# Patient Record
Sex: MALE | Race: BLACK OR AFRICAN AMERICAN | NOT HISPANIC OR LATINO | Employment: OTHER | URBAN - METROPOLITAN AREA
[De-identification: names, ages, dates, MRNs, and addresses within clinical notes are randomized per-mention and may not be internally consistent; named-entity substitution may affect disease eponyms.]

---

## 2022-07-13 ENCOUNTER — HOSPITAL ENCOUNTER (EMERGENCY)
Facility: HOSPITAL | Age: 87
Discharge: HOME | End: 2022-07-13
Attending: FAMILY MEDICINE
Payer: MEDICARE

## 2022-07-13 VITALS
HEART RATE: 80 BPM | TEMPERATURE: 99 F | DIASTOLIC BLOOD PRESSURE: 79 MMHG | OXYGEN SATURATION: 98 % | RESPIRATION RATE: 21 BRPM | SYSTOLIC BLOOD PRESSURE: 146 MMHG | WEIGHT: 181.13 LBS

## 2022-07-13 DIAGNOSIS — R06.02 SHORTNESS OF BREATH: ICD-10-CM

## 2022-07-13 DIAGNOSIS — J44.1 COPD EXACERBATION: Primary | ICD-10-CM

## 2022-07-13 LAB
ALBUMIN SERPL BCP-MCNC: 3.3 G/DL (ref 3.5–5.2)
ALLENS TEST: ABNORMAL
ALP SERPL-CCNC: 187 U/L (ref 55–135)
ALT SERPL W/O P-5'-P-CCNC: 16 U/L (ref 10–44)
ANION GAP SERPL CALC-SCNC: 12 MMOL/L (ref 8–16)
AST SERPL-CCNC: 13 U/L (ref 10–40)
BACTERIA #/AREA URNS HPF: ABNORMAL /HPF
BASOPHILS # BLD AUTO: 0.02 K/UL (ref 0–0.2)
BASOPHILS NFR BLD: 0.4 % (ref 0–1.9)
BILIRUB SERPL-MCNC: 1.6 MG/DL (ref 0.1–1)
BILIRUB UR QL STRIP: NEGATIVE
BUN SERPL-MCNC: 19 MG/DL (ref 8–23)
CALCIUM SERPL-MCNC: 8.8 MG/DL (ref 8.7–10.5)
CHLORIDE SERPL-SCNC: 117 MMOL/L (ref 95–110)
CLARITY UR: CLEAR
CO2 SERPL-SCNC: 16 MMOL/L (ref 23–29)
COLOR UR: YELLOW
CREAT SERPL-MCNC: 1.2 MG/DL (ref 0.5–1.4)
DELSYS: ABNORMAL
DIFFERENTIAL METHOD: ABNORMAL
EOSINOPHIL # BLD AUTO: 0.1 K/UL (ref 0–0.5)
EOSINOPHIL NFR BLD: 0.9 % (ref 0–8)
ERYTHROCYTE [DISTWIDTH] IN BLOOD BY AUTOMATED COUNT: 20.2 % (ref 11.5–14.5)
EST. GFR  (AFRICAN AMERICAN): >60 ML/MIN/1.73 M^2
EST. GFR  (NON AFRICAN AMERICAN): 53 ML/MIN/1.73 M^2
FIO2: 21
GLUCOSE SERPL-MCNC: 130 MG/DL (ref 70–110)
GLUCOSE UR QL STRIP: ABNORMAL
HCO3 UR-SCNC: 18.4 MMOL/L (ref 24–28)
HCT VFR BLD AUTO: 31.6 % (ref 40–54)
HGB BLD-MCNC: 9.6 G/DL (ref 14–18)
HGB UR QL STRIP: NEGATIVE
HYALINE CASTS #/AREA URNS LPF: 3 /LPF
IMM GRANULOCYTES # BLD AUTO: 0.01 K/UL (ref 0–0.04)
IMM GRANULOCYTES NFR BLD AUTO: 0.2 % (ref 0–0.5)
KETONES UR QL STRIP: NEGATIVE
LACTATE SERPL-SCNC: 2.2 MMOL/L (ref 0.5–2.2)
LEUKOCYTE ESTERASE UR QL STRIP: NEGATIVE
LYMPHOCYTES # BLD AUTO: 1.8 K/UL (ref 1–4.8)
LYMPHOCYTES NFR BLD: 33.5 % (ref 18–48)
MCH RBC QN AUTO: 25.9 PG (ref 27–31)
MCHC RBC AUTO-ENTMCNC: 30.4 G/DL (ref 32–36)
MCV RBC AUTO: 85 FL (ref 82–98)
MICROSCOPIC COMMENT: ABNORMAL
MODE: ABNORMAL
MONOCYTES # BLD AUTO: 0.7 K/UL (ref 0.3–1)
MONOCYTES NFR BLD: 12.8 % (ref 4–15)
NEUTROPHILS # BLD AUTO: 2.9 K/UL (ref 1.8–7.7)
NEUTROPHILS NFR BLD: 52.2 % (ref 38–73)
NITRITE UR QL STRIP: NEGATIVE
NRBC BLD-RTO: 0 /100 WBC
PCO2 BLDA: 24.3 MMHG (ref 35–45)
PH SMN: 7.49 [PH] (ref 7.35–7.45)
PH UR STRIP: 6 [PH] (ref 5–8)
PLATELET # BLD AUTO: 163 K/UL (ref 150–450)
PMV BLD AUTO: 10.8 FL (ref 9.2–12.9)
PO2 BLDA: 78 MMHG (ref 80–100)
POC BE: -5 MMOL/L
POC SATURATED O2: 97 % (ref 95–100)
POTASSIUM SERPL-SCNC: 3.7 MMOL/L (ref 3.5–5.1)
PROT SERPL-MCNC: 7 G/DL (ref 6–8.4)
PROT UR QL STRIP: ABNORMAL
RBC # BLD AUTO: 3.71 M/UL (ref 4.6–6.2)
RBC #/AREA URNS HPF: 2 /HPF (ref 0–4)
SAMPLE: ABNORMAL
SITE: ABNORMAL
SODIUM SERPL-SCNC: 145 MMOL/L (ref 136–145)
SP GR UR STRIP: 1.02 (ref 1–1.03)
URN SPEC COLLECT METH UR: ABNORMAL
UROBILINOGEN UR STRIP-ACNC: ABNORMAL EU/DL
WBC # BLD AUTO: 5.49 K/UL (ref 3.9–12.7)
WBC #/AREA URNS HPF: 4 /HPF (ref 0–5)
YEAST URNS QL MICRO: ABNORMAL

## 2022-07-13 PROCEDURE — 93005 ELECTROCARDIOGRAM TRACING: CPT

## 2022-07-13 PROCEDURE — 93010 ELECTROCARDIOGRAM REPORT: CPT | Mod: ,,, | Performed by: INTERNAL MEDICINE

## 2022-07-13 PROCEDURE — 83605 ASSAY OF LACTIC ACID: CPT | Performed by: FAMILY MEDICINE

## 2022-07-13 PROCEDURE — 81000 URINALYSIS NONAUTO W/SCOPE: CPT | Performed by: FAMILY MEDICINE

## 2022-07-13 PROCEDURE — 85025 COMPLETE CBC W/AUTO DIFF WBC: CPT | Performed by: FAMILY MEDICINE

## 2022-07-13 PROCEDURE — 94640 AIRWAY INHALATION TREATMENT: CPT | Mod: XB

## 2022-07-13 PROCEDURE — 99900035 HC TECH TIME PER 15 MIN (STAT)

## 2022-07-13 PROCEDURE — 80053 COMPREHEN METABOLIC PANEL: CPT | Performed by: FAMILY MEDICINE

## 2022-07-13 PROCEDURE — 87040 BLOOD CULTURE FOR BACTERIA: CPT | Performed by: FAMILY MEDICINE

## 2022-07-13 PROCEDURE — 36600 WITHDRAWAL OF ARTERIAL BLOOD: CPT

## 2022-07-13 PROCEDURE — 99291 CRITICAL CARE FIRST HOUR: CPT | Mod: 25

## 2022-07-13 PROCEDURE — 93010 EKG 12-LEAD: ICD-10-PCS | Mod: ,,, | Performed by: INTERNAL MEDICINE

## 2022-07-13 PROCEDURE — 25000242 PHARM REV CODE 250 ALT 637 W/ HCPCS: Performed by: FAMILY MEDICINE

## 2022-07-13 RX ORDER — ALBUTEROL SULFATE 90 UG/1
1-2 AEROSOL, METERED RESPIRATORY (INHALATION) EVERY 6 HOURS PRN
Qty: 6.7 G | Refills: 0 | Status: SHIPPED | OUTPATIENT
Start: 2022-07-13

## 2022-07-13 RX ORDER — IPRATROPIUM BROMIDE AND ALBUTEROL SULFATE 2.5; .5 MG/3ML; MG/3ML
3 SOLUTION RESPIRATORY (INHALATION)
Status: COMPLETED | OUTPATIENT
Start: 2022-07-13 | End: 2022-07-13

## 2022-07-13 RX ADMIN — IPRATROPIUM BROMIDE AND ALBUTEROL SULFATE 3 ML: .5; 2.5 SOLUTION RESPIRATORY (INHALATION) at 05:07

## 2022-07-13 NOTE — ED PROVIDER NOTES
SCRIBE #1 NOTE: I, Jeyreid Humphries, am scribing for, and in the presence of, Carolina Vee MD. I have scribed the entire note.       History     Chief Complaint   Patient presents with    Shortness of Breath     Pt. Presents to ED via EMS due to having shortness of breath for the past 2 days. Pt has a new DX of CHF.      Review of patient's allergies indicates:   Allergen Reactions    Penicillins          History of Present Illness     HPI    7/13/2022, 5:21 PM  History obtained from the patient and EMS      History of Present Illness: Joseph Toussaint is a 89 y.o. male patient with a PMHx of CHF and COPD who presents to the Emergency Department for evaluation of SOB x 1 week. Per EMS, pt was found on front porch in respirator distress. Pt was placed on 8L nonrebreather SpO2 100%. Pt states he smokes 1 pack per day and is not on home O2. Pt does not have inhaler or nebulizer at home. Symptoms are constant and moderate in severity. No mitigating or exacerbating factors reported. Associated sxs include cough and wheezing. Patient denies any CP, HA, n/v, congestion, fever, and all other sxs at this time. No further complaints or concerns at this time.       Arrival mode: EMS    PCP: Primary Doctor No        Past Medical History:  Past Medical History:   Diagnosis Date    CHF (congestive heart failure)     COPD (chronic obstructive pulmonary disease)     HTN (hypertension)        Past Surgical History:  No past surgical history on file.      Family History:  No family history on file.    Social History:  Social History     Tobacco Use    Smoking status: Current Every Day Smoker     Packs/day: 1.00     Types: Cigarettes    Smokeless tobacco: Never Used   Substance and Sexual Activity    Alcohol use: Yes     Comment: occasionally    Drug use: Not Currently    Sexual activity: Not Currently        Review of Systems     Review of Systems   Constitutional: Negative for fever.   HENT: Negative for congestion and  sore throat.    Respiratory: Positive for cough, shortness of breath and wheezing.    Cardiovascular: Negative for chest pain.   Gastrointestinal: Negative for nausea.   Genitourinary: Negative for dysuria.   Musculoskeletal: Negative for back pain.   Skin: Negative for rash.   Neurological: Negative for weakness.   Hematological: Does not bruise/bleed easily.   All other systems reviewed and are negative.     Physical Exam     Initial Vitals   BP Pulse Resp Temp SpO2   07/13/22 1715 07/13/22 1715 07/13/22 1715 07/13/22 1740 07/13/22 1715   (!) 142/74 88 (!) 22 98.1 °F (36.7 °C) 99 %      MAP       --                 Physical Exam  Nursing Notes and Vital Signs Reviewed.  Constitutional: Patient is in moderate distress. Well-developed and well-nourished.  Head: Atraumatic. Normocephalic.  Eyes: PERRL. EOM intact. Conjunctivae are not pale. No scleral icterus.  ENT: Mucous membranes are moist. Oropharynx is clear and symmetric.    Neck: Supple. Full ROM. No lymphadenopathy.  Cardiovascular: Regular rate. Regular rhythm. No murmurs, rubs, or gallops. Distal pulses are 2+ and symmetric.  Pulmonary/Chest: Pt is in respiratory distress. Tachypneic. Expiratory wheezing in bilateral lung bases  Abdominal: Soft and non-distended.  There is no tenderness.  No rebound, guarding, or rigidity. Good bowel sounds.  Genitourinary: No CVA tenderness  Musculoskeletal: Moves all extremities. No obvious deformities. 1+ edema BLE. No calf tenderness.  Skin: Warm and dry.  Neurological:  Alert, awake, and appropriate.  Normal speech.  No acute focal neurological deficits are appreciated.  Psychiatric: Normal affect. Good eye contact. Appropriate in content.     ED Course   Critical Care    Date/Time: 7/13/2022 11:48 PM  Performed by: Carolina Vee MD  Authorized by: Carolina Vee MD   Direct patient critical care time: 20 minutes  Additional history critical care time: 5 minutes  Ordering / reviewing critical care time: 5  minutes  Documentation critical care time: 5 minutes  Consulting other physicians critical care time: 5 minutes  Consult with family critical care time: 5 minutes  Total critical care time (exclusive of procedural time) : 45 minutes  Critical care time was exclusive of teaching time and separately billable procedures and treating other patients.  Critical care was necessary to treat or prevent imminent or life-threatening deterioration of the following conditions: respiratory failure.  Critical care was time spent personally by me on the following activities: blood draw for specimens, development of treatment plan with patient or surrogate, discussions with consultants, interpretation of cardiac output measurements, evaluation of patient's response to treatment, examination of patient, obtaining history from patient or surrogate, ordering and performing treatments and interventions, ordering and review of laboratory studies, ordering and review of radiographic studies, pulse oximetry, re-evaluation of patient's condition and review of old charts.        ED Vital Signs:  Vitals:    07/13/22 1715 07/13/22 1734 07/13/22 1735 07/13/22 1740   BP: (!) 142/74 122/82     Pulse: 88 97 87    Resp: (!) 22 20 (!) 22 (!) 24   Temp:    98.1 °F (36.7 °C)   TempSrc:    Oral   SpO2: 99% 98% 96% 97%   Weight: 82.1 kg (181 lb 1.6 oz)       07/13/22 1745 07/13/22 1747 07/13/22 1833 07/13/22 1900   BP:   (!) 154/98 (!) 158/98   Pulse:  89 94 90   Resp: (!) 27 (!) 26 20 (!) 23   Temp:       TempSrc:       SpO2: 97% 97% (!) 94% 96%   Weight:        07/13/22 1930 07/13/22 2000 07/13/22 2106   BP: (!) 153/92 (!) 144/89 (!) 146/79   Pulse: 89 86 80   Resp: (!) 22 (!) 23 (!) 21   Temp:   98.5 °F (36.9 °C)   TempSrc:   Oral   SpO2: 95% 97% 98%   Weight:          Abnormal Lab Results:  Labs Reviewed   CBC W/ AUTO DIFFERENTIAL - Abnormal; Notable for the following components:       Result Value    RBC 3.71 (*)     Hemoglobin 9.6 (*)      Hematocrit 31.6 (*)     MCH 25.9 (*)     MCHC 30.4 (*)     RDW 20.2 (*)     All other components within normal limits   COMPREHENSIVE METABOLIC PANEL - Abnormal; Notable for the following components:    Chloride 117 (*)     CO2 16 (*)     Glucose 130 (*)     Albumin 3.3 (*)     Total Bilirubin 1.6 (*)     Alkaline Phosphatase 187 (*)     eGFR if non  53 (*)     All other components within normal limits   URINALYSIS, REFLEX TO URINE CULTURE - Abnormal; Notable for the following components:    Protein, UA 1+ (*)     Glucose, UA 4+ (*)     Urobilinogen, UA 2.0-3.0 (*)     All other components within normal limits    Narrative:     Specimen Source->Urine   URINALYSIS MICROSCOPIC - Abnormal; Notable for the following components:    Hyaline Casts, UA 3 (*)     All other components within normal limits    Narrative:     Specimen Source->Urine   ISTAT PROCEDURE - Abnormal; Notable for the following components:    POC PH 7.488 (*)     POC PCO2 24.3 (*)     POC PO2 78 (*)     POC HCO3 18.4 (*)     All other components within normal limits   CULTURE, BLOOD    Narrative:     Aerobic and anaerobic   CULTURE, BLOOD    Narrative:     Aerobic and anaerobic   LACTIC ACID, PLASMA        All Lab Results:  Results for orders placed or performed during the hospital encounter of 07/13/22   Blood culture x two cultures. Draw prior to antibiotics.    Specimen: Peripheral, Antecubital, Left; Blood   Result Value Ref Range    Blood Culture, Routine No Growth to date     Blood Culture, Routine No Growth to date    Blood culture x two cultures. Draw prior to antibiotics.    Specimen: Peripheral, Antecubital, Left; Blood   Result Value Ref Range    Blood Culture, Routine No Growth to date     Blood Culture, Routine No Growth to date    CBC auto differential   Result Value Ref Range    WBC 5.49 3.90 - 12.70 K/uL    RBC 3.71 (L) 4.60 - 6.20 M/uL    Hemoglobin 9.6 (L) 14.0 - 18.0 g/dL    Hematocrit 31.6 (L) 40.0 - 54.0 %    MCV 85  82 - 98 fL    MCH 25.9 (L) 27.0 - 31.0 pg    MCHC 30.4 (L) 32.0 - 36.0 g/dL    RDW 20.2 (H) 11.5 - 14.5 %    Platelets 163 150 - 450 K/uL    MPV 10.8 9.2 - 12.9 fL    Immature Granulocytes 0.2 0.0 - 0.5 %    Gran # (ANC) 2.9 1.8 - 7.7 K/uL    Immature Grans (Abs) 0.01 0.00 - 0.04 K/uL    Lymph # 1.8 1.0 - 4.8 K/uL    Mono # 0.7 0.3 - 1.0 K/uL    Eos # 0.1 0.0 - 0.5 K/uL    Baso # 0.02 0.00 - 0.20 K/uL    nRBC 0 0 /100 WBC    Gran % 52.2 38.0 - 73.0 %    Lymph % 33.5 18.0 - 48.0 %    Mono % 12.8 4.0 - 15.0 %    Eosinophil % 0.9 0.0 - 8.0 %    Basophil % 0.4 0.0 - 1.9 %    Differential Method Automated    Comprehensive metabolic panel   Result Value Ref Range    Sodium 145 136 - 145 mmol/L    Potassium 3.7 3.5 - 5.1 mmol/L    Chloride 117 (H) 95 - 110 mmol/L    CO2 16 (L) 23 - 29 mmol/L    Glucose 130 (H) 70 - 110 mg/dL    BUN 19 8 - 23 mg/dL    Creatinine 1.2 0.5 - 1.4 mg/dL    Calcium 8.8 8.7 - 10.5 mg/dL    Total Protein 7.0 6.0 - 8.4 g/dL    Albumin 3.3 (L) 3.5 - 5.2 g/dL    Total Bilirubin 1.6 (H) 0.1 - 1.0 mg/dL    Alkaline Phosphatase 187 (H) 55 - 135 U/L    AST 13 10 - 40 U/L    ALT 16 10 - 44 U/L    Anion Gap 12 8 - 16 mmol/L    eGFR if African American >60 >60 mL/min/1.73 m^2    eGFR if non African American 53 (A) >60 mL/min/1.73 m^2   Lactic acid, plasma #1   Result Value Ref Range    Lactate (Lactic Acid) 2.2 0.5 - 2.2 mmol/L   Urinalysis, Reflex to Urine Culture Urine, Clean Catch    Specimen: Urine   Result Value Ref Range    Specimen UA Urine, Clean Catch     Color, UA Yellow Yellow, Straw, Zully    Appearance, UA Clear Clear    pH, UA 6.0 5.0 - 8.0    Specific Gravity, UA 1.020 1.005 - 1.030    Protein, UA 1+ (A) Negative    Glucose, UA 4+ (A) Negative    Ketones, UA Negative Negative    Bilirubin (UA) Negative Negative    Occult Blood UA Negative Negative    Nitrite, UA Negative Negative    Urobilinogen, UA 2.0-3.0 (A) <2.0 EU/dL    Leukocytes, UA Negative Negative   Urinalysis Microscopic   Result  Value Ref Range    RBC, UA 2 0 - 4 /hpf    WBC, UA 4 0 - 5 /hpf    Bacteria None None-Occ /hpf    Yeast, UA None None    Hyaline Casts, UA 3 (A) 0-1/lpf /lpf    Microscopic Comment SEE COMMENT    ISTAT PROCEDURE   Result Value Ref Range    POC PH 7.488 (H) 7.35 - 7.45    POC PCO2 24.3 (LL) 35 - 45 mmHg    POC PO2 78 (L) 80 - 100 mmHg    POC HCO3 18.4 (L) 24 - 28 mmol/L    POC BE -5 -2 to 2 mmol/L    POC SATURATED O2 97 95 - 100 %    Sample ARTERIAL     Site LR     Allens Test Pass     DelSys Room Air     Mode SPONT     FiO2 21          Imaging Results:  Imaging Results          X-Ray Chest AP Portable (Final result)  Result time 07/13/22 18:19:09    Final result by Leonid Robin MD (07/13/22 18:19:09)                 Impression:      No acute abnormality.      Electronically signed by: Leonid Robin  Date:    07/13/2022  Time:    18:19             Narrative:    EXAMINATION:  XR CHEST AP PORTABLE    CLINICAL HISTORY:  Sepsis;    TECHNIQUE:  Single frontal view of the chest was performed.    COMPARISON:  None    FINDINGS:  The lungs are clear, with normal appearance of pulmonary vasculature and no pleural effusion or pneumothorax.    The cardiac silhouette is borderline enlarged.  The hilar and mediastinal contours are unremarkable.    Bones are intact.                                 The EKG was ordered, reviewed, and independently interpreted by the ED provider.  Interpretation time: 17:12  Rate: 90 BPM  Rhythm: Sinus rhythm with sinus arrhythmia with 1st degree A-V block  Interpretation: Left axis deviaiton. Low voltage QRS. Cannot ruleout anterior infarct. No STEMI.           The Emergency Provider reviewed the vital signs and test results, which are outlined above.     ED Discussion       7:54 PM: Reassessed pt at this time. Discussed with pt all pertinent ED information and results. Discussed pt dx and plan of tx. Gave pt all f/u and return to the ED instructions. All questions and concerns were addressed at  this time. Pt expresses understanding of information and instructions, and is comfortable with plan to discharge. Pt is stable for discharge.    I discussed with patient and/or family/caretaker that evaluation in the ED does not suggest any emergent or life threatening medical conditions requiring immediate intervention beyond what was provided in the ED, and I believe patient is safe for discharge.  Regardless, an unremarkable evaluation in the ED does not preclude the development or presence of a serious of life threatening condition. As such, patient was instructed to return immediately for any worsening or change in current symptoms.       Medical Decision Making:   Clinical Tests:   Lab Tests: Ordered and Reviewed  Radiological Study: Reviewed and Ordered  Medical Tests: Ordered and Reviewed           ED Medication(s):  Medications   albuterol-ipratropium 2.5 mg-0.5 mg/3 mL nebulizer solution 3 mL (3 mLs Nebulization Given 7/13/22 1527)       Discharge Medication List as of 7/13/2022  7:52 PM      START taking these medications    Details   albuterol (PROVENTIL/VENTOLIN HFA) 90 mcg/actuation inhaler Inhale 1-2 puffs into the lungs every 6 (six) hours as needed for Wheezing. Rescue, Starting Wed 7/13/2022, Print                     Scribe Attestation:   Scribe #1: I performed the above scribed service and the documentation accurately describes the services I performed. I attest to the accuracy of the note.     Attending:   Physician Attestation Statement for Scribe #1: I, Carolina Vee MD, personally performed the services described in this documentation, as scribed by Jey Humphries, in my presence, and it is both accurate and complete.           Clinical Impression       ICD-10-CM ICD-9-CM   1. COPD exacerbation  J44.1 491.21   2. Shortness of breath  R06.02 786.05       Disposition:   Disposition: Discharged  Condition: Stable         Carolina Vee MD  07/15/22 6911

## 2022-07-19 ENCOUNTER — HOSPITAL ENCOUNTER (INPATIENT)
Facility: HOSPITAL | Age: 87
LOS: 7 days | Discharge: HOME | DRG: 291 | End: 2022-07-26
Attending: EMERGENCY MEDICINE | Admitting: INTERNAL MEDICINE
Payer: MEDICARE

## 2022-07-19 DIAGNOSIS — I10 ESSENTIAL HYPERTENSION: ICD-10-CM

## 2022-07-19 DIAGNOSIS — I50.23 ACUTE ON CHRONIC SYSTOLIC CONGESTIVE HEART FAILURE: ICD-10-CM

## 2022-07-19 DIAGNOSIS — I48.91 NEW ONSET ATRIAL FIBRILLATION: ICD-10-CM

## 2022-07-19 DIAGNOSIS — E78.2 MIXED HYPERLIPIDEMIA: ICD-10-CM

## 2022-07-19 DIAGNOSIS — I50.9 CHF (CONGESTIVE HEART FAILURE): ICD-10-CM

## 2022-07-19 DIAGNOSIS — I50.9 ACUTE CONGESTIVE HEART FAILURE, UNSPECIFIED HEART FAILURE TYPE: Primary | ICD-10-CM

## 2022-07-19 DIAGNOSIS — R06.02 SHORTNESS OF BREATH: ICD-10-CM

## 2022-07-19 DIAGNOSIS — R06.82 TACHYPNEA: ICD-10-CM

## 2022-07-19 LAB
ALBUMIN SERPL BCP-MCNC: 3.6 G/DL (ref 3.5–5.2)
ALP SERPL-CCNC: 178 U/L (ref 55–135)
ALT SERPL W/O P-5'-P-CCNC: 14 U/L (ref 10–44)
ANION GAP SERPL CALC-SCNC: 11 MMOL/L (ref 8–16)
AST SERPL-CCNC: 12 U/L (ref 10–40)
BACTERIA BLD CULT: NORMAL
BACTERIA BLD CULT: NORMAL
BASOPHILS # BLD AUTO: 0.01 K/UL (ref 0–0.2)
BASOPHILS NFR BLD: 0.2 % (ref 0–1.9)
BILIRUB SERPL-MCNC: 1.7 MG/DL (ref 0.1–1)
BNP SERPL-MCNC: >4900 PG/ML (ref 0–99)
BUN SERPL-MCNC: 26 MG/DL (ref 8–23)
CALCIUM SERPL-MCNC: 9.4 MG/DL (ref 8.7–10.5)
CHLORIDE SERPL-SCNC: 115 MMOL/L (ref 95–110)
CO2 SERPL-SCNC: 17 MMOL/L (ref 23–29)
CREAT SERPL-MCNC: 1.4 MG/DL (ref 0.5–1.4)
D DIMER PPP IA.FEU-MCNC: 1.2 MG/L FEU
DIFFERENTIAL METHOD: ABNORMAL
EOSINOPHIL # BLD AUTO: 0.1 K/UL (ref 0–0.5)
EOSINOPHIL NFR BLD: 1.3 % (ref 0–8)
ERYTHROCYTE [DISTWIDTH] IN BLOOD BY AUTOMATED COUNT: 19.9 % (ref 11.5–14.5)
EST. GFR  (AFRICAN AMERICAN): 51 ML/MIN/1.73 M^2
EST. GFR  (NON AFRICAN AMERICAN): 44 ML/MIN/1.73 M^2
GLUCOSE SERPL-MCNC: 113 MG/DL (ref 70–110)
HCT VFR BLD AUTO: 35.6 % (ref 40–54)
HGB BLD-MCNC: 10.8 G/DL (ref 14–18)
IMM GRANULOCYTES # BLD AUTO: 0.02 K/UL (ref 0–0.04)
IMM GRANULOCYTES NFR BLD AUTO: 0.4 % (ref 0–0.5)
LYMPHOCYTES # BLD AUTO: 1.1 K/UL (ref 1–4.8)
LYMPHOCYTES NFR BLD: 22.9 % (ref 18–48)
MCH RBC QN AUTO: 25.8 PG (ref 27–31)
MCHC RBC AUTO-ENTMCNC: 30.3 G/DL (ref 32–36)
MCV RBC AUTO: 85 FL (ref 82–98)
MONOCYTES # BLD AUTO: 0.4 K/UL (ref 0.3–1)
MONOCYTES NFR BLD: 9 % (ref 4–15)
NEUTROPHILS # BLD AUTO: 3.2 K/UL (ref 1.8–7.7)
NEUTROPHILS NFR BLD: 66.2 % (ref 38–73)
NRBC BLD-RTO: 0 /100 WBC
PLATELET # BLD AUTO: 180 K/UL (ref 150–450)
PMV BLD AUTO: 10.8 FL (ref 9.2–12.9)
POTASSIUM SERPL-SCNC: 4 MMOL/L (ref 3.5–5.1)
PROT SERPL-MCNC: 7.4 G/DL (ref 6–8.4)
RBC # BLD AUTO: 4.18 M/UL (ref 4.6–6.2)
SARS-COV-2 RDRP RESP QL NAA+PROBE: NEGATIVE
SODIUM SERPL-SCNC: 143 MMOL/L (ref 136–145)
TROPONIN I SERPL DL<=0.01 NG/ML-MCNC: 0.03 NG/ML (ref 0–0.03)
WBC # BLD AUTO: 4.8 K/UL (ref 3.9–12.7)

## 2022-07-19 PROCEDURE — 96374 THER/PROPH/DIAG INJ IV PUSH: CPT

## 2022-07-19 PROCEDURE — 80053 COMPREHEN METABOLIC PANEL: CPT | Performed by: PHYSICIAN ASSISTANT

## 2022-07-19 PROCEDURE — 25000003 PHARM REV CODE 250: Performed by: INTERNAL MEDICINE

## 2022-07-19 PROCEDURE — 63600175 PHARM REV CODE 636 W HCPCS: Performed by: INTERNAL MEDICINE

## 2022-07-19 PROCEDURE — 85379 FIBRIN DEGRADATION QUANT: CPT | Performed by: INTERNAL MEDICINE

## 2022-07-19 PROCEDURE — 36415 COLL VENOUS BLD VENIPUNCTURE: CPT | Performed by: INTERNAL MEDICINE

## 2022-07-19 PROCEDURE — 84484 ASSAY OF TROPONIN QUANT: CPT | Performed by: PHYSICIAN ASSISTANT

## 2022-07-19 PROCEDURE — 99285 EMERGENCY DEPT VISIT HI MDM: CPT | Mod: 25

## 2022-07-19 PROCEDURE — U0002 COVID-19 LAB TEST NON-CDC: HCPCS | Performed by: PHYSICIAN ASSISTANT

## 2022-07-19 PROCEDURE — 63600175 PHARM REV CODE 636 W HCPCS: Performed by: EMERGENCY MEDICINE

## 2022-07-19 PROCEDURE — 21400001 HC TELEMETRY ROOM

## 2022-07-19 PROCEDURE — 93005 ELECTROCARDIOGRAM TRACING: CPT

## 2022-07-19 PROCEDURE — 93010 EKG 12-LEAD: ICD-10-PCS | Mod: ,,, | Performed by: INTERNAL MEDICINE

## 2022-07-19 PROCEDURE — 85025 COMPLETE CBC W/AUTO DIFF WBC: CPT | Performed by: PHYSICIAN ASSISTANT

## 2022-07-19 PROCEDURE — 93010 ELECTROCARDIOGRAM REPORT: CPT | Mod: ,,, | Performed by: INTERNAL MEDICINE

## 2022-07-19 PROCEDURE — A4216 STERILE WATER/SALINE, 10 ML: HCPCS | Performed by: INTERNAL MEDICINE

## 2022-07-19 PROCEDURE — 83880 ASSAY OF NATRIURETIC PEPTIDE: CPT | Performed by: PHYSICIAN ASSISTANT

## 2022-07-19 RX ORDER — LANOLIN ALCOHOL/MO/W.PET/CERES
800 CREAM (GRAM) TOPICAL
Status: DISCONTINUED | OUTPATIENT
Start: 2022-07-19 | End: 2022-07-26 | Stop reason: HOSPADM

## 2022-07-19 RX ORDER — IBUPROFEN 200 MG
16 TABLET ORAL
Status: DISCONTINUED | OUTPATIENT
Start: 2022-07-19 | End: 2022-07-26 | Stop reason: HOSPADM

## 2022-07-19 RX ORDER — NALOXONE HCL 0.4 MG/ML
0.02 VIAL (ML) INJECTION
Status: DISCONTINUED | OUTPATIENT
Start: 2022-07-19 | End: 2022-07-26 | Stop reason: HOSPADM

## 2022-07-19 RX ORDER — IBUPROFEN 200 MG
24 TABLET ORAL
Status: DISCONTINUED | OUTPATIENT
Start: 2022-07-19 | End: 2022-07-26 | Stop reason: HOSPADM

## 2022-07-19 RX ORDER — SODIUM,POTASSIUM PHOSPHATES 280-250MG
2 POWDER IN PACKET (EA) ORAL
Status: DISCONTINUED | OUTPATIENT
Start: 2022-07-19 | End: 2022-07-26 | Stop reason: HOSPADM

## 2022-07-19 RX ORDER — HYDROCODONE BITARTRATE AND ACETAMINOPHEN 5; 325 MG/1; MG/1
1 TABLET ORAL EVERY 6 HOURS PRN
Status: DISCONTINUED | OUTPATIENT
Start: 2022-07-19 | End: 2022-07-26 | Stop reason: HOSPADM

## 2022-07-19 RX ORDER — FUROSEMIDE 10 MG/ML
60 INJECTION INTRAMUSCULAR; INTRAVENOUS
Status: COMPLETED | OUTPATIENT
Start: 2022-07-19 | End: 2022-07-19

## 2022-07-19 RX ORDER — SODIUM CHLORIDE 0.9 % (FLUSH) 0.9 %
10 SYRINGE (ML) INJECTION EVERY 8 HOURS
Status: DISCONTINUED | OUTPATIENT
Start: 2022-07-19 | End: 2022-07-26 | Stop reason: HOSPADM

## 2022-07-19 RX ORDER — TALC
6 POWDER (GRAM) TOPICAL NIGHTLY PRN
Status: DISCONTINUED | OUTPATIENT
Start: 2022-07-19 | End: 2022-07-26 | Stop reason: HOSPADM

## 2022-07-19 RX ORDER — FUROSEMIDE 10 MG/ML
40 INJECTION INTRAMUSCULAR; INTRAVENOUS DAILY
Status: DISCONTINUED | OUTPATIENT
Start: 2022-07-19 | End: 2022-07-21

## 2022-07-19 RX ORDER — ACETAMINOPHEN 325 MG/1
650 TABLET ORAL EVERY 4 HOURS PRN
Status: DISCONTINUED | OUTPATIENT
Start: 2022-07-19 | End: 2022-07-26 | Stop reason: HOSPADM

## 2022-07-19 RX ORDER — ENOXAPARIN SODIUM 100 MG/ML
40 INJECTION SUBCUTANEOUS EVERY 24 HOURS
Status: DISCONTINUED | OUTPATIENT
Start: 2022-07-19 | End: 2022-07-26 | Stop reason: HOSPADM

## 2022-07-19 RX ORDER — POLYETHYLENE GLYCOL 3350 17 G/17G
17 POWDER, FOR SOLUTION ORAL DAILY PRN
Status: DISCONTINUED | OUTPATIENT
Start: 2022-07-19 | End: 2022-07-26 | Stop reason: HOSPADM

## 2022-07-19 RX ORDER — GLUCAGON 1 MG
1 KIT INJECTION
Status: DISCONTINUED | OUTPATIENT
Start: 2022-07-19 | End: 2022-07-26 | Stop reason: HOSPADM

## 2022-07-19 RX ADMIN — ENOXAPARIN SODIUM 40 MG: 100 INJECTION SUBCUTANEOUS at 10:07

## 2022-07-19 RX ADMIN — FUROSEMIDE 60 MG: 10 INJECTION, SOLUTION INTRAMUSCULAR; INTRAVENOUS at 06:07

## 2022-07-19 RX ADMIN — Medication 10 ML: at 10:07

## 2022-07-19 RX ADMIN — FUROSEMIDE 40 MG: 10 INJECTION, SOLUTION INTRAMUSCULAR; INTRAVENOUS at 11:07

## 2022-07-19 NOTE — ED PROVIDER NOTES
Encounter Date: 7/19/2022       History     Chief Complaint   Patient presents with    Shortness of Breath     x1 week, audible wheezing in triage. Hx COPD, CHF.     HPI   89-year-old  male with history of COPD and CHF presenting to the emergency department complaining of worsening shortness of breath over the past week.  Patient notes cough and wheezing with some chills.  Denies any fever.  No runny nose sore throat.  He has all intermittent chest tightness but denies chest pain.  Has no pedal edema.  Patient denies irregular heart beat.  He does continue to smoke.  I patient denies orthopnea.    Review of patient's allergies indicates:   Allergen Reactions    Penicillins      Past Medical History:   Diagnosis Date    CHF (congestive heart failure)     COPD (chronic obstructive pulmonary disease)     HTN (hypertension)      History reviewed. No pertinent surgical history.  History reviewed. No pertinent family history.  Social History     Tobacco Use    Smoking status: Current Every Day Smoker     Packs/day: 1.00     Types: Cigarettes    Smokeless tobacco: Never Used   Substance Use Topics    Alcohol use: Yes     Comment: occasionally    Drug use: Not Currently     Review of Systems   Constitutional: Positive for chills. Negative for fever.   HENT: Negative for congestion, rhinorrhea and sore throat.    Respiratory: Positive for cough, chest tightness, shortness of breath and wheezing.    Cardiovascular: Negative for chest pain, palpitations and leg swelling.   Gastrointestinal: Negative for nausea and vomiting.   Musculoskeletal: Negative for arthralgias and myalgias.   All other systems reviewed and are negative.      Physical Exam     Initial Vitals   BP Pulse Resp Temp SpO2   07/19/22 1743 07/19/22 1733 07/19/22 1733 07/19/22 1733 07/19/22 1733   (!) 134/94 85 (!) 30 97.9 °F (36.6 °C) (!) 94 %      MAP       --                Physical Exam  Nursing Notes and Vital Signs  Reviewed.  Constitutional: Patient is in no acute distress. Well-developed and well-nourished.  Head: Atraumatic. Normocephalic.  Eyes:  EOM intact.  No scleral icterus.  ENT: Mucous membranes are moist.  Nares clear   Neck:  Full ROM. No JVD.  Cardiovascular:  Irregularly irregular rate and rhythm.Pulmonary/Chest:  Patient is tachypneic.  He is dyspneic with short sentences due to shortness of breath.  Questionable crackles in the bilateral base ease.  No wheezing.    Abdominal: Soft and non-distended.  There is no tenderness.  No rebound, guarding, or rigidity. Good bowel sounds.  Genitourinary: No CVA tenderness.  No suprapubic tenderness  Musculoskeletal: Moves all extremities. No obvious deformities.  5 x 5 strength in all extremities   Skin: Warm and dry.  Neurological:  Alert, awake, and appropriate.  Normal speech.  No acute focal neurological deficits are appreciated.  Two through 12 intact bilaterally.  Psychiatric: Normal affect. Good eye contact. Appropriate in content.    ED Course   Procedures  Labs Reviewed   CBC W/ AUTO DIFFERENTIAL - Abnormal; Notable for the following components:       Result Value    RBC 4.18 (*)     Hemoglobin 10.8 (*)     Hematocrit 35.6 (*)     MCH 25.8 (*)     MCHC 30.3 (*)     RDW 19.9 (*)     All other components within normal limits   COMPREHENSIVE METABOLIC PANEL - Abnormal; Notable for the following components:    Chloride 115 (*)     CO2 17 (*)     Glucose 113 (*)     BUN 26 (*)     Total Bilirubin 1.7 (*)     Alkaline Phosphatase 178 (*)     eGFR if  51 (*)     eGFR if non  44 (*)     All other components within normal limits   B-TYPE NATRIURETIC PEPTIDE - Abnormal; Notable for the following components:    BNP >4,900 (*)     All other components within normal limits   TROPONIN I   SARS-COV-2 RNA AMPLIFICATION, QUAL     EKG Readings: (Independently Interpreted)   Initial Reading: No STEMI. Rhythm: Atrial Fibrillation. Heart Rate: 71.  Ectopy: No Ectopy. ST Segments: Normal ST Segments. T Waves: Normal. Axis: Right Axis Deviation.     7:04 PM: Discussed case with Sloane Rosario NP ([facility]). Dr. Malhotra agrees with current care and management of pt and accepts admission.   Admitting Service: Hospital Medicine  Admitting Physician: Dr. Malhotra  Admit to: Inpatient Tele    7:14 PM: Re-evaluated pt. I have discussed test results, shared treatment plan, and the need for admission with patient and family at bedside. Pt and family express understanding at this time and agree with all information. All questions answered. Pt and family have no further questions or concerns at this time. Pt is ready for admit.    7:10 PM  Patient with new onset AFib worsening congestive heart failure.  He is tachypneic with mild respiratory distress.  Patient being admitted to Hospital Medicine for further evaluation workup and treatment.  Patient is aware.    Imaging Results          X-Ray Chest AP Portable (Final result)  Result time 07/19/22 18:23:43    Final result by Vinnie Elliott MD (07/19/22 18:23:43)                 Impression:      Cardiomegaly with perihilar edema.  Mild pleural effusions suspected.  Correlate clinically to CHF.  Underlying pneumonia not excluded      Electronically signed by: Earl Birmingham  Date:    07/19/2022  Time:    18:23             Narrative:    EXAMINATION:  XR CHEST AP PORTABLE    CLINICAL HISTORY:  CHF;    TECHNIQUE:  Single frontal view of the chest was performed.    COMPARISON:  None    FINDINGS:  Cardiomegaly with perihilar edema.  Mild pleural effusions.    Bones are intact.                                 Medications   furosemide injection 60 mg (60 mg Intravenous Given 7/19/22 1812)     Medical Decision Making:   Clinical Tests:   Lab Tests: Ordered and Reviewed  Radiological Study: Ordered and Reviewed  Medical Tests: Ordered and Reviewed                      Clinical Impression:   Final diagnoses:  [R06.02] Shortness  of breath  [I50.9] Acute congestive heart failure, unspecified heart failure type (Primary)  [I48.91] New onset atrial fibrillation  [R06.82] Tachypnea          ED Disposition Condition    Admit               Guero Rosa Jr., MD  07/19/22 1910

## 2022-07-19 NOTE — FIRST PROVIDER EVALUATION
Medical screening exam completed.  I have conducted a focused provider triage encounter, findings are as follows:    Brief history of present illness:  Sob for few days.  Hx of CHF and COPD    There were no vitals filed for this visit.    Pertinent physical exam:  sob        Preliminary workup initiated; this workup will be continued and followed by the physician or advanced practice provider that is assigned to the patient when roomed.

## 2022-07-20 PROBLEM — I25.10 CORONARY ARTERY DISEASE INVOLVING NATIVE CORONARY ARTERY OF NATIVE HEART WITHOUT ANGINA PECTORIS: Status: ACTIVE | Noted: 2018-10-26

## 2022-07-20 PROBLEM — J44.9 COPD SUGGESTED BY INITIAL EVALUATION: Status: ACTIVE | Noted: 2022-06-13

## 2022-07-20 PROBLEM — E11.42 TYPE 2 DIABETES MELLITUS WITH DIABETIC POLYNEUROPATHY, WITHOUT LONG-TERM CURRENT USE OF INSULIN: Status: ACTIVE | Noted: 2018-10-26

## 2022-07-20 PROBLEM — I10 ESSENTIAL HYPERTENSION: Status: ACTIVE | Noted: 2018-10-26

## 2022-07-20 PROBLEM — I44.1 AV BLOCK, 2ND DEGREE: Status: ACTIVE | Noted: 2019-01-18

## 2022-07-20 PROBLEM — E78.2 MIXED HYPERLIPIDEMIA: Status: ACTIVE | Noted: 2018-10-26

## 2022-07-20 PROBLEM — I50.21 ACUTE HFREF (HEART FAILURE WITH REDUCED EJECTION FRACTION): Status: ACTIVE | Noted: 2019-01-18

## 2022-07-20 PROBLEM — N40.1 BENIGN PROSTATIC HYPERPLASIA WITH NOCTURIA: Status: ACTIVE | Noted: 2018-10-26

## 2022-07-20 PROBLEM — R35.1 BENIGN PROSTATIC HYPERPLASIA WITH NOCTURIA: Status: ACTIVE | Noted: 2018-10-26

## 2022-07-20 PROBLEM — I50.23 ACUTE ON CHRONIC SYSTOLIC CONGESTIVE HEART FAILURE: Status: ACTIVE | Noted: 2022-07-20

## 2022-07-20 LAB
ALBUMIN SERPL BCP-MCNC: 3.6 G/DL (ref 3.5–5.2)
ALP SERPL-CCNC: 171 U/L (ref 55–135)
ALT SERPL W/O P-5'-P-CCNC: 13 U/L (ref 10–44)
ANION GAP SERPL CALC-SCNC: 9 MMOL/L (ref 8–16)
AORTIC ROOT ANNULUS: 3.46 CM
ASCENDING AORTA: 3.2 CM
AST SERPL-CCNC: 12 U/L (ref 10–40)
AV INDEX (PROSTH): 0.67
AV MEAN GRADIENT: 2 MMHG
AV PEAK GRADIENT: 4 MMHG
AV REGURGITATION PRESSURE HALF TIME: 523.84 MS
AV VALVE AREA: 1.9 CM2
AV VELOCITY RATIO: 0.75
BILIRUB SERPL-MCNC: 2 MG/DL (ref 0.1–1)
BSA FOR ECHO PROCEDURE: 2.25 M2
BUN SERPL-MCNC: 24 MG/DL (ref 8–23)
CALCIUM SERPL-MCNC: 9.4 MG/DL (ref 8.7–10.5)
CHLORIDE SERPL-SCNC: 112 MMOL/L (ref 95–110)
CO2 SERPL-SCNC: 24 MMOL/L (ref 23–29)
CREAT SERPL-MCNC: 1.3 MG/DL (ref 0.5–1.4)
CV ECHO LV RWT: 0.66 CM
DOP CALC AO PEAK VEL: 1.05 M/S
DOP CALC AO VTI: 20 CM
DOP CALC LVOT AREA: 2.8 CM2
DOP CALC LVOT DIAMETER: 1.9 CM
DOP CALC LVOT PEAK VEL: 0.79 M/S
DOP CALC LVOT STROKE VOLUME: 37.97 CM3
DOP CALC RVOT PEAK VEL: 0.65 M/S
DOP CALC RVOT VTI: 10.7 CM
DOP CALCLVOT PEAK VEL VTI: 13.4 CM
ECHO LV POSTERIOR WALL: 1.5 CM (ref 0.6–1.1)
EJECTION FRACTION: 20 %
EST. GFR  (AFRICAN AMERICAN): 56 ML/MIN/1.73 M^2
EST. GFR  (NON AFRICAN AMERICAN): 48 ML/MIN/1.73 M^2
FRACTIONAL SHORTENING: 11 % (ref 28–44)
GLUCOSE SERPL-MCNC: 103 MG/DL (ref 70–110)
INTERVENTRICULAR SEPTUM: 1.6 CM (ref 0.6–1.1)
IVC DIAMETER: 3 CM
IVRT: 68.51 MSEC
LA MAJOR: 5.47 CM
LA MINOR: 5.51 CM
LA WIDTH: 4.1 CM
LEFT ATRIUM SIZE: 4.13 CM
LEFT ATRIUM VOLUME INDEX: 35.8 ML/M2
LEFT ATRIUM VOLUME: 79.02 CM3
LEFT INTERNAL DIMENSION IN SYSTOLE: 4.04 CM (ref 2.1–4)
LEFT VENTRICLE DIASTOLIC VOLUME INDEX: 42.31 ML/M2
LEFT VENTRICLE DIASTOLIC VOLUME: 93.5 ML
LEFT VENTRICLE MASS INDEX: 132 G/M2
LEFT VENTRICLE SYSTOLIC VOLUME INDEX: 32.3 ML/M2
LEFT VENTRICLE SYSTOLIC VOLUME: 71.49 ML
LEFT VENTRICULAR INTERNAL DIMENSION IN DIASTOLE: 4.52 CM (ref 3.5–6)
LEFT VENTRICULAR MASS: 291.89 G
LVOT MG: 1.18 MMHG
LVOT MV: 0.51 CM/S
MAGNESIUM SERPL-MCNC: 2 MG/DL (ref 1.6–2.6)
PISA AR MAX VEL: 2.5 M/S
PISA MRMAX VEL: 5.86 M/S
PISA TR MAX VEL: 4.68 M/S
POCT GLUCOSE: 101 MG/DL (ref 70–110)
POCT GLUCOSE: 104 MG/DL (ref 70–110)
POCT GLUCOSE: 109 MG/DL (ref 70–110)
POTASSIUM SERPL-SCNC: 3.3 MMOL/L (ref 3.5–5.1)
PROT SERPL-MCNC: 6.9 G/DL (ref 6–8.4)
PV MEAN GRADIENT: 0.84 MMHG
RA MAJOR: 6.22 CM
RA PRESSURE: 15 MMHG
RA WIDTH: 3.84 CM
RIGHT VENTRICULAR END-DIASTOLIC DIMENSION: 4.56 CM
SINUS: 3.22 CM
SODIUM SERPL-SCNC: 145 MMOL/L (ref 136–145)
STJ: 3.15 CM
TDI LATERAL: 0.07 M/S
TDI SEPTAL: 0.08 M/S
TDI: 0.08 M/S
TR MAX PG: 88 MMHG
TRICUSPID ANNULAR PLANE SYSTOLIC EXCURSION: 1.81 CM
TV REST PULMONARY ARTERY PRESSURE: 103 MMHG

## 2022-07-20 PROCEDURE — A4216 STERILE WATER/SALINE, 10 ML: HCPCS | Performed by: INTERNAL MEDICINE

## 2022-07-20 PROCEDURE — 25500020 PHARM REV CODE 255: Performed by: FAMILY MEDICINE

## 2022-07-20 PROCEDURE — 94761 N-INVAS EAR/PLS OXIMETRY MLT: CPT

## 2022-07-20 PROCEDURE — 63700000 PHARM REV CODE 250 ALT 637 W/O HCPCS: Performed by: INTERNAL MEDICINE

## 2022-07-20 PROCEDURE — 83735 ASSAY OF MAGNESIUM: CPT | Performed by: INTERNAL MEDICINE

## 2022-07-20 PROCEDURE — 99223 1ST HOSP IP/OBS HIGH 75: CPT | Mod: ,,, | Performed by: INTERNAL MEDICINE

## 2022-07-20 PROCEDURE — S4991 NICOTINE PATCH NONLEGEND: HCPCS | Performed by: INTERNAL MEDICINE

## 2022-07-20 PROCEDURE — 63600175 PHARM REV CODE 636 W HCPCS: Performed by: INTERNAL MEDICINE

## 2022-07-20 PROCEDURE — 25000003 PHARM REV CODE 250: Performed by: INTERNAL MEDICINE

## 2022-07-20 PROCEDURE — 99223 PR INITIAL HOSPITAL CARE,LEVL III: ICD-10-PCS | Mod: ,,, | Performed by: INTERNAL MEDICINE

## 2022-07-20 PROCEDURE — 80053 COMPREHEN METABOLIC PANEL: CPT | Performed by: INTERNAL MEDICINE

## 2022-07-20 PROCEDURE — 83036 HEMOGLOBIN GLYCOSYLATED A1C: CPT | Performed by: INTERNAL MEDICINE

## 2022-07-20 PROCEDURE — 21400001 HC TELEMETRY ROOM

## 2022-07-20 PROCEDURE — 36415 COLL VENOUS BLD VENIPUNCTURE: CPT | Performed by: INTERNAL MEDICINE

## 2022-07-20 RX ORDER — IPRATROPIUM BROMIDE AND ALBUTEROL SULFATE 2.5; .5 MG/3ML; MG/3ML
3 SOLUTION RESPIRATORY (INHALATION) EVERY 6 HOURS PRN
Status: DISCONTINUED | OUTPATIENT
Start: 2022-07-20 | End: 2022-07-26 | Stop reason: HOSPADM

## 2022-07-20 RX ORDER — SACUBITRIL AND VALSARTAN 49; 51 MG/1; MG/1
1 TABLET, FILM COATED ORAL 2 TIMES DAILY
Status: ON HOLD | COMMUNITY
End: 2022-07-26 | Stop reason: SDUPTHER

## 2022-07-20 RX ORDER — ASPIRIN 81 MG/1
81 TABLET ORAL DAILY
Status: ON HOLD | COMMUNITY
End: 2022-07-26 | Stop reason: SDUPTHER

## 2022-07-20 RX ORDER — TAMSULOSIN HYDROCHLORIDE 0.4 MG/1
CAPSULE ORAL NIGHTLY
Status: ON HOLD | COMMUNITY
End: 2022-07-26 | Stop reason: SDUPTHER

## 2022-07-20 RX ORDER — PANTOPRAZOLE SODIUM 40 MG/1
40 TABLET, DELAYED RELEASE ORAL DAILY
Status: ON HOLD | COMMUNITY
End: 2022-07-26 | Stop reason: SDUPTHER

## 2022-07-20 RX ORDER — PRAVASTATIN SODIUM 80 MG/1
80 TABLET ORAL NIGHTLY
Status: ON HOLD | COMMUNITY
End: 2022-07-26 | Stop reason: SDUPTHER

## 2022-07-20 RX ORDER — TIOTROPIUM BROMIDE 18 UG/1
18 CAPSULE ORAL; RESPIRATORY (INHALATION) NIGHTLY
Status: ON HOLD | COMMUNITY
End: 2022-07-26 | Stop reason: SDUPTHER

## 2022-07-20 RX ORDER — INSULIN ASPART 100 [IU]/ML
0-5 INJECTION, SOLUTION INTRAVENOUS; SUBCUTANEOUS
Status: DISCONTINUED | OUTPATIENT
Start: 2022-07-20 | End: 2022-07-26 | Stop reason: HOSPADM

## 2022-07-20 RX ORDER — ASPIRIN 81 MG/1
81 TABLET ORAL DAILY
Status: DISCONTINUED | OUTPATIENT
Start: 2022-07-20 | End: 2022-07-26 | Stop reason: HOSPADM

## 2022-07-20 RX ORDER — HYDRALAZINE HYDROCHLORIDE 20 MG/ML
10 INJECTION INTRAMUSCULAR; INTRAVENOUS EVERY 8 HOURS PRN
Status: DISCONTINUED | OUTPATIENT
Start: 2022-07-20 | End: 2022-07-26 | Stop reason: HOSPADM

## 2022-07-20 RX ORDER — EMPAGLIFLOZIN 10 MG/1
10 TABLET, FILM COATED ORAL DAILY
Status: ON HOLD | COMMUNITY
End: 2022-07-26 | Stop reason: SDUPTHER

## 2022-07-20 RX ORDER — SPIRONOLACTONE 25 MG/1
25 TABLET ORAL DAILY
Status: ON HOLD | COMMUNITY
End: 2022-07-26 | Stop reason: SDUPTHER

## 2022-07-20 RX ORDER — SPIRONOLACTONE 25 MG/1
25 TABLET ORAL DAILY
Status: DISCONTINUED | OUTPATIENT
Start: 2022-07-20 | End: 2022-07-26 | Stop reason: HOSPADM

## 2022-07-20 RX ORDER — POLYETHYLENE GLYCOL 3350 17 G/17G
17 POWDER, FOR SOLUTION ORAL DAILY
Status: DISCONTINUED | OUTPATIENT
Start: 2022-07-20 | End: 2022-07-26 | Stop reason: HOSPADM

## 2022-07-20 RX ORDER — PRAVASTATIN SODIUM 20 MG/1
20 TABLET ORAL DAILY
Status: DISCONTINUED | OUTPATIENT
Start: 2022-07-20 | End: 2022-07-26 | Stop reason: HOSPADM

## 2022-07-20 RX ORDER — AZITHROMYCIN 250 MG/1
500 TABLET, FILM COATED ORAL DAILY
Status: COMPLETED | OUTPATIENT
Start: 2022-07-20 | End: 2022-07-23

## 2022-07-20 RX ORDER — SODIUM CHLORIDE 0.9 % (FLUSH) 0.9 %
10 SYRINGE (ML) INJECTION
Status: DISCONTINUED | OUTPATIENT
Start: 2022-07-20 | End: 2022-07-26 | Stop reason: HOSPADM

## 2022-07-20 RX ORDER — FLUTICASONE FUROATE AND VILANTEROL 100; 25 UG/1; UG/1
1 POWDER RESPIRATORY (INHALATION) DAILY
Status: ON HOLD | COMMUNITY
End: 2022-07-26 | Stop reason: SDUPTHER

## 2022-07-20 RX ORDER — IBUPROFEN 200 MG
1 TABLET ORAL DAILY
Status: DISCONTINUED | OUTPATIENT
Start: 2022-07-20 | End: 2022-07-26 | Stop reason: HOSPADM

## 2022-07-20 RX ORDER — ONDANSETRON 4 MG/1
4 TABLET, ORALLY DISINTEGRATING ORAL EVERY 8 HOURS PRN
Status: DISCONTINUED | OUTPATIENT
Start: 2022-07-20 | End: 2022-07-26 | Stop reason: HOSPADM

## 2022-07-20 RX ADMIN — SACUBITRIL AND VALSARTAN 1 TABLET: 49; 51 TABLET, FILM COATED ORAL at 08:07

## 2022-07-20 RX ADMIN — FUROSEMIDE 40 MG: 10 INJECTION, SOLUTION INTRAMUSCULAR; INTRAVENOUS at 08:07

## 2022-07-20 RX ADMIN — PRAVASTATIN SODIUM 20 MG: 20 TABLET ORAL at 08:07

## 2022-07-20 RX ADMIN — IOHEXOL 100 ML: 350 INJECTION, SOLUTION INTRAVENOUS at 09:07

## 2022-07-20 RX ADMIN — NICOTINE 1 PATCH: 21 PATCH, EXTENDED RELEASE TRANSDERMAL at 08:07

## 2022-07-20 RX ADMIN — ASPIRIN 81 MG: 81 TABLET, COATED ORAL at 08:07

## 2022-07-20 RX ADMIN — POLYETHYLENE GLYCOL 3350 17 G: 17 POWDER, FOR SOLUTION ORAL at 08:07

## 2022-07-20 RX ADMIN — Medication 10 ML: at 09:07

## 2022-07-20 RX ADMIN — Medication 10 ML: at 06:07

## 2022-07-20 RX ADMIN — Medication 10 ML: at 02:07

## 2022-07-20 RX ADMIN — SPIRONOLACTONE 25 MG: 25 TABLET, FILM COATED ORAL at 08:07

## 2022-07-20 RX ADMIN — ENOXAPARIN SODIUM 40 MG: 100 INJECTION SUBCUTANEOUS at 05:07

## 2022-07-20 RX ADMIN — AZITHROMYCIN MONOHYDRATE 500 MG: 250 TABLET ORAL at 08:07

## 2022-07-20 NOTE — ASSESSMENT & PLAN NOTE
Patient is identified as having Combined Systolic and Diastolic heart failure that is Acute on chronic. CHF is currently uncontrolled due to Rales/crackles on pulmonary exam. Latest ECHO performed and demonstrates- Results for orders placed during the hospital encounter of 07/19/22    Echo    Interpretation Summary  · Concentric hypertrophy and severely decreased systolic function.  · Mild left atrial enlargement.  · The estimated ejection fraction is 20%.  · Left ventricular diastolic dysfunction.  · There is left ventricular global hypokinesis.  · Moderate right ventricular enlargement with mildly to moderately reduced right ventricular systolic function.  · Moderate right atrial enlargement.  · There is mild aortic valve stenosis.  · Aortic valve area is 1.90 cm2; peak velocity is 1.05 m/s; mean gradient is 2 mmHg.  · There is severe pulmonary hypertension.  · Moderate tricuspid regurgitation.  · Elevated central venous pressure (15 mmHg).  · The estimated PA systolic pressure is 103 mmHg.  . Continue Furosemide, Aldactone and ARNI and monitor clinical status closely. Monitor on telemetry. Patient is on CHF pathway.  Monitor strict Is&Os and daily weights.  Place on fluid restriction of 1.5 L. Continue to stress to patient importance of self efficacy and  on diet for CHF. Last BNP reviewed- and noted below   Recent Labs   Lab 07/19/22  1800   BNP >4,900*   .  If no response to diuretics will plan inotropes due to recurrent admits.

## 2022-07-20 NOTE — CONSULTS
[facility] - Telemetry ([facility])  Cardiology  Consult Note    Patient Name: Joseph Toussaint  MRN: [id number]  Admission Date: 7/19/2022  Hospital Length of Stay: 1 days  Code Status: Full Code   Attending Provider: Ren Moura MD   Consulting Provider: Edie Culp MD  Primary Care Physician: Primary Doctor No  Principal Problem:Acute on chronic systolic congestive heart failure    Patient information was obtained from patient and ER records.     Inpatient consult to Cardiology  Consult performed by: Whit Culp MD  Consult ordered by: Yifan Malhotra MD        Subjective:     Chief Complaint: shortness of breath    HPI:      From hospital medicine  History was taken from the patient and electronic chart .No family at bedside at this time.  89-year-old  male with history of COPD and CHF ,chronic active smoker ,DM,BPH  presenting to the emergency department complaining of worsening shortness of breath over the past week.  There is associated history of cough ,denies history of leg swelling ,dizziness or syncope.  He was recently discharged from [facility]-07/12/2022 and from that documentation, he was sent to the SNF after recent diagnosis of CHF with EF -25-30% . He was continued on Aldactone/Entresto.   He was also seen in the ED -07/13/2022 with worsening dyspnoea and was discharged home.  He was admitted at [facility] -06/01 for acute CHF . ECho done at that time -06/2022 showed    1. Normal left ventricular cavity size. Severely depressed left ventricular systolic   function. LVEF 25 - 30%.   2. Mildly dilated right ventricle. Moderate right ventricular hypokinesis.   3. Moderate to severe mitral valve regurgitation.   4. Moderate to severe tricuspid valve regurgitation  Labs and imaging test reviewed.  Cardiomegaly with perihilar edema.  Mild pleural effusions suspected  BNP- more than 4900  CBC -hemoglobin 10.8  He will be admitted for CHF exacerbation /COPD.     His  primary care  physician is Dr. Leonid Noguera.     Cards consult for chf I have talked with patient eh stopped smoking 2 weeks ago he has progressive shortness of breath no leg swelling .denies syncope near syncope he claims compliance with medical therapy . Due to his recurrent admits he may benefit from inotropic therapy if he does not respond well to diuresis.       Past Medical History:   Diagnosis Date    CHF (congestive heart failure)     COPD (chronic obstructive pulmonary disease)     Diabetes mellitus     HTN (hypertension)        History reviewed. No pertinent surgical history.    Review of patient's allergies indicates:   Allergen Reactions    Penicillins        No current facility-administered medications on file prior to encounter.     Current Outpatient Medications on File Prior to Encounter   Medication Sig    albuterol (PROVENTIL/VENTOLIN HFA) 90 mcg/actuation inhaler Inhale 1-2 puffs into the lungs every 6 (six) hours as needed for Wheezing. Rescue    aspirin (ASPIR-81) 81 MG EC tablet Take 81 mg by mouth once daily.    empagliflozin (JARDIANCE) 10 mg tablet Take 10 mg by mouth once daily.    fluticasone furoate-vilanteroL (BREO ELLIPTA) 100-25 mcg/dose diskus inhaler Inhale 1 puff into the lungs once daily. Controller    pantoprazole (PROTONIX) 40 MG tablet Take 40 mg by mouth once daily.    pravastatin (PRAVACHOL) 80 MG tablet Take 80 mg by mouth nightly.    sacubitriL-valsartan (ENTRESTO) 49-51 mg per tablet Take 1 tablet by mouth 2 (two) times daily.    spironolactone (ALDACTONE) 25 MG tablet Take 25 mg by mouth once daily.    tamsulosin (FLOMAX) 0.4 mg Cap Take by mouth every evening.    tiotropium (SPIRIVA) 18 mcg inhalation capsule Inhale 18 mcg into the lungs nightly. Controller     Family History    None       Tobacco Use    Smoking status: Current Every Day Smoker     Packs/day: 1.00     Types: Cigarettes    Smokeless tobacco: Never Used   Substance and Sexual Activity    Alcohol  use: Yes     Comment: occasionally    Drug use: Not Currently    Sexual activity: Not Currently     Review of Systems   Constitutional: Negative for malaise/fatigue.   Eyes:  Negative for blurred vision.   Cardiovascular:  Positive for dyspnea on exertion. Negative for chest pain, claudication, cyanosis, irregular heartbeat, leg swelling, near-syncope, orthopnea, palpitations and paroxysmal nocturnal dyspnea.   Respiratory:  Positive for cough and shortness of breath. Negative for hemoptysis.    Hematologic/Lymphatic: Negative for bleeding problem. Does not bruise/bleed easily.   Skin:  Negative for dry skin and itching.   Musculoskeletal:  Negative for falls, muscle weakness and myalgias.   Gastrointestinal:  Negative for abdominal pain, diarrhea, heartburn, hematemesis, hematochezia and melena.   Genitourinary:  Negative for flank pain and hematuria.   Neurological:  Negative for dizziness, focal weakness, headaches, light-headedness, numbness, paresthesias, seizures and weakness.   Psychiatric/Behavioral:  Negative for altered mental status and memory loss. The patient is not nervous/anxious.    Allergic/Immunologic: Negative for hives.   Objective:     Vital Signs (Most Recent):  Temp: 98.7 °F (37.1 °C) (07/20/22 1104)  Pulse: 76 (07/20/22 1257)  Resp: 18 (07/20/22 1104)  BP: (!) 132/93 (07/20/22 1257)  SpO2: (!) 94 % (07/20/22 1104)   Vital Signs (24h Range):  Temp:  [97.6 °F (36.4 °C)-98.8 °F (37.1 °C)] 98.7 °F (37.1 °C)  Pulse:  [76-97] 76  Resp:  [18-30] 18  SpO2:  [92 %-100 %] 94 %  BP: (132-173)/() 132/93     Weight: 101.2 kg (223 lb)  Body mass index is 31.1 kg/m².    SpO2: (!) 94 %  O2 Device (Oxygen Therapy): nasal cannula      Intake/Output Summary (Last 24 hours) at 7/20/2022 1314  Last data filed at 7/20/2022 1100  Gross per 24 hour   Intake --   Output 2250 ml   Net -2250 ml       Lines/Drains/Airways       Peripheral Intravenous Line  Duration                  Peripheral IV - Single Lumen  07/19/22 1802 20 G Left Antecubital <1 day                    Physical Exam  Vitals and nursing note reviewed.   Constitutional:       General: He is not in acute distress.     Appearance: He is well-developed. He is not diaphoretic.   HENT:      Head: Normocephalic and atraumatic.   Eyes:      General:         Right eye: No discharge.         Left eye: No discharge.      Pupils: Pupils are equal, round, and reactive to light.   Neck:      Thyroid: No thyromegaly.      Vascular: No JVD.   Cardiovascular:      Rate and Rhythm: Normal rate and regular rhythm.      Pulses: Normal pulses and intact distal pulses.      Heart sounds: Normal heart sounds. No murmur heard.    No friction rub. No gallop.   Pulmonary:      Effort: Pulmonary effort is normal. No respiratory distress.      Breath sounds: Rales present. No wheezing.   Chest:      Chest wall: No tenderness.   Abdominal:      General: Bowel sounds are normal. There is no distension.      Palpations: Abdomen is soft.      Tenderness: There is no abdominal tenderness.   Musculoskeletal:         General: Normal range of motion.      Cervical back: Neck supple.      Right lower leg: No edema.      Left lower leg: No edema.   Skin:     General: Skin is warm and dry.      Findings: No erythema or rash.   Neurological:      Mental Status: He is alert and oriented to person, place, and time.      Cranial Nerves: No cranial nerve deficit.   Psychiatric:         Behavior: Behavior normal.         Judgment: Judgment normal.       Significant Labs:   Recent Lab Results  (Last 5 results in the past 24 hours)        07/20/22  1055   07/20/22  0806   07/20/22  0733   07/20/22  0607   07/19/22  2257        Albumin     3.6           Alkaline Phosphatase     171           ALT     13           Anion Gap     9           Ao root annulus   3.46             Ascending aorta   3.20             Ao peak aldo   1.05             Ao VTI   20.0             AR Max Aldo   2.50             AST      12           AV valve area   1.90             AV mean gradient   2             AV index (prosthetic)   0.67             AV peak gradient   4             AV regurgitation pressure 1/2 time   523.960352657194178             AV Velocity Ratio   0.75             BILIRUBIN TOTAL     2.0  Comment: For infants and newborns, interpretation of results should be based  on gestational age, weight and in agreement with clinical  observations.    Premature Infant recommended reference ranges:  Up to 24 hours.............<8.0 mg/dL  Up to 48 hours............<12.0 mg/dL  3-5 days..................<15.0 mg/dL  6-29 days.................<15.0 mg/dL             BSA   2.25             BUN     24           Calcium     9.4           Chloride     112           CO2     24           Creatinine     1.3           Left Ventricle Relative Wall Thickness   0.66             D-Dimer         1.20  Comment: The quantitative D-dimer assay should be used as an aid in   the diagnosis of deep vein thrombosis and pulmonary embolism  in patients with the appropriate presentation and clinical  history. The upper limit of the reference interval and the clinical   cut off   point are identical. Causes of a positive (>0.50 mg/L FEU) D-Dimer   test  include, but are not limited to: DVT, PE, DIC, thrombolytic   therapy, anticoagulant therapy, recent surgery, trauma, or   pregnancy, disseminated malignancy, aortic aneurysm, cirrhosis,  and severe infection. False negative results may occur in   patients with distal DVT.         eGFR if      56           eGFR if non      48  Comment: Calculation used to obtain the estimated glomerular filtration  rate (eGFR) is the CKD-EPI equation.              EF   20             FS   11             Glucose     103           IVC diameter   3.00             IVRT   [id number]             IVSd   1.60             LA WIDTH   4.10             Left Atrium Major Axis   5.47             Left  Atrium Minor Axis   5.51             LA size   4.13             LA volume   79.02             LA vol index   35.8             LVOT area   2.8             LV EDV BP   93.50             LV Diastolic Volume Index   42.31             LVIDd   4.52             LVIDs   4.04             LV mass   291.89             LV Mass Index   132             Left Ventricular Outflow Tract Mean Gradient   1.18             Left Ventricular Outflow Tract Mean Velocity   0.22845304546             LVOT diameter   1.90             LVOT peak aldo   0.79             LVOT stroke volume   37.97             LVOT peak VTI   13.40             LV ESV BP   71.49             LV Systolic Volume Index   32.3             Magnesium     2.0           Mean e'   0.08             Mr max aldo   5.86             POCT Glucose 101       109         Potassium     3.3           PROTEIN TOTAL     6.9           PV mean gradient   0.84             Posterior Wall   1.50             Right Atrial Pressure (from IVC)   15             RA Major Axis   6.22             RA Width   3.84             RVDD   4.56             RVOT peak aldo   0.65             RVOT peak VTI   10.7             Sinus   3.22             Sodium     145           STJ   3.15             TAPSE   1.81             TDI SEPTAL   0.08             TDI LATERAL   0.07             Triscuspid Valve Regurgitation Peak Gradient   88             TR Max Aldo   4.68             TV rest pulmonary artery pressure   103                                    Significant Imaging: EXAMINATION:  XR CHEST AP PORTABLE     CLINICAL HISTORY:  CHF;     TECHNIQUE:  Single frontal view of the chest was performed.     COMPARISON:  None     FINDINGS:  Cardiomegaly with perihilar edema.  Mild pleural effusions.     Bones are intact.     Impression:     Cardiomegaly with perihilar edema.  Mild pleural effusions suspected.  Correlate clinically to CHF.  Underlying pneumonia not excluded        Electronically signed by: Earl Birmingham  Date:                                             07/19/2022  Time:                                           18:23    EXAMINATION:  CTA CHEST NON CORONARY     CLINICAL HISTORY:  Chest pain and shortness of breath     TECHNIQUE:  After the intravenous administration of 100 cc of Omni 350 nonionic contrast using CT pulmonary angio technique, 1.25  Mm axial images were acquired using helical CT technique from the lung apices through costophrenic sulci.  Sagittal coronal and oblique MIPS were also submitted for interpretation.     COMPARISON:  Chest x-ray dated 07/19/2022     FINDINGS:  -Pulmonary arteries: Pulmonary arteries are well opacified.  No evidence of pulmonary embolism.  No evidence of pulmonary hypertension.  No right heart strain is identified with RV/LV ratio < 0.9.  Reflux of contrast into the IVC and azygous vein suggests right heart dysfunction.     -Lungs: No nodules or infiltrates.  Atelectasis seen at the lung bases.  Bilateral pulmonary interstitial edema suspected.     -Pleura: Small bilateral pleural effusions, right greater than left producing mild atelectasis at the lung bases.     -Mediastinum/Anabella:Shotty adenopathy     -Axilla: No adenopathy.     -Thyroid: Normal lower gland.     -Heart/Aorta: Heart size is enlarged.  Moderate  coronary artery disease.  No pericardial effusion. Aorta normal caliber.   Aorta demonstrates severe atherosclerotic disease.     -Bones/Chest Wall: Intact with mild multilevel spondylosis throughout the thoracic spine.  Mild gynecomastia.  Mild anasarca.     -Upper Abdomen: Trace ascites.  Post cholecystectomy.  Mild constipation.  Indeterminate hypodensity upper pole left kidney measuring 4 cm which could reflect a cyst.     Impression:     No evidence of pulmonary embolism.     Small bilateral pleural effusions, right greater than left producing mild atelectasis at the lung bases     See additional findings above     All CT scans at this facility use dose modulation, iterative  reconstruction and/or weight based dosing when appropriate to reduce radiation dose to as low as reasonably achievable.        Electronically signed by: Jamir Dunn MD  Date:                                            07/20/2022  Time:                                           10:05    Assessment and Plan:     * Acute on chronic systolic congestive heart failure  Patient is identified as having Combined Systolic and Diastolic heart failure that is Acute on chronic. CHF is currently uncontrolled due to Rales/crackles on pulmonary exam. Latest ECHO performed and demonstrates- Results for orders placed during the hospital encounter of 07/19/22    Echo    Interpretation Summary  · Concentric hypertrophy and severely decreased systolic function.  · Mild left atrial enlargement.  · The estimated ejection fraction is 20%.  · Left ventricular diastolic dysfunction.  · There is left ventricular global hypokinesis.  · Moderate right ventricular enlargement with mildly to moderately reduced right ventricular systolic function.  · Moderate right atrial enlargement.  · There is mild aortic valve stenosis.  · Aortic valve area is 1.90 cm2; peak velocity is 1.05 m/s; mean gradient is 2 mmHg.  · There is severe pulmonary hypertension.  · Moderate tricuspid regurgitation.  · Elevated central venous pressure (15 mmHg).  · The estimated PA systolic pressure is 103 mmHg.  . Continue Furosemide, Aldactone and ARNI and monitor clinical status closely. Monitor on telemetry. Patient is on CHF pathway.  Monitor strict Is&Os and daily weights.  Place on fluid restriction of 1.5 L. Continue to stress to patient importance of self efficacy and  on diet for CHF. Last BNP reviewed- and noted below   Recent Labs   Lab 07/19/22  1800   BNP >4,900*   .  If no response to diuretics will plan inotropes due to recurrent admits.    Mixed hyperlipidemia  contineu sattins    Type 2 diabetes mellitus with diabetic polyneuropathy, without  long-term current use of insulin  Per primary team    Essential hypertension  continue home meds    COPD suggested by initial evaluation  Per primary team .        VTE Risk Mitigation (From admission, onward)         Ordered     enoxaparin injection 40 mg  Daily         07/19/22 2131     IP VTE HIGH RISK PATIENT  Once         07/19/22 2131     Place sequential compression device  Until discontinued         07/19/22 2131                Thank you for your consult. I will follow-up with patient. Please contact us if you have any additional questions.    Edie Culp MD  Cardiology   [facility] - Telemetry ([facility])

## 2022-07-20 NOTE — ASSESSMENT & PLAN NOTE
Patient is identified as having Systolic (HFrEF) heart failure that is Acute on chronic. CHF is currently uncontrolled due to Dyspnea not returned to baseline after 1 doses of IV diuretic. Latest ECHO performed and demonstrates- 06/2022  CONCLUSIONS:   1. Normal left ventricular cavity size. Severely depressed left ventricular systolic   function. LVEF 25 - 30%.   2. Mildly dilated right ventricle. Moderate right ventricular hypokinesis.   3. Moderate to severe mitral valve regurgitation.   4. Moderate to severe tricuspid valve regurgitation    . Continue Nitrate/Vasodilator and monitor clinical status closely. Monitor on telemetry. Patient is on CHF pathway.  Monitor strict Is&Os and daily weights.  Place on fluid restriction of 1 L. Continue to stress to patient importance of self efficacy and  on diet for CHF. Last BNP reviewed- and noted below   Recent Labs   Lab 07/19/22  1800   BNP >4,900*   .  Will start entresto, lasix,aldactone  cardiology consult

## 2022-07-20 NOTE — CONSULTS
Food & Nutrition  Education    Diet Education:  Low Sodium and Fluid   Time Spent: RD remote  Learners: Patient      Nutrition Education provided with handouts:   1.  DASH Cardiac Diet Educational Handout  2.  COPD Diet Educational Handout  3.  Heart Healthy Diet Educational Handout  4.  Low Na Diet Educational Handout    Comments:    All questions and concerns answered. Dietitian's contact information provided.       Follow-Up: Nutritional Educational documents provided with discharge information.      Please Re-consult as needed        Thanks!  Julia Chris MS, RDN, LDN

## 2022-07-20 NOTE — ASSESSMENT & PLAN NOTE
Patient is on CHF pathway  -Cardiology consulted  -Continue Nitrate/Vasodilator and monitor clinical status closely.  -Continue entresto, lasix, and aldactone.      -Monitor strict I&Os and daily weights.    -Fluid restriction of 1.5 L.   -Continue to stress to patient importance of self efficacy and  on diet for CHF.      Last BNP reviewed.   Recent Labs   Lab 07/19/22  1800   BNP >4,900*

## 2022-07-20 NOTE — HPI
History was taken from the patient and electronic chart .No family at bedside at this time.  89-year-old  male with history of COPD and CHF ,chronic active smoker ,DM,BPH  presenting to the emergency department complaining of worsening shortness of breath over the past week.  There is associated history of cough ,denies history of leg swelling ,dizziness or syncope.  He was recently discharged from [facility]-07/12/2022 and from that documentation, he was sent to the SNF after recent diagnosis of CHF with EF -25-30% . He was continued on Aldactone/Entresto.   He was also seen in the ED -07/13/2022 with worsening dyspnoea and was discharged home.  He was admitted at [facility] -06/01 for acute CHF . ECho done at that time -06/2022 showed    1. Normal left ventricular cavity size. Severely depressed left ventricular systolic   function. LVEF 25 - 30%.   2. Mildly dilated right ventricle. Moderate right ventricular hypokinesis.   3. Moderate to severe mitral valve regurgitation.   4. Moderate to severe tricuspid valve regurgitation  Labs and imaging test reviewed.  Cardiomegaly with perihilar edema.  Mild pleural effusions suspected  BNP- more than 4900  CBC -hemoglobin 10.8  He will be admitted for CHF exacerbation /COPD.    His  primary care physician is Dr. Leonid Noguera.

## 2022-07-20 NOTE — PLAN OF CARE
Patient admitted to unit d/t complaints of SOB  Patient accompanied by son in ER but alone to room.   Pt remains fall free this shift.  Pt AAOx2-3. He knows he is in the hospital, but doesn't know which hospital he's at. He is verbal with clear speech.  Skin warm and dry.   Cardiac monitoring in progress d/t new onset afib  O2 per NC @2L  External catheter in place due to patient receiving lasix  Bed low, side rails up x 2, wheels locked, call light in reach.  Bed alarm maintained for safety.  Patient instructed to call for assistance.  Hourly rounding completed.  24 hour chart check completed  POC updated and reviewed with pt. Will continue POC.

## 2022-07-20 NOTE — SUBJECTIVE & OBJECTIVE
Past Medical History:   Diagnosis Date    CHF (congestive heart failure)     COPD (chronic obstructive pulmonary disease)     HTN (hypertension)        History reviewed. No pertinent surgical history.    Review of patient's allergies indicates:   Allergen Reactions    Penicillins        No current facility-administered medications on file prior to encounter.     Current Outpatient Medications on File Prior to Encounter   Medication Sig    albuterol (PROVENTIL/VENTOLIN HFA) 90 mcg/actuation inhaler Inhale 1-2 puffs into the lungs every 6 (six) hours as needed for Wheezing. Rescue     Family History    None       Tobacco Use    Smoking status: Current Every Day Smoker     Packs/day: 1.00     Types: Cigarettes    Smokeless tobacco: Never Used   Substance and Sexual Activity    Alcohol use: Yes     Comment: occasionally    Drug use: Not Currently    Sexual activity: Not Currently     Review of Systems   Constitutional:  Positive for activity change and appetite change. Negative for chills, diaphoresis, fatigue, fever and unexpected weight change.   HENT:  Negative for congestion, dental problem, drooling and ear discharge.    Respiratory:  Positive for cough and shortness of breath.    Cardiovascular:  Negative for palpitations and leg swelling.   Gastrointestinal:  Negative for abdominal distention and abdominal pain.   Endocrine: Negative for cold intolerance.   Musculoskeletal:  Negative for arthralgias and back pain.   Allergic/Immunologic: Negative for environmental allergies.   Psychiatric/Behavioral:  Negative for agitation and behavioral problems.    Objective:     Vital Signs (Most Recent):  Temp: 98.8 °F (37.1 °C) (07/20/22 0417)  Pulse: 83 (07/20/22 0417)  Resp: 18 (07/20/22 0417)  BP: (!) 163/89 (07/20/22 0417)  SpO2: 99 % (07/20/22 0417)   Vital Signs (24h Range):  Temp:  [97.6 °F (36.4 °C)-98.8 °F (37.1 °C)] 98.8 °F (37.1 °C)  Pulse:  [80-97] 83  Resp:  [18-30] 18  SpO2:  [92 %-100 %] 99 %  BP:  (134-165)/() 163/89     Weight: 101.5 kg (223 lb 12.3 oz)  Body mass index is 31.21 kg/m².    Physical Exam  Vitals and nursing note reviewed.   Constitutional:       Appearance: He is well-developed.   HENT:      Head: Normocephalic and atraumatic.      Nose: Nose normal.   Eyes:      Pupils: Pupils are equal, round, and reactive to light.   Cardiovascular:      Rate and Rhythm: Normal rate and regular rhythm.      Heart sounds: Normal heart sounds.   Pulmonary:      Effort: Pulmonary effort is normal. No respiratory distress.      Breath sounds: Rales present. No wheezing.      Comments: Barrel chest   Abdominal:      General: Abdomen is flat. There is no distension.      Tenderness: There is no abdominal tenderness.   Musculoskeletal:         General: Normal range of motion.      Cervical back: Normal range of motion and neck supple.   Skin:     General: Skin is dry.   Neurological:      Mental Status: He is alert. Mental status is at baseline.   Psychiatric:         Mood and Affect: Mood normal.         CRANIAL NERVES     CN III, IV, VI   Pupils are equal, round, and reactive to light.     Significant Labs: All pertinent labs within the past 24 hours have been reviewed.  BMP:   Recent Labs   Lab 07/19/22  1800   *      K 4.0   *   CO2 17*   BUN 26*   CREATININE 1.4   CALCIUM 9.4     CBC:   Recent Labs   Lab 07/19/22  1800   WBC 4.80   HGB 10.8*   HCT 35.6*        CMP:   Recent Labs   Lab 07/19/22  1800      K 4.0   *   CO2 17*   *   BUN 26*   CREATININE 1.4   CALCIUM 9.4   PROT 7.4   ALBUMIN 3.6   BILITOT 1.7*   ALKPHOS 178*   AST 12   ALT 14   ANIONGAP 11   EGFRNONAA 44*     Cardiac Markers:   Recent Labs   Lab 07/19/22  1800   BNP >4,900*       Significant Imaging: I have reviewed all pertinent imaging results/findings within the past 24 hours.

## 2022-07-20 NOTE — HPI
From hospital medicine  History was taken from the patient and electronic chart .No family at bedside at this time.  89-year-old  male with history of COPD and CHF ,chronic active smoker ,DM,BPH  presenting to the emergency department complaining of worsening shortness of breath over the past week.  There is associated history of cough ,denies history of leg swelling ,dizziness or syncope.  He was recently discharged from [facility]-07/12/2022 and from that documentation, he was sent to the SNF after recent diagnosis of CHF with EF -25-30% . He was continued on Aldactone/Entresto.   He was also seen in the ED -07/13/2022 with worsening dyspnoea and was discharged home.  He was admitted at [facility] -06/01 for acute CHF . ECho done at that time -06/2022 showed    1. Normal left ventricular cavity size. Severely depressed left ventricular systolic   function. LVEF 25 - 30%.   2. Mildly dilated right ventricle. Moderate right ventricular hypokinesis.   3. Moderate to severe mitral valve regurgitation.   4. Moderate to severe tricuspid valve regurgitation  Labs and imaging test reviewed.  Cardiomegaly with perihilar edema.  Mild pleural effusions suspected  BNP- more than 4900  CBC -hemoglobin 10.8  He will be admitted for CHF exacerbation /COPD.     His  primary care physician is Dr. Leonid Noguera.     Cards consult for chf I have talked with patient eh stopped smoking 2 weeks ago he has progressive shortness of breath no leg swelling .denies syncope near syncope he claims compliance with medical therapy . Due to his recurrent admits he may benefit from inotropic therapy if he does not respond well to diuresis.

## 2022-07-20 NOTE — PROGRESS NOTES
[facility] Medicine  Progress Note    Patient Name: Joseph Toussaint  MRN: [id number]  Patient Class: IP- Inpatient   Admission Date: 7/19/2022  Length of Stay: 1 days  Attending Physician: Ren Moura MD  Primary Care Provider: Primary Doctor No        Subjective:     Principal Problem:Acute on chronic systolic congestive heart failure        HPI:  History was taken from the patient and electronic chart .No family at bedside at this time.  89-year-old  male with history of COPD and CHF ,chronic active smoker ,DM,BPH  presenting to the emergency department complaining of worsening shortness of breath over the past week.  There is associated history of cough ,denies history of leg swelling ,dizziness or syncope.  He was recently discharged from [facility]-07/12/2022 and from that documentation, he was sent to the SNF after recent diagnosis of CHF with EF -25-30% . He was continued on Aldactone/Entresto.   He was also seen in the ED -07/13/2022 with worsening dyspnoea and was discharged home.  He was admitted at [facility] -06/01 for acute CHF . ECho done at that time -06/2022 showed    1. Normal left ventricular cavity size. Severely depressed left ventricular systolic   function. LVEF 25 - 30%.   2. Mildly dilated right ventricle. Moderate right ventricular hypokinesis.   3. Moderate to severe mitral valve regurgitation.   4. Moderate to severe tricuspid valve regurgitation  Labs and imaging test reviewed.  Cardiomegaly with perihilar edema.  Mild pleural effusions suspected  BNP- more than 4900  CBC -hemoglobin 10.8  He will be admitted for CHF exacerbation /COPD.    His  primary care physician is Dr. Leonid Noguera.       Overview/Hospital Course:  Patient admitted to Telemetry for CHF exacerbation/COPD. Cardio consulted. BNP > 4,900. Patient treated with nitrate/vasodilator, Entresto, Lasix, Aldactone, Zithromax, and duonebs. CXR shows cardiomegaly with  perihilar edema and mild pleural effusions. CTA reveals no evidence of pulmonary embolism, but small bilateral pleural effusions, right greater than left producing mild atelectasis at the lung bases. Patient verbalized improvement in dyspnea. He also endorses deviation from low sodium diet associated with recent move to son's house.       Interval History: Patient was sitting upright in bed, eating breakfast when seen. He notes improvement in dyspnea. Will continue current plan of care.      Review of Systems   Constitutional:  Negative for chills and fever.   HENT:  Negative for congestion, ear pain, rhinorrhea, sneezing and sore throat.    Eyes:  Negative for pain and visual disturbance.   Respiratory:  Positive for cough and shortness of breath (improving).    Cardiovascular:  Negative for chest pain, palpitations and leg swelling.   Gastrointestinal:  Negative for abdominal pain, constipation, diarrhea, nausea and vomiting.   Endocrine: Negative for cold intolerance, heat intolerance, polydipsia, polyphagia and polyuria.   Genitourinary:  Negative for dysuria.   Musculoskeletal:  Negative for arthralgias.   Neurological:  Negative for dizziness, tremors, seizures, syncope, weakness, light-headedness, numbness and headaches.   Hematological:  Negative for adenopathy. Does not bruise/bleed easily.   Psychiatric/Behavioral:  Negative for agitation and confusion. The patient is not nervous/anxious.    Objective:     Vital Signs (Most Recent):  Temp: 98.7 °F (37.1 °C) (07/20/22 1104)  Pulse: 79 (07/20/22 1104)  Resp: 18 (07/20/22 1104)  BP: (!) 173/100 (07/20/22 1104)  SpO2: (!) 94 % (07/20/22 1104)   Vital Signs (24h Range):  Temp:  [97.6 °F (36.4 °C)-98.8 °F (37.1 °C)] 98.7 °F (37.1 °C)  Pulse:  [79-97] 79  Resp:  [18-30] 18  SpO2:  [92 %-100 %] 94 %  BP: (134-173)/() 173/100     Weight: 101.2 kg (223 lb)  Body mass index is 31.1 kg/m².    Intake/Output Summary (Last 24 hours) at 7/20/2022 1232  Last data  filed at 7/20/2022 0100  Gross per 24 hour   Intake --   Output 1050 ml   Net -1050 ml      Physical Exam  Constitutional:       General: He is not in acute distress.     Appearance: Normal appearance. He is not ill-appearing, toxic-appearing or diaphoretic.   HENT:      Head: Normocephalic and atraumatic.      Nose: Nose normal.      Mouth/Throat:      Mouth: Mucous membranes are moist.      Pharynx: Oropharynx is clear.   Eyes:      Conjunctiva/sclera: Conjunctivae normal.   Cardiovascular:      Pulses: Normal pulses.      Heart sounds: Normal heart sounds.   Pulmonary:      Effort: Pulmonary effort is normal.      Breath sounds: Rales present. No wheezing.   Abdominal:      General: Bowel sounds are normal.   Musculoskeletal:         General: Normal range of motion.   Skin:     General: Skin is warm and dry.   Neurological:      General: No focal deficit present.      Mental Status: He is alert and oriented to person, place, and time.   Psychiatric:         Mood and Affect: Mood normal.         Behavior: Behavior normal.       Significant Labs: All pertinent labs within the past 24 hours have been reviewed.    Significant Imaging: I have reviewed all pertinent imaging results/findings within the past 24 hours.      Assessment/Plan:      * Acute on chronic systolic congestive heart failure  Patient is on CHF pathway  -Cardiology consulted  -Continue Nitrate/Vasodilator and monitor clinical status closely.  -Continue entresto, lasix, and aldactone.      -Monitor strict I&Os and daily weights.    -Fluid restriction of 1.5 L.   -Continue to stress to patient importance of self efficacy and  on diet for CHF.      Last BNP reviewed.   Recent Labs   Lab 07/19/22  1800   BNP >4,900*     Patient is identified as having Combined Systolic and Diastolic heart failure that is Acute on chronic. CHF is currently uncontrolled due to Rales/crackles on pulmonary exam and Pulmonary edema/pleural effusion on CXR. Latest ECHO  performed and demonstrates- Results for orders placed during the hospital encounter of 07/19/22    Echo    Interpretation Summary  · Concentric hypertrophy and severely decreased systolic function.  · Mild left atrial enlargement.  · The estimated ejection fraction is 20%.  · Left ventricular diastolic dysfunction.  · There is left ventricular global hypokinesis.  · Moderate right ventricular enlargement with mildly to moderately reduced right ventricular systolic function.  · Moderate right atrial enlargement.  · There is mild aortic valve stenosis.  · Aortic valve area is 1.90 cm2; peak velocity is 1.05 m/s; mean gradient is 2 mmHg.  · There is severe pulmonary hypertension.  · Moderate tricuspid regurgitation.  · Elevated central venous pressure (15 mmHg).  · The estimated PA systolic pressure is 103 mmHg.  . Continue Beta Blocker, ACE/ARB and Furosemide and monitor clinical status closely. Monitor on telemetry. Patient is on CHF pathway.  Monitor strict Is&Os and daily weights.  Place on fluid restriction of 1.5 L. Continue to stress to patient importance of self efficacy and  on diet for CHF. Last BNP reviewed- and noted below   Recent Labs   Lab 07/19/22  1800   BNP >4,900*   .          Mixed hyperlipidemia  -Monitor closely.   -Outpatient f/u      Type 2 diabetes mellitus with diabetic polyneuropathy, without long-term current use of insulin  -SSI   -Diabetic diet      Essential hypertension  -Continue Entresto  -Monitor BP closely  -Low sodium diet     COPD suggested by initial evaluation  -Duonebs as tolerated  -Encourage nicotine cessation (70 pack year history)   -Continue zithromax     Benign prostatic hyperplasia with nocturia  -Continue flomax. Monitor clinical response        VTE Risk Mitigation (From admission, onward)         Ordered     enoxaparin injection 40 mg  Daily         07/19/22 2131     IP VTE HIGH RISK PATIENT  Once         07/19/22 2131     Place sequential compression device   Until discontinued         07/19/22 2131                Discharge Planning   TATIANA:      Code Status: Full Code   Is the patient medically ready for discharge?:     Reason for patient still in hospital (select all that apply): Patient trending condition, Treatment and Consult recommendations                     Yary Whaley NP  Department of Hospital Medicine   [facility] ([facility])

## 2022-07-20 NOTE — ASSESSMENT & PLAN NOTE
-Duonebs as tolerated  -Encourage nicotine cessation (70 pack year history)   -Continue zithromax

## 2022-07-20 NOTE — HOSPITAL COURSE
Patient admitted to Telemetry for CHF exacerbation/COPD. Cardio consulted. BNP > 4,900. Patient treated with nitrate/vasodilator, Entresto, Lasix, Aldactone, Zithromax, and duonebs. CXR shows cardiomegaly with perihilar edema and mild pleural effusions. CTA reveals no evidence of pulmonary embolism, but small bilateral pleural effusions, right greater than left producing mild atelectasis at the lung bases. Patient verbalized improvement in dyspnea. He also endorses deviation from low sodium diet associated with recent move to son's house. On 7/21/22, patient reports continues improvement of dyspnea; patient is responding appropriately to diuresis. Decrease in potassium noted, currently 3.3. Continue plan of care with potassium added.  24 July:  No acute problems or complaints.  Breathing comfortably on room air.  No peripheral edema.  Therapy evaluations pending.  Transition to oral diuretics.    Patient was stable on room air. He was discharged home. No bb started due to history of 2nd degree AV block.

## 2022-07-20 NOTE — SUBJECTIVE & OBJECTIVE
Past Medical History:   Diagnosis Date    CHF (congestive heart failure)     COPD (chronic obstructive pulmonary disease)     Diabetes mellitus     HTN (hypertension)        History reviewed. No pertinent surgical history.    Review of patient's allergies indicates:   Allergen Reactions    Penicillins        No current facility-administered medications on file prior to encounter.     Current Outpatient Medications on File Prior to Encounter   Medication Sig    albuterol (PROVENTIL/VENTOLIN HFA) 90 mcg/actuation inhaler Inhale 1-2 puffs into the lungs every 6 (six) hours as needed for Wheezing. Rescue    aspirin (ASPIR-81) 81 MG EC tablet Take 81 mg by mouth once daily.    empagliflozin (JARDIANCE) 10 mg tablet Take 10 mg by mouth once daily.    fluticasone furoate-vilanteroL (BREO ELLIPTA) 100-25 mcg/dose diskus inhaler Inhale 1 puff into the lungs once daily. Controller    pantoprazole (PROTONIX) 40 MG tablet Take 40 mg by mouth once daily.    pravastatin (PRAVACHOL) 80 MG tablet Take 80 mg by mouth nightly.    sacubitriL-valsartan (ENTRESTO) 49-51 mg per tablet Take 1 tablet by mouth 2 (two) times daily.    spironolactone (ALDACTONE) 25 MG tablet Take 25 mg by mouth once daily.    tamsulosin (FLOMAX) 0.4 mg Cap Take by mouth every evening.    tiotropium (SPIRIVA) 18 mcg inhalation capsule Inhale 18 mcg into the lungs nightly. Controller     Family History    None       Tobacco Use    Smoking status: Current Every Day Smoker     Packs/day: 1.00     Types: Cigarettes    Smokeless tobacco: Never Used   Substance and Sexual Activity    Alcohol use: Yes     Comment: occasionally    Drug use: Not Currently    Sexual activity: Not Currently     Review of Systems   Constitutional: Negative for malaise/fatigue.   Eyes:  Negative for blurred vision.   Cardiovascular:  Positive for dyspnea on exertion. Negative for chest pain, claudication, cyanosis, irregular heartbeat, leg swelling, near-syncope, orthopnea, palpitations  and paroxysmal nocturnal dyspnea.   Respiratory:  Positive for cough and shortness of breath. Negative for hemoptysis.    Hematologic/Lymphatic: Negative for bleeding problem. Does not bruise/bleed easily.   Skin:  Negative for dry skin and itching.   Musculoskeletal:  Negative for falls, muscle weakness and myalgias.   Gastrointestinal:  Negative for abdominal pain, diarrhea, heartburn, hematemesis, hematochezia and melena.   Genitourinary:  Negative for flank pain and hematuria.   Neurological:  Negative for dizziness, focal weakness, headaches, light-headedness, numbness, paresthesias, seizures and weakness.   Psychiatric/Behavioral:  Negative for altered mental status and memory loss. The patient is not nervous/anxious.    Allergic/Immunologic: Negative for hives.   Objective:     Vital Signs (Most Recent):  Temp: 98.7 °F (37.1 °C) (07/20/22 1104)  Pulse: 76 (07/20/22 1257)  Resp: 18 (07/20/22 1104)  BP: (!) 132/93 (07/20/22 1257)  SpO2: (!) 94 % (07/20/22 1104)   Vital Signs (24h Range):  Temp:  [97.6 °F (36.4 °C)-98.8 °F (37.1 °C)] 98.7 °F (37.1 °C)  Pulse:  [76-97] 76  Resp:  [18-30] 18  SpO2:  [92 %-100 %] 94 %  BP: (132-173)/() 132/93     Weight: 101.2 kg (223 lb)  Body mass index is 31.1 kg/m².    SpO2: (!) 94 %  O2 Device (Oxygen Therapy): nasal cannula      Intake/Output Summary (Last 24 hours) at 7/20/2022 1314  Last data filed at 7/20/2022 1100  Gross per 24 hour   Intake --   Output 2250 ml   Net -2250 ml       Lines/Drains/Airways       Peripheral Intravenous Line  Duration                  Peripheral IV - Single Lumen 07/19/22 1802 20 G Left Antecubital <1 day                    Physical Exam  Vitals and nursing note reviewed.   Constitutional:       General: He is not in acute distress.     Appearance: He is well-developed. He is not diaphoretic.   HENT:      Head: Normocephalic and atraumatic.   Eyes:      General:         Right eye: No discharge.         Left eye: No discharge.      Pupils:  Pupils are equal, round, and reactive to light.   Neck:      Thyroid: No thyromegaly.      Vascular: No JVD.   Cardiovascular:      Rate and Rhythm: Normal rate and regular rhythm.      Pulses: Normal pulses and intact distal pulses.      Heart sounds: Normal heart sounds. No murmur heard.    No friction rub. No gallop.   Pulmonary:      Effort: Pulmonary effort is normal. No respiratory distress.      Breath sounds: Rales present. No wheezing.   Chest:      Chest wall: No tenderness.   Abdominal:      General: Bowel sounds are normal. There is no distension.      Palpations: Abdomen is soft.      Tenderness: There is no abdominal tenderness.   Musculoskeletal:         General: Normal range of motion.      Cervical back: Neck supple.      Right lower leg: No edema.      Left lower leg: No edema.   Skin:     General: Skin is warm and dry.      Findings: No erythema or rash.   Neurological:      Mental Status: He is alert and oriented to person, place, and time.      Cranial Nerves: No cranial nerve deficit.   Psychiatric:         Behavior: Behavior normal.         Judgment: Judgment normal.       Significant Labs:   Recent Lab Results  (Last 5 results in the past 24 hours)        07/20/22  1055   07/20/22  0806   07/20/22  0733   07/20/22  0607   07/19/22  2257        Albumin     3.6           Alkaline Phosphatase     171           ALT     13           Anion Gap     9           Ao root annulus   3.46             Ascending aorta   3.20             Ao peak aldo   1.05             Ao VTI   20.0             AR Max Aldo   2.50             AST     12           AV valve area   1.90             AV mean gradient   2             AV index (prosthetic)   0.67             AV peak gradient   4             AV regurgitation pressure 1/2 time   [id number]             AV Velocity Ratio   0.75             BILIRUBIN TOTAL     2.0  Comment: For infants and newborns, interpretation of results should be based  on gestational age,  weight and in agreement with clinical  observations.    Premature Infant recommended reference ranges:  Up to 24 hours.............<8.0 mg/dL  Up to 48 hours............<12.0 mg/dL  3-5 days..................<15.0 mg/dL  6-29 days.................<15.0 mg/dL             BSA   2.25             BUN     24           Calcium     9.4           Chloride     112           CO2     24           Creatinine     1.3           Left Ventricle Relative Wall Thickness   0.66             D-Dimer         1.20  Comment: The quantitative D-dimer assay should be used as an aid in   the diagnosis of deep vein thrombosis and pulmonary embolism  in patients with the appropriate presentation and clinical  history. The upper limit of the reference interval and the clinical   cut off   point are identical. Causes of a positive (>0.50 mg/L FEU) D-Dimer   test  include, but are not limited to: DVT, PE, DIC, thrombolytic   therapy, anticoagulant therapy, recent surgery, trauma, or   pregnancy, disseminated malignancy, aortic aneurysm, cirrhosis,  and severe infection. False negative results may occur in   patients with distal DVT.         eGFR if      56           eGFR if non      48  Comment: Calculation used to obtain the estimated glomerular filtration  rate (eGFR) is the CKD-EPI equation.              EF   20             FS   11             Glucose     103           IVC diameter   3.00             IVRT   [id number]             IVSd   1.60             LA WIDTH   4.10             Left Atrium Major Axis   5.47             Left Atrium Minor Axis   5.51             LA size   4.13             LA volume   79.02             LA vol index   35.8             LVOT area   2.8             LV EDV BP   93.50             LV Diastolic Volume Index   42.31             LVIDd   4.52             LVIDs   4.04             LV mass   291.89             LV Mass Index   132             Left Ventricular Outflow Tract Mean Gradient    1.18             Left Ventricular Outflow Tract Mean Velocity   [id number]             LVOT diameter   1.90             LVOT peak aldo   0.79             LVOT stroke volume   37.97             LVOT peak VTI   13.40             LV ESV BP   71.49             LV Systolic Volume Index   32.3             Magnesium     2.0           Mean e'   0.08             Mr max aldo   5.86             POCT Glucose 101       109         Potassium     3.3           PROTEIN TOTAL     6.9           PV mean gradient   0.84             Posterior Wall   1.50             Right Atrial Pressure (from IVC)   15             RA Major Axis   6.22             RA Width   3.84             RVDD   4.56             RVOT peak aldo   0.65             RVOT peak VTI   10.7             Sinus   3.22             Sodium     145           STJ   3.15             TAPSE   1.81             TDI SEPTAL   0.08             TDI LATERAL   0.07             Triscuspid Valve Regurgitation Peak Gradient   88             TR Max Aldo   4.68             TV rest pulmonary artery pressure   103                                    Significant Imaging: EXAMINATION:  XR CHEST AP PORTABLE     CLINICAL HISTORY:  CHF;     TECHNIQUE:  Single frontal view of the chest was performed.     COMPARISON:  None     FINDINGS:  Cardiomegaly with perihilar edema.  Mild pleural effusions.     Bones are intact.     Impression:     Cardiomegaly with perihilar edema.  Mild pleural effusions suspected.  Correlate clinically to CHF.  Underlying pneumonia not excluded        Electronically signed by: Earl Birmingham  Date:                                            07/19/2022  Time:                                           18:23    EXAMINATION:  CTA CHEST NON CORONARY     CLINICAL HISTORY:  Chest pain and shortness of breath     TECHNIQUE:  After the intravenous administration of 100 cc of Omni 350 nonionic contrast using CT pulmonary angio technique, 1.25  Mm axial images were acquired using helical CT  technique from the lung apices through costophrenic sulci.  Sagittal coronal and oblique MIPS were also submitted for interpretation.     COMPARISON:  Chest x-ray dated 07/19/2022     FINDINGS:  -Pulmonary arteries: Pulmonary arteries are well opacified.  No evidence of pulmonary embolism.  No evidence of pulmonary hypertension.  No right heart strain is identified with RV/LV ratio < 0.9.  Reflux of contrast into the IVC and azygous vein suggests right heart dysfunction.     -Lungs: No nodules or infiltrates.  Atelectasis seen at the lung bases.  Bilateral pulmonary interstitial edema suspected.     -Pleura: Small bilateral pleural effusions, right greater than left producing mild atelectasis at the lung bases.     -Mediastinum/Anabella:Shotty adenopathy     -Axilla: No adenopathy.     -Thyroid: Normal lower gland.     -Heart/Aorta: Heart size is enlarged.  Moderate  coronary artery disease.  No pericardial effusion. Aorta normal caliber.   Aorta demonstrates severe atherosclerotic disease.     -Bones/Chest Wall: Intact with mild multilevel spondylosis throughout the thoracic spine.  Mild gynecomastia.  Mild anasarca.     -Upper Abdomen: Trace ascites.  Post cholecystectomy.  Mild constipation.  Indeterminate hypodensity upper pole left kidney measuring 4 cm which could reflect a cyst.     Impression:     No evidence of pulmonary embolism.     Small bilateral pleural effusions, right greater than left producing mild atelectasis at the lung bases     See additional findings above     All CT scans at this facility use dose modulation, iterative reconstruction and/or weight based dosing when appropriate to reduce radiation dose to as low as reasonably achievable.        Electronically signed by: Jamir Dunn MD  Date:                                            07/20/2022  Time:                                           10:05

## 2022-07-20 NOTE — PLAN OF CARE
[facility] - Telemetry (Hospital)  Initial Discharge Assessment       Primary Care Provider: Primary Doctor No    Admission Diagnosis: Shortness of breath [R06.02]  Tachypnea [R06.82]  New onset atrial fibrillation [I48.91]  Acute congestive heart failure, unspecified heart failure type [I50.9]    Admission Date: 7/19/2022  Expected Discharge Date:     Discharge Barriers Identified: None    Payor: [employer] / Plan: [employer] [place] / Product Type: Medicare Advantage /     Extended Emergency Contact Information  Primary Emergency Contact: Toussaint,Kevin  Mobile Phone: [phone]  Relation: Son  Preferred language: English   needed? No    Discharge Plan A: Home, Home Health, Home with family       No Pharmacies Listed    Initial Assessment (most recent)     Adult Discharge Assessment - 07/20/22 1351        Discharge Assessment    Assessment Type Discharge Planning Assessment     Confirmed/corrected address, phone number and insurance No     Reason Other (see comment)   SW to f/u with pt's son to correct demographics    Confirmed Demographics Correct on Facesheet     Source of Information patient     Communicated TATIANA with patient/caregiver Date not available/Unable to determine     Reason For Admission CHF     Lives With child(dyan), adult     Facility Arrived From: Home     Do you expect to return to your current living situation? Yes     Do you have help at home or someone to help you manage your care at home? Yes     Who are your caregiver(s) and their phone number(s)? Pt's sonKraig     Prior to hospitilization cognitive status: Alert/Oriented     Current cognitive status: Alert/Oriented     Walking or Climbing Stairs Difficulty ambulation difficulty, requires equipment     Mobility Management walker, cane     Dressing/Bathing Difficulty none     Equipment Currently Used at Home cane, straight     Readmission within 30 days? Yes     Patient currently being followed by outpatient case  "management? No     Do you currently have service(s) that help you manage your care at home? No     Do you take prescription medications? Yes     Do you have prescription coverage? Yes     Do you have any problems affording any of your prescribed medications? No     Is the patient taking medications as prescribed? yes     Who is going to help you get home at discharge? Pt's son, Kraig     How do you get to doctors appointments? family or friend will provide     Are you on dialysis? No     Do you take coumadin? No     Discharge Plan A Home;Home Health;Home with family     DME Needed Upon Discharge  none     Discharge Plan discussed with: Patient     Discharge Barriers Identified None               Swer met with patient at the bedside to complete discharge assessment. Patient reports living at home with his son, Kraig. Patient reports that Kragi is his help at home. Patient currently receiving home health services. Pt could not recall name but pt's son said it's "[facility]," swer to f/u to confirm with agency.     CM needs TBD. Swer updated pt's whiteboard to reflect discharge disposition and CM contact information.             "

## 2022-07-20 NOTE — SUBJECTIVE & OBJECTIVE
Interval History: Patient was sitting upright in bed, eating breakfast when seen. He notes improvement in dyspnea. Will continue current plan of care.      Review of Systems   Constitutional:  Negative for chills and fever.   HENT:  Negative for congestion, ear pain, rhinorrhea, sneezing and sore throat.    Eyes:  Negative for pain and visual disturbance.   Respiratory:  Positive for cough and shortness of breath (improving).    Cardiovascular:  Negative for chest pain, palpitations and leg swelling.   Gastrointestinal:  Negative for abdominal pain, constipation, diarrhea, nausea and vomiting.   Endocrine: Negative for cold intolerance, heat intolerance, polydipsia, polyphagia and polyuria.   Genitourinary:  Negative for dysuria.   Musculoskeletal:  Negative for arthralgias.   Neurological:  Negative for dizziness, tremors, seizures, syncope, weakness, light-headedness, numbness and headaches.   Hematological:  Negative for adenopathy. Does not bruise/bleed easily.   Psychiatric/Behavioral:  Negative for agitation and confusion. The patient is not nervous/anxious.    Objective:     Vital Signs (Most Recent):  Temp: 98.7 °F (37.1 °C) (07/20/22 1104)  Pulse: 79 (07/20/22 1104)  Resp: 18 (07/20/22 1104)  BP: (!) 173/100 (07/20/22 1104)  SpO2: (!) 94 % (07/20/22 1104)   Vital Signs (24h Range):  Temp:  [97.6 °F (36.4 °C)-98.8 °F (37.1 °C)] 98.7 °F (37.1 °C)  Pulse:  [79-97] 79  Resp:  [18-30] 18  SpO2:  [92 %-100 %] 94 %  BP: (134-173)/() 173/100     Weight: 101.2 kg (223 lb)  Body mass index is 31.1 kg/m².    Intake/Output Summary (Last 24 hours) at 7/20/2022 1232  Last data filed at 7/20/2022 0100  Gross per 24 hour   Intake --   Output 1050 ml   Net -1050 ml      Physical Exam  Constitutional:       General: He is not in acute distress.     Appearance: Normal appearance. He is not ill-appearing, toxic-appearing or diaphoretic.   HENT:      Head: Normocephalic and atraumatic.      Nose: Nose normal.       Mouth/Throat:      Mouth: Mucous membranes are moist.      Pharynx: Oropharynx is clear.   Eyes:      Conjunctiva/sclera: Conjunctivae normal.   Cardiovascular:      Pulses: Normal pulses.      Heart sounds: Normal heart sounds.   Pulmonary:      Effort: Pulmonary effort is normal.      Breath sounds: Rales present. No wheezing.   Abdominal:      General: Bowel sounds are normal.   Musculoskeletal:         General: Normal range of motion.   Skin:     General: Skin is warm and dry.   Neurological:      General: No focal deficit present.      Mental Status: He is alert and oriented to person, place, and time.   Psychiatric:         Mood and Affect: Mood normal.         Behavior: Behavior normal.       Significant Labs: All pertinent labs within the past 24 hours have been reviewed.    Significant Imaging: I have reviewed all pertinent imaging results/findings within the past 24 hours.

## 2022-07-21 LAB
ANION GAP SERPL CALC-SCNC: 13 MMOL/L (ref 8–16)
BUN SERPL-MCNC: 22 MG/DL (ref 8–23)
CALCIUM SERPL-MCNC: 8.9 MG/DL (ref 8.7–10.5)
CHLORIDE SERPL-SCNC: 107 MMOL/L (ref 95–110)
CO2 SERPL-SCNC: 24 MMOL/L (ref 23–29)
CREAT SERPL-MCNC: 1.1 MG/DL (ref 0.5–1.4)
EST. GFR  (AFRICAN AMERICAN): >60 ML/MIN/1.73 M^2
EST. GFR  (NON AFRICAN AMERICAN): 59 ML/MIN/1.73 M^2
ESTIMATED AVG GLUCOSE: 143 MG/DL (ref 68–131)
GLUCOSE SERPL-MCNC: 107 MG/DL (ref 70–110)
HBA1C MFR BLD: 6.6 % (ref 4–5.6)
POCT GLUCOSE: 118 MG/DL (ref 70–110)
POCT GLUCOSE: 127 MG/DL (ref 70–110)
POCT GLUCOSE: 128 MG/DL (ref 70–110)
POCT GLUCOSE: 145 MG/DL (ref 70–110)
POCT GLUCOSE: 149 MG/DL (ref 70–110)
POTASSIUM SERPL-SCNC: 3.3 MMOL/L (ref 3.5–5.1)
SODIUM SERPL-SCNC: 144 MMOL/L (ref 136–145)

## 2022-07-21 PROCEDURE — 21400001 HC TELEMETRY ROOM

## 2022-07-21 PROCEDURE — 99232 PR SUBSEQUENT HOSPITAL CARE,LEVL II: ICD-10-PCS | Mod: ,,, | Performed by: PHYSICIAN ASSISTANT

## 2022-07-21 PROCEDURE — 80048 BASIC METABOLIC PNL TOTAL CA: CPT | Performed by: INTERNAL MEDICINE

## 2022-07-21 PROCEDURE — 63700000 PHARM REV CODE 250 ALT 637 W/O HCPCS: Performed by: INTERNAL MEDICINE

## 2022-07-21 PROCEDURE — A4216 STERILE WATER/SALINE, 10 ML: HCPCS | Performed by: INTERNAL MEDICINE

## 2022-07-21 PROCEDURE — 36415 COLL VENOUS BLD VENIPUNCTURE: CPT | Performed by: INTERNAL MEDICINE

## 2022-07-21 PROCEDURE — S4991 NICOTINE PATCH NONLEGEND: HCPCS | Performed by: INTERNAL MEDICINE

## 2022-07-21 PROCEDURE — 25000003 PHARM REV CODE 250: Performed by: INTERNAL MEDICINE

## 2022-07-21 PROCEDURE — 25000003 PHARM REV CODE 250: Performed by: NURSE PRACTITIONER

## 2022-07-21 PROCEDURE — 63600175 PHARM REV CODE 636 W HCPCS: Performed by: PHYSICIAN ASSISTANT

## 2022-07-21 PROCEDURE — 63600175 PHARM REV CODE 636 W HCPCS: Performed by: INTERNAL MEDICINE

## 2022-07-21 PROCEDURE — 99232 SBSQ HOSP IP/OBS MODERATE 35: CPT | Mod: ,,, | Performed by: PHYSICIAN ASSISTANT

## 2022-07-21 RX ORDER — FUROSEMIDE 10 MG/ML
40 INJECTION INTRAMUSCULAR; INTRAVENOUS 2 TIMES DAILY
Status: DISCONTINUED | OUTPATIENT
Start: 2022-07-21 | End: 2022-07-24

## 2022-07-21 RX ORDER — POTASSIUM CHLORIDE 20 MEQ/1
20 TABLET, EXTENDED RELEASE ORAL ONCE
Status: COMPLETED | OUTPATIENT
Start: 2022-07-21 | End: 2022-07-21

## 2022-07-21 RX ADMIN — PRAVASTATIN SODIUM 20 MG: 20 TABLET ORAL at 08:07

## 2022-07-21 RX ADMIN — FUROSEMIDE 40 MG: 10 INJECTION, SOLUTION INTRAMUSCULAR; INTRAVENOUS at 08:07

## 2022-07-21 RX ADMIN — AZITHROMYCIN MONOHYDRATE 500 MG: 250 TABLET ORAL at 08:07

## 2022-07-21 RX ADMIN — NICOTINE 1 PATCH: 21 PATCH, EXTENDED RELEASE TRANSDERMAL at 08:07

## 2022-07-21 RX ADMIN — Medication 10 ML: at 09:07

## 2022-07-21 RX ADMIN — SACUBITRIL AND VALSARTAN 1 TABLET: 49; 51 TABLET, FILM COATED ORAL at 08:07

## 2022-07-21 RX ADMIN — POTASSIUM CHLORIDE 20 MEQ: 1500 TABLET, EXTENDED RELEASE ORAL at 09:07

## 2022-07-21 RX ADMIN — Medication 6 MG: at 10:07

## 2022-07-21 RX ADMIN — Medication 10 ML: at 06:07

## 2022-07-21 RX ADMIN — POLYETHYLENE GLYCOL 3350 17 G: 17 POWDER, FOR SOLUTION ORAL at 08:07

## 2022-07-21 RX ADMIN — Medication 10 ML: at 02:07

## 2022-07-21 RX ADMIN — ASPIRIN 81 MG: 81 TABLET, COATED ORAL at 08:07

## 2022-07-21 RX ADMIN — SPIRONOLACTONE 25 MG: 25 TABLET, FILM COATED ORAL at 08:07

## 2022-07-21 RX ADMIN — ENOXAPARIN SODIUM 40 MG: 100 INJECTION SUBCUTANEOUS at 04:07

## 2022-07-21 NOTE — NURSING
Pt is on 2 liters O2.  He is requesting to leave AMA.  Pt confused.  He thinks he was admitted several days ago.  Will continue to monitor

## 2022-07-21 NOTE — PLAN OF CARE
Nutrition Plan of Care:    Interventions/Recommendations (treatment strategy):  1. RD to provide disease specific nutrition related educations.  2. Patient continue on a cardiac, diabetic, low sodium diet.   3. Patient continue to consume adequate oral intake.    Julia Chris MS, RDN, LDN

## 2022-07-21 NOTE — SUBJECTIVE & OBJECTIVE
Interval History: No acute overnight events. Patient was sitting upright in bed when seen. He notes continued improvement in dyspnea with no new complaints. Potassium declining due to diuresis, oral potassium added to regimen. Cardiology consulted. Due to recurrent readmits, will consider inotropic therapy. Continue current plan of care.     Review of Systems   Constitutional:  Positive for activity change. Negative for chills and fever.   HENT:  Negative for congestion, ear pain, rhinorrhea, sneezing and sore throat.    Eyes:  Negative for pain and visual disturbance.   Respiratory:  Positive for cough (intermittent) and shortness of breath (improving). Negative for chest tightness and wheezing.    Cardiovascular:  Negative for chest pain, palpitations and leg swelling.   Gastrointestinal:  Negative for abdominal pain, constipation, diarrhea, nausea and vomiting.   Endocrine: Negative for cold intolerance, heat intolerance, polydipsia, polyphagia and polyuria.   Genitourinary:  Negative for dysuria.   Musculoskeletal:  Negative for arthralgias.   Neurological:  Negative for dizziness, seizures, syncope, weakness, numbness and headaches.   Hematological:  Negative for adenopathy. Does not bruise/bleed easily.   Psychiatric/Behavioral:  Negative for agitation, confusion and sleep disturbance. The patient is not nervous/anxious.    Objective:     Vital Signs (Most Recent):  Temp: 97.7 °F (36.5 °C) (07/21/22 0718)  Pulse: 82 (07/21/22 0718)  Resp: 18 (07/21/22 0718)  BP: (!) 147/91 (07/21/22 0718)  SpO2: 97 % (07/21/22 0718)   Vital Signs (24h Range):  Temp:  [97.7 °F (36.5 °C)-98.7 °F (37.1 °C)] 97.7 °F (36.5 °C)  Pulse:  [69-99] 82  Resp:  [18-19] 18  SpO2:  [94 %-99 %] 97 %  BP: (116-173)/() 147/91     Weight: 97.2 kg (214 lb 4.6 oz)  Body mass index is 29.89 kg/m².    Intake/Output Summary (Last 24 hours) at 7/21/2022 1025  Last data filed at 7/21/2022 0400  Gross per 24 hour   Intake 600 ml   Output 2400 ml    Net -1800 ml      Physical Exam  Constitutional:       Appearance: Normal appearance.   HENT:      Head: Normocephalic and atraumatic.      Nose: Nose normal.      Mouth/Throat:      Mouth: Mucous membranes are moist.      Pharynx: Oropharynx is clear.   Eyes:      Conjunctiva/sclera: Conjunctivae normal.   Neck:      Vascular: No carotid bruit or JVD.   Cardiovascular:      Rate and Rhythm: Normal rate and regular rhythm.      Pulses: Normal pulses.           Radial pulses are 2+ on the right side and 2+ on the left side.        Dorsalis pedis pulses are 2+ on the right side and 2+ on the left side.   Pulmonary:      Effort: Pulmonary effort is normal.      Breath sounds: Rales present.   Abdominal:      General: Bowel sounds are normal. There is no abdominal bruit.      Palpations: There is no hepatomegaly.   Musculoskeletal:         General: Normal range of motion.      Right lower leg: No edema.      Left lower leg: No edema.   Lymphadenopathy:      Cervical: No cervical adenopathy.   Skin:     General: Skin is warm and dry.      Coloration: Skin is not jaundiced.      Findings: No erythema or rash.   Neurological:      General: No focal deficit present.      Mental Status: He is alert.   Psychiatric:         Mood and Affect: Mood normal.       Significant Labs: All pertinent labs within the past 24 hours have been reviewed.    Significant Imaging: I have reviewed all pertinent imaging results/findings within the past 24 hours.

## 2022-07-21 NOTE — SUBJECTIVE & OBJECTIVE
Review of Systems   Constitutional: Negative.   HENT: Negative.     Eyes: Negative.    Cardiovascular:  Positive for dyspnea on exertion.   Respiratory:  Positive for shortness of breath.    Endocrine: Negative.    Hematologic/Lymphatic: Negative.    Skin: Negative.    Musculoskeletal: Negative.    Gastrointestinal: Negative.    Genitourinary: Negative.    Neurological: Negative.    Psychiatric/Behavioral: Negative.     Allergic/Immunologic: Negative.    Objective:     Vital Signs (Most Recent):  Temp: 97.5 °F (36.4 °C) (07/21/22 1137)  Pulse: 74 (07/21/22 1137)  Resp: 16 (07/21/22 1137)  BP: 125/80 (07/21/22 1137)  SpO2: 96 % (07/21/22 1137) Vital Signs (24h Range):  Temp:  [97.5 °F (36.4 °C)-98 °F (36.7 °C)] 97.5 °F (36.4 °C)  Pulse:  [69-99] 74  Resp:  [16-19] 16  SpO2:  [95 %-99 %] 96 %  BP: (116-156)/(67-92) 125/80     Weight: 97.2 kg (214 lb 4.6 oz)  Body mass index is 29.89 kg/m².     SpO2: 96 %  O2 Device (Oxygen Therapy): room air      Intake/Output Summary (Last 24 hours) at 7/21/2022 1354  Last data filed at 7/21/2022 1000  Gross per 24 hour   Intake 480 ml   Output 1200 ml   Net -720 ml       Lines/Drains/Airways       Peripheral Intravenous Line  Duration                  Peripheral IV - Single Lumen 07/19/22 1802 20 G Left Antecubital 1 day                    Physical Exam  Vitals and nursing note reviewed.   Constitutional:       General: He is not in acute distress.     Appearance: Normal appearance. He is well-developed. He is not diaphoretic.   HENT:      Head: Normocephalic and atraumatic.   Eyes:      General:         Right eye: No discharge.         Left eye: No discharge.      Pupils: Pupils are equal, round, and reactive to light.   Neck:      Thyroid: No thyromegaly.      Vascular: No JVD.      Trachea: No tracheal deviation.      Comments: +JVD    Cardiovascular:      Rate and Rhythm: Normal rate and regular rhythm.      Heart sounds: Normal heart sounds, S1 normal and S2 normal. No  murmur heard.  Pulmonary:      Effort: Pulmonary effort is normal. No respiratory distress.      Breath sounds: Rales present. No wheezing.   Abdominal:      General: There is distension.      Tenderness: There is no abdominal tenderness. There is no rebound.   Musculoskeletal:      Cervical back: Neck supple.      Right lower leg: No edema.      Left lower leg: No edema.   Skin:     General: Skin is warm and dry.      Findings: No erythema.   Neurological:      General: No focal deficit present.      Mental Status: He is alert and oriented to person, place, and time.   Psychiatric:         Mood and Affect: Mood normal.         Behavior: Behavior normal.         Thought Content: Thought content normal.       Significant Labs: CMP   Recent Labs   Lab 07/19/22  1800 07/20/22  0733 07/21/22  0455    145 144   K 4.0 3.3* 3.3*   * 112* 107   CO2 17* 24 24   * 103 107   BUN 26* 24* 22   CREATININE 1.4 1.3 1.1   CALCIUM 9.4 9.4 8.9   PROT 7.4 6.9  --    ALBUMIN 3.6 3.6  --    BILITOT 1.7* 2.0*  --    ALKPHOS 178* 171*  --    AST 12 12  --    ALT 14 13  --    ANIONGAP 11 9 13   ESTGFRAFRICA 51* 56* >60   EGFRNONAA 44* 48* 59*   , CBC   Recent Labs   Lab 07/19/22  1800   WBC 4.80   HGB 10.8*   HCT 35.6*      , Troponin   Recent Labs   Lab 07/19/22  1800   TROPONINI 0.026   , and All pertinent lab results from the last 24 hours have been reviewed.    Significant Imaging: Echocardiogram: Transthoracic echo (TTE) complete (Cupid Only):   Results for orders placed or performed during the hospital encounter of 07/19/22   Echo   Result Value Ref Range    BSA 2.25 m2    LA WIDTH 4.10 cm    TDI SEPTAL 0.08 m/s    IVC diameter 3.00 cm    Left Ventricular Outflow Tract Mean Velocity 0.52828145407 cm/s    Left Ventricular Outflow Tract Mean Gradient 1.18 mmHg    TDI LATERAL 0.07 m/s    LVIDd 4.52 3.5 - 6.0 cm    IVS 1.60 (A) 0.6 - 1.1 cm    Posterior Wall 1.50 (A) 0.6 - 1.1 cm    Ao root annulus 3.46 cm     LVIDs 4.04 (A) 2.1 - 4.0 cm    FS 11 28 - 44 %    LA volume 79.02 cm3    Sinus 3.22 cm    STJ 3.15 cm    Ascending aorta 3.20 cm    LV mass 291.89 g    LA size 4.13 cm    RVDD 4.56 cm    TAPSE 1.81 cm    Left Ventricle Relative Wall Thickness 0.66 cm    AV regurgitation pressure 1/2 time 523.065653240604504 ms    AV mean gradient 2 mmHg    AV valve area 1.90 cm2    AV Velocity Ratio 0.75     AV index (prosthetic) 0.67     Mean e' 0.08 m/s    IVRT 68.238024205458103 msec    LVOT diameter 1.90 cm    LVOT area 2.8 cm2    LVOT peak aldo 0.79 m/s    LVOT peak VTI 13.40 cm    Ao peak aldo 1.05 m/s    Ao VTI 20.0 cm    RVOT peak aldo 0.65 m/s    RVOT peak VTI 10.7 cm    Mr max aldo 5.86 m/s    LVOT stroke volume 37.97 cm3    AV peak gradient 4 mmHg    PV mean gradient 0.84 mmHg    AR Max Aldo 2.50 m/s    TR Max Aldo 4.68 m/s    LV Systolic Volume 71.49 mL    LV Systolic Volume Index 32.3 mL/m2    LV Diastolic Volume 93.50 mL    LV Diastolic Volume Index 42.31 mL/m2    LA Volume Index 35.8 mL/m2    LV Mass Index 132 g/m2    RA Major Axis 6.22 cm    Left Atrium Minor Axis 5.51 cm    Left Atrium Major Axis 5.47 cm    Triscuspid Valve Regurgitation Peak Gradient 88 mmHg    RA Width 3.84 cm    Right Atrial Pressure (from IVC) 15 mmHg    EF 20 %    TV rest pulmonary artery pressure 103 mmHg    Narrative    · Concentric hypertrophy and severely decreased systolic function.  · Mild left atrial enlargement.  · The estimated ejection fraction is 20%.  · Left ventricular diastolic dysfunction.  · There is left ventricular global hypokinesis.  · Moderate right ventricular enlargement with mildly to moderately reduced   right ventricular systolic function.  · Moderate right atrial enlargement.  · There is mild aortic valve stenosis.  · Aortic valve area is 1.90 cm2; peak velocity is 1.05 m/s; mean gradient   is 2 mmHg.  · There is severe pulmonary hypertension.  · Moderate tricuspid regurgitation.  · Elevated central venous pressure (15  mmHg).  · The estimated PA systolic pressure is 103 mmHg.       and EKG: Reviewed

## 2022-07-21 NOTE — ASSESSMENT & PLAN NOTE
Patient is identified as having Combined Systolic and Diastolic heart failure that is Acute on chronic. CHF is currently uncontrolled due to Rales/crackles on pulmonary exam. Latest ECHO performed and demonstrates- Results for orders placed during the hospital encounter of 07/19/22    Echo    Interpretation Summary  · Concentric hypertrophy and severely decreased systolic function.  · Mild left atrial enlargement.  · The estimated ejection fraction is 20%.  · Left ventricular diastolic dysfunction.  · There is left ventricular global hypokinesis.  · Moderate right ventricular enlargement with mildly to moderately reduced right ventricular systolic function.  · Moderate right atrial enlargement.  · There is mild aortic valve stenosis.  · Aortic valve area is 1.90 cm2; peak velocity is 1.05 m/s; mean gradient is 2 mmHg.  · There is severe pulmonary hypertension.  · Moderate tricuspid regurgitation.  · Elevated central venous pressure (15 mmHg).  · The estimated PA systolic pressure is 103 mmHg.  . Continue Furosemide, Aldactone and ARNI and monitor clinical status closely. Monitor on telemetry. Patient is on CHF pathway.  Monitor strict Is&Os and daily weights.  Place on fluid restriction of 1.5 L. Continue to stress to patient importance of self efficacy and  on diet for CHF. Last BNP reviewed- and noted below   Recent Labs   Lab 07/19/22  1800   BNP >4,900*   .  If no response to diuretics will plan inotropes due to recurrent admits.    7/21/22  -Lasix increased to 40 mg BID, assess response  -Continue Aldactone, Entresto  -Strict I's/O's  -Reassess in AM

## 2022-07-21 NOTE — PLAN OF CARE
Pt remains fall free this shift.  Pt AAOx2, verbal, clear speech.  Skin warm and dry. No new skin issues.  Telemonitoring in progress, (SR 63-80)  Room air  Male purewick   Assist x2 with transfers.  PO abx   IUV lasix   CBG AC/ HS with PRN SS insulin.  Bed low, side rails up x 2, wheels locked, call light in reach.  Bed alarm maintained for safety.  Patient instructed to call for assistance.  Hourly rounding completed.  24 hour chart check completed  POC updated and reviewed with pt. Will continue POC.

## 2022-07-21 NOTE — PROGRESS NOTES
[facility] - Telemetry ([facility])  Cardiology  Progress Note    Patient Name: Joseph Toussaint  MRN: [id number]  Admission Date: 7/19/2022  Hospital Length of Stay: 2 days  Code Status: Full Code   Attending Physician: Ren Moura MD   Primary Care Physician: Primary Doctor No  Expected Discharge Date:   Principal Problem:Acute on chronic systolic congestive heart failure    Subjective:   HPI:  From hospital medicine  History was taken from the patient and electronic chart .No family at bedside at this time.  89-year-old  male with history of COPD and CHF ,chronic active smoker ,DM,BPH  presenting to the emergency department complaining of worsening shortness of breath over the past week.  There is associated history of cough ,denies history of leg swelling ,dizziness or syncope.  He was recently discharged from [facility]-07/12/2022 and from that documentation, he was sent to the SNF after recent diagnosis of CHF with EF -25-30% . He was continued on Aldactone/Entresto.   He was also seen in the ED -07/13/2022 with worsening dyspnoea and was discharged home.  He was admitted at [facility] -06/01 for acute CHF . ECho done at that time -06/2022 showed    1. Normal left ventricular cavity size. Severely depressed left ventricular systolic   function. LVEF 25 - 30%.   2. Mildly dilated right ventricle. Moderate right ventricular hypokinesis.   3. Moderate to severe mitral valve regurgitation.   4. Moderate to severe tricuspid valve regurgitation  Labs and imaging test reviewed.  Cardiomegaly with perihilar edema.  Mild pleural effusions suspected  BNP- more than 4900  CBC -hemoglobin 10.8  He will be admitted for CHF exacerbation /COPD.     His  primary care physician is Dr. Leonid Noguera.      Cards consult for chf I have talked with patient eh stopped smoking 2 weeks ago he has progressive shortness of breath no leg swelling .denies syncope near syncope he claims compliance with medical  therapy . Due to his recurrent admits he may benefit from inotropic therapy if he does not respond well to diuresis.           Hospital Course:   7/21/22-Patient seen and examined today, resting in bed. Feels ok. Remains SOB with abdominal distension/JVD. Lasix increased, assess response. Labs reviewed, creatinine stable, K low, needs repletion.           Review of Systems   Constitutional: Negative.   HENT: Negative.     Eyes: Negative.    Cardiovascular:  Positive for dyspnea on exertion.   Respiratory:  Positive for shortness of breath.    Endocrine: Negative.    Hematologic/Lymphatic: Negative.    Skin: Negative.    Musculoskeletal: Negative.    Gastrointestinal: Negative.    Genitourinary: Negative.    Neurological: Negative.    Psychiatric/Behavioral: Negative.     Allergic/Immunologic: Negative.    Objective:     Vital Signs (Most Recent):  Temp: 97.5 °F (36.4 °C) (07/21/22 1137)  Pulse: 74 (07/21/22 1137)  Resp: 16 (07/21/22 1137)  BP: 125/80 (07/21/22 1137)  SpO2: 96 % (07/21/22 1137) Vital Signs (24h Range):  Temp:  [97.5 °F (36.4 °C)-98 °F (36.7 °C)] 97.5 °F (36.4 °C)  Pulse:  [69-99] 74  Resp:  [16-19] 16  SpO2:  [95 %-99 %] 96 %  BP: (116-156)/(67-92) 125/80     Weight: 97.2 kg (214 lb 4.6 oz)  Body mass index is 29.89 kg/m².     SpO2: 96 %  O2 Device (Oxygen Therapy): room air      Intake/Output Summary (Last 24 hours) at 7/21/2022 1354  Last data filed at 7/21/2022 1000  Gross per 24 hour   Intake 480 ml   Output 1200 ml   Net -720 ml       Lines/Drains/Airways       Peripheral Intravenous Line  Duration                  Peripheral IV - Single Lumen 07/19/22 1802 20 G Left Antecubital 1 day                    Physical Exam  Vitals and nursing note reviewed.   Constitutional:       General: He is not in acute distress.     Appearance: Normal appearance. He is well-developed. He is not diaphoretic.   HENT:      Head: Normocephalic and atraumatic.   Eyes:      General:         Right eye: No discharge.          Left eye: No discharge.      Pupils: Pupils are equal, round, and reactive to light.   Neck:      Thyroid: No thyromegaly.      Vascular: No JVD.      Trachea: No tracheal deviation.      Comments: +JVD    Cardiovascular:      Rate and Rhythm: Normal rate and regular rhythm.      Heart sounds: Normal heart sounds, S1 normal and S2 normal. No murmur heard.  Pulmonary:      Effort: Pulmonary effort is normal. No respiratory distress.      Breath sounds: Rales present. No wheezing.   Abdominal:      General: There is distension.      Tenderness: There is no abdominal tenderness. There is no rebound.   Musculoskeletal:      Cervical back: Neck supple.      Right lower leg: No edema.      Left lower leg: No edema.   Skin:     General: Skin is warm and dry.      Findings: No erythema.   Neurological:      General: No focal deficit present.      Mental Status: He is alert and oriented to person, place, and time.   Psychiatric:         Mood and Affect: Mood normal.         Behavior: Behavior normal.         Thought Content: Thought content normal.       Significant Labs: CMP   Recent Labs   Lab 07/19/22  1800 07/20/22  0733 07/21/22  0455    145 144   K 4.0 3.3* 3.3*   * 112* 107   CO2 17* 24 24   * 103 107   BUN 26* 24* 22   CREATININE 1.4 1.3 1.1   CALCIUM 9.4 9.4 8.9   PROT 7.4 6.9  --    ALBUMIN 3.6 3.6  --    BILITOT 1.7* 2.0*  --    ALKPHOS 178* 171*  --    AST 12 12  --    ALT 14 13  --    ANIONGAP 11 9 13   ESTGFRAFRICA 51* 56* >60   EGFRNONAA 44* 48* 59*   , CBC   Recent Labs   Lab 07/19/22  1800   WBC 4.80   HGB 10.8*   HCT 35.6*      , Troponin   Recent Labs   Lab 07/19/22  1800   TROPONINI 0.026   , and All pertinent lab results from the last 24 hours have been reviewed.    Significant Imaging: Echocardiogram: Transthoracic echo (TTE) complete (Cupid Only):   Results for orders placed or performed during the hospital encounter of 07/19/22   Echo   Result Value Ref Range    BSA 2.25  m2    LA WIDTH 4.10 cm    TDI SEPTAL 0.08 m/s    IVC diameter 3.00 cm    Left Ventricular Outflow Tract Mean Velocity 0.03338367853 cm/s    Left Ventricular Outflow Tract Mean Gradient 1.18 mmHg    TDI LATERAL 0.07 m/s    LVIDd 4.52 3.5 - 6.0 cm    IVS 1.60 (A) 0.6 - 1.1 cm    Posterior Wall 1.50 (A) 0.6 - 1.1 cm    Ao root annulus 3.46 cm    LVIDs 4.04 (A) 2.1 - 4.0 cm    FS 11 28 - 44 %    LA volume 79.02 cm3    Sinus 3.22 cm    STJ 3.15 cm    Ascending aorta 3.20 cm    LV mass 291.89 g    LA size 4.13 cm    RVDD 4.56 cm    TAPSE 1.81 cm    Left Ventricle Relative Wall Thickness 0.66 cm    AV regurgitation pressure 1/2 time 523.492165996742916 ms    AV mean gradient 2 mmHg    AV valve area 1.90 cm2    AV Velocity Ratio 0.75     AV index (prosthetic) 0.67     Mean e' 0.08 m/s    IVRT 68.379197210325933 msec    LVOT diameter 1.90 cm    LVOT area 2.8 cm2    LVOT peak aldo 0.79 m/s    LVOT peak VTI 13.40 cm    Ao peak aldo 1.05 m/s    Ao VTI 20.0 cm    RVOT peak aldo 0.65 m/s    RVOT peak VTI 10.7 cm    Mr max aldo 5.86 m/s    LVOT stroke volume 37.97 cm3    AV peak gradient 4 mmHg    PV mean gradient 0.84 mmHg    AR Max Aldo 2.50 m/s    TR Max Aldo 4.68 m/s    LV Systolic Volume 71.49 mL    LV Systolic Volume Index 32.3 mL/m2    LV Diastolic Volume 93.50 mL    LV Diastolic Volume Index 42.31 mL/m2    LA Volume Index 35.8 mL/m2    LV Mass Index 132 g/m2    RA Major Axis 6.22 cm    Left Atrium Minor Axis 5.51 cm    Left Atrium Major Axis 5.47 cm    Triscuspid Valve Regurgitation Peak Gradient 88 mmHg    RA Width 3.84 cm    Right Atrial Pressure (from IVC) 15 mmHg    EF 20 %    TV rest pulmonary artery pressure 103 mmHg    Narrative    · Concentric hypertrophy and severely decreased systolic function.  · Mild left atrial enlargement.  · The estimated ejection fraction is 20%.  · Left ventricular diastolic dysfunction.  · There is left ventricular global hypokinesis.  · Moderate right ventricular enlargement with mildly to  moderately reduced   right ventricular systolic function.  · Moderate right atrial enlargement.  · There is mild aortic valve stenosis.  · Aortic valve area is 1.90 cm2; peak velocity is 1.05 m/s; mean gradient   is 2 mmHg.  · There is severe pulmonary hypertension.  · Moderate tricuspid regurgitation.  · Elevated central venous pressure (15 mmHg).  · The estimated PA systolic pressure is 103 mmHg.       and EKG: Reviewed    Assessment and Plan:   Patient who presents with decompensated combined CHF. Still overloaded, Lasix increased. Reassess in AM.    * Acute on chronic systolic congestive heart failure  Patient is identified as having Combined Systolic and Diastolic heart failure that is Acute on chronic. CHF is currently uncontrolled due to Rales/crackles on pulmonary exam. Latest ECHO performed and demonstrates- Results for orders placed during the hospital encounter of 07/19/22    Echo    Interpretation Summary  · Concentric hypertrophy and severely decreased systolic function.  · Mild left atrial enlargement.  · The estimated ejection fraction is 20%.  · Left ventricular diastolic dysfunction.  · There is left ventricular global hypokinesis.  · Moderate right ventricular enlargement with mildly to moderately reduced right ventricular systolic function.  · Moderate right atrial enlargement.  · There is mild aortic valve stenosis.  · Aortic valve area is 1.90 cm2; peak velocity is 1.05 m/s; mean gradient is 2 mmHg.  · There is severe pulmonary hypertension.  · Moderate tricuspid regurgitation.  · Elevated central venous pressure (15 mmHg).  · The estimated PA systolic pressure is 103 mmHg.  . Continue Furosemide, Aldactone and ARNI and monitor clinical status closely. Monitor on telemetry. Patient is on CHF pathway.  Monitor strict Is&Os and daily weights.  Place on fluid restriction of 1.5 L. Continue to stress to patient importance of self efficacy and  on diet for CHF. Last BNP reviewed- and noted  below   Recent Labs   Lab 07/19/22  1800   BNP >4,900*   .  If no response to diuretics will plan inotropes due to recurrent admits.    7/21/22  -Lasix increased to 40 mg BID, assess response  -Continue Aldactone, Entresto  -Strict I's/O's  -Reassess in AM    Mixed hyperlipidemia  -Continue statin    Type 2 diabetes mellitus with diabetic polyneuropathy, without long-term current use of insulin  -Per primary team    Essential hypertension  -Continue home meds    COPD suggested by initial evaluation  -Per primary team .        VTE Risk Mitigation (From admission, onward)         Ordered     enoxaparin injection 40 mg  Daily         07/19/22 2131     IP VTE HIGH RISK PATIENT  Once         07/19/22 2131     Place sequential compression device  Until discontinued         07/19/22 2131                Emilee Oliveros PA-C  Cardiology  [facility] - Telemetry ([facility])

## 2022-07-21 NOTE — HOSPITAL COURSE
7/21/22-Patient seen and examined today, resting in bed. Feels ok. Remains SOB with abdominal distension/JVD. Lasix increased, assess response. Labs reviewed, creatinine stable, K low, needs repletion.     7/22/22-Patient seen and examined today, lying in bed. Feels well. States SOB has improved. I's/O's not documented overnight. Labs reviewed. Creatinine stable, K low, needs repletion.

## 2022-07-21 NOTE — ASSESSMENT & PLAN NOTE
Patient is on CHF pathway  -Cardiology consulted. Inotropic therapy will be considered if patient is non-responsive to diuresis to minimize hospital admissions.   -Continue Nitrate/Vasodilator and monitor clinical status closely.  -Continue entresto, lasix, and aldactone.      -Monitor strict I&Os and daily weights.    -Fluid restriction of 1.5 L.   -Continue to stress to patient importance of self efficacy and  on diet for CHF.      Last BNP reviewed.   Recent Labs   Lab 07/19/22  1800   BNP >4,900*

## 2022-07-21 NOTE — PROGRESS NOTES
[facility] - Telemetry ([facility])  Adult Nutrition  Progress Note    SUMMARY       Recommendations    Recommendation/Intervention:  1. To continue patient on a cardiac, low sodium, diabetic diet.    2. Patient with consume adaquate po intake.    Goals:   1. Patient to continue on a cardiac, low sodium, diabetic diet    2. Collaboration with Medical Providers    Nutrition Goal Status: goal met    Communication of RD Recs: other (comment) (Plan of Care; Sticky notes)    Assessment and Plan        Nutrition Problem  Lack of Nutrition Knowledge    Related to (etiology):   Disease states    Signs and Symptoms (as evidenced by):   Non compliance with disease specific diet restrictions    Interventions/Recommendations (treatment strategy):  1. RD to provide disease specific nutrition related educations.  2. Patient continue on a cardiac, diabetic, low sodium diet.   3. Patient continue to consume adequate oral intake.    Nutrition Diagnosis Status:   Continues       Malnutrition Assessment                                       Reason for Assessment    Reason For Assessment: consult, other (see comments) (Diet Education)    Diagnosis: cardiac disease, pulmonary disease    Relevant Medical History: CHF, Diabetes, COPD    Interdisciplinary Rounds: did not attend    General Information Comments:   07/20/2022: Patient with Diabetic diet and 1500ml fluid restricition.  Patient is confused and stating he wants to leave AMA.  Consulted for diet education.  Education documents provided with discharge papers.  RD will follow up on any diet questions. PO intake noted at 100% with no tolerance issues.  Last bowel movement noted on 7/19/2022.  Labs reviewed.  NKFA.  Will continue to monitor.  Patient at low nutritional risk.  (RD remote)    Nutrition Discharge Planning: Patient to continue on a cardiac, diabetic, fluid restricition (1.5L) diet.    Nutrition Risk Screen    Nutrition Risk Screen: no indicators present    Nutrition/Diet  "History    Spiritual, Cultural Beliefs, Yazidi Practices, Values that Affect Care: no  Food Allergies: NKFA  Factors Affecting Nutritional Intake: None identified at this time    Anthropometrics    Temp: 97.8 °F (36.6 °C)  Height Method: Stated  Height: 5' 11" (180.3 cm)  Height (inches): 71 in  Weight Method: Bed Scale  Weight: 101.2 kg (223 lb 1.7 oz)  Weight (lb): 223.11 lb  Ideal Body Weight (IBW), Male: 172 lb  % Ideal Body Weight, Male (lb): 129.72 %  BMI (Calculated): 31.1  BMI Grade: 30 - 34.9- obesity - grade I       Lab/Procedures/Meds    Pertinent Labs Reviewed: reviewed  Pertinent Medications Reviewed: reviewed    BMP  Lab Results   Component Value Date     07/20/2022    K 3.3 (L) 07/20/2022     (H) 07/20/2022    CO2 24 07/20/2022    BUN 24 (H) 07/20/2022    CREATININE 1.3 07/20/2022    CALCIUM 9.4 07/20/2022    ANIONGAP 9 07/20/2022    ESTGFRAFRICA 56 (A) 07/20/2022    EGFRNONAA 48 (A) 07/20/2022     Lab Results   Component Value Date     07/20/2022    K 3.3 (L) 07/20/2022     (H) 07/20/2022    CO2 24 07/20/2022     Lab Results   Component Value Date    ALBUMIN 3.6 07/20/2022     Lab Results   Component Value Date    CALCIUM 9.4 07/20/2022     Scheduled Meds:   aspirin  81 mg Oral Daily    azithromycin  500 mg Oral Daily    enoxaparin  40 mg Subcutaneous Daily    furosemide (LASIX) injection  40 mg Intravenous Daily    nicotine  1 patch Transdermal Daily    polyethylene glycol  17 g Oral Daily    pravastatin  20 mg Oral Daily    sacubitriL-valsartan  1 tablet Oral BID    sodium chloride 0.9%  10 mL Intravenous Q8H    spironolactone  25 mg Oral Daily     Continuous Infusions:  PRN Meds:.acetaminophen, albuterol-ipratropium, dextrose 10%, dextrose 10%, dextrose 10%, dextrose 10%, glucagon (human recombinant), glucose, glucose, hydrALAZINE, HYDROcodone-acetaminophen, insulin aspart U-100, magnesium oxide, magnesium oxide, melatonin, naloxone, ondansetron, polyethylene " glycol, potassium bicarbonate, potassium bicarbonate, potassium bicarbonate, potassium, sodium phosphates, potassium, sodium phosphates, potassium, sodium phosphates, sodium chloride 0.9%    Physical Findings/Assessment         Estimated/Assessed Needs    Weight Used For Calorie Calculations: 101.2 kg (223 lb 1.7 oz)  Energy Calorie Requirements (kcal): 20kcals/kg  Energy Need Method: Kcal/kg  Protein Requirements: 0.8-1.2g/kg  Weight Used For Protein Calculations: 101.2 kg (223 lb 1.7 oz)  Fluid Requirements (mL): 1500mls fluid restriction  Estimated Fluid Requirement Method: other (see comments) (fluid restriction)  RDA Method (mL): 20  CHO Requirement: 253      Nutrition Prescription Ordered    Current Diet Order: Diabetic, Fluid restriction 1500mls    Evaluation of Received Nutrient/Fluid Intake    % Kcal Needs: 100%  % Protein Needs: 100%  I/O: 1500ml fluid restriction  Energy Calories Required: meeting needs  Protein Required: meeting needs  Fluid Required: meeting needs  Total Fluid Intake (mL/kg): 15ml/kg  Tolerance: tolerating  % Intake of Estimated Energy Needs: 75 - 100 %  % Meal Intake: 75 - 100 %    Nutrition Risk    Level of Risk/Frequency of Follow-up: low     Monitor and Evaluation    Food and Nutrient Intake: energy intake, food and beverage intake  Food and Nutrient Adminstration: diet order  Knowledge/Beliefs/Attitudes: food and nutrition knowledge/skill, beliefs and attitudes  Physical Activity and Function: nutrition-related ADLs and IADLs, factors affecting access to physical activity  Anthropometric Measurements: height/length, weight, weight change, body mass index  Biochemical Data, Medical Tests and Procedures: electrolyte and renal panel, lipid profile, gastrointestinal profile, glucose/endocrine profile, inflammatory profile     Nutrition Follow-Up    RD Follow-up?: Yes     Julia Chris, MS, RDN, LDN

## 2022-07-21 NOTE — PROGRESS NOTES
[facility] Medicine  Progress Note    Patient Name: Joseph Toussaint  MRN: [id number]  Patient Class: IP- Inpatient   Admission Date: 7/19/2022  Length of Stay: 2 days  Attending Physician: Ren Moura MD  Primary Care Provider: Primary Doctor No        Subjective:     Principal Problem:Acute on chronic systolic congestive heart failure        HPI:  History was taken from the patient and electronic chart .No family at bedside at this time.  89-year-old  male with history of COPD and CHF ,chronic active smoker ,DM,BPH  presenting to the emergency department complaining of worsening shortness of breath over the past week.  There is associated history of cough ,denies history of leg swelling ,dizziness or syncope.  He was recently discharged from [facility]-07/12/2022 and from that documentation, he was sent to the SNF after recent diagnosis of CHF with EF -25-30% . He was continued on Aldactone/Entresto.   He was also seen in the ED -07/13/2022 with worsening dyspnoea and was discharged home.  He was admitted at [facility] -06/01 for acute CHF . ECho done at that time -06/2022 showed    1. Normal left ventricular cavity size. Severely depressed left ventricular systolic   function. LVEF 25 - 30%.   2. Mildly dilated right ventricle. Moderate right ventricular hypokinesis.   3. Moderate to severe mitral valve regurgitation.   4. Moderate to severe tricuspid valve regurgitation  Labs and imaging test reviewed.  Cardiomegaly with perihilar edema.  Mild pleural effusions suspected  BNP- more than 4900  CBC -hemoglobin 10.8  He will be admitted for CHF exacerbation /COPD.    His  primary care physician is Dr. Leonid Noguera.       Overview/Hospital Course:  Patient admitted to Telemetry for CHF exacerbation/COPD. Cardio consulted. BNP > 4,900. Patient treated with nitrate/vasodilator, Entresto, Lasix, Aldactone, Zithromax, and duonebs. CXR shows cardiomegaly with  perihilar edema and mild pleural effusions. CTA reveals no evidence of pulmonary embolism, but small bilateral pleural effusions, right greater than left producing mild atelectasis at the lung bases. Patient verbalized improvement in dyspnea. He also endorses deviation from low sodium diet associated with recent move to son's house. On 7/21/22, patient reports continues improvement of dyspnea; patient is responding appropriately to diuresis. Hypokalemia noted with replacement given in setting of diuresis. Diuresis in progress with dose adjusted due to continued wheezing, JVD, and abdominal distention upon assessment.       Interval History: No acute overnight events. Patient was sitting upright in bed when seen. Pt verbalized improvement with no new complaints. Potassium declining due to diuresis, oral potassium added to regimen. Cardiology consulted. Continue current plan of care.     Review of Systems   Constitutional:  Positive for activity change. Negative for chills and fever.   HENT:  Negative for congestion, ear pain, rhinorrhea, sneezing and sore throat.    Eyes:  Negative for pain and visual disturbance.   Respiratory:  Positive for cough (intermittent) and shortness of breath (improving). Negative for chest tightness and wheezing.    Cardiovascular:  Negative for chest pain, palpitations and leg swelling.   Gastrointestinal:  Negative for abdominal pain, constipation, diarrhea, nausea and vomiting.   Endocrine: Negative for cold intolerance, heat intolerance, polydipsia, polyphagia and polyuria.   Genitourinary:  Negative for dysuria.   Musculoskeletal:  Negative for arthralgias.   Neurological:  Negative for dizziness, seizures, syncope, weakness, numbness and headaches.   Hematological:  Negative for adenopathy. Does not bruise/bleed easily.   Psychiatric/Behavioral:  Negative for agitation, confusion and sleep disturbance. The patient is not nervous/anxious.    Objective:     Vital Signs (Most  Recent):  Temp: 97.7 °F (36.5 °C) (07/21/22 0718)  Pulse: 82 (07/21/22 0718)  Resp: 18 (07/21/22 0718)  BP: (!) 147/91 (07/21/22 0718)  SpO2: 97 % (07/21/22 0718)   Vital Signs (24h Range):  Temp:  [97.7 °F (36.5 °C)-98.7 °F (37.1 °C)] 97.7 °F (36.5 °C)  Pulse:  [69-99] 82  Resp:  [18-19] 18  SpO2:  [94 %-99 %] 97 %  BP: (116-173)/() 147/91     Weight: 97.2 kg (214 lb 4.6 oz)  Body mass index is 29.89 kg/m².    Intake/Output Summary (Last 24 hours) at 7/21/2022 1025  Last data filed at 7/21/2022 0400  Gross per 24 hour   Intake 600 ml   Output 2400 ml   Net -1800 ml      Physical Exam  Constitutional:       Appearance: Normal appearance.   HENT:      Head: Normocephalic and atraumatic.      Nose: Nose normal.      Mouth/Throat:      Mouth: Mucous membranes are moist.      Pharynx: Oropharynx is clear.   Eyes:      Conjunctiva/sclera: Conjunctivae normal.   Neck:      Vascular: No carotid bruit. + JVD   Cardiovascular:      Rate and Rhythm: Normal rate and regular rhythm.      Pulses: Normal pulses.           Radial pulses are 2+ on the right side and 2+ on the left side.        Dorsalis pedis pulses are 2+ on the right side and 2+ on the left side.   Pulmonary:      Effort: Pulmonary effort is normal.      Breath sounds: Rales present. + wheezing  Abdominal:      General: Bowel sounds are normal. There is no abdominal bruit.      Palpations: There is no hepatomegaly. +abdominal distention    Musculoskeletal:         General: Normal range of motion.      Right lower leg: No edema.      Left lower leg: No edema.   Lymphadenopathy:      Cervical: No cervical adenopathy.   Skin:     General: Skin is warm and dry.      Coloration: Skin is not jaundiced.      Findings: No erythema or rash.   Neurological:      General: No focal deficit present.      Mental Status: He is alert.   Psychiatric:         Mood and Affect: Mood normal.       Significant Labs: All pertinent labs within the past 24 hours have been  reviewed.    Significant Imaging: I have reviewed all pertinent imaging results/findings within the past 24 hours.      Assessment/Plan:      * Acute on chronic systolic congestive heart failure  Patient is on CHF pathway  -Cardiology consulted. Inotropic therapy will be considered if patient is non-responsive to diuresis to minimize hospital admissions.   -Continue Nitrate/Vasodilator and monitor clinical status closely.  -Continue entresto, lasix, and aldactone.      -Monitor strict I&Os and daily weights.    -Fluid restriction of 1.5 L.   -Continue to stress to patient importance of self efficacy and  on diet for CHF.      Last BNP reviewed.   Recent Labs   Lab 07/19/22  1800   BNP >4,900*       Patient is identified as having Combined Systolic and Diastolic heart failure that is Acute on chronic. CHF is currently uncontrolled due to Rales/crackles on pulmonary exam and Pulmonary edema/pleural effusion on CXR. Latest ECHO performed and demonstrates- Results for orders placed during the hospital encounter of 07/19/22    Echo    Interpretation Summary  · Concentric hypertrophy and severely decreased systolic function.  · Mild left atrial enlargement.  · The estimated ejection fraction is 20%.  · Left ventricular diastolic dysfunction.  · There is left ventricular global hypokinesis.  · Moderate right ventricular enlargement with mildly to moderately reduced right ventricular systolic function.  · Moderate right atrial enlargement.  · There is mild aortic valve stenosis.  · Aortic valve area is 1.90 cm2; peak velocity is 1.05 m/s; mean gradient is 2 mmHg.  · There is severe pulmonary hypertension.  · Moderate tricuspid regurgitation.  · Elevated central venous pressure (15 mmHg).  · The estimated PA systolic pressure is 103 mmHg.  . Continue Aldactone, Entresto, and Furosemide and monitor clinical status closely. Monitor on telemetry. Patient is on CHF pathway.  Monitor strict Is&Os and daily weights.  Place  on fluid restriction of 1.5 L. Continue to stress to patient importance of self efficacy and  on diet for CHF. Last BNP reviewed- and noted below   Recent Labs   Lab 07/19/22  1800   BNP >4,900*   IV Lasix dose increased- will consider inotropic therapy if decreased response to diuresis       Mixed hyperlipidemia  -Monitor closely.   -Statin   -Outpatient f/u      Type 2 diabetes mellitus with diabetic polyneuropathy, without long-term current use of insulin  -SSI and Accuchecks   -Diabetic diet      Essential hypertension  -Continue Entresto  -Monitor BP closely  -Low sodium diet     COPD suggested by initial evaluation  -Duonebs as tolerated  -Encourage nicotine cessation (70 pack year history)   -Continue zithromax     Benign prostatic hyperplasia with nocturia  -Continue flomax. Monitor clinical response        VTE Risk Mitigation (From admission, onward)         Ordered     enoxaparin injection 40 mg  Daily         07/19/22 2131     IP VTE HIGH RISK PATIENT  Once         07/19/22 2131     Place sequential compression device  Until discontinued         07/19/22 2131                Discharge Planning   TATIANA:      Code Status: Full Code   Is the patient medically ready for discharge?:     Reason for patient still in hospital (select all that apply): Patient trending condition, Laboratory test, Treatment, Consult recommendations and PT / OT recommendations  Discharge Plan A: Home, Home Health, Home with family                  Yary Whaley NP  Department of Hospital Medicine   [facility] - Telemetry ([facility])

## 2022-07-22 LAB
ANION GAP SERPL CALC-SCNC: 11 MMOL/L (ref 8–16)
BUN SERPL-MCNC: 28 MG/DL (ref 8–23)
CALCIUM SERPL-MCNC: 8.8 MG/DL (ref 8.7–10.5)
CHLORIDE SERPL-SCNC: 108 MMOL/L (ref 95–110)
CO2 SERPL-SCNC: 21 MMOL/L (ref 23–29)
CREAT SERPL-MCNC: 1.1 MG/DL (ref 0.5–1.4)
EST. GFR  (AFRICAN AMERICAN): >60 ML/MIN/1.73 M^2
EST. GFR  (NON AFRICAN AMERICAN): 59 ML/MIN/1.73 M^2
GLUCOSE SERPL-MCNC: 112 MG/DL (ref 70–110)
POCT GLUCOSE: 108 MG/DL (ref 70–110)
POCT GLUCOSE: 122 MG/DL (ref 70–110)
POCT GLUCOSE: 125 MG/DL (ref 70–110)
POCT GLUCOSE: 127 MG/DL (ref 70–110)
POTASSIUM SERPL-SCNC: 3.4 MMOL/L (ref 3.5–5.1)
SODIUM SERPL-SCNC: 140 MMOL/L (ref 136–145)

## 2022-07-22 PROCEDURE — S4991 NICOTINE PATCH NONLEGEND: HCPCS | Performed by: INTERNAL MEDICINE

## 2022-07-22 PROCEDURE — 99232 PR SUBSEQUENT HOSPITAL CARE,LEVL II: ICD-10-PCS | Mod: ,,, | Performed by: PHYSICIAN ASSISTANT

## 2022-07-22 PROCEDURE — 63600175 PHARM REV CODE 636 W HCPCS: Performed by: INTERNAL MEDICINE

## 2022-07-22 PROCEDURE — 25000003 PHARM REV CODE 250: Performed by: INTERNAL MEDICINE

## 2022-07-22 PROCEDURE — 63700000 PHARM REV CODE 250 ALT 637 W/O HCPCS: Performed by: INTERNAL MEDICINE

## 2022-07-22 PROCEDURE — 99232 SBSQ HOSP IP/OBS MODERATE 35: CPT | Mod: ,,, | Performed by: PHYSICIAN ASSISTANT

## 2022-07-22 PROCEDURE — 36415 COLL VENOUS BLD VENIPUNCTURE: CPT | Performed by: INTERNAL MEDICINE

## 2022-07-22 PROCEDURE — A4216 STERILE WATER/SALINE, 10 ML: HCPCS | Performed by: INTERNAL MEDICINE

## 2022-07-22 PROCEDURE — 25000003 PHARM REV CODE 250: Performed by: PHYSICIAN ASSISTANT

## 2022-07-22 PROCEDURE — 94761 N-INVAS EAR/PLS OXIMETRY MLT: CPT

## 2022-07-22 PROCEDURE — 63600175 PHARM REV CODE 636 W HCPCS: Performed by: PHYSICIAN ASSISTANT

## 2022-07-22 PROCEDURE — 21400001 HC TELEMETRY ROOM

## 2022-07-22 PROCEDURE — 80048 BASIC METABOLIC PNL TOTAL CA: CPT | Performed by: INTERNAL MEDICINE

## 2022-07-22 RX ORDER — POTASSIUM CHLORIDE 750 MG/1
30 TABLET, EXTENDED RELEASE ORAL ONCE
Status: COMPLETED | OUTPATIENT
Start: 2022-07-22 | End: 2022-07-22

## 2022-07-22 RX ADMIN — AZITHROMYCIN MONOHYDRATE 500 MG: 250 TABLET ORAL at 08:07

## 2022-07-22 RX ADMIN — FUROSEMIDE 40 MG: 10 INJECTION, SOLUTION INTRAMUSCULAR; INTRAVENOUS at 08:07

## 2022-07-22 RX ADMIN — Medication 10 ML: at 05:07

## 2022-07-22 RX ADMIN — ASPIRIN 81 MG: 81 TABLET, COATED ORAL at 08:07

## 2022-07-22 RX ADMIN — ENOXAPARIN SODIUM 40 MG: 100 INJECTION SUBCUTANEOUS at 04:07

## 2022-07-22 RX ADMIN — Medication 10 ML: at 01:07

## 2022-07-22 RX ADMIN — SACUBITRIL AND VALSARTAN 1 TABLET: 49; 51 TABLET, FILM COATED ORAL at 09:07

## 2022-07-22 RX ADMIN — SPIRONOLACTONE 25 MG: 25 TABLET, FILM COATED ORAL at 08:07

## 2022-07-22 RX ADMIN — PRAVASTATIN SODIUM 20 MG: 20 TABLET ORAL at 08:07

## 2022-07-22 RX ADMIN — NICOTINE 1 PATCH: 21 PATCH, EXTENDED RELEASE TRANSDERMAL at 08:07

## 2022-07-22 RX ADMIN — SACUBITRIL AND VALSARTAN 1 TABLET: 49; 51 TABLET, FILM COATED ORAL at 08:07

## 2022-07-22 RX ADMIN — POTASSIUM CHLORIDE 30 MEQ: 750 TABLET, EXTENDED RELEASE ORAL at 04:07

## 2022-07-22 RX ADMIN — POLYETHYLENE GLYCOL 3350 17 G: 17 POWDER, FOR SOLUTION ORAL at 08:07

## 2022-07-22 RX ADMIN — Medication 10 ML: at 10:07

## 2022-07-22 NOTE — NURSING
POC reviewed with pt. Pt verbalizes understanding of POC. No questions at this time.  AAOx1. NADN.  NSR on cardiac monitor.  Pt remains free of falls.  No complaints at this time.  Safety measures in place. Will continue to monitor.  Informed pt to call for assistance before getting up. Pt verbalizes understanding.  Hourly Rounding complete

## 2022-07-22 NOTE — PROGRESS NOTES
[facility] - Telemetry ([facility])  Cardiology  Progress Note    Patient Name: Joseph Toussaint  MRN: [id number]  Admission Date: 7/19/2022  Hospital Length of Stay: 3 days  Code Status: Full Code   Attending Physician: Jose Manuel Garcia MD   Primary Care Physician: Primary Doctor No  Expected Discharge Date:   Principal Problem:Acute on chronic systolic congestive heart failure    Subjective:   HPI:   History was taken from the patient and electronic chart .No family at bedside at this time.  89-year-old  male with history of COPD and CHF ,chronic active smoker ,DM,BPH  presenting to the emergency department complaining of worsening shortness of breath over the past week.  There is associated history of cough ,denies history of leg swelling ,dizziness or syncope.  He was recently discharged from [facility]-07/12/2022 and from that documentation, he was sent to the SNF after recent diagnosis of CHF with EF -25-30% . He was continued on Aldactone/Entresto.   He was also seen in the ED -07/13/2022 with worsening dyspnoea and was discharged home.  He was admitted at [facility] -06/01 for acute CHF . ECho done at that time -06/2022 showed    1. Normal left ventricular cavity size. Severely depressed left ventricular systolic   function. LVEF 25 - 30%.   2. Mildly dilated right ventricle. Moderate right ventricular hypokinesis.   3. Moderate to severe mitral valve regurgitation.   4. Moderate to severe tricuspid valve regurgitation  Labs and imaging test reviewed.  Cardiomegaly with perihilar edema.  Mild pleural effusions suspected  BNP- more than 4900  CBC -hemoglobin 10.8  He will be admitted for CHF exacerbation /COPD.     His  primary care physician is Dr. Leonid Noguera.      Cards consult for chf I have talked with patient eh stopped smoking 2 weeks ago he has progressive shortness of breath no leg swelling .denies syncope near syncope he claims compliance with medical therapy . Due to his  recurrent admits he may benefit from inotropic therapy if he does not respond well to diuresis.      Hospital Course:   7/21/22-Patient seen and examined today, resting in bed. Feels ok. Remains SOB with abdominal distension/JVD. Lasix increased, assess response. Labs reviewed, creatinine stable, K low, needs repletion.     7/22/22-Patient seen and examined today, lying in bed. Feels well. States SOB has improved. I's/O's not documented overnight. Labs reviewed. Creatinine stable, K low, needs repletion.        Review of Systems   Constitutional: Positive for malaise/fatigue.   HENT: Negative.     Eyes: Negative.    Cardiovascular: Negative.    Respiratory: Negative.     Endocrine: Negative.    Hematologic/Lymphatic: Negative.    Skin: Negative.    Musculoskeletal: Negative.    Gastrointestinal: Negative.    Genitourinary: Negative.    Neurological: Negative.    Psychiatric/Behavioral: Negative.     Allergic/Immunologic: Negative.    Objective:     Vital Signs (Most Recent):  Temp: 97.8 °F (36.6 °C) (07/22/22 1153)  Pulse: 77 (07/22/22 1300)  Resp: 18 (07/22/22 1153)  BP: (!) 163/91 (07/22/22 1153)  SpO2: 97 % (07/22/22 1153)   Vital Signs (24h Range):  Temp:  [97.5 °F (36.4 °C)-97.8 °F (36.6 °C)] 97.8 °F (36.6 °C)  Pulse:  [53-85] 77  Resp:  [18] 18  SpO2:  [95 %-99 %] 97 %  BP: (116-163)/(73-91) 163/91     Weight: 97.1 kg (214 lb 1.1 oz)  Body mass index is 29.86 kg/m².     SpO2: 97 %  O2 Device (Oxygen Therapy): room air    No intake or output data in the 24 hours ending 07/22/22 1442    Lines/Drains/Airways       Peripheral Intravenous Line  Duration                  Peripheral IV - Single Lumen 07/19/22 1802 20 G Left Antecubital 2 days                    Physical Exam  Vitals and nursing note reviewed.   Constitutional:       General: He is not in acute distress.     Appearance: Normal appearance. He is well-developed. He is not diaphoretic.   HENT:      Head: Normocephalic and atraumatic.   Eyes:       General:         Right eye: No discharge.         Left eye: No discharge.      Pupils: Pupils are equal, round, and reactive to light.   Neck:      Thyroid: No thyromegaly.      Vascular: No JVD.      Trachea: No tracheal deviation.      Comments: +JVD  Cardiovascular:      Rate and Rhythm: Normal rate and regular rhythm.      Heart sounds: Normal heart sounds, S1 normal and S2 normal. No murmur heard.  Pulmonary:      Effort: Pulmonary effort is normal. No respiratory distress.      Breath sounds: Normal breath sounds. No wheezing or rales.   Abdominal:      General: There is no distension.      Tenderness: There is no rebound.   Musculoskeletal:      Cervical back: Neck supple.      Right lower leg: No edema.      Left lower leg: No edema.   Skin:     General: Skin is warm and dry.      Findings: No erythema.   Neurological:      General: No focal deficit present.      Mental Status: He is alert and oriented to person, place, and time.   Psychiatric:         Mood and Affect: Mood normal.         Behavior: Behavior normal.         Thought Content: Thought content normal.       Significant Labs: CMP   Recent Labs   Lab 07/21/22  0455 07/22/22  0417    140   K 3.3* 3.4*    108   CO2 24 21*    112*   BUN 22 28*   CREATININE 1.1 1.1   CALCIUM 8.9 8.8   ANIONGAP 13 11   ESTGFRAFRICA >60 >60   EGFRNONAA 59* 59*   , CBC No results for input(s): WBC, HGB, HCT, PLT in the last 48 hours., Troponin No results for input(s): TROPONINI in the last 48 hours., and All pertinent lab results from the last 24 hours have been reviewed.    Significant Imaging: Echocardiogram: Transthoracic echo (TTE) complete (Cupid Only):   Results for orders placed or performed during the hospital encounter of 07/19/22   Echo   Result Value Ref Range    BSA 2.25 m2    LA WIDTH 4.10 cm    TDI SEPTAL 0.08 m/s    IVC diameter 3.00 cm    Left Ventricular Outflow Tract Mean Velocity 0.92790642301 cm/s    Left Ventricular Outflow Tract  Mean Gradient 1.18 mmHg    TDI LATERAL 0.07 m/s    LVIDd 4.52 3.5 - 6.0 cm    IVS 1.60 (A) 0.6 - 1.1 cm    Posterior Wall 1.50 (A) 0.6 - 1.1 cm    Ao root annulus 3.46 cm    LVIDs 4.04 (A) 2.1 - 4.0 cm    FS 11 28 - 44 %    LA volume 79.02 cm3    Sinus 3.22 cm    STJ 3.15 cm    Ascending aorta 3.20 cm    LV mass 291.89 g    LA size 4.13 cm    RVDD 4.56 cm    TAPSE 1.81 cm    Left Ventricle Relative Wall Thickness 0.66 cm    AV regurgitation pressure 1/2 time 523.580359751737843 ms    AV mean gradient 2 mmHg    AV valve area 1.90 cm2    AV Velocity Ratio 0.75     AV index (prosthetic) 0.67     Mean e' 0.08 m/s    IVRT [id number] msec    LVOT diameter 1.90 cm    LVOT area 2.8 cm2    LVOT peak aldo 0.79 m/s    LVOT peak VTI 13.40 cm    Ao peak aldo 1.05 m/s    Ao VTI 20.0 cm    RVOT peak aldo 0.65 m/s    RVOT peak VTI 10.7 cm    Mr max aldo 5.86 m/s    LVOT stroke volume 37.97 cm3    AV peak gradient 4 mmHg    PV mean gradient 0.84 mmHg    AR Max Aldo 2.50 m/s    TR Max Aldo 4.68 m/s    LV Systolic Volume 71.49 mL    LV Systolic Volume Index 32.3 mL/m2    LV Diastolic Volume 93.50 mL    LV Diastolic Volume Index 42.31 mL/m2    LA Volume Index 35.8 mL/m2    LV Mass Index 132 g/m2    RA Major Axis 6.22 cm    Left Atrium Minor Axis 5.51 cm    Left Atrium Major Axis 5.47 cm    Triscuspid Valve Regurgitation Peak Gradient 88 mmHg    RA Width 3.84 cm    Right Atrial Pressure (from IVC) 15 mmHg    EF 20 %    TV rest pulmonary artery pressure 103 mmHg    Narrative    · Concentric hypertrophy and severely decreased systolic function.  · Mild left atrial enlargement.  · The estimated ejection fraction is 20%.  · Left ventricular diastolic dysfunction.  · There is left ventricular global hypokinesis.  · Moderate right ventricular enlargement with mildly to moderately reduced   right ventricular systolic function.  · Moderate right atrial enlargement.  · There is mild aortic valve stenosis.  · Aortic valve area is 1.90 cm2; peak  velocity is 1.05 m/s; mean gradient   is 2 mmHg.  · There is severe pulmonary hypertension.  · Moderate tricuspid regurgitation.  · Elevated central venous pressure (15 mmHg).  · The estimated PA systolic pressure is 103 mmHg.      , EKG: Reviewed, and X-Ray: CXR: X-Ray Chest 1 View (CXR): No results found for this visit on 07/19/22. and X-Ray Chest PA and Lateral (CXR): No results found for this visit on 07/19/22.    Assessment and Plan:   Patient who presents with decompensated combined CHF. Much improved post diuresis. Can transition to po Lasix soon. Continue OMT as tolerated. Consider OP ischemic evaluation. CHF clinic f/u.     * Acute on chronic systolic congestive heart failure  Patient is identified as having Combined Systolic and Diastolic heart failure that is Acute on chronic. CHF is currently uncontrolled due to Rales/crackles on pulmonary exam. Latest ECHO performed and demonstrates- Results for orders placed during the hospital encounter of 07/19/22    Echo    Interpretation Summary  · Concentric hypertrophy and severely decreased systolic function.  · Mild left atrial enlargement.  · The estimated ejection fraction is 20%.  · Left ventricular diastolic dysfunction.  · There is left ventricular global hypokinesis.  · Moderate right ventricular enlargement with mildly to moderately reduced right ventricular systolic function.  · Moderate right atrial enlargement.  · There is mild aortic valve stenosis.  · Aortic valve area is 1.90 cm2; peak velocity is 1.05 m/s; mean gradient is 2 mmHg.  · There is severe pulmonary hypertension.  · Moderate tricuspid regurgitation.  · Elevated central venous pressure (15 mmHg).  · The estimated PA systolic pressure is 103 mmHg.  . Continue Furosemide, Aldactone and ARNI and monitor clinical status closely. Monitor on telemetry. Patient is on CHF pathway.  Monitor strict Is&Os and daily weights.  Place on fluid restriction of 1.5 L. Continue to stress to patient  importance of self efficacy and  on diet for CHF. Last BNP reviewed- and noted below   Recent Labs   Lab 07/19/22  1800   BNP >4,900*   .  If no response to diuretics will plan inotropes due to recurrent admits.    7/21/22  -Lasix increased to 40 mg BID, assess response  -Continue Aldactone, Entresto  -Strict I's/O's  -Reassess in AM    7/22/22  -Clinically seems improved  -Continue OMT as tolerated, can transition to Lasix po soon and arrange close f/u in CHF clinic  -Continue Aldactone, Entresto    Mixed hyperlipidemia  -Continue statin    Type 2 diabetes mellitus with diabetic polyneuropathy, without long-term current use of insulin  -Per primary team    Essential hypertension  -Continue home meds    COPD suggested by initial evaluation  -Per primary team .        VTE Risk Mitigation (From admission, onward)         Ordered     enoxaparin injection 40 mg  Daily         07/19/22 2131     IP VTE HIGH RISK PATIENT  Once         07/19/22 2131     Place sequential compression device  Until discontinued         07/19/22 2131                Emilee Oliveros PA-C  Cardiology  [facility] - Telemetry ([facility])

## 2022-07-22 NOTE — SUBJECTIVE & OBJECTIVE
Review of Systems   Constitutional: Positive for malaise/fatigue.   HENT: Negative.     Eyes: Negative.    Cardiovascular: Negative.    Respiratory: Negative.     Endocrine: Negative.    Hematologic/Lymphatic: Negative.    Skin: Negative.    Musculoskeletal: Negative.    Gastrointestinal: Negative.    Genitourinary: Negative.    Neurological: Negative.    Psychiatric/Behavioral: Negative.     Allergic/Immunologic: Negative.    Objective:     Vital Signs (Most Recent):  Temp: 97.8 °F (36.6 °C) (07/22/22 1153)  Pulse: 77 (07/22/22 1300)  Resp: 18 (07/22/22 1153)  BP: (!) 163/91 (07/22/22 1153)  SpO2: 97 % (07/22/22 1153)   Vital Signs (24h Range):  Temp:  [97.5 °F (36.4 °C)-97.8 °F (36.6 °C)] 97.8 °F (36.6 °C)  Pulse:  [53-85] 77  Resp:  [18] 18  SpO2:  [95 %-99 %] 97 %  BP: (116-163)/(73-91) 163/91     Weight: 97.1 kg (214 lb 1.1 oz)  Body mass index is 29.86 kg/m².     SpO2: 97 %  O2 Device (Oxygen Therapy): room air    No intake or output data in the 24 hours ending 07/22/22 1442    Lines/Drains/Airways       Peripheral Intravenous Line  Duration                  Peripheral IV - Single Lumen 07/19/22 1802 20 G Left Antecubital 2 days                    Physical Exam  Vitals and nursing note reviewed.   Constitutional:       General: He is not in acute distress.     Appearance: Normal appearance. He is well-developed. He is not diaphoretic.   HENT:      Head: Normocephalic and atraumatic.   Eyes:      General:         Right eye: No discharge.         Left eye: No discharge.      Pupils: Pupils are equal, round, and reactive to light.   Neck:      Thyroid: No thyromegaly.      Vascular: No JVD.      Trachea: No tracheal deviation.      Comments: +JVD  Cardiovascular:      Rate and Rhythm: Normal rate and regular rhythm.      Heart sounds: Normal heart sounds, S1 normal and S2 normal. No murmur heard.  Pulmonary:      Effort: Pulmonary effort is normal. No respiratory distress.      Breath sounds: Normal breath  sounds. No wheezing or rales.   Abdominal:      General: There is no distension.      Tenderness: There is no rebound.   Musculoskeletal:      Cervical back: Neck supple.      Right lower leg: No edema.      Left lower leg: No edema.   Skin:     General: Skin is warm and dry.      Findings: No erythema.   Neurological:      General: No focal deficit present.      Mental Status: He is alert and oriented to person, place, and time.   Psychiatric:         Mood and Affect: Mood normal.         Behavior: Behavior normal.         Thought Content: Thought content normal.       Significant Labs: CMP   Recent Labs   Lab 07/21/22  0455 07/22/22  0417    140   K 3.3* 3.4*    108   CO2 24 21*    112*   BUN 22 28*   CREATININE 1.1 1.1   CALCIUM 8.9 8.8   ANIONGAP 13 11   ESTGFRAFRICA >60 >60   EGFRNONAA 59* 59*   , CBC No results for input(s): WBC, HGB, HCT, PLT in the last 48 hours., Troponin No results for input(s): TROPONINI in the last 48 hours., and All pertinent lab results from the last 24 hours have been reviewed.    Significant Imaging: Echocardiogram: Transthoracic echo (TTE) complete (Cupid Only):   Results for orders placed or performed during the hospital encounter of 07/19/22   Echo   Result Value Ref Range    BSA 2.25 m2    LA WIDTH 4.10 cm    TDI SEPTAL 0.08 m/s    IVC diameter 3.00 cm    Left Ventricular Outflow Tract Mean Velocity 0.40806893494 cm/s    Left Ventricular Outflow Tract Mean Gradient 1.18 mmHg    TDI LATERAL 0.07 m/s    LVIDd 4.52 3.5 - 6.0 cm    IVS 1.60 (A) 0.6 - 1.1 cm    Posterior Wall 1.50 (A) 0.6 - 1.1 cm    Ao root annulus 3.46 cm    LVIDs 4.04 (A) 2.1 - 4.0 cm    FS 11 28 - 44 %    LA volume 79.02 cm3    Sinus 3.22 cm    STJ 3.15 cm    Ascending aorta 3.20 cm    LV mass 291.89 g    LA size 4.13 cm    RVDD 4.56 cm    TAPSE 1.81 cm    Left Ventricle Relative Wall Thickness 0.66 cm    AV regurgitation pressure 1/2 time 523.605709914466516 ms    AV mean gradient 2 mmHg    AV  valve area 1.90 cm2    AV Velocity Ratio 0.75     AV index (prosthetic) 0.67     Mean e' 0.08 m/s    IVRT [id number] msec    LVOT diameter 1.90 cm    LVOT area 2.8 cm2    LVOT peak aldo 0.79 m/s    LVOT peak VTI 13.40 cm    Ao peak aldo 1.05 m/s    Ao VTI 20.0 cm    RVOT peak aldo 0.65 m/s    RVOT peak VTI 10.7 cm    Mr max aldo 5.86 m/s    LVOT stroke volume 37.97 cm3    AV peak gradient 4 mmHg    PV mean gradient 0.84 mmHg    AR Max Aldo 2.50 m/s    TR Max Aldo 4.68 m/s    LV Systolic Volume 71.49 mL    LV Systolic Volume Index 32.3 mL/m2    LV Diastolic Volume 93.50 mL    LV Diastolic Volume Index 42.31 mL/m2    LA Volume Index 35.8 mL/m2    LV Mass Index 132 g/m2    RA Major Axis 6.22 cm    Left Atrium Minor Axis 5.51 cm    Left Atrium Major Axis 5.47 cm    Triscuspid Valve Regurgitation Peak Gradient 88 mmHg    RA Width 3.84 cm    Right Atrial Pressure (from IVC) 15 mmHg    EF 20 %    TV rest pulmonary artery pressure 103 mmHg    Narrative    · Concentric hypertrophy and severely decreased systolic function.  · Mild left atrial enlargement.  · The estimated ejection fraction is 20%.  · Left ventricular diastolic dysfunction.  · There is left ventricular global hypokinesis.  · Moderate right ventricular enlargement with mildly to moderately reduced   right ventricular systolic function.  · Moderate right atrial enlargement.  · There is mild aortic valve stenosis.  · Aortic valve area is 1.90 cm2; peak velocity is 1.05 m/s; mean gradient   is 2 mmHg.  · There is severe pulmonary hypertension.  · Moderate tricuspid regurgitation.  · Elevated central venous pressure (15 mmHg).  · The estimated PA systolic pressure is 103 mmHg.      , EKG: Reviewed, and X-Ray: CXR: X-Ray Chest 1 View (CXR): No results found for this visit on 07/19/22. and X-Ray Chest PA and Lateral (CXR): No results found for this visit on 07/19/22.

## 2022-07-22 NOTE — ASSESSMENT & PLAN NOTE
Patient is identified as having Combined Systolic and Diastolic heart failure that is Acute on chronic. CHF is currently uncontrolled due to Rales/crackles on pulmonary exam. Latest ECHO performed and demonstrates- Results for orders placed during the hospital encounter of 07/19/22    Echo    Interpretation Summary  · Concentric hypertrophy and severely decreased systolic function.  · Mild left atrial enlargement.  · The estimated ejection fraction is 20%.  · Left ventricular diastolic dysfunction.  · There is left ventricular global hypokinesis.  · Moderate right ventricular enlargement with mildly to moderately reduced right ventricular systolic function.  · Moderate right atrial enlargement.  · There is mild aortic valve stenosis.  · Aortic valve area is 1.90 cm2; peak velocity is 1.05 m/s; mean gradient is 2 mmHg.  · There is severe pulmonary hypertension.  · Moderate tricuspid regurgitation.  · Elevated central venous pressure (15 mmHg).  · The estimated PA systolic pressure is 103 mmHg.  . Continue Furosemide, Aldactone and ARNI and monitor clinical status closely. Monitor on telemetry. Patient is on CHF pathway.  Monitor strict Is&Os and daily weights.  Place on fluid restriction of 1.5 L. Continue to stress to patient importance of self efficacy and  on diet for CHF. Last BNP reviewed- and noted below   Recent Labs   Lab 07/19/22  1800   BNP >4,900*   .  If no response to diuretics will plan inotropes due to recurrent admits.    7/21/22  -Lasix increased to 40 mg BID, assess response  -Continue Aldactone, Entresto  -Strict I's/O's  -Reassess in AM    7/22/22  -Clinically seems improved  -Continue OMT as tolerated, can transition to Lasix po soon and arrange close f/u in CHF clinic  -Continue Aldactone, Entresto

## 2022-07-23 LAB
ANION GAP SERPL CALC-SCNC: 12 MMOL/L (ref 8–16)
BUN SERPL-MCNC: 29 MG/DL (ref 8–23)
CALCIUM SERPL-MCNC: 9.1 MG/DL (ref 8.7–10.5)
CHLORIDE SERPL-SCNC: 106 MMOL/L (ref 95–110)
CO2 SERPL-SCNC: 23 MMOL/L (ref 23–29)
CREAT SERPL-MCNC: 1 MG/DL (ref 0.5–1.4)
EST. GFR  (AFRICAN AMERICAN): >60 ML/MIN/1.73 M^2
EST. GFR  (NON AFRICAN AMERICAN): >60 ML/MIN/1.73 M^2
GLUCOSE SERPL-MCNC: 105 MG/DL (ref 70–110)
POCT GLUCOSE: 104 MG/DL (ref 70–110)
POCT GLUCOSE: 123 MG/DL (ref 70–110)
POCT GLUCOSE: 133 MG/DL (ref 70–110)
POCT GLUCOSE: 174 MG/DL (ref 70–110)
POTASSIUM SERPL-SCNC: 3.6 MMOL/L (ref 3.5–5.1)
SODIUM SERPL-SCNC: 141 MMOL/L (ref 136–145)

## 2022-07-23 PROCEDURE — 63700000 PHARM REV CODE 250 ALT 637 W/O HCPCS: Performed by: INTERNAL MEDICINE

## 2022-07-23 PROCEDURE — 63600175 PHARM REV CODE 636 W HCPCS: Performed by: PHYSICIAN ASSISTANT

## 2022-07-23 PROCEDURE — 97530 THERAPEUTIC ACTIVITIES: CPT

## 2022-07-23 PROCEDURE — A4216 STERILE WATER/SALINE, 10 ML: HCPCS | Performed by: INTERNAL MEDICINE

## 2022-07-23 PROCEDURE — 97163 PT EVAL HIGH COMPLEX 45 MIN: CPT

## 2022-07-23 PROCEDURE — 36415 COLL VENOUS BLD VENIPUNCTURE: CPT | Performed by: INTERNAL MEDICINE

## 2022-07-23 PROCEDURE — 25000003 PHARM REV CODE 250: Performed by: INTERNAL MEDICINE

## 2022-07-23 PROCEDURE — 21400001 HC TELEMETRY ROOM

## 2022-07-23 PROCEDURE — 94761 N-INVAS EAR/PLS OXIMETRY MLT: CPT

## 2022-07-23 PROCEDURE — S4991 NICOTINE PATCH NONLEGEND: HCPCS | Performed by: INTERNAL MEDICINE

## 2022-07-23 PROCEDURE — 63600175 PHARM REV CODE 636 W HCPCS: Performed by: INTERNAL MEDICINE

## 2022-07-23 PROCEDURE — 80048 BASIC METABOLIC PNL TOTAL CA: CPT | Performed by: INTERNAL MEDICINE

## 2022-07-23 RX ORDER — QUETIAPINE FUMARATE 25 MG/1
25 TABLET, FILM COATED ORAL ONCE
Status: DISCONTINUED | OUTPATIENT
Start: 2022-07-24 | End: 2022-07-24

## 2022-07-23 RX ADMIN — ASPIRIN 81 MG: 81 TABLET, COATED ORAL at 09:07

## 2022-07-23 RX ADMIN — NICOTINE 1 PATCH: 21 PATCH, EXTENDED RELEASE TRANSDERMAL at 09:07

## 2022-07-23 RX ADMIN — ENOXAPARIN SODIUM 40 MG: 100 INJECTION SUBCUTANEOUS at 04:07

## 2022-07-23 RX ADMIN — SACUBITRIL AND VALSARTAN 1 TABLET: 49; 51 TABLET, FILM COATED ORAL at 09:07

## 2022-07-23 RX ADMIN — Medication 10 ML: at 05:07

## 2022-07-23 RX ADMIN — PRAVASTATIN SODIUM 20 MG: 20 TABLET ORAL at 09:07

## 2022-07-23 RX ADMIN — FUROSEMIDE 40 MG: 10 INJECTION, SOLUTION INTRAMUSCULAR; INTRAVENOUS at 09:07

## 2022-07-23 RX ADMIN — Medication 10 ML: at 09:07

## 2022-07-23 RX ADMIN — SPIRONOLACTONE 25 MG: 25 TABLET, FILM COATED ORAL at 09:07

## 2022-07-23 RX ADMIN — AZITHROMYCIN MONOHYDRATE 500 MG: 250 TABLET ORAL at 09:07

## 2022-07-23 RX ADMIN — Medication 6 MG: at 09:07

## 2022-07-23 NOTE — PROGRESS NOTES
[facility]  Progress Note    Patient Name: Joseph Toussaint  MRN: [id number]  Patient Class: IP- Inpatient   Admission Date: 7/19/2022  Length of Stay: 3 days  Attending Physician: Jose Manuel Garcia MD  Primary Care Provider: Primary Doctor No        Subjective:     Principal Problem:Acute on chronic systolic congestive heart failure        HPI:  History was taken from the patient and electronic chart .No family at bedside at this time.  89-year-old  male with history of COPD and CHF ,chronic active smoker ,DM,BPH  presenting to the emergency department complaining of worsening shortness of breath over the past week.  There is associated history of cough ,denies history of leg swelling ,dizziness or syncope.  He was recently discharged from [facility] ACUTE CARE-07/12/2022 and from that documentation, he was sent to the SNF after recent diagnosis of CHF with EF -25-30% . He was continued on Aldactone/Entresto.   He was also seen in the ED -07/13/2022 with worsening dyspnoea and was discharged home.  He was admitted at [facility] -06/01 for acute CHF . ECho done at that time -06/2022 showed    1. Normal left ventricular cavity size. Severely depressed left ventricular systolic   function. LVEF 25 - 30%.   2. Mildly dilated right ventricle. Moderate right ventricular hypokinesis.   3. Moderate to severe mitral valve regurgitation.   4. Moderate to severe tricuspid valve regurgitation  Labs and imaging test reviewed.  Cardiomegaly with perihilar edema.  Mild pleural effusions suspected  BNP- more than 4900  CBC -hemoglobin 10.8  He will be admitted for CHF exacerbation /COPD.    His  primary care physician is Dr. Leonid Noguera.       Overview/Hospital Course:  Patient admitted to Telemetry for CHF exacerbation/COPD. Cardio consulted. BNP > 4,900. Patient treated with nitrate/vasodilator, Entresto, Lasix, Aldactone, Zithromax, and duonebs. CXR shows cardiomegaly  with perihilar edema and mild pleural effusions. CTA reveals no evidence of pulmonary embolism, but small bilateral pleural effusions, right greater than left producing mild atelectasis at the lung bases. Patient verbalized improvement in dyspnea. He also endorses deviation from low sodium diet associated with recent move to son's house. On 7/21/22, patient reports continues improvement of dyspnea; patient is responding appropriately to diuresis. Decrease in potassium noted, currently 3.3. Continue plan of care with potassium added.      Interval History: No acute problems or complaints.  Breathing comfortably.  No shortness of breath or chest pain.  Continuing to replace Potassium and optimize medical management.  Coordinating with Cardiology.    Review of Systems   Constitutional:  Positive for activity change. Negative for chills and fever.   HENT:  Negative for congestion, ear pain, rhinorrhea, sneezing and sore throat.    Eyes:  Negative for pain and visual disturbance.   Respiratory:  Positive for cough (intermittent). Negative for chest tightness, shortness of breath (improving) and wheezing.    Cardiovascular:  Negative for chest pain, palpitations and leg swelling.   Gastrointestinal:  Negative for abdominal pain, constipation, diarrhea, nausea and vomiting.   Endocrine: Negative for cold intolerance, heat intolerance, polydipsia, polyphagia and polyuria.   Genitourinary:  Negative for dysuria.   Musculoskeletal:  Negative for arthralgias.   Neurological:  Negative for dizziness, seizures, syncope, weakness, numbness and headaches.   Hematological:  Negative for adenopathy. Does not bruise/bleed easily.   Psychiatric/Behavioral:  Negative for agitation, confusion and sleep disturbance. The patient is not nervous/anxious.    Objective:     Vital Signs (Most Recent):  Temp: 97.7 °F (36.5 °C) (07/22/22 2000)  Pulse: 80 (07/22/22 2124)  Resp: 18 (07/22/22 2124)  BP: (!) 145/83 (07/22/22 2000)  SpO2: 97 %  (07/22/22 2124)   Vital Signs (24h Range):  Temp:  [97.6 °F (36.4 °C)-98 °F (36.7 °C)] 97.7 °F (36.5 °C)  Pulse:  [53-83] 80  Resp:  [18] 18  SpO2:  [95 %-100 %] 97 %  BP: (128-163)/(76-91) 145/83     Weight: 97.1 kg (214 lb 1.1 oz)  Body mass index is 29.86 kg/m².  No intake or output data in the 24 hours ending 07/22/22 2152     Physical Exam  Constitutional:       Appearance: Normal appearance.   HENT:      Head: Normocephalic and atraumatic.      Nose: Nose normal.      Mouth/Throat:      Mouth: Mucous membranes are moist.      Pharynx: Oropharynx is clear.   Eyes:      Conjunctiva/sclera: Conjunctivae normal.   Neck:      Vascular: No carotid bruit or JVD.   Cardiovascular:      Rate and Rhythm: Normal rate and regular rhythm.      Pulses: Normal pulses.           Radial pulses are 2+ on the right side and 2+ on the left side.        Dorsalis pedis pulses are 2+ on the right side and 2+ on the left side.   Pulmonary:      Effort: Pulmonary effort is normal.      Breath sounds: No rales.   Abdominal:      General: Bowel sounds are normal. There is no abdominal bruit.      Palpations: There is no hepatomegaly.   Musculoskeletal:         General: Normal range of motion.      Right lower leg: No edema.      Left lower leg: No edema.   Lymphadenopathy:      Cervical: No cervical adenopathy.   Skin:     General: Skin is warm and dry.      Coloration: Skin is not jaundiced.      Findings: No erythema or rash.   Neurological:      General: No focal deficit present.      Mental Status: He is alert.   Psychiatric:         Mood and Affect: Mood normal.       Significant Labs: All pertinent labs within the past 24 hours have been reviewed.    Significant Imaging: I have reviewed all pertinent imaging results/findings within the past 24 hours.      Assessment/Plan:      * Acute on chronic systolic congestive heart failure  Patient is on CHF pathway  -Cardiology consulted. Inotropic therapy will be considered if patient is  non-responsive to diuresis to minimize hospital admissions.   -Continue Nitrate/Vasodilator and monitor clinical status closely.  -Continue entresto, lasix, and aldactone.      -Monitor strict I&Os and daily weights.    -Fluid restriction of 1.5 L.   -Continue to stress to patient importance of self efficacy and  on diet for CHF.      Last BNP reviewed.   Recent Labs   Lab 07/19/22  1800   BNP >4,900*           Mixed hyperlipidemia  -Monitor closely.   -Outpatient f/u      Type 2 diabetes mellitus with diabetic polyneuropathy, without long-term current use of insulin  -SSI   -Diabetic diet      Essential hypertension  -Continue Entresto  -Monitor BP closely  -Low sodium diet     COPD suggested by initial evaluation  -Duonebs as tolerated  -Encourage nicotine cessation (70 pack year history)   -Continue zithromax     Benign prostatic hyperplasia with nocturia  -Continue flomax. Monitor clinical response        VTE Risk Mitigation (From admission, onward)         Ordered     enoxaparin injection 40 mg  Daily         07/19/22 2131     IP VTE HIGH RISK PATIENT  Once         07/19/22 2131     Place sequential compression device  Until discontinued         07/19/22 2131                Discharge Planning   TATIANA:      Code Status: Full Code   Is the patient medically ready for discharge?:     Reason for patient still in hospital (select all that apply): Patient trending condition, Treatment and Consult recommendations  Discharge Plan A: Home, Home Health, Home with family                  Jose Manuel Garcia MD  Department of Hospital Medicine   [facility] - Telemetry ([facility])

## 2022-07-23 NOTE — PLAN OF CARE
PT eval complete. The following goals will be met in 14 days  Be SBA with bed mobility  Will transfer bed to chair with RW with Mod A  Will ambulate 25 ft with RW and Mod-Max A

## 2022-07-23 NOTE — SUBJECTIVE & OBJECTIVE
Interval History: No acute problems or complaints.  Breathing comfortably.  No shortness of breath or chest pain.  Continuing to replace Potassium and optimize medical management.  Coordinating with Cardiology.    Review of Systems   Constitutional:  Positive for activity change. Negative for chills and fever.   HENT:  Negative for congestion, ear pain, rhinorrhea, sneezing and sore throat.    Eyes:  Negative for pain and visual disturbance.   Respiratory:  Positive for cough (intermittent). Negative for chest tightness, shortness of breath (improving) and wheezing.    Cardiovascular:  Negative for chest pain, palpitations and leg swelling.   Gastrointestinal:  Negative for abdominal pain, constipation, diarrhea, nausea and vomiting.   Endocrine: Negative for cold intolerance, heat intolerance, polydipsia, polyphagia and polyuria.   Genitourinary:  Negative for dysuria.   Musculoskeletal:  Negative for arthralgias.   Neurological:  Negative for dizziness, seizures, syncope, weakness, numbness and headaches.   Hematological:  Negative for adenopathy. Does not bruise/bleed easily.   Psychiatric/Behavioral:  Negative for agitation, confusion and sleep disturbance. The patient is not nervous/anxious.    Objective:     Vital Signs (Most Recent):  Temp: 97.7 °F (36.5 °C) (07/22/22 2000)  Pulse: 80 (07/22/22 2124)  Resp: 18 (07/22/22 2124)  BP: (!) 145/83 (07/22/22 2000)  SpO2: 97 % (07/22/22 2124)   Vital Signs (24h Range):  Temp:  [97.6 °F (36.4 °C)-98 °F (36.7 °C)] 97.7 °F (36.5 °C)  Pulse:  [53-83] 80  Resp:  [18] 18  SpO2:  [95 %-100 %] 97 %  BP: (128-163)/(76-91) 145/83     Weight: 97.1 kg (214 lb 1.1 oz)  Body mass index is 29.86 kg/m².  No intake or output data in the 24 hours ending 07/22/22 2152     Physical Exam  Constitutional:       Appearance: Normal appearance.   HENT:      Head: Normocephalic and atraumatic.      Nose: Nose normal.      Mouth/Throat:      Mouth: Mucous membranes are moist.      Pharynx:  Oropharynx is clear.   Eyes:      Conjunctiva/sclera: Conjunctivae normal.   Neck:      Vascular: No carotid bruit or JVD.   Cardiovascular:      Rate and Rhythm: Normal rate and regular rhythm.      Pulses: Normal pulses.           Radial pulses are 2+ on the right side and 2+ on the left side.        Dorsalis pedis pulses are 2+ on the right side and 2+ on the left side.   Pulmonary:      Effort: Pulmonary effort is normal.      Breath sounds: No rales.   Abdominal:      General: Bowel sounds are normal. There is no abdominal bruit.      Palpations: There is no hepatomegaly.   Musculoskeletal:         General: Normal range of motion.      Right lower leg: No edema.      Left lower leg: No edema.   Lymphadenopathy:      Cervical: No cervical adenopathy.   Skin:     General: Skin is warm and dry.      Coloration: Skin is not jaundiced.      Findings: No erythema or rash.   Neurological:      General: No focal deficit present.      Mental Status: He is alert.   Psychiatric:         Mood and Affect: Mood normal.       Significant Labs: All pertinent labs within the past 24 hours have been reviewed.    Significant Imaging: I have reviewed all pertinent imaging results/findings within the past 24 hours.

## 2022-07-23 NOTE — SUBJECTIVE & OBJECTIVE
Interval History: No acute problems or complaints.  Breathing comfortably on room air.  No peripheral edema.  Therapy evaluations pending.    Review of Systems   Constitutional:  Positive for activity change. Negative for chills and fever.   HENT:  Negative for congestion, ear pain, rhinorrhea, sneezing and sore throat.    Eyes:  Negative for pain and visual disturbance.   Respiratory:  Positive for cough (intermittent). Negative for chest tightness, shortness of breath and wheezing.    Cardiovascular:  Negative for chest pain, palpitations and leg swelling.   Gastrointestinal:  Negative for abdominal pain, constipation, diarrhea, nausea and vomiting.   Endocrine: Negative for cold intolerance, heat intolerance, polydipsia, polyphagia and polyuria.   Genitourinary:  Negative for dysuria.   Musculoskeletal:  Negative for arthralgias.   Neurological:  Negative for dizziness, seizures, syncope, weakness, numbness and headaches.   Hematological:  Negative for adenopathy. Does not bruise/bleed easily.   Psychiatric/Behavioral:  Negative for agitation, confusion and sleep disturbance. The patient is not nervous/anxious.    Objective:     Vital Signs (Most Recent):  Temp: 97.9 °F (36.6 °C) (07/23/22 1600)  Pulse: 78 (07/23/22 1600)  Resp: 20 (07/23/22 1600)  BP: 127/78 (07/23/22 1600)  SpO2: 96 % (07/23/22 1600)   Vital Signs (24h Range):  Temp:  [97.4 °F (36.3 °C)-97.9 °F (36.6 °C)] 97.9 °F (36.6 °C)  Pulse:  [75-88] 78  Resp:  [15-20] 20  SpO2:  [94 %-100 %] 96 %  BP: (127-151)/(74-89) 127/78     Weight: 97.1 kg (214 lb 1.1 oz)  Body mass index is 29.86 kg/m².    Intake/Output Summary (Last 24 hours) at 7/23/2022 3314  Last data filed at 7/23/2022 1400  Gross per 24 hour   Intake 20 ml   Output --   Net 20 ml        Physical Exam  Constitutional:       Appearance: Normal appearance.   HENT:      Head: Normocephalic and atraumatic.      Nose: Nose normal.      Mouth/Throat:      Mouth: Mucous membranes are moist.       Pharynx: Oropharynx is clear.   Eyes:      Conjunctiva/sclera: Conjunctivae normal.   Neck:      Vascular: No carotid bruit or JVD.   Cardiovascular:      Rate and Rhythm: Normal rate and regular rhythm.      Pulses: Normal pulses.           Radial pulses are 2+ on the right side and 2+ on the left side.        Dorsalis pedis pulses are 2+ on the right side and 2+ on the left side.   Pulmonary:      Effort: Pulmonary effort is normal.      Breath sounds: No rales.   Abdominal:      General: Bowel sounds are normal. There is no abdominal bruit.      Palpations: There is no hepatomegaly.   Musculoskeletal:         General: Normal range of motion.      Right lower leg: No edema.      Left lower leg: No edema.   Lymphadenopathy:      Cervical: No cervical adenopathy.   Skin:     General: Skin is warm and dry.      Coloration: Skin is not jaundiced.      Findings: No erythema or rash.   Neurological:      General: No focal deficit present.      Mental Status: He is alert.   Psychiatric:         Mood and Affect: Mood normal.       Significant Labs: All pertinent labs within the past 24 hours have been reviewed.    Significant Imaging: I have reviewed all pertinent imaging results/findings within the past 24 hours.

## 2022-07-23 NOTE — PROGRESS NOTES
[facility]  Progress Note    Patient Name: Joseph Toussaint  MRN: [id number]  Patient Class: IP- Inpatient   Admission Date: 7/19/2022  Length of Stay: 4 days  Attending Physician: Jose Manuel Garcia MD  Primary Care Provider: Primary Doctor No        Subjective:     Principal Problem:Acute on chronic systolic congestive heart failure        HPI:  History was taken from the patient and electronic chart .No family at bedside at this time.  89-year-old  male with history of COPD and CHF ,chronic active smoker ,DM,BPH  presenting to the emergency department complaining of worsening shortness of breath over the past week.  There is associated history of cough ,denies history of leg swelling ,dizziness or syncope.  He was recently discharged from [facility] ACUTE CARE-07/12/2022 and from that documentation, he was sent to the SNF after recent diagnosis of CHF with EF -25-30% . He was continued on Aldactone/Entresto.   He was also seen in the ED -07/13/2022 with worsening dyspnoea and was discharged home.  He was admitted at [facility] -06/01 for acute CHF . ECho done at that time -06/2022 showed    1. Normal left ventricular cavity size. Severely depressed left ventricular systolic   function. LVEF 25 - 30%.   2. Mildly dilated right ventricle. Moderate right ventricular hypokinesis.   3. Moderate to severe mitral valve regurgitation.   4. Moderate to severe tricuspid valve regurgitation  Labs and imaging test reviewed.  Cardiomegaly with perihilar edema.  Mild pleural effusions suspected  BNP- more than 4900  CBC -hemoglobin 10.8  He will be admitted for CHF exacerbation /COPD.    His  primary care physician is Dr. Leonid Noguera.       Overview/Hospital Course:  Patient admitted to Telemetry for CHF exacerbation/COPD. Cardio consulted. BNP > 4,900. Patient treated with nitrate/vasodilator, Entresto, Lasix, Aldactone, Zithromax, and duonebs. CXR shows cardiomegaly  with perihilar edema and mild pleural effusions. CTA reveals no evidence of pulmonary embolism, but small bilateral pleural effusions, right greater than left producing mild atelectasis at the lung bases. Patient verbalized improvement in dyspnea. He also endorses deviation from low sodium diet associated with recent move to son's house. On 7/21/22, patient reports continues improvement of dyspnea; patient is responding appropriately to diuresis. Decrease in potassium noted, currently 3.3. Continue plan of care with potassium added.      Interval History: No acute problems or complaints.  Breathing comfortably on room air.  No peripheral edema.  Therapy evaluations pending.    Review of Systems   Constitutional:  Positive for activity change. Negative for chills and fever.   HENT:  Negative for congestion, ear pain, rhinorrhea, sneezing and sore throat.    Eyes:  Negative for pain and visual disturbance.   Respiratory:  Positive for cough (intermittent). Negative for chest tightness, shortness of breath and wheezing.    Cardiovascular:  Negative for chest pain, palpitations and leg swelling.   Gastrointestinal:  Negative for abdominal pain, constipation, diarrhea, nausea and vomiting.   Endocrine: Negative for cold intolerance, heat intolerance, polydipsia, polyphagia and polyuria.   Genitourinary:  Negative for dysuria.   Musculoskeletal:  Negative for arthralgias.   Neurological:  Negative for dizziness, seizures, syncope, weakness, numbness and headaches.   Hematological:  Negative for adenopathy. Does not bruise/bleed easily.   Psychiatric/Behavioral:  Negative for agitation, confusion and sleep disturbance. The patient is not nervous/anxious.    Objective:     Vital Signs (Most Recent):  Temp: 97.9 °F (36.6 °C) (07/23/22 1600)  Pulse: 78 (07/23/22 1600)  Resp: 20 (07/23/22 1600)  BP: 127/78 (07/23/22 1600)  SpO2: 96 % (07/23/22 1600)   Vital Signs (24h Range):  Temp:  [97.4 °F (36.3 °C)-97.9 °F (36.6 °C)] 97.9  °F (36.6 °C)  Pulse:  [75-88] 78  Resp:  [15-20] 20  SpO2:  [94 %-100 %] 96 %  BP: (127-151)/(74-89) 127/78     Weight: 97.1 kg (214 lb 1.1 oz)  Body mass index is 29.86 kg/m².    Intake/Output Summary (Last 24 hours) at 7/23/2022 1734  Last data filed at 7/23/2022 1400  Gross per 24 hour   Intake 20 ml   Output --   Net 20 ml        Physical Exam  Constitutional:       Appearance: Normal appearance.   HENT:      Head: Normocephalic and atraumatic.      Nose: Nose normal.      Mouth/Throat:      Mouth: Mucous membranes are moist.      Pharynx: Oropharynx is clear.   Eyes:      Conjunctiva/sclera: Conjunctivae normal.   Neck:      Vascular: No carotid bruit or JVD.   Cardiovascular:      Rate and Rhythm: Normal rate and regular rhythm.      Pulses: Normal pulses.           Radial pulses are 2+ on the right side and 2+ on the left side.        Dorsalis pedis pulses are 2+ on the right side and 2+ on the left side.   Pulmonary:      Effort: Pulmonary effort is normal.      Breath sounds: No rales.   Abdominal:      General: Bowel sounds are normal. There is no abdominal bruit.      Palpations: There is no hepatomegaly.   Musculoskeletal:         General: Normal range of motion.      Right lower leg: No edema.      Left lower leg: No edema.   Lymphadenopathy:      Cervical: No cervical adenopathy.   Skin:     General: Skin is warm and dry.      Coloration: Skin is not jaundiced.      Findings: No erythema or rash.   Neurological:      General: No focal deficit present.      Mental Status: He is alert.   Psychiatric:         Mood and Affect: Mood normal.       Significant Labs: All pertinent labs within the past 24 hours have been reviewed.    Significant Imaging: I have reviewed all pertinent imaging results/findings within the past 24 hours.      Assessment/Plan:      * Acute on chronic systolic congestive heart failure  Patient is on CHF pathway  -Cardiology consulted. Inotropic therapy will be considered if patient  is non-responsive to diuresis to minimize hospital admissions.   -Continue Nitrate/Vasodilator and monitor clinical status closely.  -Continue entresto, lasix, and aldactone.      -Monitor strict I&Os and daily weights.    -Fluid restriction of 1.5 L.   -Continue to stress to patient importance of self efficacy and  on diet for CHF.      Last BNP reviewed.   Recent Labs   Lab 07/19/22  1800   BNP >4,900*           Mixed hyperlipidemia  -Monitor closely.   -Outpatient f/u      Type 2 diabetes mellitus with diabetic polyneuropathy, without long-term current use of insulin  -SSI   -Diabetic diet      Essential hypertension  -Continue Entresto  -Monitor BP closely  -Low sodium diet     COPD suggested by initial evaluation  -Duonebs as tolerated  -Encourage nicotine cessation (70 pack year history)   -Continue zithromax     Benign prostatic hyperplasia with nocturia  -Continue flomax. Monitor clinical response        VTE Risk Mitigation (From admission, onward)         Ordered     enoxaparin injection 40 mg  Daily         07/19/22 2131     IP VTE HIGH RISK PATIENT  Once         07/19/22 2131     Place sequential compression device  Until discontinued         07/19/22 2131                Discharge Planning   TATIANA:      Code Status: Full Code   Is the patient medically ready for discharge?:     Reason for patient still in hospital (select all that apply): Consult recommendations and PT / OT recommendations  Discharge Plan A: Home, Home Health, Home with family                  Jose Manuel Garcia MD  Department of Hospital Medicine   [facility] - Telemetry ([facility])

## 2022-07-23 NOTE — PLAN OF CARE
Pt AAOx1. PT to oriented self. No signs of distress. Able to verbalize needs and follow commands. Calm and Cooperative. Denies any pain at this time. Vital signs stable. Sinus Rhythm on tele monitor. On room air.  PIV is patent. Pt incontinent of b/b. Assist x2. Pt remains free of falls. Meds given as prescribed.  POC reviewed with pt and pt verbalized understanding. Pt educated on safety and fall risk. Verbalized understanding. Interventions implemented as appropriate. Pt able to turn self. Encouraged pt  to turn frequently. Resting quietly in bed.Bed low. Side rails up x2, wheels locked, call light within reach.

## 2022-07-23 NOTE — PT/OT/SLP EVAL
Physical Therapy Evaluation    Patient Name:  Joseph Toussaint   MRN:  [id number]    Recommendations:     Discharge Recommendations:  nursing facility, skilled   Discharge Equipment Recommendations:  (TBD)   Barriers to discharge: Decreased caregiver support    Assessment:     Joseph Toussaint is a 89 y.o. male admitted with a medical diagnosis of Acute on chronic systolic congestive heart failure.  He presents with the following impairments/functional limitations:  weakness, impaired functional mobility, gait instability, impaired balance, decreased lower extremity function, decreased upper extremity function, pain ..    Rehab Prognosis: Fair; patient would benefit from acute skilled PT services to address these deficits and reach maximum level of function.    Recent Surgery: * No surgery found *      Plan:     During this hospitalization, patient to be seen 3 x/week to address the identified rehab impairments via gait training, therapeutic activities, therapeutic exercises and progress toward the following goals:    · Plan of Care Expires:  08/06/22    Subjective     Chief Complaint: L shoulder pain, weakness  Patient/Family Comments/goals: improve mobility  Pain/Comfort:  · Pain Rating 1: 0/10    Patients cultural, spiritual, Religion conflicts given the current situation:      Living Environment:  Mr. toussaint confused and disoriented. Unsure of accuracy of subjective info. Reports walks with STC  But has RW and WC available.  Lives with wife in one story house. Needs assist with bathing an dressing. Reports fell onto L shoulder a few weeks ago and unable to raise L arm  Prior to admission, patients level of function was equipment and assist.  Equipment used at home: cane, straight, walker, rolling, wheelchair.  DME owned (not currently used): none.  Upon discharge, patient will have assistance from facility.    Objective:     Communicated with [facility] and nurse Calderón prior to session.  Patient found supine  with    upon PT entry to room.    General Precautions: Standard,     Orthopedic Precautions:    Braces:    Respiratory Status: Room air    Exams:  · LUE ROM: Deficits: severe loss L shoulder  · LUE Strength: Deficits: severe L shoulder  · RLE ROM: Deficits: moderate  · RLE Strength: Deficits: 3/5  · LLE ROM: Deficits: moderate  · LLE Strength: Deficits: 3/5    Functional Mobility:  · Bed Mobility:     · Scooting: maximal assistance  · Supine to Sit: maximal assistance  · Transfers:     · Sit to Stand:  maximal assistance and of 2 persons with rolling walker  · Gait: NT    Therapeutic Activities and Exercises:   max A for motor planning and upper trunk for bed mobility. Max A to initiate standing and maintain standing position.  After eval performed sit to stand with max x2. Pt soiled himself.  Returned to bed and nursing in room    AM-PAC 6 CLICK MOBILITY  Total Score:10     Patient left HOB elevated with all lines intact, call button in reach and nursing present.    GOALS:   Multidisciplinary Problems     Physical Therapy Goals        Problem: Physical Therapy    Goal Priority Disciplines Outcome Goal Variances Interventions   Physical Therapy Goal     PT, PT/OT Ongoing, Progressing                     History:     Past Medical History:   Diagnosis Date    CHF (congestive heart failure)     COPD (chronic obstructive pulmonary disease)     Diabetes mellitus     HTN (hypertension)        History reviewed. No pertinent surgical history.    Time Tracking:     PT Received On: 07/23/22  PT Start Time: 1300     PT Stop Time: 1325  PT Total Time (min): 25 min     Billable Minutes: Evaluation 15 and Therapeutic Activity 15      07/23/2022

## 2022-07-23 NOTE — PLAN OF CARE
Pt AAO to self only. VSS. Pt remained free of falls this shift. Avasys at bedside for safety monitoring. No complaints of pain or discomfort. Medications administered as ordered. Pt is SR on monitor. PIV intact, site rotated per policy. Hourly rounding completed. Pt instructed to call for assistance. POC reviewed. No evidence of learning. Pt denies any further needs at this time.       Problem: Adult Inpatient Plan of Care  Goal: Plan of Care Review  Outcome: Ongoing, Progressing     Problem: Fall Injury Risk  Goal: Absence of Fall and Fall-Related Injury  Outcome: Ongoing, Progressing     Problem: Skin Injury Risk Increased  Goal: Skin Health and Integrity  Outcome: Ongoing, Progressing

## 2022-07-24 LAB
ANION GAP SERPL CALC-SCNC: 10 MMOL/L (ref 8–16)
BUN SERPL-MCNC: 26 MG/DL (ref 8–23)
CALCIUM SERPL-MCNC: 9.4 MG/DL (ref 8.7–10.5)
CHLORIDE SERPL-SCNC: 108 MMOL/L (ref 95–110)
CO2 SERPL-SCNC: 25 MMOL/L (ref 23–29)
CREAT SERPL-MCNC: 1.1 MG/DL (ref 0.5–1.4)
EST. GFR  (AFRICAN AMERICAN): >60 ML/MIN/1.73 M^2
EST. GFR  (NON AFRICAN AMERICAN): 59 ML/MIN/1.73 M^2
GLUCOSE SERPL-MCNC: 104 MG/DL (ref 70–110)
POCT GLUCOSE: 107 MG/DL (ref 70–110)
POCT GLUCOSE: 148 MG/DL (ref 70–110)
POCT GLUCOSE: 159 MG/DL (ref 70–110)
POCT GLUCOSE: 176 MG/DL (ref 70–110)
POTASSIUM SERPL-SCNC: 3.9 MMOL/L (ref 3.5–5.1)
SODIUM SERPL-SCNC: 143 MMOL/L (ref 136–145)

## 2022-07-24 PROCEDURE — 36415 COLL VENOUS BLD VENIPUNCTURE: CPT | Performed by: INTERNAL MEDICINE

## 2022-07-24 PROCEDURE — 63600175 PHARM REV CODE 636 W HCPCS: Performed by: NURSE PRACTITIONER

## 2022-07-24 PROCEDURE — 51798 US URINE CAPACITY MEASURE: CPT

## 2022-07-24 PROCEDURE — 63600175 PHARM REV CODE 636 W HCPCS: Performed by: INTERNAL MEDICINE

## 2022-07-24 PROCEDURE — 21400001 HC TELEMETRY ROOM

## 2022-07-24 PROCEDURE — A4216 STERILE WATER/SALINE, 10 ML: HCPCS | Performed by: INTERNAL MEDICINE

## 2022-07-24 PROCEDURE — 25000003 PHARM REV CODE 250: Performed by: NURSE PRACTITIONER

## 2022-07-24 PROCEDURE — 25000003 PHARM REV CODE 250: Performed by: INTERNAL MEDICINE

## 2022-07-24 PROCEDURE — S4991 NICOTINE PATCH NONLEGEND: HCPCS | Performed by: INTERNAL MEDICINE

## 2022-07-24 PROCEDURE — 94761 N-INVAS EAR/PLS OXIMETRY MLT: CPT

## 2022-07-24 PROCEDURE — 63600175 PHARM REV CODE 636 W HCPCS: Performed by: PHYSICIAN ASSISTANT

## 2022-07-24 PROCEDURE — 80048 BASIC METABOLIC PNL TOTAL CA: CPT | Performed by: INTERNAL MEDICINE

## 2022-07-24 RX ORDER — FUROSEMIDE 40 MG/1
40 TABLET ORAL DAILY
Status: DISCONTINUED | OUTPATIENT
Start: 2022-07-24 | End: 2022-07-26 | Stop reason: HOSPADM

## 2022-07-24 RX ORDER — QUETIAPINE FUMARATE 25 MG/1
25 TABLET, FILM COATED ORAL ONCE
Status: COMPLETED | OUTPATIENT
Start: 2022-07-24 | End: 2022-07-24

## 2022-07-24 RX ADMIN — HYDRALAZINE HYDROCHLORIDE 10 MG: 20 INJECTION, SOLUTION INTRAMUSCULAR; INTRAVENOUS at 04:07

## 2022-07-24 RX ADMIN — ENOXAPARIN SODIUM 40 MG: 100 INJECTION SUBCUTANEOUS at 04:07

## 2022-07-24 RX ADMIN — SPIRONOLACTONE 25 MG: 25 TABLET, FILM COATED ORAL at 09:07

## 2022-07-24 RX ADMIN — SACUBITRIL AND VALSARTAN 1 TABLET: 49; 51 TABLET, FILM COATED ORAL at 08:07

## 2022-07-24 RX ADMIN — NICOTINE 1 PATCH: 21 PATCH, EXTENDED RELEASE TRANSDERMAL at 10:07

## 2022-07-24 RX ADMIN — Medication 10 ML: at 01:07

## 2022-07-24 RX ADMIN — HYDROCODONE BITARTRATE AND ACETAMINOPHEN 1 TABLET: 5; 325 TABLET ORAL at 12:07

## 2022-07-24 RX ADMIN — POLYETHYLENE GLYCOL 3350 17 G: 17 POWDER, FOR SOLUTION ORAL at 06:07

## 2022-07-24 RX ADMIN — SACUBITRIL AND VALSARTAN 1 TABLET: 49; 51 TABLET, FILM COATED ORAL at 09:07

## 2022-07-24 RX ADMIN — Medication 6 MG: at 08:07

## 2022-07-24 RX ADMIN — Medication 10 ML: at 09:07

## 2022-07-24 RX ADMIN — PRAVASTATIN SODIUM 20 MG: 20 TABLET ORAL at 09:07

## 2022-07-24 RX ADMIN — QUETIAPINE FUMARATE 25 MG: 25 TABLET ORAL at 12:07

## 2022-07-24 RX ADMIN — Medication 10 ML: at 07:07

## 2022-07-24 RX ADMIN — ASPIRIN 81 MG: 81 TABLET, COATED ORAL at 09:07

## 2022-07-24 RX ADMIN — FUROSEMIDE 40 MG: 10 INJECTION, SOLUTION INTRAMUSCULAR; INTRAVENOUS at 09:07

## 2022-07-24 RX ADMIN — FUROSEMIDE 40 MG: 40 TABLET ORAL at 01:07

## 2022-07-24 NOTE — PROGRESS NOTES
[facility] Medicine  Progress Note    Patient Name: Joseph Toussaint  MRN: [id number]  Patient Class: IP- Inpatient   Admission Date: 7/19/2022  Length of Stay: 5 days  Attending Physician: Jose Manuel Garcia MD  Primary Care Provider: Primary Doctor No        Subjective:     Principal Problem:Acute on chronic systolic congestive heart failure        HPI:  History was taken from the patient and electronic chart .No family at bedside at this time.  89-year-old  male with history of COPD and CHF ,chronic active smoker ,DM,BPH  presenting to the emergency department complaining of worsening shortness of breath over the past week.  There is associated history of cough ,denies history of leg swelling ,dizziness or syncope.  He was recently discharged from [facility]-07/12/2022 and from that documentation, he was sent to the SNF after recent diagnosis of CHF with EF -25-30% . He was continued on Aldactone/Entresto.   He was also seen in the ED -07/13/2022 with worsening dyspnoea and was discharged home.  He was admitted at [facility] -06/01 for acute CHF . ECho done at that time -06/2022 showed    1. Normal left ventricular cavity size. Severely depressed left ventricular systolic   function. LVEF 25 - 30%.   2. Mildly dilated right ventricle. Moderate right ventricular hypokinesis.   3. Moderate to severe mitral valve regurgitation.   4. Moderate to severe tricuspid valve regurgitation  Labs and imaging test reviewed.  Cardiomegaly with perihilar edema.  Mild pleural effusions suspected  BNP- more than 4900  CBC -hemoglobin 10.8  He will be admitted for CHF exacerbation /COPD.    His  primary care physician is Dr. Leonid Noguera.       Overview/Hospital Course:  Patient admitted to Telemetry for CHF exacerbation/COPD. Cardio consulted. BNP > 4,900. Patient treated with nitrate/vasodilator, Entresto, Lasix, Aldactone, Zithromax, and duonebs. CXR shows cardiomegaly  with perihilar edema and mild pleural effusions. CTA reveals no evidence of pulmonary embolism, but small bilateral pleural effusions, right greater than left producing mild atelectasis at the lung bases. Patient verbalized improvement in dyspnea. He also endorses deviation from low sodium diet associated with recent move to son's house. On 7/21/22, patient reports continues improvement of dyspnea; patient is responding appropriately to diuresis. Decrease in potassium noted, currently 3.3. Continue plan of care with potassium added.      Interval History: No acute problems or complaints.  Breathing comfortably on room air.  No peripheral edema.  Therapy evaluations pending.  Transition to oral diuretics.    Review of Systems   Constitutional:  Positive for activity change. Negative for chills and fever.   HENT:  Negative for congestion, ear pain, rhinorrhea, sneezing and sore throat.    Eyes:  Negative for pain and visual disturbance.   Respiratory:  Positive for cough (intermittent). Negative for chest tightness, shortness of breath and wheezing.    Cardiovascular:  Negative for chest pain, palpitations and leg swelling.   Gastrointestinal:  Negative for abdominal pain, constipation, diarrhea, nausea and vomiting.   Endocrine: Negative for cold intolerance, heat intolerance, polydipsia, polyphagia and polyuria.   Genitourinary:  Negative for dysuria.   Musculoskeletal:  Negative for arthralgias.   Neurological:  Negative for dizziness, seizures, syncope, weakness, numbness and headaches.   Hematological:  Negative for adenopathy. Does not bruise/bleed easily.   Psychiatric/Behavioral:  Negative for agitation, confusion and sleep disturbance. The patient is not nervous/anxious.    Objective:     Vital Signs (Most Recent):  Temp: 97.5 °F (36.4 °C) (07/24/22 1608)  Pulse: 93 (07/24/22 1706)  Resp: 20 (07/24/22 1608)  BP: (!) 180/105 (PRN Hydralazine given) (07/24/22 1608)  SpO2: (!) 92 % (07/24/22 1608)   Vital Signs  (24h Range):  Temp:  [97.5 °F (36.4 °C)-98 °F (36.7 °C)] 97.5 °F (36.4 °C)  Pulse:  [68-97] 93  Resp:  [16-20] 20  SpO2:  [89 %-100 %] 92 %  BP: (124-186)/() 180/105     Weight: 97.1 kg (214 lb 1.1 oz)  Body mass index is 29.86 kg/m².    Intake/Output Summary (Last 24 hours) at 7/24/2022 1757  Last data filed at 7/24/2022 1630  Gross per 24 hour   Intake 1200 ml   Output 1000 ml   Net 200 ml        Physical Exam  Constitutional:       Appearance: Normal appearance.   HENT:      Head: Normocephalic and atraumatic.      Nose: Nose normal.      Mouth/Throat:      Mouth: Mucous membranes are moist.      Pharynx: Oropharynx is clear.   Eyes:      Conjunctiva/sclera: Conjunctivae normal.   Neck:      Vascular: No carotid bruit or JVD.   Cardiovascular:      Rate and Rhythm: Normal rate and regular rhythm.      Pulses: Normal pulses.           Radial pulses are 2+ on the right side and 2+ on the left side.        Dorsalis pedis pulses are 2+ on the right side and 2+ on the left side.   Pulmonary:      Effort: Pulmonary effort is normal.      Breath sounds: No rales.   Abdominal:      General: Bowel sounds are normal. There is no abdominal bruit.      Palpations: There is no hepatomegaly.   Musculoskeletal:         General: Normal range of motion.      Right lower leg: No edema.      Left lower leg: No edema.   Lymphadenopathy:      Cervical: No cervical adenopathy.   Skin:     General: Skin is warm and dry.      Coloration: Skin is not jaundiced.      Findings: No erythema or rash.   Neurological:      General: No focal deficit present.      Mental Status: He is alert.   Psychiatric:         Mood and Affect: Mood normal.       Significant Labs: All pertinent labs within the past 24 hours have been reviewed.    Significant Imaging: I have reviewed all pertinent imaging results/findings within the past 24 hours.      Assessment/Plan:      * Acute on chronic systolic congestive heart failure  Patient is on CHF  pathway  -Cardiology consulted. Inotropic therapy will be considered if patient is non-responsive to diuresis to minimize hospital admissions.   -Continue Nitrate/Vasodilator and monitor clinical status closely.  -Continue entresto, lasix, and aldactone.      -Monitor strict I&Os and daily weights.    -Fluid restriction of 1.5 L.   -Continue to stress to patient importance of self efficacy and  on diet for CHF.      Last BNP reviewed.   Recent Labs   Lab 07/19/22  1800   BNP >4,900*           Mixed hyperlipidemia  -Monitor closely.   -Outpatient f/u      Type 2 diabetes mellitus with diabetic polyneuropathy, without long-term current use of insulin  -SSI   -Diabetic diet      Essential hypertension  -Continue Entresto  -Monitor BP closely  -Low sodium diet     COPD suggested by initial evaluation  -Duonebs as tolerated  -Encourage nicotine cessation (70 pack year history)   -Continue zithromax     Benign prostatic hyperplasia with nocturia  -Continue flomax. Monitor clinical response        VTE Risk Mitigation (From admission, onward)         Ordered     enoxaparin injection 40 mg  Daily         07/19/22 2131     IP VTE HIGH RISK PATIENT  Once         07/19/22 2131     Place sequential compression device  Until discontinued         07/19/22 2131                Discharge Planning   TATIANA:      Code Status: Full Code   Is the patient medically ready for discharge?:     Reason for patient still in hospital (select all that apply): Patient trending condition, Treatment and PT / OT recommendations  Discharge Plan A: Home, Home Health, Home with family                  Jose Manuel Garcia MD  Department of Hospital Medicine   [facility] - Telemetry ([facility])

## 2022-07-24 NOTE — NURSING
Patient been voiding a good bit, but now complaining of bladder pain. Saying feels like urine needs to come out but not coming out. Bladder scanned revealed nothing.Feels like there's a hard spot on his lower abdomen, above the bladder--a little bit tender. Son, Kraig said, patient probably has dementia.  MD notified.

## 2022-07-24 NOTE — PLAN OF CARE
Care plan reviewed with patient. Pain managed by PRN med. Able to get up and walk to the bathroom with assist. Free from falls. No other complaints made.

## 2022-07-24 NOTE — PLAN OF CARE
Pt AAOx1. PT to oriented self. No signs of distress. Able to verbalize needs and follow commands. Pt is irritable and restless. Not easily redirected.  Denies any pain at this time. Vital signs stable. Sinus Rhythm on tele monitor. On room air.  PIV is patent. Pt incontinent of b/b. Assist x2. Pt remains free of falls. Meds given as prescribed.  POC reviewed with pt and pt verbalized understanding. Pt educated on safety and fall risk. Verbalized understanding. Interventions implemented as appropriate. Pt able to turn self. Encouraged pt  to turn frequently. Resting quietly in bed.Bed low. Side rails up x2, wheels locked, call light within reach.

## 2022-07-24 NOTE — SUBJECTIVE & OBJECTIVE
Interval History: No acute problems or complaints.  Breathing comfortably on room air.  No peripheral edema.  Therapy evaluations pending.  Transition to oral diuretics.    Review of Systems   Constitutional:  Positive for activity change. Negative for chills and fever.   HENT:  Negative for congestion, ear pain, rhinorrhea, sneezing and sore throat.    Eyes:  Negative for pain and visual disturbance.   Respiratory:  Positive for cough (intermittent). Negative for chest tightness, shortness of breath and wheezing.    Cardiovascular:  Negative for chest pain, palpitations and leg swelling.   Gastrointestinal:  Negative for abdominal pain, constipation, diarrhea, nausea and vomiting.   Endocrine: Negative for cold intolerance, heat intolerance, polydipsia, polyphagia and polyuria.   Genitourinary:  Negative for dysuria.   Musculoskeletal:  Negative for arthralgias.   Neurological:  Negative for dizziness, seizures, syncope, weakness, numbness and headaches.   Hematological:  Negative for adenopathy. Does not bruise/bleed easily.   Psychiatric/Behavioral:  Negative for agitation, confusion and sleep disturbance. The patient is not nervous/anxious.    Objective:     Vital Signs (Most Recent):  Temp: 97.5 °F (36.4 °C) (07/24/22 1608)  Pulse: 93 (07/24/22 1706)  Resp: 20 (07/24/22 1608)  BP: (!) 180/105 (PRN Hydralazine given) (07/24/22 1608)  SpO2: (!) 92 % (07/24/22 1608)   Vital Signs (24h Range):  Temp:  [97.5 °F (36.4 °C)-98 °F (36.7 °C)] 97.5 °F (36.4 °C)  Pulse:  [68-97] 93  Resp:  [16-20] 20  SpO2:  [89 %-100 %] 92 %  BP: (124-186)/() 180/105     Weight: 97.1 kg (214 lb 1.1 oz)  Body mass index is 29.86 kg/m².    Intake/Output Summary (Last 24 hours) at 7/24/2022 4209  Last data filed at 7/24/2022 1630  Gross per 24 hour   Intake 1200 ml   Output 1000 ml   Net 200 ml        Physical Exam  Constitutional:       Appearance: Normal appearance.   HENT:      Head: Normocephalic and atraumatic.      Nose: Nose  normal.      Mouth/Throat:      Mouth: Mucous membranes are moist.      Pharynx: Oropharynx is clear.   Eyes:      Conjunctiva/sclera: Conjunctivae normal.   Neck:      Vascular: No carotid bruit or JVD.   Cardiovascular:      Rate and Rhythm: Normal rate and regular rhythm.      Pulses: Normal pulses.           Radial pulses are 2+ on the right side and 2+ on the left side.        Dorsalis pedis pulses are 2+ on the right side and 2+ on the left side.   Pulmonary:      Effort: Pulmonary effort is normal.      Breath sounds: No rales.   Abdominal:      General: Bowel sounds are normal. There is no abdominal bruit.      Palpations: There is no hepatomegaly.   Musculoskeletal:         General: Normal range of motion.      Right lower leg: No edema.      Left lower leg: No edema.   Lymphadenopathy:      Cervical: No cervical adenopathy.   Skin:     General: Skin is warm and dry.      Coloration: Skin is not jaundiced.      Findings: No erythema or rash.   Neurological:      General: No focal deficit present.      Mental Status: He is alert.   Psychiatric:         Mood and Affect: Mood normal.       Significant Labs: All pertinent labs within the past 24 hours have been reviewed.    Significant Imaging: I have reviewed all pertinent imaging results/findings within the past 24 hours.

## 2022-07-25 LAB
ALBUMIN SERPL BCP-MCNC: 3.8 G/DL (ref 3.5–5.2)
ANION GAP SERPL CALC-SCNC: 16 MMOL/L (ref 8–16)
ANION GAP SERPL CALC-SCNC: 16 MMOL/L (ref 8–16)
BUN SERPL-MCNC: 24 MG/DL (ref 8–23)
BUN SERPL-MCNC: 24 MG/DL (ref 8–23)
CALCIUM SERPL-MCNC: 10.1 MG/DL (ref 8.7–10.5)
CALCIUM SERPL-MCNC: 10.1 MG/DL (ref 8.7–10.5)
CHLORIDE SERPL-SCNC: 103 MMOL/L (ref 95–110)
CHLORIDE SERPL-SCNC: 103 MMOL/L (ref 95–110)
CO2 SERPL-SCNC: 25 MMOL/L (ref 23–29)
CO2 SERPL-SCNC: 25 MMOL/L (ref 23–29)
CREAT SERPL-MCNC: 1.1 MG/DL (ref 0.5–1.4)
CREAT SERPL-MCNC: 1.1 MG/DL (ref 0.5–1.4)
EST. GFR  (AFRICAN AMERICAN): >60 ML/MIN/1.73 M^2
EST. GFR  (AFRICAN AMERICAN): >60 ML/MIN/1.73 M^2
EST. GFR  (NON AFRICAN AMERICAN): 59 ML/MIN/1.73 M^2
EST. GFR  (NON AFRICAN AMERICAN): 59 ML/MIN/1.73 M^2
GLUCOSE SERPL-MCNC: 130 MG/DL (ref 70–110)
GLUCOSE SERPL-MCNC: 130 MG/DL (ref 70–110)
PHOSPHATE SERPL-MCNC: 3.3 MG/DL (ref 2.7–4.5)
POCT GLUCOSE: 120 MG/DL (ref 70–110)
POCT GLUCOSE: 130 MG/DL (ref 70–110)
POCT GLUCOSE: 131 MG/DL (ref 70–110)
POCT GLUCOSE: 132 MG/DL (ref 70–110)
POCT GLUCOSE: 135 MG/DL (ref 70–110)
POTASSIUM SERPL-SCNC: 3.7 MMOL/L (ref 3.5–5.1)
POTASSIUM SERPL-SCNC: 3.7 MMOL/L (ref 3.5–5.1)
SODIUM SERPL-SCNC: 144 MMOL/L (ref 136–145)
SODIUM SERPL-SCNC: 144 MMOL/L (ref 136–145)

## 2022-07-25 PROCEDURE — 94761 N-INVAS EAR/PLS OXIMETRY MLT: CPT

## 2022-07-25 PROCEDURE — 25000003 PHARM REV CODE 250: Performed by: INTERNAL MEDICINE

## 2022-07-25 PROCEDURE — 63600175 PHARM REV CODE 636 W HCPCS: Performed by: NURSE PRACTITIONER

## 2022-07-25 PROCEDURE — 63600175 PHARM REV CODE 636 W HCPCS: Performed by: INTERNAL MEDICINE

## 2022-07-25 PROCEDURE — 21400001 HC TELEMETRY ROOM

## 2022-07-25 PROCEDURE — S4991 NICOTINE PATCH NONLEGEND: HCPCS | Performed by: INTERNAL MEDICINE

## 2022-07-25 PROCEDURE — 36415 COLL VENOUS BLD VENIPUNCTURE: CPT | Performed by: INTERNAL MEDICINE

## 2022-07-25 PROCEDURE — 97530 THERAPEUTIC ACTIVITIES: CPT | Mod: CQ

## 2022-07-25 PROCEDURE — 80069 RENAL FUNCTION PANEL: CPT | Performed by: INTERNAL MEDICINE

## 2022-07-25 PROCEDURE — A4216 STERILE WATER/SALINE, 10 ML: HCPCS | Performed by: INTERNAL MEDICINE

## 2022-07-25 RX ADMIN — ENOXAPARIN SODIUM 40 MG: 100 INJECTION SUBCUTANEOUS at 04:07

## 2022-07-25 RX ADMIN — Medication 10 ML: at 09:07

## 2022-07-25 RX ADMIN — Medication 10 ML: at 06:07

## 2022-07-25 RX ADMIN — FUROSEMIDE 40 MG: 40 TABLET ORAL at 08:07

## 2022-07-25 RX ADMIN — ASPIRIN 81 MG: 81 TABLET, COATED ORAL at 08:07

## 2022-07-25 RX ADMIN — SPIRONOLACTONE 25 MG: 25 TABLET, FILM COATED ORAL at 08:07

## 2022-07-25 RX ADMIN — SACUBITRIL AND VALSARTAN 1 TABLET: 49; 51 TABLET, FILM COATED ORAL at 09:07

## 2022-07-25 RX ADMIN — NICOTINE 1 PATCH: 21 PATCH, EXTENDED RELEASE TRANSDERMAL at 08:07

## 2022-07-25 RX ADMIN — POLYETHYLENE GLYCOL 3350 17 G: 17 POWDER, FOR SOLUTION ORAL at 08:07

## 2022-07-25 RX ADMIN — HYDRALAZINE HYDROCHLORIDE 10 MG: 20 INJECTION, SOLUTION INTRAMUSCULAR; INTRAVENOUS at 11:07

## 2022-07-25 RX ADMIN — SACUBITRIL AND VALSARTAN 1 TABLET: 49; 51 TABLET, FILM COATED ORAL at 08:07

## 2022-07-25 RX ADMIN — PRAVASTATIN SODIUM 20 MG: 20 TABLET ORAL at 08:07

## 2022-07-25 NOTE — PLAN OF CARE
Care plan reviewed with patient. Less confused today than yesterday. Able to get up and walk to the bathroom with assist. Free from falls. No other complaints made.

## 2022-07-25 NOTE — PLAN OF CARE
Pt AAOx1. PT to oriented self. No signs of distress. Able to verbalize needs and follow commands. Pt is irritable and restless. easily redirected.  Denies any pain at this time. Vital signs stable. Sinus Rhythm on tele monitor. On room air.  PIV is patent. Pt incontinent of b/b. Assist x2. Pt remains free of falls. Meds given as prescribed.  POC reviewed with pt and pt verbalized understanding. Pt educated on safety and fall risk. Verbalized understanding. Interventions implemented as appropriate. Pt able to turn self. Encouraged pt  to turn frequently. Resting quietly in bed.Bed low. Side rails up x2, wheels locked, call light within reach.

## 2022-07-26 VITALS
RESPIRATION RATE: 17 BRPM | DIASTOLIC BLOOD PRESSURE: 80 MMHG | WEIGHT: 202.38 LBS | TEMPERATURE: 98 F | BODY MASS INDEX: 28.33 KG/M2 | OXYGEN SATURATION: 94 % | HEART RATE: 96 BPM | HEIGHT: 71 IN | SYSTOLIC BLOOD PRESSURE: 150 MMHG

## 2022-07-26 LAB
ALBUMIN SERPL BCP-MCNC: 3.5 G/DL (ref 3.5–5.2)
ANION GAP SERPL CALC-SCNC: 12 MMOL/L (ref 8–16)
ANION GAP SERPL CALC-SCNC: 12 MMOL/L (ref 8–16)
BASOPHILS # BLD AUTO: 0.02 K/UL (ref 0–0.2)
BASOPHILS NFR BLD: 0.5 % (ref 0–1.9)
BUN SERPL-MCNC: 26 MG/DL (ref 8–23)
BUN SERPL-MCNC: 26 MG/DL (ref 8–23)
CALCIUM SERPL-MCNC: 9.9 MG/DL (ref 8.7–10.5)
CALCIUM SERPL-MCNC: 9.9 MG/DL (ref 8.7–10.5)
CHLORIDE SERPL-SCNC: 107 MMOL/L (ref 95–110)
CHLORIDE SERPL-SCNC: 107 MMOL/L (ref 95–110)
CO2 SERPL-SCNC: 23 MMOL/L (ref 23–29)
CO2 SERPL-SCNC: 23 MMOL/L (ref 23–29)
CREAT SERPL-MCNC: 1.2 MG/DL (ref 0.5–1.4)
CREAT SERPL-MCNC: 1.2 MG/DL (ref 0.5–1.4)
DIFFERENTIAL METHOD: ABNORMAL
EOSINOPHIL # BLD AUTO: 0 K/UL (ref 0–0.5)
EOSINOPHIL NFR BLD: 0 % (ref 0–8)
ERYTHROCYTE [DISTWIDTH] IN BLOOD BY AUTOMATED COUNT: 19.2 % (ref 11.5–14.5)
EST. GFR  (AFRICAN AMERICAN): >60 ML/MIN/1.73 M^2
EST. GFR  (AFRICAN AMERICAN): >60 ML/MIN/1.73 M^2
EST. GFR  (NON AFRICAN AMERICAN): 53 ML/MIN/1.73 M^2
EST. GFR  (NON AFRICAN AMERICAN): 53 ML/MIN/1.73 M^2
GLUCOSE SERPL-MCNC: 128 MG/DL (ref 70–110)
GLUCOSE SERPL-MCNC: 128 MG/DL (ref 70–110)
HCT VFR BLD AUTO: 31.7 % (ref 40–54)
HGB BLD-MCNC: 9.9 G/DL (ref 14–18)
IMM GRANULOCYTES # BLD AUTO: 0.01 K/UL (ref 0–0.04)
IMM GRANULOCYTES NFR BLD AUTO: 0.3 % (ref 0–0.5)
LYMPHOCYTES # BLD AUTO: 0.8 K/UL (ref 1–4.8)
LYMPHOCYTES NFR BLD: 19.5 % (ref 18–48)
MCH RBC QN AUTO: 25.1 PG (ref 27–31)
MCHC RBC AUTO-ENTMCNC: 31.2 G/DL (ref 32–36)
MCV RBC AUTO: 81 FL (ref 82–98)
MONOCYTES # BLD AUTO: 0.5 K/UL (ref 0.3–1)
MONOCYTES NFR BLD: 13.3 % (ref 4–15)
NEUTROPHILS # BLD AUTO: 2.7 K/UL (ref 1.8–7.7)
NEUTROPHILS NFR BLD: 66.4 % (ref 38–73)
NRBC BLD-RTO: 0 /100 WBC
PHOSPHATE SERPL-MCNC: 3.7 MG/DL (ref 2.7–4.5)
PLATELET # BLD AUTO: 157 K/UL (ref 150–450)
PMV BLD AUTO: 10.3 FL (ref 9.2–12.9)
POCT GLUCOSE: 132 MG/DL (ref 70–110)
POTASSIUM SERPL-SCNC: 3.5 MMOL/L (ref 3.5–5.1)
POTASSIUM SERPL-SCNC: 3.5 MMOL/L (ref 3.5–5.1)
RBC # BLD AUTO: 3.94 M/UL (ref 4.6–6.2)
SODIUM SERPL-SCNC: 142 MMOL/L (ref 136–145)
SODIUM SERPL-SCNC: 142 MMOL/L (ref 136–145)
WBC # BLD AUTO: 4 K/UL (ref 3.9–12.7)

## 2022-07-26 PROCEDURE — 36415 COLL VENOUS BLD VENIPUNCTURE: CPT | Performed by: INTERNAL MEDICINE

## 2022-07-26 PROCEDURE — 25000003 PHARM REV CODE 250: Performed by: INTERNAL MEDICINE

## 2022-07-26 PROCEDURE — S4991 NICOTINE PATCH NONLEGEND: HCPCS | Performed by: INTERNAL MEDICINE

## 2022-07-26 PROCEDURE — 80069 RENAL FUNCTION PANEL: CPT | Performed by: INTERNAL MEDICINE

## 2022-07-26 PROCEDURE — A4216 STERILE WATER/SALINE, 10 ML: HCPCS | Performed by: INTERNAL MEDICINE

## 2022-07-26 PROCEDURE — 94761 N-INVAS EAR/PLS OXIMETRY MLT: CPT

## 2022-07-26 PROCEDURE — 85025 COMPLETE CBC W/AUTO DIFF WBC: CPT | Performed by: INTERNAL MEDICINE

## 2022-07-26 RX ORDER — ASPIRIN 81 MG/1
81 TABLET ORAL DAILY
Qty: 90 TABLET | Refills: 3 | Status: SHIPPED | OUTPATIENT
Start: 2022-07-26

## 2022-07-26 RX ORDER — SPIRONOLACTONE 25 MG/1
25 TABLET ORAL DAILY
Qty: 30 TABLET | Refills: 1 | Status: ON HOLD | OUTPATIENT
Start: 2022-07-26 | End: 2022-09-21 | Stop reason: HOSPADM

## 2022-07-26 RX ORDER — EMPAGLIFLOZIN 10 MG/1
10 TABLET, FILM COATED ORAL DAILY
Qty: 30 TABLET | Refills: 1 | Status: ON HOLD | OUTPATIENT
Start: 2022-07-26 | End: 2022-09-21 | Stop reason: HOSPADM

## 2022-07-26 RX ORDER — PRAVASTATIN SODIUM 80 MG/1
80 TABLET ORAL NIGHTLY
Qty: 30 TABLET | Refills: 1 | Status: SHIPPED | OUTPATIENT
Start: 2022-07-26

## 2022-07-26 RX ORDER — METOPROLOL SUCCINATE 25 MG/1
25 TABLET, EXTENDED RELEASE ORAL DAILY
Qty: 30 TABLET | Refills: 11 | Status: SHIPPED | OUTPATIENT
Start: 2022-07-26 | End: 2022-07-26 | Stop reason: HOSPADM

## 2022-07-26 RX ORDER — SACUBITRIL AND VALSARTAN 49; 51 MG/1; MG/1
1 TABLET, FILM COATED ORAL 2 TIMES DAILY
Qty: 60 TABLET | Refills: 1 | Status: SHIPPED | OUTPATIENT
Start: 2022-07-26 | End: 2022-08-01

## 2022-07-26 RX ORDER — FLUTICASONE FUROATE AND VILANTEROL 100; 25 UG/1; UG/1
1 POWDER RESPIRATORY (INHALATION) DAILY
Qty: 60 EACH | Refills: 0 | Status: SHIPPED | OUTPATIENT
Start: 2022-07-26

## 2022-07-26 RX ORDER — PANTOPRAZOLE SODIUM 40 MG/1
40 TABLET, DELAYED RELEASE ORAL DAILY
Qty: 30 TABLET | Refills: 1 | Status: SHIPPED | OUTPATIENT
Start: 2022-07-26

## 2022-07-26 RX ORDER — FUROSEMIDE 40 MG/1
40 TABLET ORAL DAILY
Qty: 30 TABLET | Refills: 11 | Status: SHIPPED | OUTPATIENT
Start: 2022-07-27 | End: 2022-08-15

## 2022-07-26 RX ORDER — TIOTROPIUM BROMIDE 18 UG/1
18 CAPSULE ORAL; RESPIRATORY (INHALATION) NIGHTLY
Qty: 30 CAPSULE | Refills: 1 | Status: SHIPPED | OUTPATIENT
Start: 2022-07-26

## 2022-07-26 RX ORDER — TAMSULOSIN HYDROCHLORIDE 0.4 MG/1
0.4 CAPSULE ORAL NIGHTLY
Qty: 30 CAPSULE | Refills: 1 | Status: SHIPPED | OUTPATIENT
Start: 2022-07-26 | End: 2023-07-26

## 2022-07-26 RX ADMIN — Medication 10 ML: at 06:07

## 2022-07-26 NOTE — PLAN OF CARE
07/26/22 1135   Post-Acute Status   Post-Acute Authorization Home Health   Hospital Resources/Appts/Education Provided Post-Acute resouces added to AVS;Appointments scheduled and added to AVS   Discharge Delays None known at this time   Discharge Plan   Discharge Plan A Home Health     Spoke with patient's son about discharging with home health. Patient does not have PCP established since moving to [place] a couple of months ago. Established PCP with [facility] and referral sent to [facility].

## 2022-07-26 NOTE — PROGRESS NOTES
[facility]  Progress Note    Patient Name: Joseph Toussaint  MRN: [id number]  Patient Class: IP- Inpatient   Admission Date: 7/19/2022  Length of Stay: 6 days  Attending Physician: Jose Manuel Garcia MD  Primary Care Provider: Primary Doctor No        Subjective:     Principal Problem:Acute on chronic systolic congestive heart failure        HPI:  History was taken from the patient and electronic chart .No family at bedside at this time.  89-year-old  male with history of COPD and CHF ,chronic active smoker ,DM,BPH  presenting to the emergency department complaining of worsening shortness of breath over the past week.  There is associated history of cough ,denies history of leg swelling ,dizziness or syncope.  He was recently discharged from [facility] ACUTE [facility]-07/12/2022 and from that documentation, he was sent to the SNF after recent diagnosis of CHF with EF -25-30% . He was continued on Aldactone/Entresto.   He was also seen in the ED -07/13/2022 with worsening dyspnoea and was discharged home.  He was admitted at [facility] -06/01 for acute CHF . ECho done at that time -06/2022 showed    1. Normal left ventricular cavity size. Severely depressed left ventricular systolic   function. LVEF 25 - 30%.   2. Mildly dilated right ventricle. Moderate right ventricular hypokinesis.   3. Moderate to severe mitral valve regurgitation.   4. Moderate to severe tricuspid valve regurgitation  Labs and imaging test reviewed.  Cardiomegaly with perihilar edema.  Mild pleural effusions suspected  BNP- more than 4900  CBC -hemoglobin 10.8  He will be admitted for CHF exacerbation /COPD.    His  primary care physician is Dr. Leonid Noguera.       Overview/Hospital Course:  Patient admitted to Telemetry for CHF exacerbation/COPD. Cardio consulted. BNP > 4,900. Patient treated with nitrate/vasodilator, Entresto, Lasix, Aldactone, Zithromax, and duonebs. CXR shows cardiomegaly  with perihilar edema and mild pleural effusions. CTA reveals no evidence of pulmonary embolism, but small bilateral pleural effusions, right greater than left producing mild atelectasis at the lung bases. Patient verbalized improvement in dyspnea. He also endorses deviation from low sodium diet associated with recent move to son's house. On 7/21/22, patient reports continues improvement of dyspnea; patient is responding appropriately to diuresis. Decrease in potassium noted, currently 3.3. Continue plan of care with potassium added.  24 July:  No acute problems or complaints.  Breathing comfortably on room air.  No peripheral edema.  Therapy evaluations pending.  Transition to oral diuretics.      Interval History: No new problems overnight.  Feeling better today and answering questions more clearly.  Complains of some constipation and would like repeat laxative.  Working with PT and OT who recommend SNF but patient would rather return home.  Home health may be appropriate.  Transitioned to oral medications.  Reviewed with Cardiology.  May be ready to discharge soon.    Review of Systems   Constitutional:  Positive for activity change. Negative for chills and fever.   HENT:  Negative for congestion, ear pain, rhinorrhea, sneezing and sore throat.    Eyes:  Negative for pain and visual disturbance.   Respiratory:  Positive for cough (intermittent). Negative for chest tightness, shortness of breath and wheezing.    Cardiovascular:  Negative for chest pain, palpitations and leg swelling.   Gastrointestinal:  Negative for abdominal pain, constipation, diarrhea, nausea and vomiting.   Endocrine: Negative for cold intolerance, heat intolerance, polydipsia, polyphagia and polyuria.   Genitourinary:  Negative for dysuria.   Musculoskeletal:  Negative for arthralgias.   Neurological:  Negative for dizziness, seizures, syncope, weakness, numbness and headaches.   Hematological:  Negative for adenopathy. Does not bruise/bleed  easily.   Psychiatric/Behavioral:  Negative for agitation, confusion and sleep disturbance. The patient is not nervous/anxious.    Objective:     Vital Signs (Most Recent):  Temp: 98.2 °F (36.8 °C) (07/25/22 2013)  Pulse: 96 (07/25/22 2013)  Resp: 18 (07/25/22 2013)  BP: (!) 156/85 (07/25/22 2013)  SpO2: 98 % (07/25/22 2013)   Vital Signs (24h Range):  Temp:  [97.6 °F (36.4 °C)-98.4 °F (36.9 °C)] 98.2 °F (36.8 °C)  Pulse:  [74-99] 96  Resp:  [16-20] 18  SpO2:  [94 %-100 %] 98 %  BP: (135-184)/() 156/85     Weight: 91.8 kg (202 lb 6.1 oz)  Body mass index is 28.23 kg/m².    Intake/Output Summary (Last 24 hours) at 7/25/2022 2145  Last data filed at 7/25/2022 0800  Gross per 24 hour   Intake 200 ml   Output 200 ml   Net 0 ml        Physical Exam  Constitutional:       Appearance: Normal appearance.   HENT:      Head: Normocephalic and atraumatic.      Nose: Nose normal.      Mouth/Throat:      Mouth: Mucous membranes are moist.      Pharynx: Oropharynx is clear.   Eyes:      Conjunctiva/sclera: Conjunctivae normal.   Neck:      Vascular: No carotid bruit or JVD.   Cardiovascular:      Rate and Rhythm: Normal rate and regular rhythm.      Pulses: Normal pulses.           Radial pulses are 2+ on the right side and 2+ on the left side.        Dorsalis pedis pulses are 2+ on the right side and 2+ on the left side.   Pulmonary:      Effort: Pulmonary effort is normal.      Breath sounds: No rales.   Abdominal:      General: Bowel sounds are normal. There is no abdominal bruit.      Palpations: There is no hepatomegaly.   Musculoskeletal:         General: Normal range of motion.      Right lower leg: No edema.      Left lower leg: No edema.   Lymphadenopathy:      Cervical: No cervical adenopathy.   Skin:     General: Skin is warm and dry.      Coloration: Skin is not jaundiced.      Findings: No erythema or rash.   Neurological:      General: No focal deficit present.      Mental Status: He is alert.   Psychiatric:          Mood and Affect: Mood normal.       Significant Labs: All pertinent labs within the past 24 hours have been reviewed.    Significant Imaging: I have reviewed all pertinent imaging results/findings within the past 24 hours.      Assessment/Plan:      * Acute on chronic systolic congestive heart failure  Patient is on CHF pathway  -Cardiology consulted. Inotropic therapy will be considered if patient is non-responsive to diuresis to minimize hospital admissions.   -Continue Nitrate/Vasodilator and monitor clinical status closely.  -Continue entresto, lasix, and aldactone.      -Monitor strict I&Os and daily weights.    -Fluid restriction of 1.5 L.   -Continue to stress to patient importance of self efficacy and  on diet for CHF.      Last BNP reviewed.   Recent Labs   Lab 07/19/22  1800   BNP >4,900*     Back on oral diuretics.      Mixed hyperlipidemia  -Monitor closely.   -Outpatient f/u      Type 2 diabetes mellitus with diabetic polyneuropathy, without long-term current use of insulin  -SSI   -Diabetic diet      Essential hypertension  -Continue Entresto  -Monitor BP closely  -Low sodium diet     COPD suggested by initial evaluation  -Duonebs as tolerated  -Encourage nicotine cessation (70 pack year history)   -Continue zithromax     Benign prostatic hyperplasia with nocturia  -Continue flomax. Monitor clinical response        VTE Risk Mitigation (From admission, onward)         Ordered     enoxaparin injection 40 mg  Daily         07/19/22 2131     IP VTE HIGH RISK PATIENT  Once         07/19/22 2131     Place sequential compression device  Until discontinued         07/19/22 2131                Discharge Planning   TATIANA:      Code Status: Full Code   Is the patient medically ready for discharge?:     Reason for patient still in hospital (select all that apply): Treatment, PT / OT recommendations and Pending disposition  Discharge Plan A: Home, Home Health, Home with family                  Jose Manuel ROBBIE  MD Jose  Department of Hospital Medicine   '[place] - Telemetry ([facility])

## 2022-07-26 NOTE — DISCHARGE SUMMARY
[facility] [place] by [facility]  Discharge Summary      Patient Name: Joseph Toussaint  MRN: [id number]  Patient Class: IP- Inpatient  Admission Date: 7/19/2022  Hospital Length of Stay: 7 days  Discharge Date and Time: 7/26/2022  2:33 PM  Attending Physician: No att. providers found   Discharging Provider: Lan Escobar MD  Primary Care Provider: Abad Iqbal MD      HPI:   History was taken from the patient and electronic chart .No family at bedside at this time.  89-year-old  male with history of COPD and CHF ,chronic active smoker ,DM,BPH  presenting to the emergency department complaining of worsening shortness of breath over the past week.  There is associated history of cough ,denies history of leg swelling ,dizziness or syncope.  He was recently discharged from [facility]-07/12/2022 and from that documentation, he was sent to the SNF after recent diagnosis of CHF with EF -25-30% . He was continued on Aldactone/Entresto.   He was also seen in the ED -07/13/2022 with worsening dyspnoea and was discharged home.  He was admitted at [facility] -06/01 for acute CHF . ECho done at that time -06/2022 showed    1. Normal left ventricular cavity size. Severely depressed left ventricular systolic   function. LVEF 25 - 30%.   2. Mildly dilated right ventricle. Moderate right ventricular hypokinesis.   3. Moderate to severe mitral valve regurgitation.   4. Moderate to severe tricuspid valve regurgitation  Labs and imaging test reviewed.  Cardiomegaly with perihilar edema.  Mild pleural effusions suspected  BNP- more than 4900  CBC -hemoglobin 10.8  He will be admitted for CHF exacerbation /COPD.    His  primary care physician is Dr. Leonid Noguera.       * No surgery found *      Hospital Course:   Patient admitted to Telemetry for CHF exacerbation/COPD. Cardio consulted. BNP > 4,900. Patient treated with nitrate/vasodilator, Entresto, Lasix, Aldactone, Zithromax, and duonebs. CXR  shows cardiomegaly with perihilar edema and mild pleural effusions. CTA reveals no evidence of pulmonary embolism, but small bilateral pleural effusions, right greater than left producing mild atelectasis at the lung bases. Patient verbalized improvement in dyspnea. He also endorses deviation from low sodium diet associated with recent move to son's house. On 7/21/22, patient reports continues improvement of dyspnea; patient is responding appropriately to diuresis. Decrease in potassium noted, currently 3.3. Continue plan of care with potassium added.  24 July:  No acute problems or complaints.  Breathing comfortably on room air.  No peripheral edema.  Therapy evaluations pending.  Transition to oral diuretics.    Patient was stable on room air. He was discharged home. No bb started due to history of 2nd degree AV block.        Goals of Care Treatment Preferences:  Code Status: Full Code      Consults:   Consults (From admission, onward)        Status Ordering Provider     Inpatient consult to Cardiology  Once        Provider:  Whit Culp MD    Completed ZULEYKA LOPES     Inpatient consult to Social Work/Case Management  Once        Provider:  (Not yet assigned)    Completed ZULEYKA LOPES     Inpatient consult to Registered Dietitian/Nutritionist  Once        Provider:  (Not yet assigned)    Completed ZULEYKA LOPES          No new Assessment & Plan notes have been filed under this hospital service since the last note was generated.  Service: Hospital Medicine    Final Active Diagnoses:    Diagnosis Date Noted POA    PRINCIPAL PROBLEM:  Acute on chronic systolic congestive heart failure [I50.23] 07/20/2022 Yes    Shortness of breath [R06.02]  Yes    COPD suggested by initial evaluation [J44.9] 06/13/2022 Yes    Benign prostatic hyperplasia with nocturia [N40.1, R35.1] 10/26/2018 Yes    Type 2 diabetes mellitus with diabetic polyneuropathy, without long-term current use of insulin [E11.42] 10/26/2018 Yes     Mixed hyperlipidemia [E78.2] 10/26/2018 Yes    Essential hypertension [I10] 10/26/2018 Yes      Problems Resolved During this Admission:       Discharged Condition: good    Disposition: Home or Self Care    Follow Up:   Follow-up Information     [facility].    Specialties: Home Health Services, Home Therapy Services, Home Living Aide Services  Contact information:  [address]  [place] [address] [phone]                     Patient Instructions:   No discharge procedures on file.    Significant Diagnostic Studies: Labs:   BMP:   Recent Labs   Lab 07/25/22  0539 07/26/22  0421   *  130* 128*  128*     144 142  142   K 3.7  3.7 3.5  3.5     103 107  107   CO2 25  25 23  23   BUN 24*  24* 26*  26*   CREATININE 1.1  1.1 1.2  1.2   CALCIUM 10.1  10.1 9.9  9.9    and CBC   Recent Labs   Lab 07/26/22  0421   WBC 4.00   HGB 9.9*   HCT 31.7*          Pending Diagnostic Studies:     None         Medications:  Reconciled Home Medications:      Medication List      START taking these medications    furosemide 40 MG tablet  Commonly known as: LASIX  Take 1 tablet (40 mg total) by mouth once daily.  Start taking on: July 27, 2022        CHANGE how you take these medications    tamsulosin 0.4 mg Cap  Commonly known as: FLOMAX  Take 1 capsule (0.4 mg total) by mouth every evening.  What changed: how much to take        CONTINUE taking these medications    albuterol 90 mcg/actuation inhaler  Commonly known as: PROVENTIL/VENTOLIN HFA  Inhale 1-2 puffs into the lungs every 6 (six) hours as needed for Wheezing. Rescue     aspirin 81 MG EC tablet  Commonly known as: ASPIR-81  Take 1 tablet (81 mg total) by mouth once daily.     ENTRESTO 49-51 mg per tablet  Generic drug: sacubitriL-valsartan  Take 1 tablet by mouth 2 (two) times daily.     fluticasone furoate-vilanteroL 100-25 mcg/dose diskus inhaler  Commonly known as: BREO ELLIPTA  Inhale  1 puff into the lungs once daily. Controller     JARDIANCE 10 mg tablet  Generic drug: empagliflozin  Take 1 tablet (10 mg total) by mouth once daily.     pantoprazole 40 MG tablet  Commonly known as: PROTONIX  Take 1 tablet (40 mg total) by mouth once daily.     pravastatin 80 MG tablet  Commonly known as: PRAVACHOL  Take 1 tablet (80 mg total) by mouth nightly.     spironolactone 25 MG tablet  Commonly known as: ALDACTONE  Take 1 tablet (25 mg total) by mouth once daily.     tiotropium 18 mcg inhalation capsule  Commonly known as: SPIRIVA  Inhale 1 capsule (18 mcg total) into the lungs nightly. Controller            Indwelling Lines/Drains at time of discharge:   Lines/Drains/Airways     None                 Time spent on the discharge of patient: 36 minutes         Lan Escobar MD  Department of Hospital Medicine  '[place] - Telemetry ([facility])

## 2022-07-26 NOTE — PLAN OF CARE
[place] - Telemetry (Hospital)  Discharge Final Note    Primary Care Provider: Abad Iqbal MD    Expected Discharge Date: 7/26/2022    Final Discharge Note (most recent)     Final Note - 07/26/22 1214        Final Note    Assessment Type Final Discharge Note     Anticipated Discharge Disposition Home-Health Care [facility]     Hospital Resources/Appts/Education Provided Appointments scheduled and added to AVS;Post-Acute resouces added to AVS        Post-Acute Status    Home Health Status Set-up Complete/Auth obtained     Discharge Delays None known at this time                 Important Message from Medicare             Contact Info     [facility]   Specialty: Home Health Services, Home Therapy Services, Home Living Aide Services    [address]  [place] [address]   Phone: [phone]       Next Steps: Follow up        PCP established with Dr. Abad Iqbal. Appt scheduled for 8/1/22.

## 2022-07-26 NOTE — PROGRESS NOTES
Patient discharged with belongings in hand. Wheeled down per hospital staff. Reinforced education, pt verbalized understanding. IV taken out, jelco intact. Telemetry monitor taken off. Medications reviewed with pt, verbalized understanding. VICENTA

## 2022-07-26 NOTE — ASSESSMENT & PLAN NOTE
Patient is on CHF pathway  -Cardiology consulted. Inotropic therapy will be considered if patient is non-responsive to diuresis to minimize hospital admissions.   -Continue Nitrate/Vasodilator and monitor clinical status closely.  -Continue entresto, lasix, and aldactone.      -Monitor strict I&Os and daily weights.    -Fluid restriction of 1.5 L.   -Continue to stress to patient importance of self efficacy and  on diet for CHF.      Last BNP reviewed.   Recent Labs   Lab 07/19/22  1800   BNP >4,900*     Back on oral diuretics.

## 2022-07-26 NOTE — CHAPLAIN
Initial visit with patient.  Visited with patient to assess for spiritual and emotional needs.  Pt seemed to be a little confused at the time of my visit.  He was not happy about a lot of different things.  I took time to listen to all he was saying and did what I could to be of some encouragement to him.  Pt currently had no other needs and spiritual care remains available as needed.    Chaplain Timur Bolden M.Div., BCC

## 2022-07-26 NOTE — SUBJECTIVE & OBJECTIVE
Interval History: No new problems overnight.  Feeling better today and answering questions more clearly.  Complains of some constipation and would like repeat laxative.  Working with PT and OT who recommend SNF but patient would rather return home.  Home health may be appropriate.  Transitioned to oral medications.  Reviewed with Cardiology.  May be ready to discharge soon.    Review of Systems   Constitutional:  Positive for activity change. Negative for chills and fever.   HENT:  Negative for congestion, ear pain, rhinorrhea, sneezing and sore throat.    Eyes:  Negative for pain and visual disturbance.   Respiratory:  Positive for cough (intermittent). Negative for chest tightness, shortness of breath and wheezing.    Cardiovascular:  Negative for chest pain, palpitations and leg swelling.   Gastrointestinal:  Negative for abdominal pain, constipation, diarrhea, nausea and vomiting.   Endocrine: Negative for cold intolerance, heat intolerance, polydipsia, polyphagia and polyuria.   Genitourinary:  Negative for dysuria.   Musculoskeletal:  Negative for arthralgias.   Neurological:  Negative for dizziness, seizures, syncope, weakness, numbness and headaches.   Hematological:  Negative for adenopathy. Does not bruise/bleed easily.   Psychiatric/Behavioral:  Negative for agitation, confusion and sleep disturbance. The patient is not nervous/anxious.    Objective:     Vital Signs (Most Recent):  Temp: 98.2 °F (36.8 °C) (07/25/22 2013)  Pulse: 96 (07/25/22 2013)  Resp: 18 (07/25/22 2013)  BP: (!) 156/85 (07/25/22 2013)  SpO2: 98 % (07/25/22 2013)   Vital Signs (24h Range):  Temp:  [97.6 °F (36.4 °C)-98.4 °F (36.9 °C)] 98.2 °F (36.8 °C)  Pulse:  [74-99] 96  Resp:  [16-20] 18  SpO2:  [94 %-100 %] 98 %  BP: (135-184)/() 156/85     Weight: 91.8 kg (202 lb 6.1 oz)  Body mass index is 28.23 kg/m².    Intake/Output Summary (Last 24 hours) at 7/25/2022 2193  Last data filed at 7/25/2022 0800  Gross per 24 hour   Intake  200 ml   Output 200 ml   Net 0 ml        Physical Exam  Constitutional:       Appearance: Normal appearance.   HENT:      Head: Normocephalic and atraumatic.      Nose: Nose normal.      Mouth/Throat:      Mouth: Mucous membranes are moist.      Pharynx: Oropharynx is clear.   Eyes:      Conjunctiva/sclera: Conjunctivae normal.   Neck:      Vascular: No carotid bruit or JVD.   Cardiovascular:      Rate and Rhythm: Normal rate and regular rhythm.      Pulses: Normal pulses.           Radial pulses are 2+ on the right side and 2+ on the left side.        Dorsalis pedis pulses are 2+ on the right side and 2+ on the left side.   Pulmonary:      Effort: Pulmonary effort is normal.      Breath sounds: No rales.   Abdominal:      General: Bowel sounds are normal. There is no abdominal bruit.      Palpations: There is no hepatomegaly.   Musculoskeletal:         General: Normal range of motion.      Right lower leg: No edema.      Left lower leg: No edema.   Lymphadenopathy:      Cervical: No cervical adenopathy.   Skin:     General: Skin is warm and dry.      Coloration: Skin is not jaundiced.      Findings: No erythema or rash.   Neurological:      General: No focal deficit present.      Mental Status: He is alert.   Psychiatric:         Mood and Affect: Mood normal.       Significant Labs: All pertinent labs within the past 24 hours have been reviewed.    Significant Imaging: I have reviewed all pertinent imaging results/findings within the past 24 hours.

## 2022-07-27 ENCOUNTER — PATIENT OUTREACH (OUTPATIENT)
Dept: ADMINISTRATIVE | Facility: CLINIC | Age: 87
End: 2022-07-27
Payer: MEDICARE

## 2022-07-27 DIAGNOSIS — I50.21 ACUTE HFREF (HEART FAILURE WITH REDUCED EJECTION FRACTION): Primary | ICD-10-CM

## 2022-07-27 NOTE — PROGRESS NOTES
C3 nurse attempted to contact Joseph Toussaint for a TCC post hospital discharge follow up call. The patient is unable to conduct the call @ this time. The patient's son requested a callback.    The patient has a scheduled appointment with Abad Iqbal MD on 8/1/2022 @ 0800. Message sent to Physician staff.

## 2022-07-27 NOTE — PROGRESS NOTES
Second attempt  C3 nurse spoke with Joseph Toussaint's son, Kevin Toussaint, for a TCC post hospital discharge follow up call. The patient has a scheduled HOSFU appointment with Abad Iqbal MD on 8/1/2022 @ 0800.  Son states is hard to get patient to the doctor.  NP @ Home referral.  OPCM referral.

## 2022-07-27 NOTE — TELEPHONE ENCOUNTER
Pt states that his dad is nott sleeping at night and wants to sleep all day. Son states the his dad is being combative with him and is very forgetful. He states that his dad is cursing at him. He states that his dad has now urinating on himself. Advised son to keep the appointment with Dr. Iqbal next week. Verbalized understanding.

## 2022-07-28 ENCOUNTER — TELEPHONE (OUTPATIENT)
Dept: TRANSPLANT | Facility: CLINIC | Age: 87
End: 2022-07-28
Payer: MEDICARE

## 2022-07-28 NOTE — TELEPHONE ENCOUNTER
Called suze Cornell(only contact number listed), no answer and unable to leave a VM. Will try again later.

## 2022-07-29 ENCOUNTER — TELEPHONE (OUTPATIENT)
Dept: TRANSPLANT | Facility: CLINIC | Age: 87
End: 2022-07-29
Payer: MEDICARE

## 2022-07-29 ENCOUNTER — TELEPHONE (OUTPATIENT)
Dept: INTERNAL MEDICINE | Facility: CLINIC | Age: 87
End: 2022-07-29
Payer: MEDICARE

## 2022-07-29 NOTE — TELEPHONE ENCOUNTER
Wendy Caruso RN sent staff message asking provider to be advised of the following TCC message:    C3 nurse spoke with Joseph Toussaint's son, Kevin Toussaint, for a TCC post hospital discharge follow up call. The patient has a scheduled appointment with Abad Iqbal MD on 8/1/2022 @ 0800. Son states that he will have a hard time to get patient to appointment due to weakness.  NP @ home referral placed     Son is asking about giving patient something to help him sleep.  Patient reportedly staying up all night.  Son would also like to discuss a dementia/Alzheimer's diagnosis for patient.  Please contact patient's son with recommendations.

## 2022-07-29 NOTE — TELEPHONE ENCOUNTER
I called pt to f/u from hosp d/c. No answer for pts son. (only contact #).  full. Unable to leave .         ----- Message from Teetee Foote RN sent at 2022  9:02 AM CDT -----  Regardin hour call

## 2022-07-29 NOTE — TELEPHONE ENCOUNTER
----- Message from Wendy Caruso RN sent at 7/27/2022  2:56 PM CDT -----  Regarding: sleep medication  Please forward this important TCC information to your provider in order to maximize the post discharge care delivery of this patient.    C3 nurse spoke with Joseph Toussaint's son, Kevin Toussaint, for a TCC post hospital discharge follow up call. The patient has a scheduled appointment with Abad Iqbal MD on 8/1/2022 @ 0800. Son states that he will have a hard time to get patient to appointment due to weakness.  NP @ home referral placed    Son is asking about giving patient something to help him sleep.  Patient reportedly staying up all night.  Son would also like to discuss a dementia/Alzheimer's diagnosis for patient.  Please contact patient's son with recommendations.    Respectfully,  Wendy Caruso RN  [facility] C[facility]    [email or web address]       Please do not reply to this message, as this inbox is not routinely monitored.

## 2022-08-01 ENCOUNTER — LAB VISIT (OUTPATIENT)
Dept: LAB | Facility: HOSPITAL | Age: 87
End: 2022-08-01
Attending: FAMILY MEDICINE
Payer: MEDICARE

## 2022-08-01 ENCOUNTER — OFFICE VISIT (OUTPATIENT)
Dept: INTERNAL MEDICINE | Facility: CLINIC | Age: 87
End: 2022-08-01
Payer: MEDICARE

## 2022-08-01 VITALS
RESPIRATION RATE: 18 BRPM | HEART RATE: 96 BPM | DIASTOLIC BLOOD PRESSURE: 68 MMHG | SYSTOLIC BLOOD PRESSURE: 138 MMHG | TEMPERATURE: 98 F | BODY MASS INDEX: 20.65 KG/M2 | OXYGEN SATURATION: 95 % | WEIGHT: 147.5 LBS | HEIGHT: 71 IN

## 2022-08-01 DIAGNOSIS — J44.9 COPD SUGGESTED BY INITIAL EVALUATION: ICD-10-CM

## 2022-08-01 DIAGNOSIS — Z76.89 ENCOUNTER TO ESTABLISH CARE WITH NEW DOCTOR: Primary | ICD-10-CM

## 2022-08-01 DIAGNOSIS — N40.1 BENIGN PROSTATIC HYPERPLASIA WITH NOCTURIA: ICD-10-CM

## 2022-08-01 DIAGNOSIS — R41.3 MEMORY DIFFICULTIES: ICD-10-CM

## 2022-08-01 DIAGNOSIS — I50.21 ACUTE HFREF (HEART FAILURE WITH REDUCED EJECTION FRACTION): ICD-10-CM

## 2022-08-01 DIAGNOSIS — I44.1 AV BLOCK, 2ND DEGREE: ICD-10-CM

## 2022-08-01 DIAGNOSIS — I25.10 CORONARY ARTERY DISEASE INVOLVING NATIVE CORONARY ARTERY OF NATIVE HEART WITHOUT ANGINA PECTORIS: ICD-10-CM

## 2022-08-01 DIAGNOSIS — E11.9 CONTROLLED TYPE 2 DIABETES MELLITUS WITHOUT COMPLICATION, WITHOUT LONG-TERM CURRENT USE OF INSULIN: ICD-10-CM

## 2022-08-01 DIAGNOSIS — E11.42 TYPE 2 DIABETES MELLITUS WITH DIABETIC POLYNEUROPATHY, WITHOUT LONG-TERM CURRENT USE OF INSULIN: ICD-10-CM

## 2022-08-01 DIAGNOSIS — I10 ESSENTIAL HYPERTENSION: ICD-10-CM

## 2022-08-01 DIAGNOSIS — R35.1 BENIGN PROSTATIC HYPERPLASIA WITH NOCTURIA: ICD-10-CM

## 2022-08-01 LAB
ALBUMIN/CREAT UR: 254.4 UG/MG (ref 0–30)
BACTERIA #/AREA URNS AUTO: NORMAL /HPF
BILIRUB UR QL STRIP: NEGATIVE
CLARITY UR REFRACT.AUTO: CLEAR
COLOR UR AUTO: YELLOW
CREAT UR-MCNC: 68 MG/DL (ref 23–375)
GLUCOSE UR QL STRIP: ABNORMAL
HGB UR QL STRIP: NEGATIVE
HYALINE CASTS UR QL AUTO: 0 /LPF
KETONES UR QL STRIP: NEGATIVE
LEUKOCYTE ESTERASE UR QL STRIP: NEGATIVE
MICROALBUMIN UR DL<=1MG/L-MCNC: 173 UG/ML
MICROSCOPIC COMMENT: NORMAL
NITRITE UR QL STRIP: NEGATIVE
PH UR STRIP: 6 [PH] (ref 5–8)
PROT UR QL STRIP: ABNORMAL
RBC #/AREA URNS AUTO: 0 /HPF (ref 0–4)
SP GR UR STRIP: 1.01 (ref 1–1.03)
SQUAMOUS #/AREA URNS AUTO: 1 /HPF
URN SPEC COLLECT METH UR: ABNORMAL
WBC #/AREA URNS AUTO: 2 /HPF (ref 0–5)
YEAST UR QL AUTO: NORMAL

## 2022-08-01 PROCEDURE — 81001 URINALYSIS AUTO W/SCOPE: CPT | Performed by: FAMILY MEDICINE

## 2022-08-01 PROCEDURE — 1125F PR PAIN SEVERITY QUANTIFIED, PAIN PRESENT: ICD-10-PCS | Mod: CPTII,S$GLB,, | Performed by: FAMILY MEDICINE

## 2022-08-01 PROCEDURE — 99999 PR PBB SHADOW E&M-EST. PATIENT-LVL IV: ICD-10-PCS | Mod: PBBFAC,,, | Performed by: FAMILY MEDICINE

## 2022-08-01 PROCEDURE — 82570 ASSAY OF URINE CREATININE: CPT | Performed by: FAMILY MEDICINE

## 2022-08-01 PROCEDURE — 1159F MED LIST DOCD IN RCRD: CPT | Mod: CPTII,S$GLB,, | Performed by: FAMILY MEDICINE

## 2022-08-01 PROCEDURE — 99204 OFFICE O/P NEW MOD 45 MIN: CPT | Mod: S$GLB,,, | Performed by: FAMILY MEDICINE

## 2022-08-01 PROCEDURE — 3288F PR FALLS RISK ASSESSMENT DOCUMENTED: ICD-10-PCS | Mod: CPTII,S$GLB,, | Performed by: FAMILY MEDICINE

## 2022-08-01 PROCEDURE — 1111F DSCHRG MED/CURRENT MED MERGE: CPT | Mod: CPTII,S$GLB,, | Performed by: FAMILY MEDICINE

## 2022-08-01 PROCEDURE — 3288F FALL RISK ASSESSMENT DOCD: CPT | Mod: CPTII,S$GLB,, | Performed by: FAMILY MEDICINE

## 2022-08-01 PROCEDURE — 1111F PR DISCHARGE MEDS RECONCILED W/ CURRENT OUTPATIENT MED LIST: ICD-10-PCS | Mod: CPTII,S$GLB,, | Performed by: FAMILY MEDICINE

## 2022-08-01 PROCEDURE — 1125F AMNT PAIN NOTED PAIN PRSNT: CPT | Mod: CPTII,S$GLB,, | Performed by: FAMILY MEDICINE

## 2022-08-01 PROCEDURE — 99204 PR OFFICE/OUTPT VISIT, NEW, LEVL IV, 45-59 MIN: ICD-10-PCS | Mod: S$GLB,,, | Performed by: FAMILY MEDICINE

## 2022-08-01 PROCEDURE — 1100F PR PT FALLS ASSESS DOC 2+ FALLS/FALL W/INJURY/YR: ICD-10-PCS | Mod: CPTII,S$GLB,, | Performed by: FAMILY MEDICINE

## 2022-08-01 PROCEDURE — 99999 PR PBB SHADOW E&M-EST. PATIENT-LVL IV: CPT | Mod: PBBFAC,,, | Performed by: FAMILY MEDICINE

## 2022-08-01 PROCEDURE — 82043 UR ALBUMIN QUANTITATIVE: CPT | Performed by: FAMILY MEDICINE

## 2022-08-01 PROCEDURE — 1100F PTFALLS ASSESS-DOCD GE2>/YR: CPT | Mod: CPTII,S$GLB,, | Performed by: FAMILY MEDICINE

## 2022-08-01 PROCEDURE — 1159F PR MEDICATION LIST DOCUMENTED IN MEDICAL RECORD: ICD-10-PCS | Mod: CPTII,S$GLB,, | Performed by: FAMILY MEDICINE

## 2022-08-01 RX ORDER — FLUTICASONE FUROATE AND VILANTEROL 100; 25 UG/1; UG/1
1 POWDER RESPIRATORY (INHALATION) DAILY
Status: ON HOLD | COMMUNITY
Start: 2022-06-14 | End: 2022-09-21 | Stop reason: SDUPTHER

## 2022-08-01 RX ORDER — SPIRONOLACTONE 25 MG/1
1 TABLET ORAL DAILY
Status: ON HOLD | COMMUNITY
Start: 2022-06-14 | End: 2022-09-21 | Stop reason: SDUPTHER

## 2022-08-01 RX ORDER — TIOTROPIUM BROMIDE 18 UG/1
18 CAPSULE ORAL; RESPIRATORY (INHALATION)
Status: ON HOLD | COMMUNITY
Start: 2022-06-13 | End: 2022-09-21 | Stop reason: SDUPTHER

## 2022-08-01 RX ORDER — TIMOLOL MALEATE 5 MG/ML
1 SOLUTION/ DROPS OPHTHALMIC 2 TIMES DAILY
COMMUNITY
Start: 2022-06-13 | End: 2023-06-13

## 2022-08-01 NOTE — PROGRESS NOTES
Subjective:       Patient ID: Joseph Toussaint is a 89 y.o. male.    Chief Complaint: Altered Mental Status, Insomnia, Annual Exam, Fall, and Dysmenorrhea    Establish care.  Medical history includes coronary artery disease type 2 diabetes with diabetic polyneuropathy acute on chronic congestive heart failure COPD hypertension hyperlipidemia second-degree AV block.  Had a recent hospital admission for COPD use extubation he then had another admission for acute congestive heart failure.  He is living at home with family.  Family is concerned about his ongoing confusion incontinence weakness difficulty sleeping.  They were interested in nursing home versus skilled nursing.  He denies any chest pain palpitations shortness of breath or edema.  He notes a occasionally has some mild dysuria.    Review of Systems   Constitutional: Negative for appetite change, chills and fever.   HENT: Negative for congestion.    Respiratory: Negative for cough, chest tightness, shortness of breath and wheezing.    Cardiovascular: Negative for chest pain, palpitations and leg swelling.   Gastrointestinal: Negative for abdominal distention and abdominal pain.        Bowel incontinence   Genitourinary: Positive for dysuria and frequency.        Bladder incontinence   Musculoskeletal: Positive for back pain.        Recent onset low back pain improved with Tylenol   Neurological: Positive for weakness. Negative for dizziness, light-headedness and headaches.   Psychiatric/Behavioral: Positive for confusion and sleep disturbance.       Objective:      Physical Exam  Constitutional:       General: He is not in acute distress.     Appearance: He is not diaphoretic.   Cardiovascular:      Rate and Rhythm: Normal rate. Rhythm irregular.      Heart sounds: No murmur heard.    No gallop.   Pulmonary:      Effort: Pulmonary effort is normal. No respiratory distress.      Breath sounds: No wheezing, rhonchi or rales.   Abdominal:      General: There is  no distension.      Palpations: Abdomen is soft. There is no mass.      Tenderness: There is no abdominal tenderness.   Lymphadenopathy:      Cervical: No cervical adenopathy.   Skin:     Coloration: Skin is not pale.      Findings: No erythema.   Psychiatric:         Mood and Affect: Mood normal.         Behavior: Behavior normal.         No results displayed because visit has over 200 results.        Assessment:       1. Encounter to establish care with new doctor    2. Memory difficulties    3. Controlled type 2 diabetes mellitus without complication, without long-term current use of insulin    4. Acute HFrEF (heart failure with reduced ejection fraction)    5. Essential hypertension    6. Type 2 diabetes mellitus with diabetic polyneuropathy, without long-term current use of insulin    7. AV block, 2nd degree    8. Benign prostatic hyperplasia with nocturia    9. COPD suggested by initial evaluation    10. Coronary artery disease involving native coronary artery of native heart without angina pectoris        Plan:     Blood pressure controlled  Medications reviewed.  Labs up-to-date including CBC CMP A1c.  They will check with insurance regarding nursing home skilled nursing placement.  Also needs cardiology follow-up.  Family  would like neurology evaluation for dementia evaluation.  Follow-up 1 month.  Continue diabetes.  Recommend melatonin for sleep.  Nursing home  form was completed 08/17/2022  pt to be admitted to center for skilled nursing service.  Encounter to establish care with new doctor    Memory difficulties  -     Ambulatory referral/consult to Neurology; Future; Expected date: 08/08/2022    Controlled type 2 diabetes mellitus without complication, without long-term current use of insulin  -     Urinalysis; Future; Expected date: 08/01/2022  -     Microalbumin/Creatinine Ratio, Urine; Future; Expected date: 08/01/2022    Acute HFrEF (heart failure with reduced ejection fraction)    Essential  hypertension    Type 2 diabetes mellitus with diabetic polyneuropathy, without long-term current use of insulin    AV block, 2nd degree    Benign prostatic hyperplasia with nocturia    COPD suggested by initial evaluation    Coronary artery disease involving native coronary artery of native heart without angina pectoris

## 2022-08-05 ENCOUNTER — TELEPHONE (OUTPATIENT)
Dept: INTERNAL MEDICINE | Facility: CLINIC | Age: 87
End: 2022-08-05
Payer: MEDICARE

## 2022-08-05 NOTE — TELEPHONE ENCOUNTER
Spoke with patients son about a skilled nurse order and an appt for neuro, the soonest available  is not until January so he requested for external referral to be placed. Son also has a list of possible nursing homes and wants to personally discuss with Dr. Iqbal. Has appt on 09/22/22

## 2022-08-08 DIAGNOSIS — R41.3 MEMORY DEFICIT: Primary | ICD-10-CM

## 2022-08-09 ENCOUNTER — TELEPHONE (OUTPATIENT)
Dept: PRIMARY CARE CLINIC | Facility: CLINIC | Age: 87
End: 2022-08-09

## 2022-08-09 NOTE — TELEPHONE ENCOUNTER
Patient son call was returned. He stated that he wants to get a referral to have his dad placed in a skilled nursing facility. He as either [facility] or [facility] would be good, because they have openings.

## 2022-08-09 NOTE — TELEPHONE ENCOUNTER
----- Message from Marcie Armando sent at 8/8/2022  2:38 PM CDT -----  Contact: Son  Request a return call concerning a referral for skilled nursing, no additional info given and can be reached at [phone]///[id number]

## 2022-08-09 NOTE — PHYSICIAN QUERY
PT Name: Joseph Toussaint  MR #: [id number]     DOCUMENTATION CLARIFICATION      CDS/: CALLIE Aly, RN, CCDS               Contact information: [email or web address]    This form is a permanent document in the medical record.     Query Date: August 9, 2022    Dear Provider,  By submitting this query, we are merely seeking further clarification of documentation.  Please utilize your independent clinical judgment when addressing the question(s) below.      The patient has an order for the following medication(s):    azithromycin tablet 500 mg  Dose: 500 mg  Freq: Daily Route: Oral  Indications of Use: lower respiratory infection  Start: 07/20/22 0900 End: 07/23/22 0902        Please provide the corresponding diagnosis or diagnoses that support(s) the use of the medication(s) mentioned above.        Provider Use Only      Diagnosis(es): COPD Exacerbation        [  ]  Clinically undetermined    Present on admission (POA) status:   [  X ] Yes (Y)                          [   ] Clinically Undetermined (W)   [   ] No (N)                            [   ] Documentation insufficient to determine if condition is POA (U)

## 2022-08-09 NOTE — TELEPHONE ENCOUNTER
Pt's son is calling neuromedical to see if they have any soon appts and will call back to let us know where to send the referral

## 2022-08-09 NOTE — TELEPHONE ENCOUNTER
Pt's son states he will choose which nursing facility he would like for him to go to and call us back, he also stated you saw him August 1st. Will you need to see him again before completing the paperwork since you had seen him last week? Please advise

## 2022-08-09 NOTE — PHYSICIAN QUERY
PT Name: Joseph Toussaint  MR #: [id number]    DOCUMENTATION CLARIFICATION     CDS/: CALLIE Aly, RN, CCDS               Contact information: [email or web address]     This form is a permanent document in the medical record.     Query Date: 2022    By submitting this query, we are merely seeking further clarification of documentation. Please utilize your independent clinical judgment when addressing the question(s) below.    The Medical Record contains the following:   Indicators Supporting Clinical Findings Location in Medical Record   X Atrial Fibrillation Patient with new onset AFib worsening congestive heart failure.    ED Note: Dr. Rosa    X EKG Results Vent. Rate : 071 BPM     Atrial Rate : 000 BPM      P-R Int : 000 ms          QRS Dur : 088 ms       QT Int : 420 ms       P-R-T Axes : 000 192 -18 degrees      QTc Int : 456 ms     sr with pac   Right superior axis deviation   Pulmonary disease pattern   Nonspecific T wave abnormality   Abnormal ECG   When compared with ECG of 2022 17:12,     Questionable change in The axis   Nonspecific T wave abnormality now evident in Inferior leads   Nonspecific T wave abnormality, improved in Lateral leads    EKG Readings: (Independently Interpreted)   Initial Reading: No STEMI. Rhythm: Atrial Fibrillation. Heart Rate: 71. Ectopy: No Ectopy. ST Segments: Normal ST Segments. T Waves: Normal. Axis: Right Axis Deviation.    EK/19                                ED Note: Dr. Rosa    X Medication enoxaparin injection 40 mg  Dose: 40 mg  Freq: Daily Route: SubQ  Start: 22 2230 End: 22 1643 MAR    Treatment      Other       The clinical guidelines noted are only a system guideline. It does not replace the provider's clinical judgment.    Provider, please specify the type of Atrial Fibrillation (AF):    [ X ] Paroxysmal - defined as AF that terminates spontaneously or with intervention within < 7 days of onset, episodes may  recur with variable frequency   [  ] Unspecified Atrial Fibrillation   [  ] Other cardiac diagnosis (please specify): ____________   [  ] Clinically Undetermined     Please document in your progress notes daily for the duration of treatment until resolved, and include in your discharge summary.    Reference:  ERA Gao MD. (2020, September 14). Overview of atrial fibrillation (MATEO Mcfadden MD & ARELY Garcia MD, Eds.). Retrieved October 22, 2020, from [email or web address]=atrial%20fibrillation&source=search_result&selectedTitle=1~150&usage_type=default&display_rank=1    Form No. [id number]

## 2022-08-15 ENCOUNTER — OFFICE VISIT (OUTPATIENT)
Dept: CARDIOLOGY | Facility: CLINIC | Age: 87
End: 2022-08-15
Payer: MEDICARE

## 2022-08-15 VITALS
DIASTOLIC BLOOD PRESSURE: 76 MMHG | HEIGHT: 71 IN | SYSTOLIC BLOOD PRESSURE: 150 MMHG | WEIGHT: 147.94 LBS | HEART RATE: 56 BPM | BODY MASS INDEX: 20.71 KG/M2

## 2022-08-15 DIAGNOSIS — J44.9 COPD SUGGESTED BY INITIAL EVALUATION: Primary | ICD-10-CM

## 2022-08-15 DIAGNOSIS — I10 ESSENTIAL HYPERTENSION: ICD-10-CM

## 2022-08-15 DIAGNOSIS — I50.23 ACUTE ON CHRONIC SYSTOLIC CONGESTIVE HEART FAILURE: ICD-10-CM

## 2022-08-15 DIAGNOSIS — I25.10 CORONARY ARTERY DISEASE INVOLVING NATIVE CORONARY ARTERY OF NATIVE HEART WITHOUT ANGINA PECTORIS: ICD-10-CM

## 2022-08-15 DIAGNOSIS — E78.2 MIXED HYPERLIPIDEMIA: ICD-10-CM

## 2022-08-15 PROCEDURE — 1159F PR MEDICATION LIST DOCUMENTED IN MEDICAL RECORD: ICD-10-PCS | Mod: CPTII,S$GLB,, | Performed by: INTERNAL MEDICINE

## 2022-08-15 PROCEDURE — 1159F MED LIST DOCD IN RCRD: CPT | Mod: CPTII,S$GLB,, | Performed by: INTERNAL MEDICINE

## 2022-08-15 PROCEDURE — 1100F PTFALLS ASSESS-DOCD GE2>/YR: CPT | Mod: CPTII,S$GLB,, | Performed by: INTERNAL MEDICINE

## 2022-08-15 PROCEDURE — 1100F PR PT FALLS ASSESS DOC 2+ FALLS/FALL W/INJURY/YR: ICD-10-PCS | Mod: CPTII,S$GLB,, | Performed by: INTERNAL MEDICINE

## 2022-08-15 PROCEDURE — 1160F PR REVIEW ALL MEDS BY PRESCRIBER/CLIN PHARMACIST DOCUMENTED: ICD-10-PCS | Mod: CPTII,S$GLB,, | Performed by: INTERNAL MEDICINE

## 2022-08-15 PROCEDURE — 3288F PR FALLS RISK ASSESSMENT DOCUMENTED: ICD-10-PCS | Mod: CPTII,S$GLB,, | Performed by: INTERNAL MEDICINE

## 2022-08-15 PROCEDURE — 1126F PR PAIN SEVERITY QUANTIFIED, NO PAIN PRESENT: ICD-10-PCS | Mod: CPTII,S$GLB,, | Performed by: INTERNAL MEDICINE

## 2022-08-15 PROCEDURE — 1126F AMNT PAIN NOTED NONE PRSNT: CPT | Mod: CPTII,S$GLB,, | Performed by: INTERNAL MEDICINE

## 2022-08-15 PROCEDURE — 99999 PR PBB SHADOW E&M-EST. PATIENT-LVL III: CPT | Mod: PBBFAC,,, | Performed by: INTERNAL MEDICINE

## 2022-08-15 PROCEDURE — 99215 PR OFFICE/OUTPT VISIT, EST, LEVL V, 40-54 MIN: ICD-10-PCS | Mod: S$GLB,,, | Performed by: INTERNAL MEDICINE

## 2022-08-15 PROCEDURE — 99999 PR PBB SHADOW E&M-EST. PATIENT-LVL III: ICD-10-PCS | Mod: PBBFAC,,, | Performed by: INTERNAL MEDICINE

## 2022-08-15 PROCEDURE — 3288F FALL RISK ASSESSMENT DOCD: CPT | Mod: CPTII,S$GLB,, | Performed by: INTERNAL MEDICINE

## 2022-08-15 PROCEDURE — 1160F RVW MEDS BY RX/DR IN RCRD: CPT | Mod: CPTII,S$GLB,, | Performed by: INTERNAL MEDICINE

## 2022-08-15 PROCEDURE — 1111F DSCHRG MED/CURRENT MED MERGE: CPT | Mod: CPTII,S$GLB,, | Performed by: INTERNAL MEDICINE

## 2022-08-15 PROCEDURE — 1111F PR DISCHARGE MEDS RECONCILED W/ CURRENT OUTPATIENT MED LIST: ICD-10-PCS | Mod: CPTII,S$GLB,, | Performed by: INTERNAL MEDICINE

## 2022-08-15 PROCEDURE — 99215 OFFICE O/P EST HI 40 MIN: CPT | Mod: S$GLB,,, | Performed by: INTERNAL MEDICINE

## 2022-08-15 RX ORDER — FUROSEMIDE 40 MG/1
40 TABLET ORAL DAILY
Qty: 60 TABLET | Refills: 11 | Status: ON HOLD | OUTPATIENT
Start: 2022-08-15 | End: 2022-09-21 | Stop reason: SDUPTHER

## 2022-08-15 NOTE — PROGRESS NOTES
Subjective:   Patient ID:  Joseph Toussaint is a 89 y.o. male who presents for follow-up of No chief complaint on file.  Pt with repeat CHF 3-4 x this year ( [place]). Pt with 2 admission at [facility], discharged 7-26  EF 20%, pulmonary HTN-significant.     Hyperlipidemia  This is a chronic problem. The current episode started more than 1 year ago. The problem is controlled. Associated symptoms include shortness of breath. Pertinent negatives include no chest pain. Current antihyperlipidemic treatment includes statins. The current treatment provides moderate improvement of lipids. There are no compliance problems.    Congestive Heart Failure  Presents for follow-up visit. Associated symptoms include shortness of breath. Pertinent negatives include no abdominal pain, chest pain, chest pressure, claudication, edema, fatigue, muscle weakness, near-syncope, orthopnea, palpitations, paroxysmal nocturnal dyspnea or unexpected weight change. The symptoms have been stable. Compliance with total regimen is %. Compliance with diet is %. Compliance with exercise is %. Compliance with medications is %.       Review of Systems   Constitutional: Negative. Negative for fatigue, unexpected weight change and weight gain.   HENT: Negative.    Eyes: Negative.    Cardiovascular: Negative.  Negative for chest pain, claudication, leg swelling, near-syncope and palpitations.   Respiratory: Positive for shortness of breath.    Endocrine: Negative.    Hematologic/Lymphatic: Negative.    Skin: Negative.    Musculoskeletal: Negative for muscle weakness.   Gastrointestinal: Negative.  Negative for abdominal pain.   Genitourinary: Negative.    Neurological: Negative.  Negative for dizziness.   Psychiatric/Behavioral: Negative.    Allergic/Immunologic: Negative.      History reviewed. No pertinent family history.  Past Medical History:   Diagnosis Date    Acute coronary syndrome     1979    CHF (congestive heart failure)      COPD (chronic obstructive pulmonary disease)     Diabetes mellitus     HTN (hypertension)      Social History     Socioeconomic History    Marital status:    Tobacco Use    Smoking status: Former Smoker     Packs/day: 1.00     Types: Cigarettes     Quit date: 2022     Years since quittin.0    Smokeless tobacco: Never Used   Substance and Sexual Activity    Alcohol use: Not Currently     Comment: occasionally    Drug use: Not Currently    Sexual activity: Not Currently     Current Outpatient Medications on File Prior to Visit   Medication Sig Dispense Refill    albuterol (PROVENTIL/VENTOLIN HFA) 90 mcg/actuation inhaler Inhale 1-2 puffs into the lungs every 6 (six) hours as needed for Wheezing. Rescue 6.7 g 0    aspirin (ASPIR-81) 81 MG EC tablet Take 1 tablet (81 mg total) by mouth once daily. 90 tablet 3    empagliflozin (JARDIANCE) 10 mg tablet Take 1 tablet (10 mg total) by mouth once daily. 30 tablet 1    fluticasone furoate-vilanteroL (BREO) 100-25 mcg/dose diskus inhaler Inhale 1 puff into the lungs once daily.      furosemide (LASIX) 40 MG tablet Take 1 tablet (40 mg total) by mouth once daily. 30 tablet 11    pantoprazole (PROTONIX) 40 MG tablet Take 1 tablet (40 mg total) by mouth once daily. 30 tablet 1    pravastatin (PRAVACHOL) 80 MG tablet Take 1 tablet (80 mg total) by mouth nightly. 30 tablet 1    spironolactone (ALDACTONE) 25 MG tablet Take 1 tablet (25 mg total) by mouth once daily. 30 tablet 1    tamsulosin (FLOMAX) 0.4 mg Cap Take 1 capsule (0.4 mg total) by mouth every evening. 30 capsule 1    tiotropium (SPIRIVA) 18 mcg inhalation capsule Inhale 1 capsule (18 mcg total) into the lungs nightly. Controller 30 capsule 1    empagliflozin (JARDIANCE) 10 mg tablet Take 10 mg by mouth.      fluticasone furoate-vilanteroL (BREO ELLIPTA) 100-25 mcg/dose diskus inhaler Inhale 1 puff into the lungs once daily. Controller 60 each 0    spironolactone (ALDACTONE) 25 MG  tablet Take 1 tablet by mouth once daily.      timolol maleate 0.5% (TIMOPTIC) 0.5 % Drop Apply 1 drop to eye 2 (two) times daily.      tiotropium (SPIRIVA WITH HANDIHALER) 18 mcg inhalation capsule Inhale 18 mcg into the lungs.      [DISCONTINUED] metoprolol succinate (TOPROL-XL) 25 MG 24 hr tablet Take 1 tablet (25 mg total) by mouth once daily. 30 tablet 11     No current facility-administered medications on file prior to visit.     Review of patient's allergies indicates:   Allergen Reactions    Penicillins        Objective:     Physical Exam  Vitals and nursing note reviewed.   Constitutional:       Appearance: He is well-developed.   HENT:      Head: Normocephalic and atraumatic.   Eyes:      Conjunctiva/sclera: Conjunctivae normal.      Pupils: Pupils are equal, round, and reactive to light.   Cardiovascular:      Rate and Rhythm: Normal rate and regular rhythm.      Pulses: Intact distal pulses.      Heart sounds: Normal heart sounds.   Pulmonary:      Effort: Pulmonary effort is normal.      Breath sounds: Normal breath sounds.   Abdominal:      General: Bowel sounds are normal.      Palpations: Abdomen is soft.   Musculoskeletal:      Cervical back: Normal range of motion and neck supple.   Skin:     General: Skin is warm and dry.   Neurological:      Mental Status: He is alert and oriented to person, place, and time.         Assessment:     1. COPD suggested by initial evaluation    2. Coronary artery disease involving native coronary artery of native heart without angina pectoris    3. Essential hypertension    4. Mixed hyperlipidemia    5. Acute on chronic systolic congestive heart failure        Plan:     COPD suggested by initial evaluation    Coronary artery disease involving native coronary artery of native heart without angina pectoris    Essential hypertension    Mixed hyperlipidemia    Acute on chronic systolic congestive heart failure      BP diary  CHF clinic  Restart entresto, continue  aldactone-chf/htn  Can take lasix prn  Continue statin-hlp  CHF clinic

## 2022-08-15 NOTE — TELEPHONE ENCOUNTER
----- Message from Lady Peter sent at 8/15/2022  3:17 PM CDT -----  Contact: [phone]  Type:  RX Refill Request    Who Called: Mamadou  Refill or New Rx:refill  RX Name and Strength:timolol maleate 0.5% (TIMOPTIC) 0.5 % Drop  How is the patient currently taking it? (ex. 1XDay):Route: Apply 1 drop to eye 2 (two) times daily  Is this a 30 day or 90 day RX:  Preferred Pharmacy with phone number:  [facility]/pharmacy #[id number] - [place] - [address]  [address]  [place] [address]  Phone: [phone] Fax: [phone]   Local or Mail Order:local  Ordering Provider:dr petersen  Would the patient rather a call back or a response via [facility]? Call back  Best Call Back Number:[phone]  Additional Information: n/a         Thanks  KB

## 2022-08-16 RX ORDER — TIMOLOL MALEATE 5 MG/ML
1 SOLUTION/ DROPS OPHTHALMIC 2 TIMES DAILY
OUTPATIENT
Start: 2022-08-16 | End: 2023-08-16

## 2022-08-17 RX ORDER — TIMOLOL MALEATE 5 MG/ML
1 SOLUTION/ DROPS OPHTHALMIC 2 TIMES DAILY
Status: CANCELLED | OUTPATIENT
Start: 2022-08-17 | End: 2023-08-17

## 2022-08-18 ENCOUNTER — EXTERNAL HOME HEALTH (OUTPATIENT)
Dept: HOME HEALTH SERVICES | Facility: HOSPITAL | Age: 87
End: 2022-08-18
Payer: MEDICARE

## 2022-08-19 ENCOUNTER — TELEPHONE (OUTPATIENT)
Dept: INTERNAL MEDICINE | Facility: CLINIC | Age: 87
End: 2022-08-19
Payer: MEDICARE

## 2022-08-19 NOTE — TELEPHONE ENCOUNTER
----- Message from Mandy Hernandez sent at 8/19/2022 12:01 PM CDT -----  Contact: Pt nephew    Who Called: Kraig     Does the patient know what this is regarding?: need complete medical records, office notes, Rx profile, procedures, discharge summaries, results for all imaging and labs attached with paperwork   Would the patient rather a call back or a response via [facility]?  Callback    Best Call Back Number: [phone]  Additional Information:      Please email to    [email or web address]

## 2022-08-19 NOTE — TELEPHONE ENCOUNTER
Called and spoke with patient son. I  Gave son the medical records number to get a copy of his complete records

## 2022-08-26 ENCOUNTER — TELEPHONE (OUTPATIENT)
Dept: INTERNAL MEDICINE | Facility: CLINIC | Age: 87
End: 2022-08-26
Payer: MEDICARE

## 2022-08-26 NOTE — TELEPHONE ENCOUNTER
----- Message from Ricardo Villanueva sent at 8/26/2022  9:02 AM CDT -----  [facility] states the pt's daughter has some things that she would like done for the pt and Eve would like to check to see if the office knows about it. Please call [phone]

## 2022-08-26 NOTE — TELEPHONE ENCOUNTER
Spoke with Vanessa at [facility], she wants to know if we can fax over an order stating it is okay to allow [facility] to assist with getting the [facility] into [facility].    [facility] fax: [phone]

## 2022-08-27 ENCOUNTER — NURSE TRIAGE (OUTPATIENT)
Dept: ADMINISTRATIVE | Facility: CLINIC | Age: 87
End: 2022-08-27
Payer: MEDICARE

## 2022-08-27 ENCOUNTER — HOSPITAL ENCOUNTER (EMERGENCY)
Facility: HOSPITAL | Age: 87
Discharge: HOME | End: 2022-08-27
Attending: EMERGENCY MEDICINE
Payer: MEDICARE

## 2022-08-27 VITALS
DIASTOLIC BLOOD PRESSURE: 77 MMHG | RESPIRATION RATE: 13 BRPM | OXYGEN SATURATION: 95 % | HEART RATE: 80 BPM | TEMPERATURE: 98 F | SYSTOLIC BLOOD PRESSURE: 155 MMHG

## 2022-08-27 DIAGNOSIS — N30.00 ACUTE CYSTITIS WITHOUT HEMATURIA: ICD-10-CM

## 2022-08-27 DIAGNOSIS — F01.50 VASCULAR DEMENTIA WITHOUT BEHAVIORAL DISTURBANCE: Primary | ICD-10-CM

## 2022-08-27 DIAGNOSIS — I10 CHRONIC HYPERTENSION: ICD-10-CM

## 2022-08-27 LAB
ALBUMIN SERPL BCP-MCNC: 3.5 G/DL (ref 3.5–5.2)
ALP SERPL-CCNC: 133 U/L (ref 55–135)
ALT SERPL W/O P-5'-P-CCNC: 11 U/L (ref 10–44)
AMPHET+METHAMPHET UR QL: NEGATIVE
ANION GAP SERPL CALC-SCNC: 13 MMOL/L (ref 8–16)
AST SERPL-CCNC: 14 U/L (ref 10–40)
BACTERIA #/AREA URNS HPF: ABNORMAL /HPF
BARBITURATES UR QL SCN>200 NG/ML: NEGATIVE
BASOPHILS # BLD AUTO: 0.02 K/UL (ref 0–0.2)
BASOPHILS NFR BLD: 0.5 % (ref 0–1.9)
BENZODIAZ UR QL SCN>200 NG/ML: NEGATIVE
BILIRUB SERPL-MCNC: 1.5 MG/DL (ref 0.1–1)
BILIRUB UR QL STRIP: NEGATIVE
BUN SERPL-MCNC: 22 MG/DL (ref 8–23)
BZE UR QL SCN: NEGATIVE
CALCIUM SERPL-MCNC: 9.3 MG/DL (ref 8.7–10.5)
CANNABINOIDS UR QL SCN: NEGATIVE
CHLORIDE SERPL-SCNC: 113 MMOL/L (ref 95–110)
CLARITY UR: CLEAR
CO2 SERPL-SCNC: 18 MMOL/L (ref 23–29)
COLOR UR: YELLOW
CREAT SERPL-MCNC: 1.1 MG/DL (ref 0.5–1.4)
CREAT UR-MCNC: 64.4 MG/DL (ref 23–375)
DIFFERENTIAL METHOD: ABNORMAL
EOSINOPHIL # BLD AUTO: 0 K/UL (ref 0–0.5)
EOSINOPHIL NFR BLD: 0.5 % (ref 0–8)
ERYTHROCYTE [DISTWIDTH] IN BLOOD BY AUTOMATED COUNT: 19.7 % (ref 11.5–14.5)
EST. GFR  (NO RACE VARIABLE): >60 ML/MIN/1.73 M^2
ETHANOL SERPL-MCNC: <10 MG/DL
GLUCOSE SERPL-MCNC: 114 MG/DL (ref 70–110)
GLUCOSE UR QL STRIP: ABNORMAL
HCT VFR BLD AUTO: 31 % (ref 40–54)
HGB BLD-MCNC: 9.8 G/DL (ref 14–18)
HGB UR QL STRIP: NEGATIVE
HYALINE CASTS #/AREA URNS LPF: 0 /LPF
IMM GRANULOCYTES # BLD AUTO: 0.01 K/UL (ref 0–0.04)
IMM GRANULOCYTES NFR BLD AUTO: 0.2 % (ref 0–0.5)
KETONES UR QL STRIP: NEGATIVE
LEUKOCYTE ESTERASE UR QL STRIP: NEGATIVE
LYMPHOCYTES # BLD AUTO: 0.8 K/UL (ref 1–4.8)
LYMPHOCYTES NFR BLD: 18.4 % (ref 18–48)
MCH RBC QN AUTO: 25.5 PG (ref 27–31)
MCHC RBC AUTO-ENTMCNC: 31.6 G/DL (ref 32–36)
MCV RBC AUTO: 81 FL (ref 82–98)
METHADONE UR QL SCN>300 NG/ML: NEGATIVE
MICROSCOPIC COMMENT: ABNORMAL
MONOCYTES # BLD AUTO: 0.4 K/UL (ref 0.3–1)
MONOCYTES NFR BLD: 9.1 % (ref 4–15)
NEUTROPHILS # BLD AUTO: 3.1 K/UL (ref 1.8–7.7)
NEUTROPHILS NFR BLD: 71.3 % (ref 38–73)
NITRITE UR QL STRIP: NEGATIVE
NRBC BLD-RTO: 0 /100 WBC
OPIATES UR QL SCN: NEGATIVE
PCP UR QL SCN>25 NG/ML: NEGATIVE
PH UR STRIP: 6 [PH] (ref 5–8)
PLATELET # BLD AUTO: 143 K/UL (ref 150–450)
PMV BLD AUTO: 10.6 FL (ref 9.2–12.9)
POTASSIUM SERPL-SCNC: 3.4 MMOL/L (ref 3.5–5.1)
PROT SERPL-MCNC: 6.9 G/DL (ref 6–8.4)
PROT UR QL STRIP: ABNORMAL
RBC # BLD AUTO: 3.85 M/UL (ref 4.6–6.2)
RBC #/AREA URNS HPF: 1 /HPF (ref 0–4)
SODIUM SERPL-SCNC: 144 MMOL/L (ref 136–145)
SP GR UR STRIP: 1.01 (ref 1–1.03)
TOXICOLOGY INFORMATION: NORMAL
TSH SERPL DL<=0.005 MIU/L-ACNC: 1.15 UIU/ML (ref 0.4–4)
URN SPEC COLLECT METH UR: ABNORMAL
UROBILINOGEN UR STRIP-ACNC: ABNORMAL EU/DL
WBC # BLD AUTO: 4.29 K/UL (ref 3.9–12.7)
WBC #/AREA URNS HPF: 2 /HPF (ref 0–5)
WBC CLUMPS URNS QL MICRO: ABNORMAL
YEAST URNS QL MICRO: ABNORMAL

## 2022-08-27 PROCEDURE — 25000003 PHARM REV CODE 250: Performed by: EMERGENCY MEDICINE

## 2022-08-27 PROCEDURE — 82077 ASSAY SPEC XCP UR&BREATH IA: CPT | Performed by: EMERGENCY MEDICINE

## 2022-08-27 PROCEDURE — 81000 URINALYSIS NONAUTO W/SCOPE: CPT | Mod: 59 | Performed by: EMERGENCY MEDICINE

## 2022-08-27 PROCEDURE — 96360 HYDRATION IV INFUSION INIT: CPT

## 2022-08-27 PROCEDURE — 80307 DRUG TEST PRSMV CHEM ANLYZR: CPT | Performed by: EMERGENCY MEDICINE

## 2022-08-27 PROCEDURE — 99284 EMERGENCY DEPT VISIT MOD MDM: CPT | Mod: 25

## 2022-08-27 PROCEDURE — 36415 COLL VENOUS BLD VENIPUNCTURE: CPT | Performed by: EMERGENCY MEDICINE

## 2022-08-27 PROCEDURE — 80053 COMPREHEN METABOLIC PANEL: CPT | Performed by: EMERGENCY MEDICINE

## 2022-08-27 PROCEDURE — 85025 COMPLETE CBC W/AUTO DIFF WBC: CPT | Performed by: EMERGENCY MEDICINE

## 2022-08-27 PROCEDURE — 84443 ASSAY THYROID STIM HORMONE: CPT | Performed by: EMERGENCY MEDICINE

## 2022-08-27 RX ORDER — CIPROFLOXACIN 250 MG/1
250 TABLET, FILM COATED ORAL 2 TIMES DAILY
Qty: 14 TABLET | Refills: 0 | Status: SHIPPED | OUTPATIENT
Start: 2022-08-27 | End: 2022-09-03

## 2022-08-27 RX ADMIN — SODIUM CHLORIDE 500 ML: 0.9 INJECTION, SOLUTION INTRAVENOUS at 09:08

## 2022-08-27 NOTE — ED PROVIDER NOTES
"SCRIBE #1 NOTE: I, Teetee Lizbeth, am scribing for, and in the presence of, Celina Yang MD. I have scribed the entire note.       History     Chief Complaint   Patient presents with    Altered Mental Status     Pt brought in by EMS for worsening dementia per family, pt has not been diagnosed, per EMS pt is at baseline. Pt family has power of , Pt has no complaints at this time     Review of patient's allergies indicates:   Allergen Reactions    Penicillins          History of Present Illness     HPI    2022, 8:29 AM  History obtained from the patient's son       History of Present Illness: Joseph Toussaint is a 89 y.o. male patient with a PMHx of HTN, COPD, CHF, DM, and acute coronary syndrome who presents to the Emergency Department for evaluation of AMS. Pt's son states pt has had "worsening dementia" ; however, pt has not been diagnosed with dementia. Pt's son states pt began experiencing confusion of his surroundings and began walking for the first time in 4 months. Symptoms are constant and moderate in severity. No mitigating or exacerbating factors reported.  Patient denies any headache, vision changes, slurred speech, focal weakness, CP, SOB, N/V, HA, fever, and all other sxs at this time. No prior Tx reported. No further complaints or concerns at this time.       Arrival mode: Ambulance service     PCP: Abad Iqbal MD        Past Medical History:  Past Medical History:   Diagnosis Date    Acute coronary syndrome         CHF (congestive heart failure)     COPD (chronic obstructive pulmonary disease)     Diabetes mellitus     HTN (hypertension)        Past Surgical History:  No past surgical history on file.      Family History:  No family history on file.    Social History:  Social History     Tobacco Use    Smoking status: Former     Packs/day: 1.00     Types: Cigarettes     Quit date: 2022     Years since quittin.0    Smokeless tobacco: Never   Substance and Sexual Activity "    Alcohol use: Not Currently     Comment: occasionally    Drug use: Not Currently    Sexual activity: Not Currently        Review of Systems     Review of Systems   Constitutional:  Negative for chills and fever.   HENT:  Negative for sore throat.    Respiratory:  Negative for shortness of breath.    Cardiovascular:  Negative for chest pain.   Gastrointestinal:  Negative for nausea and vomiting.   Genitourinary:  Negative for dysuria.   Musculoskeletal:  Negative for back pain.   Skin:  Negative for rash.   Neurological:  Positive for speech difficulty (slurred speech). Negative for weakness.   Hematological:  Does not bruise/bleed easily.   Psychiatric/Behavioral:  Positive for confusion.    All other systems reviewed and are negative.     Physical Exam     Initial Vitals [08/27/22 0755]   BP Pulse Resp Temp SpO2   (!) 160/104 84 18 97.9 °F (36.6 °C) 99 %      MAP       --          Physical Exam  Nursing Notes and Vital Signs Reviewed.  Constitutional: Patient is in no apparent distress. Well-developed and well-nourished.  Head: Atraumatic. Normocephalic.  Eyes: PERRL. EOM intact. Conjunctivae are not pale. No scleral icterus.  ENT: Mucous membranes are moist. Oropharynx is clear and symmetric.    Neck: Supple. Full ROM. No lymphadenopathy.  Cardiovascular: Regular rate. Regular rhythm. No murmurs, rubs, or gallops. Distal pulses are 2+ and symmetric.  Pulmonary/Chest: No respiratory distress. Clear to auscultation bilaterally. No wheezing or rales.  Abdominal: Soft and non-distended.  There is no tenderness.  No rebound, guarding, or rigidity.   Musculoskeletal: Moves all extremities. No obvious deformities. No edema. No calf tenderness.  Skin: Warm and dry.  Neurological:  Pleasantly demented but oriented to person and place.  No slurred speech, no facial droop. No acute focal neurological deficits are appreciated.  Psychiatric: Normal affect. Good eye contact. Appropriate in content.     ED Course    Procedures  ED Vital Signs:  Vitals:    08/27/22 0755 08/27/22 0822 08/27/22 0823 08/27/22 1006   BP: (!) 160/104 (!) 145/78  127/86   Pulse: 84 76  89   Resp: 18  15 19   Temp: 97.9 °F (36.6 °C)      TempSrc: Oral      SpO2: 99% 100%  95%    08/27/22 1040   BP: (!) 171/106   Pulse: 80   Resp: 13   Temp:    TempSrc:    SpO2: 95%       Abnormal Lab Results:  Labs Reviewed   CBC W/ AUTO DIFFERENTIAL - Abnormal; Notable for the following components:       Result Value    RBC 3.85 (*)     Hemoglobin 9.8 (*)     Hematocrit 31.0 (*)     MCV 81 (*)     MCH 25.5 (*)     MCHC 31.6 (*)     RDW 19.7 (*)     Platelets 143 (*)     Lymph # 0.8 (*)     All other components within normal limits   COMPREHENSIVE METABOLIC PANEL - Abnormal; Notable for the following components:    Potassium 3.4 (*)     Chloride 113 (*)     CO2 18 (*)     Glucose 114 (*)     Total Bilirubin 1.5 (*)     All other components within normal limits   URINALYSIS, REFLEX TO URINE CULTURE - Abnormal; Notable for the following components:    Protein, UA 1+ (*)     Glucose, UA 4+ (*)     Urobilinogen, UA 2.0-3.0 (*)     All other components within normal limits    Narrative:     Specimen Source->Urine   URINALYSIS MICROSCOPIC - Abnormal; Notable for the following components:    WBC Clumps, UA Occasional (*)     All other components within normal limits    Narrative:     Specimen Source->Urine   TSH   DRUG SCREEN PANEL, URINE EMERGENCY    Narrative:     Specimen Source->Urine   TSH   ALCOHOL,MEDICAL (ETHANOL)        All Lab Results:  Results for orders placed or performed during the hospital encounter of 08/27/22   CBC auto differential   Result Value Ref Range    WBC 4.29 3.90 - 12.70 K/uL    RBC 3.85 (L) 4.60 - 6.20 M/uL    Hemoglobin 9.8 (L) 14.0 - 18.0 g/dL    Hematocrit 31.0 (L) 40.0 - 54.0 %    MCV 81 (L) 82 - 98 fL    MCH 25.5 (L) 27.0 - 31.0 pg    MCHC 31.6 (L) 32.0 - 36.0 g/dL    RDW 19.7 (H) 11.5 - 14.5 %    Platelets 143 (L) 150 - 450 K/uL    MPV  10.6 9.2 - 12.9 fL    Immature Granulocytes 0.2 0.0 - 0.5 %    Gran # (ANC) 3.1 1.8 - 7.7 K/uL    Immature Grans (Abs) 0.01 0.00 - 0.04 K/uL    Lymph # 0.8 (L) 1.0 - 4.8 K/uL    Mono # 0.4 0.3 - 1.0 K/uL    Eos # 0.0 0.0 - 0.5 K/uL    Baso # 0.02 0.00 - 0.20 K/uL    nRBC 0 0 /100 WBC    Gran % 71.3 38.0 - 73.0 %    Lymph % 18.4 18.0 - 48.0 %    Mono % 9.1 4.0 - 15.0 %    Eosinophil % 0.5 0.0 - 8.0 %    Basophil % 0.5 0.0 - 1.9 %    Differential Method Automated    Comprehensive metabolic panel   Result Value Ref Range    Sodium 144 136 - 145 mmol/L    Potassium 3.4 (L) 3.5 - 5.1 mmol/L    Chloride 113 (H) 95 - 110 mmol/L    CO2 18 (L) 23 - 29 mmol/L    Glucose 114 (H) 70 - 110 mg/dL    BUN 22 8 - 23 mg/dL    Creatinine 1.1 0.5 - 1.4 mg/dL    Calcium 9.3 8.7 - 10.5 mg/dL    Total Protein 6.9 6.0 - 8.4 g/dL    Albumin 3.5 3.5 - 5.2 g/dL    Total Bilirubin 1.5 (H) 0.1 - 1.0 mg/dL    Alkaline Phosphatase 133 55 - 135 U/L    AST 14 10 - 40 U/L    ALT 11 10 - 44 U/L    Anion Gap 13 8 - 16 mmol/L    eGFR >60 >60 mL/min/1.73 m^2   Urinalysis, Reflex to Urine Culture Urine, Catheterized    Specimen: Urine   Result Value Ref Range    Specimen UA Urine, Catheterized     Color, UA Yellow Yellow, Straw, Zully    Appearance, UA Clear Clear    pH, UA 6.0 5.0 - 8.0    Specific Gravity, UA 1.015 1.005 - 1.030    Protein, UA 1+ (A) Negative    Glucose, UA 4+ (A) Negative    Ketones, UA Negative Negative    Bilirubin (UA) Negative Negative    Occult Blood UA Negative Negative    Nitrite, UA Negative Negative    Urobilinogen, UA 2.0-3.0 (A) <2.0 EU/dL    Leukocytes, UA Negative Negative   Drug screen panel, emergency   Result Value Ref Range    Benzodiazepines Negative Negative    Methadone metabolites Negative Negative    Cocaine (Metab.) Negative Negative    Opiate Scrn, Ur Negative Negative    Barbiturate Screen, Ur Negative Negative    Amphetamine Screen, Ur Negative Negative    THC Negative Negative    Phencyclidine Negative  Negative    Creatinine, Urine 64.4 23.0 - 375.0 mg/dL    Toxicology Information SEE COMMENT    TSH   Result Value Ref Range    TSH 1.146 0.400 - 4.000 uIU/mL   Ethanol   Result Value Ref Range    Alcohol, Serum <10 <10 mg/dL   Urinalysis Microscopic   Result Value Ref Range    RBC, UA 1 0 - 4 /hpf    WBC, UA 2 0 - 5 /hpf    WBC Clumps, UA Occasional (A) None-Rare    Bacteria Rare None-Occ /hpf    Yeast, UA None None    Hyaline Casts, UA 0 0-1/lpf /lpf    Microscopic Comment SEE COMMENT          Imaging Results:  Imaging Results              CT Head Without Contrast (Final result)  Result time 08/27/22 09:01:20      Final result by Matt Sánchez III, MD (08/27/22 09:01:20)                   Impression:      No acute intracranial disease identified.    Age-related atrophy, severe chronic microvascular ischemic change and chronic infarcts.      Electronically signed by: Matt Sánchez MD  Date:    08/27/2022  Time:    09:01               Narrative:    EXAMINATION:  CT HEAD WITHOUT CONTRAST    CLINICAL HISTORY:  Mental status change, unknown cause;    TECHNIQUE:  Routine noncontrast axial head CT. All CT scans at this facility are performed  using dose modulation techniques as appropriate to performed exam including the following:  automated exposure control; adjustment of mA and/or kV according to the patients size (this includes techniques or standardized protocols for targeted exams where dose is matched to indication/reason for exam: i.e. extremities or head);  iterative reconstruction technique.    COMPARISON:  none    FINDINGS:  Diffuse age-related atrophy.  Intracranial atherosclerosis.  Severe chronic microvascular ischemic change with prominent bihemispheric white matter hypodensity.  Chronic infarcts and encephalomalacia of the left occipital and parietal lobes.  Small chronic infarct right cerebellum.  Small chronic lacunar infarcts bilateral thalami.  There is no CT evidence of acute major vascular  territory infarct.  There is no evidence of intracranial hemorrhage, mass-effect, hydrocephalus or other acute disease. The visualized paranasal sinuses and mastoid air cells are clear. No calvarial fracture is identified.                                                The Emergency Provider reviewed the vital signs and test results, which are outlined above.     ED Discussion     CT findings consistent with severe microvascular disease likely underlying vascular dementia, patient has been pleasant in the ER, oriented, calm and cooperative.  Social work has been consulted to assist family in further care of this patient.  Of note patient's primary care has been contacted previously for possible nursing home placement. Awaiting son's return to the ER to discuss findings, no indication for admission.     11:41 AM: Reassessed pt at this time.   Discussed with pt all pertinent ED information and results. Discussed pt dx and plan of tx. Gave pt all f/u and return to the ED instructions. All questions and concerns were addressed at this time. Pt expresses understanding of information and instructions, and is comfortable with plan to discharge. Pt is stable for discharge.    I discussed with patient and/or family/caretaker that evaluation in the ED does not suggest any emergent or life threatening medical conditions requiring immediate intervention beyond what was provided in the ED, and I believe patient is safe for discharge.  Regardless, an unremarkable evaluation in the ED does not preclude the development or presence of a serious of life threatening condition. As such, patient was instructed to return immediately for any worsening or change in current symptoms.         Medical Decision Making:   Clinical Tests:   Lab Tests: Ordered and Reviewed  Radiological Study: Ordered and Reviewed         ED Medication(s):  Medications   sodium chloride 0.9% bolus 500 mL (0 mLs Intravenous Stopped 8/27/22 1011)       New  Prescriptions    CIPROFLOXACIN HCL (CIPRO) 250 MG TABLET    Take 1 tablet (250 mg total) by mouth 2 (two) times daily. for 7 days        Follow-up Information       Abad Iqbal MD. Schedule an appointment as soon as possible for a visit in 2 days.    Specialty: Family Medicine  Why: Return to the Emergency Room, If symptoms worsen  Contact information:  [address] [facility] [address]  [phone]                                 Scribe Attestation:   Scribe #1: I performed the above scribed service and the documentation accurately describes the services I performed. I attest to the accuracy of the note.     Attending:   Physician Attestation Statement for Scribe #1: I, Celina Yang MD, personally performed the services described in this documentation, as scribed by Teetee Nieves, in my presence, and it is both accurate and complete.           Clinical Impression       ICD-10-CM ICD-9-CM   1. Vascular dementia without behavioral disturbance  F01.50 290.40   2. Chronic hypertension  I10 401.9   3. Acute cystitis without hematuria  N30.00 595.0       Disposition:   Disposition: Discharged  Condition: Stable       Celina Yang MD  08/27/22 1201

## 2022-08-27 NOTE — TELEPHONE ENCOUNTER
Pt family states they are unable to take care of the patient, was supposed to be recommended to facility for assistance, that has not happened per the family.  Pt has been falling more per the family and states pt with slurred speech this morning.  Advised to call 911.  All questions answered.    Reason for Disposition   Difficult to awaken or acting confused (e.g., disoriented, slurred speech)    Additional Information   Negative: Major injury from dangerous force (e.g., fall > 10 feet or 3 meters)   Negative: [1] Major bleeding (e.g., actively dripping or spurting) AND [2] can't be stopped   Negative: Shock suspected (e.g., cold/pale/clammy skin, too weak to stand)    Protocols used: Falls and [id number]-[id number]

## 2022-08-29 ENCOUNTER — TELEPHONE (OUTPATIENT)
Dept: INTERNAL MEDICINE | Facility: CLINIC | Age: 87
End: 2022-08-29
Payer: MEDICARE

## 2022-08-29 NOTE — TELEPHONE ENCOUNTER
----- Message from Kellie Kimble sent at 8/29/2022  2:58 PM CDT -----  Pt's niece is requesting a call back in regards to a question that she has before her uncle appt tomorrow. Caller can be reached at [phone] (Nicanor)

## 2022-08-29 NOTE — TELEPHONE ENCOUNTER
----- Message from Collinsjohannedontae LincolnОлег sent at 8/29/2022 11:20 AM CDT -----  Contact: self  Type:  Patient Returning Call    Who Called:Mamadou  Who Left Message for Patient:unknown  Does the patient know what this is regarding?:unknown  Would the patient rather a call back or a response via [facility]? Call back   Best Call Back Number:[phone]  Additional Information: Pt is requesting a update on referral for skilled nursing [facility]    Thank you   Alli

## 2022-08-29 NOTE — TELEPHONE ENCOUNTER
Spoke with the son. Appointment has reschedule as per son request @ 2:20 instead of 8:40. Reason for visit has confirmed for hospital follow up and skilled nursing referral.

## 2022-08-29 NOTE — TELEPHONE ENCOUNTER
Spoke with patient's daughter, Nicanor. Rescheduled his 9/22 appointment to 8/30 due to recent hospital visit. He needs to be seen regarding his recent ER visit and needs assistance obtaining a referral for LTC with his HH, [facility].

## 2022-08-30 ENCOUNTER — OFFICE VISIT (OUTPATIENT)
Dept: INTERNAL MEDICINE | Facility: CLINIC | Age: 87
End: 2022-08-30
Payer: MEDICARE

## 2022-08-30 VITALS
OXYGEN SATURATION: 98 % | DIASTOLIC BLOOD PRESSURE: 86 MMHG | BODY MASS INDEX: 21.17 KG/M2 | WEIGHT: 151.19 LBS | HEART RATE: 83 BPM | TEMPERATURE: 98 F | SYSTOLIC BLOOD PRESSURE: 118 MMHG | HEIGHT: 71 IN

## 2022-08-30 DIAGNOSIS — F03.91 DEMENTIA WITH BEHAVIORAL DISTURBANCE, UNSPECIFIED DEMENTIA TYPE: ICD-10-CM

## 2022-08-30 DIAGNOSIS — I10 ESSENTIAL HYPERTENSION: ICD-10-CM

## 2022-08-30 DIAGNOSIS — N30.00 ACUTE CYSTITIS WITHOUT HEMATURIA: Primary | ICD-10-CM

## 2022-08-30 PROCEDURE — 99214 OFFICE O/P EST MOD 30 MIN: CPT | Mod: S$GLB,,, | Performed by: PHYSICIAN ASSISTANT

## 2022-08-30 PROCEDURE — 99999 PR PBB SHADOW E&M-EST. PATIENT-LVL IV: CPT | Mod: PBBFAC,,, | Performed by: PHYSICIAN ASSISTANT

## 2022-08-30 PROCEDURE — 3288F PR FALLS RISK ASSESSMENT DOCUMENTED: ICD-10-PCS | Mod: CPTII,S$GLB,, | Performed by: PHYSICIAN ASSISTANT

## 2022-08-30 PROCEDURE — 1160F RVW MEDS BY RX/DR IN RCRD: CPT | Mod: CPTII,S$GLB,, | Performed by: PHYSICIAN ASSISTANT

## 2022-08-30 PROCEDURE — 1160F PR REVIEW ALL MEDS BY PRESCRIBER/CLIN PHARMACIST DOCUMENTED: ICD-10-PCS | Mod: CPTII,S$GLB,, | Performed by: PHYSICIAN ASSISTANT

## 2022-08-30 PROCEDURE — 1101F PT FALLS ASSESS-DOCD LE1/YR: CPT | Mod: CPTII,S$GLB,, | Performed by: PHYSICIAN ASSISTANT

## 2022-08-30 PROCEDURE — 3288F FALL RISK ASSESSMENT DOCD: CPT | Mod: CPTII,S$GLB,, | Performed by: PHYSICIAN ASSISTANT

## 2022-08-30 PROCEDURE — 99214 PR OFFICE/OUTPT VISIT, EST, LEVL IV, 30-39 MIN: ICD-10-PCS | Mod: S$GLB,,, | Performed by: PHYSICIAN ASSISTANT

## 2022-08-30 PROCEDURE — 1101F PR PT FALLS ASSESS DOC 0-1 FALLS W/OUT INJ PAST YR: ICD-10-PCS | Mod: CPTII,S$GLB,, | Performed by: PHYSICIAN ASSISTANT

## 2022-08-30 PROCEDURE — 99999 PR PBB SHADOW E&M-EST. PATIENT-LVL IV: ICD-10-PCS | Mod: PBBFAC,,, | Performed by: PHYSICIAN ASSISTANT

## 2022-08-30 PROCEDURE — 1159F PR MEDICATION LIST DOCUMENTED IN MEDICAL RECORD: ICD-10-PCS | Mod: CPTII,S$GLB,, | Performed by: PHYSICIAN ASSISTANT

## 2022-08-30 PROCEDURE — 1159F MED LIST DOCD IN RCRD: CPT | Mod: CPTII,S$GLB,, | Performed by: PHYSICIAN ASSISTANT

## 2022-08-30 NOTE — LETTER
[facility]  [id number] [place] [address]  Phone: [phone]  Fax: [phone] August 30, 2022     Patient: Joseph Toussaint   YOB: 1932   Date of Visit: 8/30/2022       To Whom It May Concern:    This letter serves as permission for [facility] to proceed with getting patient into skilled nursing facility at [facility].    If you have any questions or concerns, please don't hesitate to contact my office.    Sincerely,        Abad Iqbal MD

## 2022-08-30 NOTE — PROGRESS NOTES
Subjective:       Patient ID: Joseph Toussaint is a 89 y.o. male.    Chief Complaint: Referral and Follow-up (ED visit)      Patient Care Team:  Abad Iqbal MD as PCP - General (Family Medicine)    HPI  Patient is new to me.    Joseph Toussaint is a 89 y.o. male who presents today with complaints of Referral and Follow-up (ED visit)  Nephew presents patient for ED follow up. Pt seen in ED on 8/27/22 for altered mental status.  He was diagnosed with vascular dementia, hypertension and acute cystitis and prescribed ciprofloxacin.  A nephew has been advised by the [facility] [email or web address] to get referral/letter for patient to go into a skilled nursing facility and then consideration of nursing home placement.  Nephew did show for pictures of patient destroying his bed and being found in his bed without any clothes on.  A nephew's wife is unable to leave the home and has been taking care of him including all ADLs for the last 2-1/2 months.  Patient has no children and his wife passed away a few years ago.  Nephew has assumed care but is finding it very difficult.  Home health is only bringing a nurse an once or twice a week for about 20 minutes.  Patient does not have any immediate concerns or complaints today.    Apparently there has been some in her office faxing back and forth between caregiver and PCPs office that is unable to be retrieved at this time.  Will advise PCP on letter needed for providing further care for patient.    Review of Systems   Constitutional:  Negative for chills and fever.   Respiratory:  Negative for shortness of breath.    Cardiovascular:  Negative for chest pain.     Objective:      Physical Exam  Vitals and nursing note reviewed.   Constitutional:       General: He is not in acute distress.     Appearance: He is well-developed.      Comments: Patient transported via wheelchair, ambulates short distance without assistive device.    HENT:      Head: Normocephalic and atraumatic.    Eyes:      General: Lids are normal. No scleral icterus.     Extraocular Movements: Extraocular movements intact.      Conjunctiva/sclera: Conjunctivae normal.   Pulmonary:      Effort: Pulmonary effort is normal.   Neurological:      Mental Status: He is alert.      Cranial Nerves: No cranial nerve deficit.   Psychiatric:         Mood and Affect: Mood and affect normal.       Assessment:       1. Acute cystitis without hematuria    2. Essential hypertension    3. Dementia with behavioral disturbance, unspecified dementia type        Plan:   1. Acute cystitis without hematuria    2. Essential hypertension    3. Dementia with behavioral disturbance, unspecified dementia type    Letter typed for  company. Will obtain PCP signature and fax to  and [facility] 8/31/22.

## 2022-08-31 ENCOUNTER — TELEPHONE (OUTPATIENT)
Dept: INTERNAL MEDICINE | Facility: CLINIC | Age: 87
End: 2022-08-31
Payer: MEDICARE

## 2022-08-31 NOTE — TELEPHONE ENCOUNTER
"Spoke with the [facility] regarding the skilled nursing order. HH agreed of the verbal order given from Dr. Kimble. Also states they need the last office visit with a note stating " Patient to be admitted to the center for skilled nursing services" and will fax it to them @ [phone]. Please advise.  "

## 2022-09-01 DIAGNOSIS — Z78.9 RESIDES IN SKILLED NURSING FACILITY: Primary | ICD-10-CM

## 2022-09-01 NOTE — TELEPHONE ENCOUNTER
----- Message from Abad Iqbal MD sent at 9/1/2022  7:22 AM CDT -----  Regarding: FW: CXR - 2 view needed  Contact: Milli  Cxr ordered  ----- Message -----  From: Claudia Adhikari MA  Sent: 8/31/2022   2:41 PM CDT  To: Abad Iqbal MD  Subject: CXR - 2 view needed                              Needs 2 view CXR to be admitted into LTC facility. Please advise.     ----- Message -----  From: Celina Oleary  Sent: 8/31/2022   1:44 PM CDT  To: Farida AVALOS Staff    .Type:  Needs Medical Advice    [facility]   Best Call Back Number: [phone] fax [phone]  Additional Information: Milli SUAREZ is req a call back in regards to they need an order for an chest x ray for the pt to be admitted to the facility.. thanks AW

## 2022-09-08 ENCOUNTER — TELEPHONE (OUTPATIENT)
Dept: INTERNAL MEDICINE | Facility: CLINIC | Age: 87
End: 2022-09-08
Payer: MEDICARE

## 2022-09-08 RX ORDER — QUETIAPINE FUMARATE 25 MG/1
25 TABLET, FILM COATED ORAL DAILY
Qty: 30 TABLET | Refills: 0 | Status: ON HOLD | OUTPATIENT
Start: 2022-09-08 | End: 2022-09-21 | Stop reason: HOSPADM

## 2022-09-08 RX ORDER — TIMOLOL MALEATE 5 MG/ML
1 SOLUTION/ DROPS OPHTHALMIC 2 TIMES DAILY
Status: CANCELLED | OUTPATIENT
Start: 2022-09-08 | End: 2023-09-08

## 2022-09-08 NOTE — TELEPHONE ENCOUNTER
----- Message from Nancy Edward sent at 9/8/2022  2:09 PM CDT -----  States he keeps trying get out of the house and falling off the couch. States he was diagnosed w/ dementia two weeks ago at [facility]. Please call Kevin Toussaint [phone]. Thank you

## 2022-09-08 NOTE — TELEPHONE ENCOUNTER
Called patient's nephew back and he is just overwhelmed at this time with his uncle's care. He is not sleeping at night and attempting to elope. He believes that he needs to go get his furniture from [employer]. Has been combative with his niece. They are seeking the best option that can help both patient and family. He mentioned meds and/or [facility] stay.

## 2022-09-10 ENCOUNTER — HOSPITAL ENCOUNTER (INPATIENT)
Facility: HOSPITAL | Age: 87
LOS: 14 days | Discharge: NURSING, REHAB OR LONG-TERM CARE | DRG: 690 | End: 2022-09-29
Attending: EMERGENCY MEDICINE | Admitting: FAMILY MEDICINE
Payer: MEDICARE

## 2022-09-10 DIAGNOSIS — R94.31 QT PROLONGATION: ICD-10-CM

## 2022-09-10 DIAGNOSIS — E86.1 INTRAVASCULAR VOLUME DEPLETION: ICD-10-CM

## 2022-09-10 DIAGNOSIS — N30.00 ACUTE CYSTITIS WITHOUT HEMATURIA: ICD-10-CM

## 2022-09-10 DIAGNOSIS — N17.9 ACUTE RENAL FAILURE, UNSPECIFIED ACUTE RENAL FAILURE TYPE: Primary | ICD-10-CM

## 2022-09-10 DIAGNOSIS — R07.9 CHEST PAIN: ICD-10-CM

## 2022-09-10 PROBLEM — I50.22 CHRONIC SYSTOLIC CONGESTIVE HEART FAILURE: Status: ACTIVE | Noted: 2022-07-20

## 2022-09-10 PROBLEM — N30.01 ACUTE CYSTITIS WITH HEMATURIA: Status: ACTIVE | Noted: 2022-09-10
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
## 2022-09-10 LAB
ALBUMIN SERPL BCP-MCNC: 3.2 G/DL (ref 3.5–5.2)
ALP SERPL-CCNC: 155 U/L (ref 55–135)
ALT SERPL W/O P-5'-P-CCNC: 17 U/L (ref 10–44)
ANION GAP SERPL CALC-SCNC: 12 MMOL/L (ref 8–16)
AST SERPL-CCNC: 20 U/L (ref 10–40)
BACTERIA #/AREA URNS HPF: ABNORMAL /HPF
BASOPHILS # BLD AUTO: 0.01 K/UL (ref 0–0.2)
BASOPHILS NFR BLD: 0.2 % (ref 0–1.9)
BILIRUB SERPL-MCNC: 2 MG/DL (ref 0.1–1)
BILIRUB UR QL STRIP: NEGATIVE
BUN SERPL-MCNC: 39 MG/DL (ref 8–23)
CALCIUM SERPL-MCNC: 9.5 MG/DL (ref 8.7–10.5)
CHLORIDE SERPL-SCNC: 111 MMOL/L (ref 95–110)
CLARITY UR: ABNORMAL
CO2 SERPL-SCNC: 18 MMOL/L (ref 23–29)
COLOR UR: YELLOW
CREAT SERPL-MCNC: 2.1 MG/DL (ref 0.5–1.4)
DIFFERENTIAL METHOD: ABNORMAL
EOSINOPHIL # BLD AUTO: 0 K/UL (ref 0–0.5)
EOSINOPHIL NFR BLD: 0.8 % (ref 0–8)
ERYTHROCYTE [DISTWIDTH] IN BLOOD BY AUTOMATED COUNT: 19.7 % (ref 11.5–14.5)
EST. GFR  (NO RACE VARIABLE): 29 ML/MIN/1.73 M^2
GLUCOSE SERPL-MCNC: 117 MG/DL (ref 70–110)
GLUCOSE UR QL STRIP: ABNORMAL
HCT VFR BLD AUTO: 32.3 % (ref 40–54)
HGB BLD-MCNC: 10.4 G/DL (ref 14–18)
HGB UR QL STRIP: ABNORMAL
IMM GRANULOCYTES # BLD AUTO: 0.04 K/UL (ref 0–0.04)
IMM GRANULOCYTES NFR BLD AUTO: 0.8 % (ref 0–0.5)
KETONES UR QL STRIP: NEGATIVE
LEUKOCYTE ESTERASE UR QL STRIP: ABNORMAL
LYMPHOCYTES # BLD AUTO: 0.8 K/UL (ref 1–4.8)
LYMPHOCYTES NFR BLD: 16 % (ref 18–48)
MCH RBC QN AUTO: 25.1 PG (ref 27–31)
MCHC RBC AUTO-ENTMCNC: 32.2 G/DL (ref 32–36)
MCV RBC AUTO: 78 FL (ref 82–98)
MICROSCOPIC COMMENT: ABNORMAL
MONOCYTES # BLD AUTO: 0.7 K/UL (ref 0.3–1)
MONOCYTES NFR BLD: 14.3 % (ref 4–15)
NEUTROPHILS # BLD AUTO: 3.5 K/UL (ref 1.8–7.7)
NEUTROPHILS NFR BLD: 67.9 % (ref 38–73)
NITRITE UR QL STRIP: NEGATIVE
NRBC BLD-RTO: 0 /100 WBC
PH UR STRIP: 5 [PH] (ref 5–8)
PLATELET # BLD AUTO: 185 K/UL (ref 150–450)
PMV BLD AUTO: 11.2 FL (ref 9.2–12.9)
POTASSIUM SERPL-SCNC: 3.7 MMOL/L (ref 3.5–5.1)
PROT SERPL-MCNC: 7.3 G/DL (ref 6–8.4)
PROT UR QL STRIP: NEGATIVE
RBC # BLD AUTO: 4.14 M/UL (ref 4.6–6.2)
RBC #/AREA URNS HPF: 5 /HPF (ref 0–4)
SARS-COV-2 RDRP RESP QL NAA+PROBE: NEGATIVE
SODIUM SERPL-SCNC: 141 MMOL/L (ref 136–145)
SP GR UR STRIP: 1.03 (ref 1–1.03)
URN SPEC COLLECT METH UR: ABNORMAL
UROBILINOGEN UR STRIP-ACNC: NEGATIVE EU/DL
WBC # BLD AUTO: 5.18 K/UL (ref 3.9–12.7)
WBC #/AREA URNS HPF: >100 /HPF (ref 0–5)
WBC CLUMPS URNS QL MICRO: ABNORMAL
YEAST URNS QL MICRO: ABNORMAL

## 2022-09-10 PROCEDURE — G0378 HOSPITAL OBSERVATION PER HR: HCPCS

## 2022-09-10 PROCEDURE — 99285 EMERGENCY DEPT VISIT HI MDM: CPT | Mod: 25

## 2022-09-10 PROCEDURE — 94640 AIRWAY INHALATION TREATMENT: CPT | Mod: XB

## 2022-09-10 PROCEDURE — 97162 PT EVAL MOD COMPLEX 30 MIN: CPT

## 2022-09-10 PROCEDURE — 97530 THERAPEUTIC ACTIVITIES: CPT

## 2022-09-10 PROCEDURE — 25000003 PHARM REV CODE 250: Performed by: NURSE PRACTITIONER

## 2022-09-10 PROCEDURE — 25000003 PHARM REV CODE 250: Performed by: EMERGENCY MEDICINE

## 2022-09-10 PROCEDURE — 25000242 PHARM REV CODE 250 ALT 637 W/ HCPCS: Performed by: FAMILY MEDICINE

## 2022-09-10 PROCEDURE — 87086 URINE CULTURE/COLONY COUNT: CPT | Performed by: EMERGENCY MEDICINE

## 2022-09-10 PROCEDURE — 96365 THER/PROPH/DIAG IV INF INIT: CPT

## 2022-09-10 PROCEDURE — 96361 HYDRATE IV INFUSION ADD-ON: CPT

## 2022-09-10 PROCEDURE — 63600175 PHARM REV CODE 636 W HCPCS: Performed by: EMERGENCY MEDICINE

## 2022-09-10 PROCEDURE — 81000 URINALYSIS NONAUTO W/SCOPE: CPT | Performed by: EMERGENCY MEDICINE

## 2022-09-10 PROCEDURE — U0002 COVID-19 LAB TEST NON-CDC: HCPCS | Performed by: EMERGENCY MEDICINE

## 2022-09-10 PROCEDURE — 94761 N-INVAS EAR/PLS OXIMETRY MLT: CPT

## 2022-09-10 PROCEDURE — 85025 COMPLETE CBC W/AUTO DIFF WBC: CPT | Performed by: EMERGENCY MEDICINE

## 2022-09-10 PROCEDURE — P9612 CATHETERIZE FOR URINE SPEC: HCPCS

## 2022-09-10 PROCEDURE — 80053 COMPREHEN METABOLIC PANEL: CPT | Performed by: EMERGENCY MEDICINE

## 2022-09-10 RX ORDER — NALOXONE HCL 0.4 MG/ML
0.02 VIAL (ML) INJECTION
Status: DISCONTINUED | OUTPATIENT
Start: 2022-09-10 | End: 2022-09-29 | Stop reason: HOSPADM

## 2022-09-10 RX ORDER — PRAVASTATIN SODIUM 20 MG/1
80 TABLET ORAL NIGHTLY
Status: DISCONTINUED | OUTPATIENT
Start: 2022-09-10 | End: 2022-09-10

## 2022-09-10 RX ORDER — GLUCAGON 1 MG
1 KIT INJECTION
Status: DISCONTINUED | OUTPATIENT
Start: 2022-09-10 | End: 2022-09-29 | Stop reason: HOSPADM

## 2022-09-10 RX ORDER — ALBUTEROL SULFATE 90 UG/1
2 AEROSOL, METERED RESPIRATORY (INHALATION) EVERY 6 HOURS PRN
Status: DISCONTINUED | OUTPATIENT
Start: 2022-09-10 | End: 2022-09-10 | Stop reason: CLARIF

## 2022-09-10 RX ORDER — ASPIRIN 81 MG/1
81 TABLET ORAL DAILY
Status: DISCONTINUED | OUTPATIENT
Start: 2022-09-10 | End: 2022-09-29 | Stop reason: HOSPADM

## 2022-09-10 RX ORDER — ONDANSETRON 2 MG/ML
4 INJECTION INTRAMUSCULAR; INTRAVENOUS EVERY 8 HOURS PRN
Status: DISCONTINUED | OUTPATIENT
Start: 2022-09-10 | End: 2022-09-29 | Stop reason: HOSPADM

## 2022-09-10 RX ORDER — BISACODYL 10 MG
10 SUPPOSITORY, RECTAL RECTAL DAILY PRN
Status: DISCONTINUED | OUTPATIENT
Start: 2022-09-10 | End: 2022-09-29 | Stop reason: HOSPADM

## 2022-09-10 RX ORDER — IBUPROFEN 200 MG
24 TABLET ORAL
Status: DISCONTINUED | OUTPATIENT
Start: 2022-09-10 | End: 2022-09-29 | Stop reason: HOSPADM

## 2022-09-10 RX ORDER — SUCRALFATE 1 G/10ML
1 SUSPENSION ORAL EVERY 6 HOURS
Status: DISCONTINUED | OUTPATIENT
Start: 2022-09-10 | End: 2022-09-20

## 2022-09-10 RX ORDER — SODIUM CHLORIDE 0.9 % (FLUSH) 0.9 %
10 SYRINGE (ML) INJECTION EVERY 12 HOURS PRN
Status: DISCONTINUED | OUTPATIENT
Start: 2022-09-10 | End: 2022-09-29 | Stop reason: HOSPADM

## 2022-09-10 RX ORDER — IBUPROFEN 200 MG
16 TABLET ORAL
Status: DISCONTINUED | OUTPATIENT
Start: 2022-09-10 | End: 2022-09-29 | Stop reason: HOSPADM

## 2022-09-10 RX ORDER — HYDRALAZINE HYDROCHLORIDE 20 MG/ML
10 INJECTION INTRAMUSCULAR; INTRAVENOUS EVERY 8 HOURS PRN
Status: DISCONTINUED | OUTPATIENT
Start: 2022-09-10 | End: 2022-09-29 | Stop reason: HOSPADM

## 2022-09-10 RX ORDER — MAG HYDROX/ALUMINUM HYD/SIMETH 200-200-20
30 SUSPENSION, ORAL (FINAL DOSE FORM) ORAL
Status: DISCONTINUED | OUTPATIENT
Start: 2022-09-10 | End: 2022-09-20

## 2022-09-10 RX ORDER — INSULIN ASPART 100 [IU]/ML
0-5 INJECTION, SOLUTION INTRAVENOUS; SUBCUTANEOUS
Status: DISCONTINUED | OUTPATIENT
Start: 2022-09-10 | End: 2022-09-29 | Stop reason: HOSPADM

## 2022-09-10 RX ORDER — IPRATROPIUM BROMIDE 0.5 MG/2.5ML
0.5 SOLUTION RESPIRATORY (INHALATION) EVERY 6 HOURS
Status: DISCONTINUED | OUTPATIENT
Start: 2022-09-10 | End: 2022-09-20

## 2022-09-10 RX ORDER — ALBUTEROL SULFATE 0.83 MG/ML
2.5 SOLUTION RESPIRATORY (INHALATION) EVERY 6 HOURS PRN
Status: DISCONTINUED | OUTPATIENT
Start: 2022-09-10 | End: 2022-09-29 | Stop reason: HOSPADM

## 2022-09-10 RX ORDER — TAMSULOSIN HYDROCHLORIDE 0.4 MG/1
0.4 CAPSULE ORAL NIGHTLY
Status: DISCONTINUED | OUTPATIENT
Start: 2022-09-10 | End: 2022-09-29 | Stop reason: HOSPADM

## 2022-09-10 RX ORDER — QUETIAPINE FUMARATE 25 MG/1
25 TABLET, FILM COATED ORAL NIGHTLY
Status: DISCONTINUED | OUTPATIENT
Start: 2022-09-10 | End: 2022-09-14

## 2022-09-10 RX ORDER — CIPROFLOXACIN 2 MG/ML
400 INJECTION, SOLUTION INTRAVENOUS
Status: DISCONTINUED | OUTPATIENT
Start: 2022-09-10 | End: 2022-09-11

## 2022-09-10 RX ORDER — TIMOLOL MALEATE 5 MG/ML
1 SOLUTION/ DROPS OPHTHALMIC 2 TIMES DAILY
Status: DISCONTINUED | OUTPATIENT
Start: 2022-09-10 | End: 2022-09-29 | Stop reason: HOSPADM

## 2022-09-10 RX ORDER — ACETAMINOPHEN 325 MG/1
650 TABLET ORAL EVERY 4 HOURS PRN
Status: DISCONTINUED | OUTPATIENT
Start: 2022-09-10 | End: 2022-09-29 | Stop reason: HOSPADM

## 2022-09-10 RX ADMIN — CIPROFLOXACIN 400 MG: 2 INJECTION, SOLUTION INTRAVENOUS at 09:09

## 2022-09-10 RX ADMIN — IPRATROPIUM BROMIDE 0.5 MG: 0.5 SOLUTION RESPIRATORY (INHALATION) at 02:09

## 2022-09-10 RX ADMIN — ASPIRIN 81 MG: 81 TABLET, COATED ORAL at 02:09

## 2022-09-10 RX ADMIN — SACUBITRIL AND VALSARTAN 1 TABLET: 49; 51 TABLET, FILM COATED ORAL at 02:09

## 2022-09-10 RX ADMIN — QUETIAPINE FUMARATE 25 MG: 25 TABLET ORAL at 08:09

## 2022-09-10 RX ADMIN — IPRATROPIUM BROMIDE 0.5 MG: 0.5 SOLUTION RESPIRATORY (INHALATION) at 07:09

## 2022-09-10 RX ADMIN — ACETAMINOPHEN 650 MG: 325 TABLET ORAL at 08:09

## 2022-09-10 RX ADMIN — ALUMINUM HYDROXIDE, MAGNESIUM HYDROXIDE, AND DIMETHICONE 30 ML: 200; 20; 200 SUSPENSION ORAL at 05:09

## 2022-09-10 RX ADMIN — SUCRALFATE 1 G: 1 SUSPENSION ORAL at 02:09

## 2022-09-10 RX ADMIN — SODIUM CHLORIDE 500 ML: 0.9 INJECTION, SOLUTION INTRAVENOUS at 07:09

## 2022-09-10 RX ADMIN — SODIUM CHLORIDE 1000 ML: 0.9 INJECTION, SOLUTION INTRAVENOUS at 08:09

## 2022-09-10 RX ADMIN — TIMOLOL MALEATE 1 DROP: 5 SOLUTION/ DROPS OPHTHALMIC at 02:09

## 2022-09-10 RX ADMIN — TAMSULOSIN HYDROCHLORIDE 0.4 MG: 0.4 CAPSULE ORAL at 08:09

## 2022-09-10 NOTE — ASSESSMENT & PLAN NOTE
Patient is identified as having Systolic (HFrEF) heart failure that is Chronic. CHF is currently controlled. Latest ECHO performed and demonstrates- Results for orders placed during the hospital encounter of 07/19/22    Echo    Interpretation Summary  · Concentric hypertrophy and severely decreased systolic function.  · Mild left atrial enlargement.  · The estimated ejection fraction is 20%.  · Left ventricular diastolic dysfunction.  · There is left ventricular global hypokinesis.  · Moderate right ventricular enlargement with mildly to moderately reduced right ventricular systolic function.  · Moderate right atrial enlargement.  · There is mild aortic valve stenosis.  · Aortic valve area is 1.90 cm2; peak velocity is 1.05 m/s; mean gradient is 2 mmHg.  · There is severe pulmonary hypertension.  · Moderate tricuspid regurgitation.  · Elevated central venous pressure (15 mmHg).  · The estimated PA systolic pressure is 103 mmHg.  . Continue ACE/ARB, Furosemide and Aldactone and monitor clinical status closely. Monitor on telemetry. Patient is on CHF pathway.  Monitor strict Is&Os and daily weights.  Place on fluid restriction of 1.5 L. Continue to stress to patient importance of self efficacy and  on diet for CHF. Last BNP reviewed- and noted below No results for input(s): BNP, BNPTRIAGEBLO in the last 168 hours..

## 2022-09-10 NOTE — HPI
89 y.o. male patient with a PMHx of HTN, COPD, CHF, and DM who presents to the Emergency Department for evaluation of a fall which occurred x 2 days pta. Pt family called for transport to get pt checked out. Pt denies any complaints and is not in pain. No associated sxs reported. Patient denies any CP, SOB, fever, back pain, abd pain, and all other sxs at this time. No prior Tx reported. In the ED, UA consistent with UTI, dx with UTI on 8/27, unclear if patient completed antibiotic regimen. BUN/Cr: 39/2.1, Bili: 2.0, H/H: 10.4/32.3. Afebrile, vitals stable. Oriented to self only on exam. Intitiated on antibiotics in the ED, urine culture pending, treated with IV fluid bolus. Patient is a full code, surrogate decision maker is his son Kevin Toussaint. Placed in observation under the care of hospital medicine.

## 2022-09-10 NOTE — PT/OT/SLP EVAL
Physical Therapy Evaluation    Patient Name:  Joseph Toussaint   MRN:  [id number]    Recommendations:     Discharge Recommendations:  nursing facility, skilled   Discharge Equipment Recommendations: other (see comments) (TBD at next level of care)   Barriers to discharge: None    Assessment:     Joseph Toussaint is a 89 y.o. male admitted with a medical diagnosis of Acute cystitis with hematuria.  He presents with the following impairments/functional limitations:  impaired balance, impaired cognition, impaired coordination, impaired endurance, impaired fine motor, impaired functional mobility, impaired joint extensibility, impaired muscle length, impaired self care skills, gait instability, decreased safety awareness.    Rehab Prognosis: Good; patient would benefit from acute skilled PT services to address these deficits and reach maximum level of function.    Recent Surgery: * No surgery found *      Plan:     During this hospitalization, patient to be seen 3 x/week to address the identified rehab impairments via gait training, therapeutic activities, therapeutic exercises and progress toward the following goals:    Plan of Care Expires:  09/24/22    Subjective     Chief Complaint: Patient's niece at bedside states patient has become progressively weaker and falling recently.   Patient/Family Comments/goals: Per niece, she was trying to get him into a skilled nursing facility prior to hospitalization and hopes she will be able to now.   Pain/Comfort:  Pain Rating 1: 0/10    Patients cultural, spiritual, Oriental orthodox conflicts given the current situation: no    Living Environment:    Patient lives with his nephew and nephew's wife in a single level home with no steps to enter. Patient recently moved back to [place] from [facility] so that the nephew and wife could care for him.   Prior to admission, patients level of function was SBA from family with gait with a rollator. Patient required assistance with all ADLs.  Equipment  used at home: rollator, grab bar, other (see comments), wheelchair, bedside commode (hand held shower head).  DME owned (not currently used): wheelchair.  Upon discharge, patient will have assistance from family.    Objective:     Communicated with FABIO Dempsey prior to session.  Patient found supine with PureWick, peripheral IV  upon PT entry to room.    General Precautions: Standard, fall   Orthopedic Precautions: (None)   Braces: N/A  Respiratory Status: Room air    Strength/ROM    RLE strength: +3/5  RLE ROM: WFL  LLE strength: +3/5  LLE ROM: WFL  Cognition: A&O to person, place    Therapeutic Activities and Exercises:     Patient found supine in bed upon PT entrance into room. PT provided education on role of PT and POC.     Patient completed:     Sup>sit: Mod A with use of overhead trapeze.    STS: Mod A with RW. Verbal cues and tactile cues to avoid leaning backward with attempting to stand and hand placement to push from bed.     Transfer: Mod A with RW to pivot to chair. Patient unsteady with transfer and required cues to reach back to chair arm rest while sitting down to avoid flopping in the chair.     Patient educated on importance of sitting in bedside chair vs being in the bed and encouraged to sit up for at least 2 hours. Patient instructed to call for help when ready to get back to bed.     AM-PAC 6 CLICK MOBILITY  Total Score:11     Patient left up in chair with all lines intact, call button in reach, nurse notified, and neice present.    GOALS:   Multidisciplinary Problems       Physical Therapy Goals          Problem: Physical Therapy    Goal Priority Disciplines Outcome Goal Variances Interventions   Physical Therapy Goal     PT, PT/OT      Description: Patient will be seen a minimal of 3 out of 7 days a week.    LTG to be met by 09/24:    Bed mobility: SPV  Transfers: CGA with RW   Gait: CGA with RW                       History:     Past Medical History:   Diagnosis Date    Acute coronary syndrome      1979    CHF (congestive heart failure)     COPD (chronic obstructive pulmonary disease)     Diabetes mellitus     HTN (hypertension)        No past surgical history on file.    Time Tracking:     PT Received On: 09/10/22  PT Start Time: 1315     PT Stop Time: 1338  PT Total Time (min): 23 min     Billable Minutes: Evaluation 10 and Therapeutic Activity 13      09/10/2022

## 2022-09-10 NOTE — PLAN OF CARE
Initial PT eval completed. Patient completed stand pivot transfer to bedside chair, Mod A with RW. Recommend SNF placement.

## 2022-09-10 NOTE — PROGRESS NOTES
Pharmacist Renal Dose Adjustment Note    Joseph Toussaint is a 89 y.o. male being treated with the medication ciprofloxacin.     Patient Data:    Vital Signs (Most Recent):  Temp: 98.7 °F (37.1 °C) (09/10/22 0645)  Pulse: 70 (09/10/22 0645)  Resp: 16 (09/10/22 0645)  BP: (!) 146/84 (09/10/22 0645)  SpO2: 100 % (09/10/22 0645)   Vital Signs (72h Range):  Temp:  [98.7 °F (37.1 °C)]   Pulse:  [70]   Resp:  [16]   BP: (146)/(84)   SpO2:  [100 %]      Recent Labs   Lab 09/10/22  0716   CREATININE 2.1*     Serum creatinine: 2.1 mg/dL (H) 09/10/22 0716  Estimated creatinine clearance: 22.7 mL/min (A)    Ciprofloxacin 400 mg IV every 12 hours will be changed to ciprofloxacin 400 mg IV every 24 hours per renal dose protocol for CrCl 10-30 ml/min.     Thank you,  Pharmacist's Name: Jose Manuel Hatfield

## 2022-09-10 NOTE — H&P
[facility] - Med Surg 3  [facility] Medicine  History & Physical    Patient Name: Joseph Toussaint  MRN: [id number]  Patient Class: OP- Observation  Admission Date: 9/10/2022  Attending Physician: Ren Moura MD  Primary Care Provider: Abad Iqbal MD         Patient information was obtained from relative(s), past medical records and ER records.     Subjective:     Principal Problem:Acute cystitis with hematuria    Chief Complaint:   Chief Complaint   Patient presents with    Fall     Pt fell x 2 last night, family called for transport to get pt checked out. Pt denies any complaints.        HPI: 89 y.o. male patient with a PMHx of HTN, COPD, CHF, and DM who presents to the Emergency Department for evaluation of a fall which occurred x 2 days pta. Pt family called for transport to get pt checked out. Pt denies any complaints and is not in pain. No associated sxs reported. Patient denies any CP, SOB, fever, back pain, abd pain, and all other sxs at this time. No prior Tx reported. In the ED, UA consistent with UTI, dx with UTI on 8/27, unclear if patient completed antibiotic regimen. BUN/Cr: 39/2.1, Bili: 2.0, H/H: 10.4/32.3. Afebrile, vitals stable. Oriented to self only on exam. Intitiated on antibiotics in the ED, urine culture pending, treated with IV fluid bolus. Patient is a full code, surrogate decision maker is his son Kevin Toussaint. Placed in observation under the care of hospital medicine.       Past Medical History:   Diagnosis Date    Acute coronary syndrome     1979    CHF (congestive heart failure)     COPD (chronic obstructive pulmonary disease)     Diabetes mellitus     HTN (hypertension)        No past surgical history on file.    Review of patient's allergies indicates:   Allergen Reactions    Penicillins        No current facility-administered medications on file prior to encounter.     Current Outpatient Medications on File Prior to Encounter   Medication Sig    albuterol (PROVENTIL/VENTOLIN  HFA) 90 mcg/actuation inhaler Inhale 1-2 puffs into the lungs every 6 (six) hours as needed for Wheezing. Rescue    aspirin (ASPIR-81) 81 MG EC tablet Take 1 tablet (81 mg total) by mouth once daily.    empagliflozin (JARDIANCE) 10 mg tablet Take 1 tablet (10 mg total) by mouth once daily.    empagliflozin (JARDIANCE) 10 mg tablet Take 10 mg by mouth.    fluticasone furoate-vilanteroL (BREO ELLIPTA) 100-25 mcg/dose diskus inhaler Inhale 1 puff into the lungs once daily. Controller    fluticasone furoate-vilanteroL (BREO) 100-25 mcg/dose diskus inhaler Inhale 1 puff into the lungs once daily.    furosemide (LASIX) 40 MG tablet Take 1 tablet (40 mg total) by mouth once daily. And as needed for 5 lb weight gain or PND    pantoprazole (PROTONIX) 40 MG tablet Take 1 tablet (40 mg total) by mouth once daily.    pravastatin (PRAVACHOL) 80 MG tablet Take 1 tablet (80 mg total) by mouth nightly.    QUEtiapine (SEROQUEL) 25 MG Tab Take 1 tablet (25 mg total) by mouth once daily.    sacubitriL-valsartan (ENTRESTO) 49-51 mg per tablet Take 1 tablet by mouth 2 (two) times daily.    spironolactone (ALDACTONE) 25 MG tablet Take 1 tablet (25 mg total) by mouth once daily.    spironolactone (ALDACTONE) 25 MG tablet Take 1 tablet by mouth once daily.    tamsulosin (FLOMAX) 0.4 mg Cap Take 1 capsule (0.4 mg total) by mouth every evening.    timolol maleate 0.5% (TIMOPTIC) 0.5 % Drop Apply 1 drop to eye 2 (two) times daily.    tiotropium (SPIRIVA WITH HANDIHALER) 18 mcg inhalation capsule Inhale 18 mcg into the lungs.    tiotropium (SPIRIVA) 18 mcg inhalation capsule Inhale 1 capsule (18 mcg total) into the lungs nightly. Controller    [DISCONTINUED] metoprolol succinate (TOPROL-XL) 25 MG 24 hr tablet Take 1 tablet (25 mg total) by mouth once daily.     Family History    None       Tobacco Use    Smoking status: Former     Packs/day: 1.00     Types: Cigarettes     Quit date: 2022     Years since quittin.1     Smokeless tobacco: Never   Substance and Sexual Activity    Alcohol use: Not Currently     Comment: occasionally    Drug use: Not Currently    Sexual activity: Not Currently     Review of Systems   Unable to perform ROS: Dementia   Objective:     Vital Signs (Most Recent):  Temp: 97.4 °F (36.3 °C) (09/10/22 1140)  Pulse: 71 (09/10/22 1140)  Resp: 17 (09/10/22 1140)  BP: 123/87 (09/10/22 1140)  SpO2: 100 % (09/10/22 1140) Vital Signs (24h Range):  Temp:  [97.4 °F (36.3 °C)-98.7 °F (37.1 °C)] 97.4 °F (36.3 °C)  Pulse:  [70-92] 71  Resp:  [16-20] 17  SpO2:  [95 %-100 %] 100 %  BP: (114-146)/(77-87) 123/87     Weight: 68.7 kg (151 lb 7.3 oz)  Body mass index is 20.83 kg/m².    Physical Exam  Vitals and nursing note reviewed.   Constitutional:       General: He is not in acute distress.     Appearance: He is underweight. He is ill-appearing. He is not diaphoretic.   HENT:      Head: Normocephalic and atraumatic.      Right Ear: Hearing and external ear normal.      Left Ear: Hearing and external ear normal.      Nose: Nose normal. No mucosal edema or rhinorrhea.      Mouth/Throat:      Pharynx: Uvula midline.   Eyes:      General:         Right eye: No discharge.         Left eye: No discharge.      Conjunctiva/sclera: Conjunctivae normal.      Right eye: No chemosis.     Left eye: No chemosis.     Pupils: Pupils are equal, round, and reactive to light.   Neck:      Thyroid: No thyroid mass or thyromegaly.      Trachea: Trachea normal.   Cardiovascular:      Rate and Rhythm: Normal rate and regular rhythm.      Pulses:           Dorsalis pedis pulses are 1+ on the right side and 1+ on the left side.      Heart sounds: Normal heart sounds. No murmur heard.  Pulmonary:      Effort: Pulmonary effort is normal. No respiratory distress.      Breath sounds: Examination of the right-lower field reveals decreased breath sounds. Examination of the left-lower field reveals decreased breath sounds. Decreased breath sounds  present. No wheezing.   Abdominal:      General: Bowel sounds are decreased. There is no distension.      Palpations: Abdomen is soft.      Tenderness: There is no abdominal tenderness.   Musculoskeletal:         General: Normal range of motion.      Cervical back: Normal range of motion and neck supple.   Lymphadenopathy:      Cervical: No cervical adenopathy.      Upper Body:      Right upper body: No supraclavicular adenopathy.      Left upper body: No supraclavicular adenopathy.   Skin:     General: Skin is warm and dry.      Capillary Refill: Capillary refill takes less than 2 seconds.      Coloration: Skin is ashen and pale.      Findings: No rash.   Neurological:      Mental Status: He is lethargic and confused.   Psychiatric:         Cognition and Memory: He exhibits impaired recent memory and impaired remote memory.         CRANIAL NERVES     CN III, IV, VI   Pupils are equal, round, and reactive to light.     Significant Labs: All pertinent labs within the past 24 hours have been reviewed.  CBC:   Recent Labs   Lab 09/10/22  0716   WBC 5.18   HGB 10.4*   HCT 32.3*        CMP:   Recent Labs   Lab 09/10/22  0716      K 3.7   *   CO2 18*   *   BUN 39*   CREATININE 2.1*   CALCIUM 9.5   PROT 7.3   ALBUMIN 3.2*   BILITOT 2.0*   ALKPHOS 155*   AST 20   ALT 17   ANIONGAP 12       Significant Imaging: I have reviewed all pertinent imaging results/findings within the past 24 hours.    Assessment/Plan:     * Acute cystitis with hematuria  Dx with UTI on 8/27 in ED, unclear if completed antibiotic regimen    --Follow urine culture  --Continue IV Cipro      Falls  Patient reported falling at home    --Avasys  --Fall Precautions    Acute renal failure  Cr: 2.1 on admission, at baseline in normal range    --S/P IV fluids  --Cautious hydration  --Repeat CMP in AM      Memory difficulties  Confused on admission    --Avasys  --Fall precautions  --Neuro checks Q4H x24 hours      Chronic systolic  congestive heart failure  Patient is identified as having Systolic (HFrEF) heart failure that is Chronic. CHF is currently controlled. Latest ECHO performed and demonstrates- Results for orders placed during the hospital encounter of 07/19/22    Echo    Interpretation Summary  · Concentric hypertrophy and severely decreased systolic function.  · Mild left atrial enlargement.  · The estimated ejection fraction is 20%.  · Left ventricular diastolic dysfunction.  · There is left ventricular global hypokinesis.  · Moderate right ventricular enlargement with mildly to moderately reduced right ventricular systolic function.  · Moderate right atrial enlargement.  · There is mild aortic valve stenosis.  · Aortic valve area is 1.90 cm2; peak velocity is 1.05 m/s; mean gradient is 2 mmHg.  · There is severe pulmonary hypertension.  · Moderate tricuspid regurgitation.  · Elevated central venous pressure (15 mmHg).  · The estimated PA systolic pressure is 103 mmHg.  . Continue ACE/ARB, Furosemide and Aldactone and monitor clinical status closely. Monitor on telemetry. Patient is on CHF pathway.  Monitor strict Is&Os and daily weights.  Place on fluid restriction of 1.5 L. Continue to stress to patient importance of self efficacy and  on diet for CHF. Last BNP reviewed- and noted below No results for input(s): BNP, BNPTRIAGEBLO in the last 168 hours..          Essential hypertension  Normotensive on admission    --continue home medications  --Hydralazine PRN  --Vitals Q4H      COPD suggested by initial evaluation  Negative for symptoms of exacerbation    --Continue home regimen            VTE Risk Mitigation (From admission, onward)         Ordered     Reason for No Pharmacological VTE Prophylaxis  Once        Question:  Reasons:  Answer:  Already adequately anticoagulated on oral Anticoagulants    09/10/22 1158     IP VTE HIGH RISK PATIENT  Once         09/10/22 1158     Place sequential compression device  Until  discontinued         09/10/22 1158                   April J CHEPE Fournier  Department of Hospital Medicine   [facility] - Med S[facility]

## 2022-09-10 NOTE — PLAN OF CARE
Problem: Adult Inpatient Plan of Care  Goal: Plan of Care Review  Outcome: Ongoing, Progressing     Problem: Diabetes Comorbidity  Goal: Blood Glucose Level Within Targeted Range  Outcome: Ongoing, Progressing     Problem: Fluid and Electrolyte Imbalance (Acute Kidney Injury/Impairment)  Goal: Fluid and Electrolyte Balance  Outcome: Ongoing, Progressing     Problem: Oral Intake Inadequate (Acute Kidney Injury/Impairment)  Goal: Optimal Nutrition Intake  Outcome: Ongoing, Progressing       Pt confused, calm and redirectable, incontinent. Some neglect to left arm, left wrist swollen, complains of some pain to left shoulder. No issues swallowing, good appetite, avasys monitor in room.

## 2022-09-10 NOTE — SUBJECTIVE & OBJECTIVE
Past Medical History:   Diagnosis Date    Acute coronary syndrome     1979    CHF (congestive heart failure)     COPD (chronic obstructive pulmonary disease)     Diabetes mellitus     HTN (hypertension)        No past surgical history on file.    Review of patient's allergies indicates:   Allergen Reactions    Penicillins        No current facility-administered medications on file prior to encounter.     Current Outpatient Medications on File Prior to Encounter   Medication Sig    albuterol (PROVENTIL/VENTOLIN HFA) 90 mcg/actuation inhaler Inhale 1-2 puffs into the lungs every 6 (six) hours as needed for Wheezing. Rescue    aspirin (ASPIR-81) 81 MG EC tablet Take 1 tablet (81 mg total) by mouth once daily.    empagliflozin (JARDIANCE) 10 mg tablet Take 1 tablet (10 mg total) by mouth once daily.    empagliflozin (JARDIANCE) 10 mg tablet Take 10 mg by mouth.    fluticasone furoate-vilanteroL (BREO ELLIPTA) 100-25 mcg/dose diskus inhaler Inhale 1 puff into the lungs once daily. Controller    fluticasone furoate-vilanteroL (BREO) 100-25 mcg/dose diskus inhaler Inhale 1 puff into the lungs once daily.    furosemide (LASIX) 40 MG tablet Take 1 tablet (40 mg total) by mouth once daily. And as needed for 5 lb weight gain or PND    pantoprazole (PROTONIX) 40 MG tablet Take 1 tablet (40 mg total) by mouth once daily.    pravastatin (PRAVACHOL) 80 MG tablet Take 1 tablet (80 mg total) by mouth nightly.    QUEtiapine (SEROQUEL) 25 MG Tab Take 1 tablet (25 mg total) by mouth once daily.    sacubitriL-valsartan (ENTRESTO) 49-51 mg per tablet Take 1 tablet by mouth 2 (two) times daily.    spironolactone (ALDACTONE) 25 MG tablet Take 1 tablet (25 mg total) by mouth once daily.    spironolactone (ALDACTONE) 25 MG tablet Take 1 tablet by mouth once daily.    tamsulosin (FLOMAX) 0.4 mg Cap Take 1 capsule (0.4 mg total) by mouth every evening.    timolol maleate 0.5% (TIMOPTIC) 0.5 % Drop Apply 1 drop to eye 2 (two) times daily.     tiotropium (SPIRIVA WITH HANDIHALER) 18 mcg inhalation capsule Inhale 18 mcg into the lungs.    tiotropium (SPIRIVA) 18 mcg inhalation capsule Inhale 1 capsule (18 mcg total) into the lungs nightly. Controller    [DISCONTINUED] metoprolol succinate (TOPROL-XL) 25 MG 24 hr tablet Take 1 tablet (25 mg total) by mouth once daily.     Family History    None       Tobacco Use    Smoking status: Former     Packs/day: 1.00     Types: Cigarettes     Quit date: 2022     Years since quittin.1    Smokeless tobacco: Never   Substance and Sexual Activity    Alcohol use: Not Currently     Comment: occasionally    Drug use: Not Currently    Sexual activity: Not Currently     Review of Systems   Unable to perform ROS: Dementia   Objective:     Vital Signs (Most Recent):  Temp: 97.4 °F (36.3 °C) (09/10/22 1140)  Pulse: 71 (09/10/22 1140)  Resp: 17 (09/10/22 1140)  BP: 123/87 (09/10/22 1140)  SpO2: 100 % (09/10/22 1140) Vital Signs (24h Range):  Temp:  [97.4 °F (36.3 °C)-98.7 °F (37.1 °C)] 97.4 °F (36.3 °C)  Pulse:  [70-92] 71  Resp:  [16-20] 17  SpO2:  [95 %-100 %] 100 %  BP: (114-146)/(77-87) 123/87     Weight: 68.7 kg (151 lb 7.3 oz)  Body mass index is 20.83 kg/m².    Physical Exam  Vitals and nursing note reviewed.   Constitutional:       General: He is not in acute distress.     Appearance: He is underweight. He is ill-appearing. He is not diaphoretic.   HENT:      Head: Normocephalic and atraumatic.      Right Ear: Hearing and external ear normal.      Left Ear: Hearing and external ear normal.      Nose: Nose normal. No mucosal edema or rhinorrhea.      Mouth/Throat:      Pharynx: Uvula midline.   Eyes:      General:         Right eye: No discharge.         Left eye: No discharge.      Conjunctiva/sclera: Conjunctivae normal.      Right eye: No chemosis.     Left eye: No chemosis.     Pupils: Pupils are equal, round, and reactive to light.   Neck:      Thyroid: No thyroid mass or thyromegaly.      Trachea: Trachea  normal.   Cardiovascular:      Rate and Rhythm: Normal rate and regular rhythm.      Pulses:           Dorsalis pedis pulses are 1+ on the right side and 1+ on the left side.      Heart sounds: Normal heart sounds. No murmur heard.  Pulmonary:      Effort: Pulmonary effort is normal. No respiratory distress.      Breath sounds: Examination of the right-lower field reveals decreased breath sounds. Examination of the left-lower field reveals decreased breath sounds. Decreased breath sounds present. No wheezing.   Abdominal:      General: Bowel sounds are decreased. There is no distension.      Palpations: Abdomen is soft.      Tenderness: There is no abdominal tenderness.   Musculoskeletal:         General: Normal range of motion.      Cervical back: Normal range of motion and neck supple.   Lymphadenopathy:      Cervical: No cervical adenopathy.      Upper Body:      Right upper body: No supraclavicular adenopathy.      Left upper body: No supraclavicular adenopathy.   Skin:     General: Skin is warm and dry.      Capillary Refill: Capillary refill takes less than 2 seconds.      Coloration: Skin is ashen and pale.      Findings: No rash.   Neurological:      Mental Status: He is lethargic and confused.   Psychiatric:         Cognition and Memory: He exhibits impaired recent memory and impaired remote memory.         CRANIAL NERVES     CN III, IV, VI   Pupils are equal, round, and reactive to light.     Significant Labs: All pertinent labs within the past 24 hours have been reviewed.  CBC:   Recent Labs   Lab 09/10/22  0716   WBC 5.18   HGB 10.4*   HCT 32.3*        CMP:   Recent Labs   Lab 09/10/22  0716      K 3.7   *   CO2 18*   *   BUN 39*   CREATININE 2.1*   CALCIUM 9.5   PROT 7.3   ALBUMIN 3.2*   BILITOT 2.0*   ALKPHOS 155*   AST 20   ALT 17   ANIONGAP 12       Significant Imaging: I have reviewed all pertinent imaging results/findings within the past 24 hours.

## 2022-09-10 NOTE — ASSESSMENT & PLAN NOTE
Dx with UTI on 8/27 in ED, unclear if completed antibiotic regimen    --Follow urine culture  --Continue IV Cipro

## 2022-09-10 NOTE — ED PROVIDER NOTES
SCRIBE #1 NOTE: I, Yousef Karley, am scribing for, and in the presence of, Celina Yang MD. I have scribed the entire note.       History     Chief Complaint   Patient presents with    Fall     Pt fell x 2 last night, family called for transport to get pt checked out. Pt denies any complaints.     Review of patient's allergies indicates:   Allergen Reactions    Penicillins          History of Present Illness     HPI    9/10/2022, 6:48 AM  History obtained from the patient, family, and EMS      History of Present Illness: Joseph Toussaint is a 89 y.o. male patient with a PMHx of HTN, COPD, CHF, and DM who presents to the Emergency Department for evaluation of a fall which occurred x 2 days pta. Pt family called for transport to get pt checked out. Pt denies any complaints and is not in pain. No associated sxs reported. Patient denies any CP, SOB, fever, back pain, abd pain, and all other sxs at this time. No prior Tx reported. No further complaints or concerns at this time. History limited due to dementia.       Arrival mode:  EMS    PCP: Abad Iqbal MD        Past Medical History:  Past Medical History:   Diagnosis Date    Acute coronary syndrome         CHF (congestive heart failure)     COPD (chronic obstructive pulmonary disease)     Diabetes mellitus     HTN (hypertension)        Past Surgical History:  No past surgical history on file.      Family History:  No family history on file.    Social History:  Social History     Tobacco Use    Smoking status: Former     Packs/day: 1.00     Types: Cigarettes     Quit date: 2022     Years since quittin.2    Smokeless tobacco: Never   Substance and Sexual Activity    Alcohol use: Not Currently     Comment: occasionally    Drug use: Not Currently    Sexual activity: Not Currently        Review of Systems     Review of Systems   Constitutional:  Negative for fever.        (+) fall   HENT:  Negative for sore throat.    Respiratory:  Negative for  shortness of breath.    Cardiovascular:  Negative for chest pain.   Gastrointestinal:  Negative for abdominal pain and nausea.   Genitourinary:  Negative for dysuria.   Musculoskeletal:  Negative for back pain.   Skin:  Negative for rash.   Neurological:  Negative for weakness.   Hematological:  Does not bruise/bleed easily.   All other systems reviewed and are negative.     Physical Exam     Initial Vitals [09/10/22 0645]   BP Pulse Resp Temp SpO2   (!) 146/84 70 16 98.7 °F (37.1 °C) 100 %      MAP       --          Physical Exam  Nursing Notes and Vital Signs Reviewed.  Constitutional: Patient is in no distress. Well-developed and well-nourished. Pleasantly demented.   Head: Atraumatic. Normocephalic.  Eyes: PERRL. EOM intact. Conjunctivae are not pale. No scleral icterus.  ENT: Mucous membranes are moist. Oropharynx is clear and symmetric.    Neck: Supple. Full ROM. No lymphadenopathy.  Cardiovascular: Regular rate. Regular rhythm. No murmurs, rubs, or gallops. Distal pulses are 2+ and symmetric.  Pulmonary/Chest: No respiratory distress. Clear to auscultation bilaterally. No wheezing or rales.  Abdominal: Soft and non-distended.  There is no tenderness.  No rebound, guarding, or rigidity. Good bowel sounds.  Genitourinary: No CVA tenderness  Musculoskeletal: Moves all extremities. No obvious deformities. No edema. No calf tenderness. No signs of trauma.  Skin: Warm and dry.  Neurological:  Alert, awake, and appropriate.  Normal speech.  No acute focal neurological deficits are appreciated. Oriented to person and place.   Psychiatric: Normal affect. Good eye contact. Appropriate in content.     ED Course   Critical Care    Date/Time: 9/10/2022 8:00 AM  Performed by: Celina Yang MD  Authorized by: Lan Escobar MD   Direct patient critical care time: 25 minutes  Additional history critical care time: 5 minutes  Ordering / reviewing critical care time: 5 minutes  Documentation critical care time: 5 minutes  Total  critical care time (exclusive of procedural time) : 40 minutes  Critical care was necessary to treat or prevent imminent or life-threatening deterioration of the following conditions: dehydration and renal failure.  Critical care was time spent personally by me on the following activities: blood draw for specimens, development of treatment plan with patient or surrogate, discussions with consultants, obtaining history from patient or surrogate, pulse oximetry, re-evaluation of patient's condition and review of old charts.      ED Vital Signs:  Vitals:    09/28/22 1530 09/28/22 1559 09/28/22 1616 09/28/22 1617   BP:  126/61     Pulse: 82 80 84 84   Resp:  20 20 20   Temp:  97.2 °F (36.2 °C)     TempSrc:  Axillary     SpO2:  (!) 93% 96% 96%   Weight:       Height:        09/28/22 1705 09/28/22 1926 09/28/22 2056 09/29/22 0409   BP:   (!) 148/89 (!) 145/88   Pulse: 80 80 86 86   Resp:  18 20 20   Temp:   97.9 °F (36.6 °C) 97.6 °F (36.4 °C)   TempSrc:   Oral Axillary   SpO2:  97% 100% 99%   Weight:       Height:        09/29/22 0735 09/29/22 0832 09/29/22 1000 09/29/22 1122   BP: (!) 170/94 (!) 154/105  129/71   Pulse: 78 80 82 83   Resp:    18   Temp:  97.1 °F (36.2 °C)  97.4 °F (36.3 °C)   TempSrc:    Axillary   SpO2:  100%     Weight:       Height:        09/29/22 1300 09/29/22 1320 09/29/22 1356   BP:      Pulse:  80 85   Resp:   18   Temp:      TempSrc:      SpO2: 100%  97%   Weight:      Height:          Abnormal Lab Results:  Labs Reviewed   CBC W/ AUTO DIFFERENTIAL - Abnormal; Notable for the following components:       Result Value    RBC 4.14 (*)     Hemoglobin 10.4 (*)     Hematocrit 32.3 (*)     MCV 78 (*)     MCH 25.1 (*)     RDW 19.7 (*)     Immature Granulocytes 0.8 (*)     Lymph # 0.8 (*)     Lymph % 16.0 (*)     All other components within normal limits   COMPREHENSIVE METABOLIC PANEL - Abnormal; Notable for the following components:    Chloride 111 (*)     CO2 18 (*)     Glucose 117 (*)     BUN 39 (*)      Creatinine 2.1 (*)     Albumin 3.2 (*)     Total Bilirubin 2.0 (*)     Alkaline Phosphatase 155 (*)     eGFR 29 (*)     All other components within normal limits   URINALYSIS, REFLEX TO URINE CULTURE - Abnormal; Notable for the following components:    Appearance, UA Cloudy (*)     Glucose, UA 4+ (*)     Occult Blood UA 1+ (*)     Leukocytes, UA 3+ (*)     All other components within normal limits    Narrative:     Specimen Source->Urine   URINALYSIS MICROSCOPIC - Abnormal; Notable for the following components:    RBC, UA 5 (*)     WBC, UA >100 (*)     WBC Clumps, UA Few (*)     Bacteria Many (*)     Yeast, UA Moderate (*)     All other components within normal limits    Narrative:     Specimen Source->Urine   SARS-COV-2 RNA AMPLIFICATION, QUAL        All Lab Results:  Results for orders placed or performed during the hospital encounter of 09/10/22   Urine culture    Specimen: Urine   Result Value Ref Range    Urine Culture, Routine No significant growth    Urine culture    Specimen: Urine   Result Value Ref Range    Urine Culture, Routine No growth    CBC auto differential   Result Value Ref Range    WBC 5.18 3.90 - 12.70 K/uL    RBC 4.14 (L) 4.60 - 6.20 M/uL    Hemoglobin 10.4 (L) 14.0 - 18.0 g/dL    Hematocrit 32.3 (L) 40.0 - 54.0 %    MCV 78 (L) 82 - 98 fL    MCH 25.1 (L) 27.0 - 31.0 pg    MCHC 32.2 32.0 - 36.0 g/dL    RDW 19.7 (H) 11.5 - 14.5 %    Platelets 185 150 - 450 K/uL    MPV 11.2 9.2 - 12.9 fL    Immature Granulocytes 0.8 (H) 0.0 - 0.5 %    Gran # (ANC) 3.5 1.8 - 7.7 K/uL    Immature Grans (Abs) 0.04 0.00 - 0.04 K/uL    Lymph # 0.8 (L) 1.0 - 4.8 K/uL    Mono # 0.7 0.3 - 1.0 K/uL    Eos # 0.0 0.0 - 0.5 K/uL    Baso # 0.01 0.00 - 0.20 K/uL    nRBC 0 0 /100 WBC    Gran % 67.9 38.0 - 73.0 %    Lymph % 16.0 (L) 18.0 - 48.0 %    Mono % 14.3 4.0 - 15.0 %    Eosinophil % 0.8 0.0 - 8.0 %    Basophil % 0.2 0.0 - 1.9 %    Differential Method Automated    Comprehensive metabolic panel   Result Value Ref Range     Sodium 141 136 - 145 mmol/L    Potassium 3.7 3.5 - 5.1 mmol/L    Chloride 111 (H) 95 - 110 mmol/L    CO2 18 (L) 23 - 29 mmol/L    Glucose 117 (H) 70 - 110 mg/dL    BUN 39 (H) 8 - 23 mg/dL    Creatinine 2.1 (H) 0.5 - 1.4 mg/dL    Calcium 9.5 8.7 - 10.5 mg/dL    Total Protein 7.3 6.0 - 8.4 g/dL    Albumin 3.2 (L) 3.5 - 5.2 g/dL    Total Bilirubin 2.0 (H) 0.1 - 1.0 mg/dL    Alkaline Phosphatase 155 (H) 55 - 135 U/L    AST 20 10 - 40 U/L    ALT 17 10 - 44 U/L    Anion Gap 12 8 - 16 mmol/L    eGFR 29 (A) >60 mL/min/1.73 m^2   Urinalysis, Reflex to Urine Culture Urine, Catheterized    Specimen: Urine   Result Value Ref Range    Specimen UA Urine, Clean Catch     Color, UA Yellow Yellow, Straw, Zully    Appearance, UA Cloudy (A) Clear    pH, UA 5.0 5.0 - 8.0    Specific Gravity, UA 1.030 1.005 - 1.030    Protein, UA Negative Negative    Glucose, UA 4+ (A) Negative    Ketones, UA Negative Negative    Bilirubin (UA) Negative Negative    Occult Blood UA 1+ (A) Negative    Nitrite, UA Negative Negative    Urobilinogen, UA Negative <2.0 EU/dL    Leukocytes, UA 3+ (A) Negative   Urinalysis Microscopic   Result Value Ref Range    RBC, UA 5 (H) 0 - 4 /hpf    WBC, UA >100 (H) 0 - 5 /hpf    WBC Clumps, UA Few (A) None-Rare    Bacteria Many (A) None-Occ /hpf    Yeast, UA Moderate (A) None    Microscopic Comment SEE COMMENT    COVID-19 Rapid Screening   Result Value Ref Range    SARS-CoV-2 RNA, Amplification, Qual Negative Negative   Comprehensive Metabolic Panel (CMP)   Result Value Ref Range    Sodium 141 136 - 145 mmol/L    Potassium 3.8 3.5 - 5.1 mmol/L    Chloride 112 (H) 95 - 110 mmol/L    CO2 17 (L) 23 - 29 mmol/L    Glucose 121 (H) 70 - 110 mg/dL    BUN 39 (H) 8 - 23 mg/dL    Creatinine 1.9 (H) 0.5 - 1.4 mg/dL    Calcium 9.0 8.7 - 10.5 mg/dL    Total Protein 6.5 6.0 - 8.4 g/dL    Albumin 3.1 (L) 3.5 - 5.2 g/dL    Total Bilirubin 1.8 (H) 0.1 - 1.0 mg/dL    Alkaline Phosphatase 158 (H) 55 - 135 U/L    AST 17 10 - 40 U/L    ALT  18 10 - 44 U/L    Anion Gap 12 8 - 16 mmol/L    eGFR 33 (A) >60 mL/min/1.73 m^2   Magnesium   Result Value Ref Range    Magnesium 2.1 1.6 - 2.6 mg/dL   CBC with Automated Differential   Result Value Ref Range    WBC 5.81 3.90 - 12.70 K/uL    RBC 4.05 (L) 4.60 - 6.20 M/uL    Hemoglobin 9.8 (L) 14.0 - 18.0 g/dL    Hematocrit 32.0 (L) 40.0 - 54.0 %    MCV 79 (L) 82 - 98 fL    MCH 24.2 (L) 27.0 - 31.0 pg    MCHC 30.6 (L) 32.0 - 36.0 g/dL    RDW 19.5 (H) 11.5 - 14.5 %    Platelets 168 150 - 450 K/uL    MPV 10.9 9.2 - 12.9 fL    Immature Granulocytes 0.3 0.0 - 0.5 %    Gran # (ANC) 3.6 1.8 - 7.7 K/uL    Immature Grans (Abs) 0.02 0.00 - 0.04 K/uL    Lymph # 0.9 (L) 1.0 - 4.8 K/uL    Mono # 1.2 (H) 0.3 - 1.0 K/uL    Eos # 0.0 0.0 - 0.5 K/uL    Baso # 0.02 0.00 - 0.20 K/uL    nRBC 0 0 /100 WBC    Gran % 62.0 38.0 - 73.0 %    Lymph % 15.7 (L) 18.0 - 48.0 %    Mono % 21.0 (H) 4.0 - 15.0 %    Eosinophil % 0.7 0.0 - 8.0 %    Basophil % 0.3 0.0 - 1.9 %    Differential Method Automated    Comprehensive Metabolic Panel (CMP)   Result Value Ref Range    Sodium 138 136 - 145 mmol/L    Potassium 3.6 3.5 - 5.1 mmol/L    Chloride 104 95 - 110 mmol/L    CO2 24 23 - 29 mmol/L    Glucose 121 (H) 70 - 110 mg/dL    BUN 37 (H) 8 - 23 mg/dL    Creatinine 1.7 (H) 0.5 - 1.4 mg/dL    Calcium 8.4 (L) 8.7 - 10.5 mg/dL    Total Protein 6.1 6.0 - 8.4 g/dL    Albumin 2.9 (L) 3.5 - 5.2 g/dL    Total Bilirubin 1.6 (H) 0.1 - 1.0 mg/dL    Alkaline Phosphatase 181 (H) 55 - 135 U/L    AST 31 10 - 40 U/L    ALT 26 10 - 44 U/L    Anion Gap 10 8 - 16 mmol/L    eGFR 38 (A) >60 mL/min/1.73 m^2   Magnesium   Result Value Ref Range    Magnesium 2.1 1.6 - 2.6 mg/dL   CBC with Automated Differential   Result Value Ref Range    WBC 5.91 3.90 - 12.70 K/uL    RBC 3.80 (L) 4.60 - 6.20 M/uL    Hemoglobin 9.3 (L) 14.0 - 18.0 g/dL    Hematocrit 29.6 (L) 40.0 - 54.0 %    MCV 78 (L) 82 - 98 fL    MCH 24.5 (L) 27.0 - 31.0 pg    MCHC 31.4 (L) 32.0 - 36.0 g/dL    RDW 19.0 (H)  11.5 - 14.5 %    Platelets 166 150 - 450 K/uL    MPV 11.4 9.2 - 12.9 fL    Immature Granulocytes 0.3 0.0 - 0.5 %    Gran # (ANC) 4.1 1.8 - 7.7 K/uL    Immature Grans (Abs) 0.02 0.00 - 0.04 K/uL    Lymph # 0.8 (L) 1.0 - 4.8 K/uL    Mono # 1.0 0.3 - 1.0 K/uL    Eos # 0.0 0.0 - 0.5 K/uL    Baso # 0.01 0.00 - 0.20 K/uL    nRBC 0 0 /100 WBC    Gran % 69.4 38.0 - 73.0 %    Lymph % 13.5 (L) 18.0 - 48.0 %    Mono % 16.1 (H) 4.0 - 15.0 %    Eosinophil % 0.5 0.0 - 8.0 %    Basophil % 0.2 0.0 - 1.9 %    Differential Method Automated    Comprehensive Metabolic Panel (CMP)   Result Value Ref Range    Sodium 139 136 - 145 mmol/L    Potassium 4.0 3.5 - 5.1 mmol/L    Chloride 106 95 - 110 mmol/L    CO2 20 (L) 23 - 29 mmol/L    Glucose 123 (H) 70 - 110 mg/dL    BUN 37 (H) 8 - 23 mg/dL    Creatinine 1.7 (H) 0.5 - 1.4 mg/dL    Calcium 8.7 8.7 - 10.5 mg/dL    Total Protein 6.1 6.0 - 8.4 g/dL    Albumin 2.8 (L) 3.5 - 5.2 g/dL    Total Bilirubin 1.5 (H) 0.1 - 1.0 mg/dL    Alkaline Phosphatase 180 (H) 55 - 135 U/L    AST 21 10 - 40 U/L    ALT 27 10 - 44 U/L    Anion Gap 13 8 - 16 mmol/L    eGFR 38 (A) >60 mL/min/1.73 m^2   Magnesium   Result Value Ref Range    Magnesium 2.3 1.6 - 2.6 mg/dL   CBC with Automated Differential   Result Value Ref Range    WBC 5.74 3.90 - 12.70 K/uL    RBC 4.07 (L) 4.60 - 6.20 M/uL    Hemoglobin 10.1 (L) 14.0 - 18.0 g/dL    Hematocrit 31.6 (L) 40.0 - 54.0 %    MCV 78 (L) 82 - 98 fL    MCH 24.8 (L) 27.0 - 31.0 pg    MCHC 32.0 32.0 - 36.0 g/dL    RDW 19.6 (H) 11.5 - 14.5 %    Platelets 172 150 - 450 K/uL    MPV 10.7 9.2 - 12.9 fL    Immature Granulocytes 0.3 0.0 - 0.5 %    Gran # (ANC) 3.8 1.8 - 7.7 K/uL    Immature Grans (Abs) 0.02 0.00 - 0.04 K/uL    Lymph # 1.0 1.0 - 4.8 K/uL    Mono # 0.9 0.3 - 1.0 K/uL    Eos # 0.0 0.0 - 0.5 K/uL    Baso # 0.02 0.00 - 0.20 K/uL    nRBC 1 (A) 0 /100 WBC    Gran % 66.3 38.0 - 73.0 %    Lymph % 16.6 (L) 18.0 - 48.0 %    Mono % 16.2 (H) 4.0 - 15.0 %    Eosinophil % 0.3 0.0 - 8.0  %    Basophil % 0.3 0.0 - 1.9 %    Differential Method Automated    Brain natriuretic peptide   Result Value Ref Range    BNP >4,900 (H) 0 - 99 pg/mL   Basic Metabolic Panel   Result Value Ref Range    Sodium 140 136 - 145 mmol/L    Potassium 4.0 3.5 - 5.1 mmol/L    Chloride 107 95 - 110 mmol/L    CO2 20 (L) 23 - 29 mmol/L    Glucose 133 (H) 70 - 110 mg/dL    BUN 36 (H) 8 - 23 mg/dL    Creatinine 1.5 (H) 0.5 - 1.4 mg/dL    Calcium 8.5 (L) 8.7 - 10.5 mg/dL    Anion Gap 13 8 - 16 mmol/L    eGFR 44 (A) >60 mL/min/1.73 m^2   Urinalysis, Reflex to Urine Culture Urine, Clean Catch    Specimen: Urine   Result Value Ref Range    Specimen UA Urine, Catheterized     Color, UA Brown (A) Yellow, Straw, Zully    Appearance, UA Cloudy (A) Clear    pH, UA 7.0 5.0 - 8.0    Specific Gravity, UA 1.015 1.005 - 1.030    Protein, UA 2+ (A) Negative    Glucose, UA 2+ (A) Negative    Ketones, UA Negative Negative    Bilirubin (UA) Negative Negative    Occult Blood UA 3+ (A) Negative    Nitrite, UA Negative Negative    Urobilinogen, UA Negative <2.0 EU/dL    Leukocytes, UA 3+ (A) Negative   Urinalysis Microscopic   Result Value Ref Range    RBC, UA >100 (H) 0 - 4 /hpf    WBC, UA >100 (H) 0 - 5 /hpf    WBC Clumps, UA Many (A) None-Rare    Bacteria Many (A) None-Occ /hpf    Hyaline Casts, UA 0 0-1/lpf /lpf    Microscopic Comment SEE COMMENT    CBC Auto Differential   Result Value Ref Range    WBC 5.83 3.90 - 12.70 K/uL    RBC 3.91 (L) 4.60 - 6.20 M/uL    Hemoglobin 9.5 (L) 14.0 - 18.0 g/dL    Hematocrit 29.3 (L) 40.0 - 54.0 %    MCV 75 (L) 82 - 98 fL    MCH 24.3 (L) 27.0 - 31.0 pg    MCHC 32.4 32.0 - 36.0 g/dL    RDW 19.0 (H) 11.5 - 14.5 %    Platelets 182 150 - 450 K/uL    MPV 10.3 9.2 - 12.9 fL    Immature Granulocytes 0.3 0.0 - 0.5 %    Gran # (ANC) 4.3 1.8 - 7.7 K/uL    Immature Grans (Abs) 0.02 0.00 - 0.04 K/uL    Lymph # 0.9 (L) 1.0 - 4.8 K/uL    Mono # 0.6 0.3 - 1.0 K/uL    Eos # 0.1 0.0 - 0.5 K/uL    Baso # 0.02 0.00 - 0.20 K/uL     nRBC 0 0 /100 WBC    Gran % 72.9 38.0 - 73.0 %    Lymph % 14.6 (L) 18.0 - 48.0 %    Mono % 11.0 4.0 - 15.0 %    Eosinophil % 0.9 0.0 - 8.0 %    Basophil % 0.3 0.0 - 1.9 %    Platelet Estimate Appears normal     Aniso Slight     Poik Slight     [place] Cells Occasional     Acanthocytes Present     Large/Giant Platelets Present     Differential Method Automated    Basic Metabolic Panel   Result Value Ref Range    Sodium 140 136 - 145 mmol/L    Potassium 3.3 (L) 3.5 - 5.1 mmol/L    Chloride 106 95 - 110 mmol/L    CO2 23 23 - 29 mmol/L    Glucose 120 (H) 70 - 110 mg/dL    BUN 29 (H) 8 - 23 mg/dL    Creatinine 1.2 0.5 - 1.4 mg/dL    Calcium 8.3 (L) 8.7 - 10.5 mg/dL    Anion Gap 11 8 - 16 mmol/L    eGFR 57 (A) >60 mL/min/1.73 m^2   CBC Auto Differential   Result Value Ref Range    WBC 6.27 3.90 - 12.70 K/uL    RBC 4.08 (L) 4.60 - 6.20 M/uL    Hemoglobin 9.8 (L) 14.0 - 18.0 g/dL    Hematocrit 31.7 (L) 40.0 - 54.0 %    MCV 78 (L) 82 - 98 fL    MCH 24.0 (L) 27.0 - 31.0 pg    MCHC 30.9 (L) 32.0 - 36.0 g/dL    RDW 18.8 (H) 11.5 - 14.5 %    Platelets 208 150 - 450 K/uL    MPV 11.0 9.2 - 12.9 fL    Immature Granulocytes 0.5 0.0 - 0.5 %    Gran # (ANC) 4.5 1.8 - 7.7 K/uL    Immature Grans (Abs) 0.03 0.00 - 0.04 K/uL    Lymph # 1.0 1.0 - 4.8 K/uL    Mono # 0.7 0.3 - 1.0 K/uL    Eos # 0.1 0.0 - 0.5 K/uL    Baso # 0.02 0.00 - 0.20 K/uL    nRBC 0 0 /100 WBC    Gran % 71.0 38.0 - 73.0 %    Lymph % 16.3 (L) 18.0 - 48.0 %    Mono % 10.8 4.0 - 15.0 %    Eosinophil % 1.1 0.0 - 8.0 %    Basophil % 0.3 0.0 - 1.9 %    Differential Method Automated    Comprehensive Metabolic Panel   Result Value Ref Range    Sodium 143 136 - 145 mmol/L    Potassium 3.5 3.5 - 5.1 mmol/L    Chloride 106 95 - 110 mmol/L    CO2 26 23 - 29 mmol/L    Glucose 112 (H) 70 - 110 mg/dL    BUN 25 (H) 8 - 23 mg/dL    Creatinine 1.1 0.5 - 1.4 mg/dL    Calcium 8.5 (L) 8.7 - 10.5 mg/dL    Total Protein 5.6 (L) 6.0 - 8.4 g/dL    Albumin 2.5 (L) 3.5 - 5.2 g/dL    Total Bilirubin  1.0 0.1 - 1.0 mg/dL    Alkaline Phosphatase 147 (H) 55 - 135 U/L    AST 12 10 - 40 U/L    ALT 17 10 - 44 U/L    Anion Gap 11 8 - 16 mmol/L    eGFR >60 >60 mL/min/1.73 m^2   Magnesium   Result Value Ref Range    Magnesium 2.4 1.6 - 2.6 mg/dL   CBC Auto Differential   Result Value Ref Range    WBC 8.10 3.90 - 12.70 K/uL    RBC 3.97 (L) 4.60 - 6.20 M/uL    Hemoglobin 9.4 (L) 14.0 - 18.0 g/dL    Hematocrit 31.7 (L) 40.0 - 54.0 %    MCV 80 (L) 82 - 98 fL    MCH 23.7 (L) 27.0 - 31.0 pg    MCHC 29.7 (L) 32.0 - 36.0 g/dL    RDW 19.1 (H) 11.5 - 14.5 %    Platelets 183 150 - 450 K/uL    MPV 10.1 9.2 - 12.9 fL    Immature Granulocytes 0.5 0.0 - 0.5 %    Gran # (ANC) 5.9 1.8 - 7.7 K/uL    Immature Grans (Abs) 0.04 0.00 - 0.04 K/uL    Lymph # 1.2 1.0 - 4.8 K/uL    Mono # 0.9 0.3 - 1.0 K/uL    Eos # 0.1 0.0 - 0.5 K/uL    Baso # 0.02 0.00 - 0.20 K/uL    nRBC 0 0 /100 WBC    Gran % 72.5 38.0 - 73.0 %    Lymph % 15.1 (L) 18.0 - 48.0 %    Mono % 11.0 4.0 - 15.0 %    Eosinophil % 0.7 0.0 - 8.0 %    Basophil % 0.2 0.0 - 1.9 %    Differential Method Automated    Magnesium   Result Value Ref Range    Magnesium 2.5 1.6 - 2.6 mg/dL   COVID-19 Rapid Screening   Result Value Ref Range    SARS-CoV-2 RNA, Amplification, Qual Negative Negative   CBC Auto Differential   Result Value Ref Range    WBC 6.28 3.90 - 12.70 K/uL    RBC 3.91 (L) 4.60 - 6.20 M/uL    Hemoglobin 9.5 (L) 14.0 - 18.0 g/dL    Hematocrit 30.0 (L) 40.0 - 54.0 %    MCV 77 (L) 82 - 98 fL    MCH 24.3 (L) 27.0 - 31.0 pg    MCHC 31.7 (L) 32.0 - 36.0 g/dL    RDW 19.0 (H) 11.5 - 14.5 %    Platelets 181 150 - 450 K/uL    MPV 11.0 9.2 - 12.9 fL    Immature Granulocytes 0.5 0.0 - 0.5 %    Gran # (ANC) 4.6 1.8 - 7.7 K/uL    Immature Grans (Abs) 0.03 0.00 - 0.04 K/uL    Lymph # 1.0 1.0 - 4.8 K/uL    Mono # 0.6 0.3 - 1.0 K/uL    Eos # 0.1 0.0 - 0.5 K/uL    Baso # 0.02 0.00 - 0.20 K/uL    nRBC 0 0 /100 WBC    Gran % 73.2 (H) 38.0 - 73.0 %    Lymph % 15.3 (L) 18.0 - 48.0 %    Mono % 9.4 4.0 -  15.0 %    Eosinophil % 1.3 0.0 - 8.0 %    Basophil % 0.3 0.0 - 1.9 %    Differential Method Automated    Comprehensive Metabolic Panel   Result Value Ref Range    Sodium 140 136 - 145 mmol/L    Potassium 4.0 3.5 - 5.1 mmol/L    Chloride 107 95 - 110 mmol/L    CO2 21 (L) 23 - 29 mmol/L    Glucose 123 (H) 70 - 110 mg/dL    BUN 29 (H) 8 - 23 mg/dL    Creatinine 1.0 0.5 - 1.4 mg/dL    Calcium 9.3 8.7 - 10.5 mg/dL    Total Protein 6.1 6.0 - 8.4 g/dL    Albumin 2.5 (L) 3.5 - 5.2 g/dL    Total Bilirubin 0.9 0.1 - 1.0 mg/dL    Alkaline Phosphatase 153 (H) 55 - 135 U/L    AST 21 10 - 40 U/L    ALT 18 10 - 44 U/L    Anion Gap 12 8 - 16 mmol/L    eGFR >60 >60 mL/min/1.73 m^2   Magnesium   Result Value Ref Range    Magnesium 2.2 1.6 - 2.6 mg/dL   Vitamin B12   Result Value Ref Range    Vitamin B-12 1414 (H) 210 - 950 pg/mL   Vitamin B1   Result Value Ref Range    Thiamine 49 38 - 122 ug/L   Folate   Result Value Ref Range    Folate 10.1 4.0 - 24.0 ng/mL   Basic metabolic panel   Result Value Ref Range    Sodium 142 136 - 145 mmol/L    Potassium 3.5 3.5 - 5.1 mmol/L    Chloride 111 (H) 95 - 110 mmol/L    CO2 23 23 - 29 mmol/L    Glucose 123 (H) 70 - 110 mg/dL    BUN 37 (H) 8 - 23 mg/dL    Creatinine 1.1 0.5 - 1.4 mg/dL    Calcium 9.1 8.7 - 10.5 mg/dL    Anion Gap 8 8 - 16 mmol/L    eGFR >60 >60 mL/min/1.73 m^2   POCT glucose   Result Value Ref Range    POCT Glucose 126 (H) 70 - 110 mg/dL   POCT glucose   Result Value Ref Range    POCT Glucose 134 (H) 70 - 110 mg/dL   POCT glucose   Result Value Ref Range    POCT Glucose 121 (H) 70 - 110 mg/dL   POCT glucose   Result Value Ref Range    POCT Glucose 171 (H) 70 - 110 mg/dL   POCT glucose   Result Value Ref Range    POCT Glucose 139 (H) 70 - 110 mg/dL   POCT glucose   Result Value Ref Range    POCT Glucose 128 (H) 70 - 110 mg/dL   POCT glucose   Result Value Ref Range    POCT Glucose 133 (H) 70 - 110 mg/dL   POCT glucose   Result Value Ref Range    POCT Glucose 134 (H) 70 - 110  mg/dL   POCT glucose   Result Value Ref Range    POCT Glucose 135 (H) 70 - 110 mg/dL   POCT glucose   Result Value Ref Range    POCT Glucose 128 (H) 70 - 110 mg/dL   POCT glucose   Result Value Ref Range    POCT Glucose 130 (H) 70 - 110 mg/dL   POCT glucose   Result Value Ref Range    POCT Glucose 114 (H) 70 - 110 mg/dL   POCT glucose   Result Value Ref Range    POCT Glucose 129 (H) 70 - 110 mg/dL   POCT glucose   Result Value Ref Range    POCT Glucose 133 (H) 70 - 110 mg/dL   POCT glucose   Result Value Ref Range    POCT Glucose 135 (H) 70 - 110 mg/dL   POCT glucose   Result Value Ref Range    POCT Glucose 121 (H) 70 - 110 mg/dL   POCT glucose   Result Value Ref Range    POCT Glucose 134 (H) 70 - 110 mg/dL   POCT glucose   Result Value Ref Range    POCT Glucose 134 (H) 70 - 110 mg/dL   POCT glucose   Result Value Ref Range    POCT Glucose 145 (H) 70 - 110 mg/dL   POCT glucose   Result Value Ref Range    POCT Glucose 118 (H) 70 - 110 mg/dL   POCT glucose   Result Value Ref Range    POCT Glucose 137 (H) 70 - 110 mg/dL   POCT glucose   Result Value Ref Range    POCT Glucose 151 (H) 70 - 110 mg/dL   POCT glucose   Result Value Ref Range    POCT Glucose 138 (H) 70 - 110 mg/dL   POCT glucose   Result Value Ref Range    POCT Glucose 110 70 - 110 mg/dL   POCT glucose   Result Value Ref Range    POCT Glucose 146 (H) 70 - 110 mg/dL   POCT glucose   Result Value Ref Range    POCT Glucose 129 (H) 70 - 110 mg/dL   POCT glucose   Result Value Ref Range    POCT Glucose 132 (H) 70 - 110 mg/dL   POCT glucose   Result Value Ref Range    POCT Glucose 124 (H) 70 - 110 mg/dL   POCT glucose   Result Value Ref Range    POCT Glucose 152 (H) 70 - 110 mg/dL   POCT glucose   Result Value Ref Range    POCT Glucose 157 (H) 70 - 110 mg/dL   POCT glucose   Result Value Ref Range    POCT Glucose 159 (H) 70 - 110 mg/dL   POCT glucose   Result Value Ref Range    POCT Glucose 140 (H) 70 - 110 mg/dL   POCT glucose   Result Value Ref Range    POCT  Glucose 114 (H) 70 - 110 mg/dL   POCT glucose   Result Value Ref Range    POCT Glucose 121 (H) 70 - 110 mg/dL   POCT glucose   Result Value Ref Range    POCT Glucose 123 (H) 70 - 110 mg/dL   POCT glucose   Result Value Ref Range    POCT Glucose 142 (H) 70 - 110 mg/dL   POCT glucose   Result Value Ref Range    POCT Glucose 136 (H) 70 - 110 mg/dL   POCT glucose   Result Value Ref Range    POCT Glucose 151 (H) 70 - 110 mg/dL   POCT glucose   Result Value Ref Range    POCT Glucose 154 (H) 70 - 110 mg/dL   POCT glucose   Result Value Ref Range    POCT Glucose 143 (H) 70 - 110 mg/dL   POCT glucose   Result Value Ref Range    POCT Glucose 125 (H) 70 - 110 mg/dL   POCT glucose   Result Value Ref Range    POCT Glucose 160 (H) 70 - 110 mg/dL   POCT glucose   Result Value Ref Range    POCT Glucose 162 (H) 70 - 110 mg/dL   POCT glucose   Result Value Ref Range    POCT Glucose 175 (H) 70 - 110 mg/dL   POCT glucose   Result Value Ref Range    POCT Glucose 123 (H) 70 - 110 mg/dL   POCT glucose   Result Value Ref Range    POCT Glucose 142 (H) 70 - 110 mg/dL   POCT glucose   Result Value Ref Range    POCT Glucose 128 (H) 70 - 110 mg/dL   POCT glucose   Result Value Ref Range    POCT Glucose 116 (H) 70 - 110 mg/dL   POCT glucose   Result Value Ref Range    POCT Glucose 128 (H) 70 - 110 mg/dL   POCT glucose   Result Value Ref Range    POCT Glucose 127 (H) 70 - 110 mg/dL   POCT glucose   Result Value Ref Range    POCT Glucose 129 (H) 70 - 110 mg/dL   POCT glucose   Result Value Ref Range    POCT Glucose 149 (H) 70 - 110 mg/dL   POCT glucose   Result Value Ref Range    POCT Glucose 120 (H) 70 - 110 mg/dL   POCT glucose   Result Value Ref Range    POCT Glucose 115 (H) 70 - 110 mg/dL   POCT glucose   Result Value Ref Range    POCT Glucose 225 (H) 70 - 110 mg/dL         Imaging Results:  Imaging Results    None                     The Emergency Provider reviewed the vital signs and test results, which are outlined above.     ED Discussion      9:07 AM: Discussed case with Margarita Botello NP ([facility]). Dr. Moura agrees with current care and management of pt and accepts admission.   Admitting Service: Hospital Medicine  Admitting Physician: Dr. Moura  Admit to: Obs     9:08 AM: Re-evaluated pt. I have discussed test results, shared treatment plan, and the need for admission with patient and family at bedside. Pt and family express understanding at this time and agree with all information. All questions answered. Pt and family have no further questions or concerns at this time. Pt is ready for admit.           Medical Decision Making:   Clinical Tests:   Lab Tests: Ordered and Reviewed         ED Medication(s):  Medications   sodium chloride 0.9% bolus 500 mL (0 mLs Intravenous Stopped 9/10/22 0811)   sodium chloride 0.9% bolus 1,000 mL (0 mLs Intravenous Stopped 9/10/22 0929)   potassium chloride SA CR tablet 40 mEq (40 mEq Oral Given 9/15/22 1642)   potassium chloride SA CR tablet 40 mEq (40 mEq Oral Given 9/16/22 1000)   mupirocin 2 % ointment ( Nasal Given 9/21/22 0949)   influenza (QUADRIVALENT ADJUVANTED PF) vaccine 0.5 mL (0.5 mLs Intramuscular Given 9/20/22 0924)   pneumoc 20-leonor conj-dip cr(PF) (PREVNAR-20 (PF)) injection Syrg 0.5 mL (0.5 mLs Intramuscular Given 9/23/22 1206)   QUEtiapine tablet 25 mg (25 mg Oral Given 9/20/22 2124)   cefTRIAXone (ROCEPHIN) 1 g/50 mL D5W IVPB (0 g Intravenous Stopped 9/21/22 1051)   magnesium hydroxide 400 mg/5 ml suspension 2,400 mg (2,400 mg Oral Given 9/29/22 1253)   sodium phosphates 19-7 gram/118 mL enema 1 enema (1 enema Rectal Given 9/29/22 1129)       Discharge Medication List as of 9/29/2022 12:18 PM        START taking these medications    Details   acetaminophen (TYLENOL) 325 MG tablet Take 2 tablets (650 mg total) by mouth every 4 (four) hours as needed for Pain., Starting Wed 9/21/2022, No Print      aluminum-magnesium hydroxide-simethicone (MAALOX) 200-200-20 mg/5 mL Susp Take 30 mLs  by mouth every 6 (six) hours as needed (indigestion)., Starting Wed 9/21/2022, No Print      bisacodyL (DULCOLAX) 10 mg Supp Place 1 suppository (10 mg total) rectally daily as needed (Until bowel movement if patient has no bowel movement for 2 days)., Starting Wed 9/21/2022, No Print      enoxaparin (LOVENOX) 40 mg/0.4 mL Syrg Inject 0.4 mLs (40 mg total) into the skin once daily. As DVT prophylaxis, Starting Wed 9/21/2022, No Print      multivitamin Tab Take 1 tablet by mouth once daily., Starting Wed 9/21/2022, No Print      senna-docusate 8.6-50 mg (SENNA WITH DOCUSATE SODIUM) 8.6-50 mg per tablet Take 1 tablet by mouth 2 (two) times a day., Starting Thu 9/29/2022, No Print              Follow-up Information       Yifan Sarmiento MD Follow up in 1 week(s).    Specialty: Urology  Why: Discharge with the christopher in place, and will follow-up with us in 1 week for a voiding trial.  Contact information:  [address] [place] [address] [address] [place] [address]  [phone]               Abad Iqbal MD Follow up in 1 week(s).    Specialty: Family Medicine  Contact information:  [address] [place] [address] Drive  [place] [address]  [phone]                                 Scribe Attestation:   Scribe #1: I performed the above scribed service and the documentation accurately describes the services I performed. I attest to the accuracy of the note.     Attending:   Physician Attestation Statement for Scribe #1: I, Celina Yang MD, personally performed the services described in this documentation, as scribed by Filemon Crandall, in my presence, and it is both accurate and complete.           Clinical Impression       ICD-10-CM ICD-9-CM   1. Acute renal failure, unspecified acute renal failure type  N17.9 584.9   2. Acute cystitis without hematuria  N30.00 595.0   3. Intravascular volume depletion  E86.1 276.52   4. Chest pain  R07.9 786.50   5. QT prolongation  R94.31 794.31       Disposition:   Disposition: Placed in  Observation  Condition: Fair       Celina Yang MD  09/10/22 1401       Celina Yang MD  10/06/22 7877

## 2022-09-10 NOTE — ASSESSMENT & PLAN NOTE
Cr: 2.1 on admission, at baseline in normal range    --S/P IV fluids  --Cautious hydration  --Repeat CMP in AM

## 2022-09-11 PROBLEM — R41.89 COGNITIVE IMPAIRMENT: Status: ACTIVE | Noted: 2022-08-01

## 2022-09-11 PROBLEM — E87.29 HYPERCHLOREMIC METABOLIC ACIDOSIS: Status: ACTIVE | Noted: 2022-09-11

## 2022-09-11 LAB
ALBUMIN SERPL BCP-MCNC: 3.1 G/DL (ref 3.5–5.2)
ALP SERPL-CCNC: 158 U/L (ref 55–135)
ALT SERPL W/O P-5'-P-CCNC: 18 U/L (ref 10–44)
ANION GAP SERPL CALC-SCNC: 12 MMOL/L (ref 8–16)
AST SERPL-CCNC: 17 U/L (ref 10–40)
BACTERIA UR CULT: NORMAL
BASOPHILS # BLD AUTO: 0.02 K/UL (ref 0–0.2)
BASOPHILS NFR BLD: 0.3 % (ref 0–1.9)
BILIRUB SERPL-MCNC: 1.8 MG/DL (ref 0.1–1)
BUN SERPL-MCNC: 39 MG/DL (ref 8–23)
CALCIUM SERPL-MCNC: 9 MG/DL (ref 8.7–10.5)
CHLORIDE SERPL-SCNC: 112 MMOL/L (ref 95–110)
CO2 SERPL-SCNC: 17 MMOL/L (ref 23–29)
CREAT SERPL-MCNC: 1.9 MG/DL (ref 0.5–1.4)
DIFFERENTIAL METHOD: ABNORMAL
EOSINOPHIL # BLD AUTO: 0 K/UL (ref 0–0.5)
EOSINOPHIL NFR BLD: 0.7 % (ref 0–8)
ERYTHROCYTE [DISTWIDTH] IN BLOOD BY AUTOMATED COUNT: 19.5 % (ref 11.5–14.5)
EST. GFR  (NO RACE VARIABLE): 33 ML/MIN/1.73 M^2
GLUCOSE SERPL-MCNC: 121 MG/DL (ref 70–110)
HCT VFR BLD AUTO: 32 % (ref 40–54)
HGB BLD-MCNC: 9.8 G/DL (ref 14–18)
IMM GRANULOCYTES # BLD AUTO: 0.02 K/UL (ref 0–0.04)
IMM GRANULOCYTES NFR BLD AUTO: 0.3 % (ref 0–0.5)
LYMPHOCYTES # BLD AUTO: 0.9 K/UL (ref 1–4.8)
LYMPHOCYTES NFR BLD: 15.7 % (ref 18–48)
MAGNESIUM SERPL-MCNC: 2.1 MG/DL (ref 1.6–2.6)
MCH RBC QN AUTO: 24.2 PG (ref 27–31)
MCHC RBC AUTO-ENTMCNC: 30.6 G/DL (ref 32–36)
MCV RBC AUTO: 79 FL (ref 82–98)
MONOCYTES # BLD AUTO: 1.2 K/UL (ref 0.3–1)
MONOCYTES NFR BLD: 21 % (ref 4–15)
NEUTROPHILS # BLD AUTO: 3.6 K/UL (ref 1.8–7.7)
NEUTROPHILS NFR BLD: 62 % (ref 38–73)
NRBC BLD-RTO: 0 /100 WBC
PLATELET # BLD AUTO: 168 K/UL (ref 150–450)
PMV BLD AUTO: 10.9 FL (ref 9.2–12.9)
POCT GLUCOSE: 121 MG/DL (ref 70–110)
POCT GLUCOSE: 126 MG/DL (ref 70–110)
POCT GLUCOSE: 134 MG/DL (ref 70–110)
POCT GLUCOSE: 171 MG/DL (ref 70–110)
POTASSIUM SERPL-SCNC: 3.8 MMOL/L (ref 3.5–5.1)
PROT SERPL-MCNC: 6.5 G/DL (ref 6–8.4)
RBC # BLD AUTO: 4.05 M/UL (ref 4.6–6.2)
SODIUM SERPL-SCNC: 141 MMOL/L (ref 136–145)
WBC # BLD AUTO: 5.81 K/UL (ref 3.9–12.7)

## 2022-09-11 PROCEDURE — A4217 STERILE WATER/SALINE, 500 ML: HCPCS | Performed by: NURSE PRACTITIONER

## 2022-09-11 PROCEDURE — 25000003 PHARM REV CODE 250: Performed by: NURSE PRACTITIONER

## 2022-09-11 PROCEDURE — 85025 COMPLETE CBC W/AUTO DIFF WBC: CPT | Performed by: NURSE PRACTITIONER

## 2022-09-11 PROCEDURE — 63600175 PHARM REV CODE 636 W HCPCS: Performed by: NURSE PRACTITIONER

## 2022-09-11 PROCEDURE — 80053 COMPREHEN METABOLIC PANEL: CPT | Performed by: NURSE PRACTITIONER

## 2022-09-11 PROCEDURE — 97166 OT EVAL MOD COMPLEX 45 MIN: CPT

## 2022-09-11 PROCEDURE — 94640 AIRWAY INHALATION TREATMENT: CPT | Mod: XB

## 2022-09-11 PROCEDURE — G0378 HOSPITAL OBSERVATION PER HR: HCPCS

## 2022-09-11 PROCEDURE — 36415 COLL VENOUS BLD VENIPUNCTURE: CPT | Performed by: NURSE PRACTITIONER

## 2022-09-11 PROCEDURE — 25000242 PHARM REV CODE 250 ALT 637 W/ HCPCS: Performed by: FAMILY MEDICINE

## 2022-09-11 PROCEDURE — 97530 THERAPEUTIC ACTIVITIES: CPT

## 2022-09-11 PROCEDURE — 94761 N-INVAS EAR/PLS OXIMETRY MLT: CPT

## 2022-09-11 PROCEDURE — 83735 ASSAY OF MAGNESIUM: CPT | Performed by: NURSE PRACTITIONER

## 2022-09-11 RX ORDER — CIPROFLOXACIN 750 MG/1
750 TABLET, FILM COATED ORAL DAILY
Status: DISCONTINUED | OUTPATIENT
Start: 2022-09-11 | End: 2022-09-14

## 2022-09-11 RX ADMIN — TIMOLOL MALEATE 1 DROP: 5 SOLUTION/ DROPS OPHTHALMIC at 09:09

## 2022-09-11 RX ADMIN — ASPIRIN 81 MG: 81 TABLET, COATED ORAL at 08:09

## 2022-09-11 RX ADMIN — CIPROFLOXACIN 400 MG: 2 INJECTION, SOLUTION INTRAVENOUS at 09:09

## 2022-09-11 RX ADMIN — QUETIAPINE FUMARATE 25 MG: 25 TABLET ORAL at 09:09

## 2022-09-11 RX ADMIN — TAMSULOSIN HYDROCHLORIDE 0.4 MG: 0.4 CAPSULE ORAL at 09:09

## 2022-09-11 RX ADMIN — SUCRALFATE 1 G: 1 SUSPENSION ORAL at 11:09

## 2022-09-11 RX ADMIN — ALUMINUM HYDROXIDE, MAGNESIUM HYDROXIDE, AND DIMETHICONE 30 ML: 200; 20; 200 SUSPENSION ORAL at 11:09

## 2022-09-11 RX ADMIN — CIPROFLOXACIN 750 MG: 750 TABLET, FILM COATED ORAL at 06:09

## 2022-09-11 RX ADMIN — TIMOLOL MALEATE 1 DROP: 5 SOLUTION/ DROPS OPHTHALMIC at 08:09

## 2022-09-11 RX ADMIN — ACETAMINOPHEN 650 MG: 325 TABLET ORAL at 09:09

## 2022-09-11 RX ADMIN — SUCRALFATE 1 G: 1 SUSPENSION ORAL at 01:09

## 2022-09-11 RX ADMIN — SUCRALFATE 1 G: 1 SUSPENSION ORAL at 05:09

## 2022-09-11 RX ADMIN — ALUMINUM HYDROXIDE, MAGNESIUM HYDROXIDE, AND DIMETHICONE 30 ML: 200; 20; 200 SUSPENSION ORAL at 06:09

## 2022-09-11 RX ADMIN — IPRATROPIUM BROMIDE 0.5 MG: 0.5 SOLUTION RESPIRATORY (INHALATION) at 07:09

## 2022-09-11 RX ADMIN — SODIUM BICARBONATE: 84 INJECTION, SOLUTION INTRAVENOUS at 10:09

## 2022-09-11 RX ADMIN — SUCRALFATE 1 G: 1 SUSPENSION ORAL at 06:09

## 2022-09-11 RX ADMIN — ALUMINUM HYDROXIDE, MAGNESIUM HYDROXIDE, AND DIMETHICONE 30 ML: 200; 20; 200 SUSPENSION ORAL at 09:09

## 2022-09-11 RX ADMIN — IPRATROPIUM BROMIDE 0.5 MG: 0.5 SOLUTION RESPIRATORY (INHALATION) at 12:09

## 2022-09-11 NOTE — NURSING
89 y/o male admitted for Acute cystitis with hematuria, noted guarding left arm, left shoulder stiff, unable to flex, entire left arm tender to touch, facial grimacing and moaning noted during repositioning, on-call provider notified.

## 2022-09-11 NOTE — ASSESSMENT & PLAN NOTE
Dx with UTI on 8/27 in ED, unclear if completed antibiotic regimen    --Follow urine culture  --Continue IV Cipro    9/11/22:Blood cultures show NGTD. Urine culture showed no growth but urine output is very cloudy -almost purulent. Will switch IV Cipro to oral dose.

## 2022-09-11 NOTE — PROGRESS NOTES
[facility] - Med Surg 3  [facility] Medicine  Progress Note    Patient Name: Joseph Toussaint  MRN: [id number]  Patient Class: OP- Observation   Admission Date: 9/10/2022  Length of Stay: 0 days  Attending Physician: Ren Moura MD  Primary Care Provider: Abad Iqbal MD        Subjective:     Principal Problem:Acute renal failure        HPI:  89 y.o. male patient with a PMHx of HTN, COPD, CHF, and DM who presents to the Emergency Department for evaluation of a fall which occurred x 2 days pta. Pt family called for transport to get pt checked out. Pt denies any complaints and is not in pain. No associated sxs reported. Patient denies any CP, SOB, fever, back pain, abd pain, and all other sxs at this time. No prior Tx reported. In the ED, UA consistent with UTI, dx with UTI on 8/27, unclear if patient completed antibiotic regimen. BUN/Cr: 39/2.1, Bili: 2.0, H/H: 10.4/32.3. Afebrile, vitals stable. Oriented to self only on exam. Intitiated on antibiotics in the ED, urine culture pending, treated with IV fluid bolus. Patient is a full code, surrogate decision maker is his son Kevin Toussaint. Placed in observation under the care of hospital medicine.       Overview/Hospital Course:  The patient is a 89 yo male with HTN, COPD, CHF, and DM who was placed in observation for frequent falls, MELISSA, and presumed UTI on IVF and IV  Rocephin.     9/11/22: Pt AAOx3 at times but with notable cognitive deficits. Denies complaints. MELISSA improving 2.1>1.9. Hyperchloremic metabolic acidosis noted- will switch NS IVFs to Bicarb drip. Blood cultures show NGTD. Urine culture showed no growth but urine output is very cloudy -almost purulent. Will switch IV Cipro to oral dose. PT/OT evaluated pt and recommended SNF. Called pt's nephew Kraig (POA) and provided status update.  Pt lives with his nephew and his wife. Currently, Kraig is in the hospital recovering from surgery. He requested discharge to SNF.      Interval History: see hospital  course    Review of Systems   Unable to perform ROS: Dementia   Objective:     Vital Signs (Most Recent):  Temp: 97.6 °F (36.4 °C) (09/11/22 1530)  Pulse: 81 (09/11/22 1530)  Resp: 18 (09/11/22 1530)  BP: 110/76 (09/11/22 1530)  SpO2: 99 % (09/11/22 1530) Vital Signs (24h Range):  Temp:  [97.2 °F (36.2 °C)-97.9 °F (36.6 °C)] 97.6 °F (36.4 °C)  Pulse:  [60-81] 81  Resp:  [18-19] 18  SpO2:  [94 %-100 %] 99 %  BP: (110-128)/(73-82) 110/76     Weight: 68.7 kg (151 lb 7.3 oz)  Body mass index is 20.83 kg/m².    Intake/Output Summary (Last 24 hours) at 9/11/2022 1617  Last data filed at 9/11/2022 1320  Gross per 24 hour   Intake 1253.44 ml   Output 900 ml   Net 353.44 ml      Physical Exam  Vitals and nursing note reviewed.   Constitutional:       Appearance: He is well-developed.   HENT:      Head: Normocephalic and atraumatic.      Nose: Nose normal.   Eyes:      General: No scleral icterus.     Pupils: Pupils are equal, round, and reactive to light.   Cardiovascular:      Rate and Rhythm: Normal rate and regular rhythm.      Heart sounds: Normal heart sounds. No murmur heard.    No friction rub. No gallop.   Pulmonary:      Effort: Pulmonary effort is normal. No respiratory distress.      Breath sounds: Normal breath sounds. No wheezing.   Abdominal:      General: Bowel sounds are normal. There is no distension.      Palpations: Abdomen is soft.      Tenderness: There is no abdominal tenderness.   Genitourinary:     Comments: Condom catheter in place with purulent UOP  Musculoskeletal:         General: Normal range of motion.      Cervical back: Normal range of motion and neck supple.   Skin:     General: Skin is warm and dry.      Findings: No rash.   Neurological:      Mental Status: He is alert and oriented to person, place, and time.      Cranial Nerves: No cranial nerve deficit.   Psychiatric:         Attention and Perception: He is inattentive.         Behavior: Behavior normal.         Cognition and Memory:  Cognition is impaired. He exhibits impaired recent memory.       Significant Labs: All pertinent labs within the past 24 hours have been reviewed.    Significant Imaging: I have reviewed all pertinent imaging results/findings within the past 24 hours.      Assessment/Plan:      * Acute renal failure  Cr: 2.1 on admission   at baseline, serum cr is in normal range    --IV fluids  --Cautious hydration  --Repeat CMP in AM      Acute cystitis with hematuria  Dx with UTI on 8/27 in ED, unclear if completed antibiotic regimen    --Follow urine culture  --Continue IV Cipro    9/11/22:Blood cultures show NGTD. Urine culture showed no growth but urine output is very cloudy -almost purulent. Will switch IV Cipro to oral dose.    Falls  Patient reported falling at home    --Avasys  --Fall Precautions    9/11/22: PT/OT  SNF placement - CM consulted     Hyperchloremic metabolic acidosis  IV bicarb drip   Monitor       Cognitive impairment  Confused on admission    --Avasys  --Fall precautions  --Neuro checks Q4H x24 hours    9/11/22: Pt AAOx3 at times but with notable cognitive deficits.      Chronic systolic congestive heart failure  Patient is identified as having Systolic (HFrEF) heart failure that is Chronic. CHF is currently controlled. Latest ECHO performed and demonstrates- Results for orders placed during the hospital encounter of 07/19/22    Echo    Interpretation Summary  · Concentric hypertrophy and severely decreased systolic function.  · Mild left atrial enlargement.  · The estimated ejection fraction is 20%.  · Left ventricular diastolic dysfunction.  · There is left ventricular global hypokinesis.  · Moderate right ventricular enlargement with mildly to moderately reduced right ventricular systolic function.  · Moderate right atrial enlargement.  · There is mild aortic valve stenosis.  · Aortic valve area is 1.90 cm2; peak velocity is 1.05 m/s; mean gradient is 2 mmHg.  · There is severe pulmonary hypertension.  ·  Moderate tricuspid regurgitation.  · Elevated central venous pressure (15 mmHg).  · The estimated PA systolic pressure is 103 mmHg.  . Continue ACE/ARB, Furosemide and Aldactone and monitor clinical status closely. Monitor on telemetry. Patient is on CHF pathway.  Monitor strict Is&Os and daily weights.  Place on fluid restriction of 1.5 L. Continue to stress to patient importance of self efficacy and  on diet for CHF. Last BNP reviewed- and noted below No results for input(s): BNP, BNPTRIAGEBLO in the last 168 hours..          Essential hypertension  Normotensive on admission    --continue home medications  --Hydralazine PRN  --Vitals Q4H      COPD suggested by initial evaluation  Negative for symptoms of exacerbation    --Continue home regimen            VTE Risk Mitigation (From admission, onward)         Ordered     Reason for No Pharmacological VTE Prophylaxis  Once        Question:  Reasons:  Answer:  Already adequately anticoagulated on oral Anticoagulants    09/10/22 1158     IP VTE HIGH RISK PATIENT  Once         09/10/22 1158     Place sequential compression device  Until discontinued         09/10/22 1158                Discharge Planning   TATIANA:      Code Status: Full Code   Is the patient medically ready for discharge?: Yes    Reason for patient still in hospital (select all that apply): Pending disposition                     Malaika Brand NP  Department of Hospital Medicine   [facility] - Med Surg 3

## 2022-09-11 NOTE — ASSESSMENT & PLAN NOTE
Cr: 2.1 on admission   at baseline, serum cr is in normal range    --IV fluids  --Cautious hydration  --Repeat CMP in AM

## 2022-09-11 NOTE — PLAN OF CARE
Problem: Diabetes Comorbidity  Goal: Blood Glucose Level Within Targeted Range  Intervention: Monitor and Manage Glycemia  Flowsheets (Taken 9/11/2022 0235)  Glycemic Management:   blood glucose monitored   oral glucose given     Problem: Fluid and Electrolyte Imbalance (Acute Kidney Injury/Impairment)  Goal: Fluid and Electrolyte Balance  Intervention: Monitor and Manage Fluid and Electrolyte Balance  Flowsheets (Taken 9/11/2022 0235)  Fluid/Electrolyte Management:   fluids provided   fluids adjusted   fluids restricted   intravenous fluids adjusted     Problem: Renal Function Impairment (Acute Kidney Injury/Impairment)  Goal: Effective Renal Function  Intervention: Monitor and Support Renal Function  Flowsheets (Taken 9/11/2022 0235)  Stabilization Measures:   provider notified   legs elevated  Medication Review/Management: medications reviewed     Problem: Fall Injury Risk  Goal: Absence of Fall and Fall-Related Injury  Intervention: Identify and Manage Contributors  Flowsheets (Taken 9/11/2022 0235)  Self-Care Promotion: independence encouraged  Medication Review/Management: medications reviewed  Intervention: Promote Injury-Free Environment  Flowsheets (Taken 9/11/2022 0235)  Safety Promotion/Fall Prevention:   assistive device/personal item within reach   bed alarm set   Fall Risk reviewed with patient/family   medications reviewed   nonskid shoes/socks when out of bed   instructed to call staff for mobility   lighting adjusted

## 2022-09-11 NOTE — SUBJECTIVE & OBJECTIVE
Interval History: see hospital course    Review of Systems   Unable to perform ROS: Dementia   Objective:     Vital Signs (Most Recent):  Temp: 97.6 °F (36.4 °C) (09/11/22 1530)  Pulse: 81 (09/11/22 1530)  Resp: 18 (09/11/22 1530)  BP: 110/76 (09/11/22 1530)  SpO2: 99 % (09/11/22 1530) Vital Signs (24h Range):  Temp:  [97.2 °F (36.2 °C)-97.9 °F (36.6 °C)] 97.6 °F (36.4 °C)  Pulse:  [60-81] 81  Resp:  [18-19] 18  SpO2:  [94 %-100 %] 99 %  BP: (110-128)/(73-82) 110/76     Weight: 68.7 kg (151 lb 7.3 oz)  Body mass index is 20.83 kg/m².    Intake/Output Summary (Last 24 hours) at 9/11/2022 1617  Last data filed at 9/11/2022 1320  Gross per 24 hour   Intake 1253.44 ml   Output 900 ml   Net 353.44 ml      Physical Exam  Vitals and nursing note reviewed.   Constitutional:       Appearance: He is well-developed.   HENT:      Head: Normocephalic and atraumatic.      Nose: Nose normal.   Eyes:      General: No scleral icterus.     Pupils: Pupils are equal, round, and reactive to light.   Cardiovascular:      Rate and Rhythm: Normal rate and regular rhythm.      Heart sounds: Normal heart sounds. No murmur heard.    No friction rub. No gallop.   Pulmonary:      Effort: Pulmonary effort is normal. No respiratory distress.      Breath sounds: Normal breath sounds. No wheezing.   Abdominal:      General: Bowel sounds are normal. There is no distension.      Palpations: Abdomen is soft.      Tenderness: There is no abdominal tenderness.   Genitourinary:     Comments: Condom catheter in place with purulent UOP  Musculoskeletal:         General: Normal range of motion.      Cervical back: Normal range of motion and neck supple.   Skin:     General: Skin is warm and dry.      Findings: No rash.   Neurological:      Mental Status: He is alert and oriented to person, place, and time.      Cranial Nerves: No cranial nerve deficit.   Psychiatric:         Attention and Perception: He is inattentive.         Behavior: Behavior normal.          Cognition and Memory: Cognition is impaired. He exhibits impaired recent memory.       Significant Labs: All pertinent labs within the past 24 hours have been reviewed.    Significant Imaging: I have reviewed all pertinent imaging results/findings within the past 24 hours.

## 2022-09-11 NOTE — PT/OT/SLP PROGRESS
Physical Therapy Treatment    Patient Name:  Joseph Toussaint   MRN:  [id number]    Recommendations:     Discharge Recommendations:  nursing facility, skilled   Discharge Equipment Recommendations:  (TBD AT NEXT LEVEL OF CARE)   Barriers to discharge: None    Assessment:     Joseph Toussaint is a 89 y.o. male admitted with a medical diagnosis of Acute cystitis with hematuria.  He presents with the following impairments/functional limitations:  weakness, impaired endurance, impaired functional mobility, gait instability, impaired balance, pain, decreased safety awareness, decreased lower extremity function, decreased coordination, impaired cardiopulmonary response to activity.    Rehab Prognosis: Fair; patient would benefit from acute skilled PT services to address these deficits and reach maximum level of function.    Recent Surgery: * No surgery found *     Plan:     During this hospitalization, patient to be seen 3 x/week to address the identified rehab impairments via gait training, therapeutic activities, therapeutic exercises and progress toward the following goals:    Plan of Care Expires:  09/24/22    Subjective     Chief Complaint:   Patient/Family Comments/goals:   Pain/Comfort:  Pain Rating 1: 0/10      Objective:     Communicated with NURSE ALAS prior to session.  Patient found supine with telemetry, peripheral IV, PureWick upon PT entry to room.     General Precautions: Standard, fall   Orthopedic Precautions:N/A   Braces: N/A, Shoulder abduction brace  Respiratory Status: Room air     Functional Mobility:  Bed Mobility:     Rolling Left:  moderate assistance and of 2 persons  Scooting: moderate assistance and of 2 persons  Supine to Sit: moderate assistance and of 2 persons  Transfers:     Sit to Stand:  maximal assistance and of 2 persons with no AD and hand-held assist, PT FLEXED FWD UNABLE TO STAND ERECT DESPITE MAX VERBAL AND TACTILE CUES  Bed to Chair: maximal assistance and of 2 persons with  no  "AD and hand-held assist  using  Step Transfer, VERY QUICK TO FATIGUE DURING TF  Balance: FAIR SITTING BALANCE, POOR STANDING BALANCE    AM-PAC 6 CLICK MOBILITY  Turning over in bed (including adjusting bedclothes, sheets and blankets)?: 2  Sitting down on and standing up from a chair with arms (e.g., wheelchair, bedside commode, etc.): 2  Moving from lying on back to sitting on the side of the bed?: 2  Moving to and from a bed to a chair (including a wheelchair)?: 2  Need to walk in hospital room?: 1  Climbing 3-5 steps with a railing?: 1  Basic Mobility Total Score: 10     Therapeutic Activities and Exercises:  REVIEW ROLE OF P.T. AND POC IN ACUTE CARE HOSPITAL SETTING, ENCOURAGED TO INCREASE TIME OOB IN CHAIR TO TOLERANCE, EDUCATED IN AND ENCOURAGED TO PERFORM BLE THEREX WHILE SEATED OR SUPINE THROUGHOUT THE DAY TO TOLERANCE: HIP FLEX/EXT, QUAD SET, LAQ, HEEL SLIDES, AP'S.  PT AGREEABLE TO REQUESTS  PT EDUCATED ON RISK FOR FALLS DUE TO GENERALIZED WEAKNESS, EDUCATED ON "CALL DON'T FALL", ENCOURAGED TO CALL FOR ASSISTANCE WITH ALL NEEDS SUCH AS BED<>CHAIR TRANSFERS OR TRIPS TO BATHROOM, PT AGREEABLE TO SAFETY PRECAUTIONS    Patient left up in chair with all lines intact, call button in reach, and NURSE notified..    GOALS:   Multidisciplinary Problems       Physical Therapy Goals          Problem: Physical Therapy    Goal Priority Disciplines Outcome Goal Variances Interventions   Physical Therapy Goal     PT, PT/OT Ongoing, Progressing     Description: Patient will be seen a minimal of 3 out of 7 days a week.    LTG to be met by 09/24:    Bed mobility: SPV  Transfers: CGA with RW   Gait: CGA with RW                       Time Tracking:     PT Received On: 09/11/22  PT Start Time: 1000     PT Stop Time: 1023  PT Total Time (min): 23 min     Billable Minutes: Therapeutic Activity 23    Treatment Type: Treatment  PT/PTA: PT     PTA Visit Number: 0     09/11/2022  "

## 2022-09-11 NOTE — HOSPITAL COURSE
The patient is a 89 yo male with HTN, COPD, CHF, and DM who was placed in observation for frequent falls, MELISSA, and presumed UTI on IVF and IV  Rocephin.     9/11/22: Pt AAOx3 at times but with notable cognitive deficits. Denies complaints. MELISSA improving 2.1>1.9. Hyperchloremic metabolic acidosis noted- will switch NS IVFs to Bicarb drip. Blood cultures show NGTD. Urine culture showed no growth but urine output is very cloudy -almost purulent. Will switch IV Cipro to oral dose. PT/OT evaluated pt and recommended SNF. Called pt's nephew Kraig (POA) and provided status update.  Pt lives with his nephew and his wife. Currently, Kraig is in the hospital recovering from surgery. He requested discharge to SNF.    9/12/22: Denies complaints. No acute events. MELISSA slowly improving 2.1>1.9>1.7. cont IVF. SNF placement pending     9/13/22: No acute events. Serum Cr remains 1.7. Will check BNP and Renal U/S. Hold IVFs  Awaiting SNF placement     9/14/22: Increased agitation - Seroquel increased to BID. Serum Cr 1.5. Renal U/s showed mild hydronephrosis on right and moderate on Left with distended bladder. Christopher placed-900 ml creamy white UOP immediately returned. Will d/c Cipro- add IV Rocephin. Urology consulted. Awaiting SNF placement     9/15/22: Seroquel in am made pt too drowsy- will decrease back to just nightly. Gross hematuria to UOP. Urology recommended continuous bladder irrigation. Serum Cr normalized after placing christopher. Hydronephrosis 2/2 urinary retention. Cont IV Rocephin. Repeat urine culture pending. Pt will be placed in SNF when hematuria resolves and repeat urine culture results.     9/16 Gross hematuria resolved. CBI clamped. Continue current treatment.   9/17 Patient remains stable and improved. No hematuria at this time.   9/18 Stable. SNF placement pending.   9/19 Stable. SNF placement pending.   9/20 Stable. SNF placement pending.   9/21 Stable.  Awaiting SNF placement.   9/22 Stable. Pleasant and  cooperative at this time.     As of 9/23/2022, pt seen and examined. He is more clam today, however not accepted by [facility], possible [facility] on Monday. Will follow.   As of 9/24/2022, vital signs stable. Patient more awake and alert today. Follow simple commands. Awaiting placement. Will monitor.     As of 9/25/2022, patient awake and cooperative. Vital signs stable. Awaiting placement, possible [facility] on Monday.      As of 9/26/2022, patient awake, following simple commends. Vital signs stable. Awaiting placement, [facility]. Case management following.     As of 9/27/2022, patient seen and examined. No distress. Vital signs stable. Awaiting SNF placement.     As of 9/28/2022, vital signs stable. No distress. Awaiting placement, plan discharge tomorrow [facility].     As of 9/29/2022, vital signs stable, no distress, patient experiencing some constipation. Laxative and enema given. Will discharge with stool softener, as well. Patient was seen, examined and deemed stable for discharge.

## 2022-09-11 NOTE — ASSESSMENT & PLAN NOTE
Patient reported falling at home    --Avasys  --Fall Precautions    9/11/22: PT/OT  SNF placement - CM consulted

## 2022-09-11 NOTE — PROGRESS NOTES
Pharmacist Renal Dose Adjustment Note    Joseph Toussaint is a 89 y.o. male being treated with the medication ciprofloxacin.     Patient Data:    Vital Signs (Most Recent):  Temp: 97.6 °F (36.4 °C) (09/11/22 1530)  Pulse: 81 (09/11/22 1530)  Resp: 18 (09/11/22 1530)  BP: 110/76 (09/11/22 1530)  SpO2: 99 % (09/11/22 1530) Vital Signs (72h Range):  Temp:  [97.2 °F (36.2 °C)-98.7 °F (37.1 °C)]   Pulse:  [60-92]   Resp:  [16-20]   BP: (110-146)/(73-87)   SpO2:  [94 %-100 %]      Recent Labs   Lab 09/10/22  0716 09/11/22  0423   CREATININE 2.1* 1.9*     Serum creatinine: 1.9 mg/dL (H) 09/11/22 0423  Estimated creatinine clearance: 25.1 mL/min (A)    Ciprofloxacin 500 mg oral every 12 hours will be changed to ciprofloxacin 750 mg oral every 24 hours per renal dose protocol for CrCl 10-30 ml/min.     Thank you,  Pharmacist's Name: Jose Manuel Hatfield       Ote[facility]la Pregnancy And Lactation Text: This medication is Pregnancy Category C and it isn't known if it is safe during pregnancy. It is unknown if it is excreted in breast milk.

## 2022-09-11 NOTE — PLAN OF CARE
OT hailey completed. Sup>sit mod A of 2, sit>stand max A of 2, step>pivot to bedside chair with max A of 2. Recommending SNF at d/c.

## 2022-09-11 NOTE — PT/OT/SLP EVAL
"Occupational Therapy   Evaluation    Name: Joseph Toussaint  MRN: [id number]  Admitting Diagnosis:  Acute cystitis with hematuria  Recent Surgery: * No surgery found *      Recommendations:     Discharge Recommendations: nursing facility, skilled  Discharge Equipment Recommendations:   (TBD at next level of care)  Barriers to discharge:  None    Assessment:     Joseph Toussaint is a 89 y.o. male with a medical diagnosis of Acute cystitis with hematuria.  He presents with the following performance deficits affecting function: weakness, impaired endurance, impaired self care skills, impaired functional mobility, impaired balance, impaired cardiopulmonary response to activity, decreased safety awareness.      Rehab Prognosis: Good and Fair; patient would benefit from acute skilled OT services to address these deficits and reach maximum level of function.       Plan:     Patient to be seen 2 x/week to address the above listed problems via self-care/home management, therapeutic activities, therapeutic exercises  Plan of Care Expires: 09/25/22  Plan of Care Reviewed with: patient    Subjective     Chief Complaint: Reported "I was doing good in [facility]."  Patient/Family Comments/goals: get stronger    Occupational Profile:  Living Environment: Patient resides in a 1 story home with no steps to enter, with his nephew & his wife. Moved from [place] this summer to  for increased A of family.  Previous level of function: Patient ambulated with 4WW and SBA at baseline. He required A with ADLs.   Roles and Routines: Does not drive or work.  Equipment Used at Home:  rollator, grab bar, wheelchair, bedside commode  Assistance upon Discharge: family    Pain/Comfort:  Pain Rating 1: 0/10    Objective:     Communicated with: NurseRoselyn, prior to session.  Patient found supine with PureWick, peripheral IV, telemetry (trapeze on bed) upon OT entry to room.    General Precautions: Standard, fall   Orthopedic Precautions:N/A   Braces: " N/A  Respiratory Status: Room air    Bed Mobility:    Patient completed Supine to Sit with moderate assistance    Functional Mobility/Transfers:  Patient completed Sit <> Stand Transfer with maximal assistance and of 2 persons  with  hand-held assist   Patient completed Bed <> Chair Transfer using Step Transfer technique with maximal assistance and of 2 persons with rolling walker  Functional Mobility: unable to complete severe posterior instability unimproved with cueing    Activities of Daily Living:  Grooming: moderate assistance .  Upper Body Dressing: maximal assistance .    Cognitive/Visual Perceptual:  Cognitive/Psychosocial Skills:     -       Oriented to: Person, Place, and Situation   -       Follows Commands/attention:Follows one-step commands  Visual/Perceptual:      -Impaired  unable to elicited specifics from patient, but baseline visual deficits present ,    Physical Exam:  Balance:    -       sitting: fair, static standing: poor, dynamic standing: absent  Upper Extremity Range of Motion:     -       Right Upper Extremity: WFL  -       Left Upper Extremity: WFL  Upper Extremity Strength:    -       Right Upper Extremity: Deficits: grossly 4-/5  -       Left Upper Extremity: Deficits: grossly 4-/5   Strength:    -       Right Upper Extremity: Deficits: fair  -       Left Upper Extremity: Deficits: fair    AMPAC 6 Click ADL:  AMPAC Total Score: 13    Treatment & Education:  Patient educated on role of OT in acute setting and benefits of participation. Educated on techniques to use to increase independence and decrease fall risk with functional transfers. Educated on importance of OOB activity and calling for A to transfer back to bed. Encouraged completion of B UE AROM therex throughout the day to tolerance to increase functional strength and activity tolerance. Patient stated understanding and in agreement with POC.    Patient left up in chair with all lines intact, call button in reach, and nurse  notified    GOALS:   Multidisciplinary Problems       Occupational Therapy Goals          Problem: Occupational Therapy    Goal Priority Disciplines Outcome Interventions   Occupational Therapy Goal     OT, PT/OT     Description: Goals to be met by: 9/25/22     Patient will increase functional independence with ADLs by performing:    Toileting from bedside commode with Contact Guard Assistance for hygiene and clothing management.   Toilet transfer to bedside commode with Contact Guard Assistance.  Increased functional strength in B UE grossly by 1/2 MM grade.                         History:     Past Medical History:   Diagnosis Date    Acute coronary syndrome     1979    CHF (congestive heart failure)     COPD (chronic obstructive pulmonary disease)     Diabetes mellitus     HTN (hypertension)        No past surgical history on file.    Time Tracking:     OT Date of Treatment: 09/11/22  OT Start Time: 1020  OT Stop Time: 1045  OT Total Time (min): 25 min    Billable Minutes:Evaluation 15  Therapeutic Activity 10    9/11/2022

## 2022-09-12 PROBLEM — E87.29 HYPERCHLOREMIC METABOLIC ACIDOSIS: Status: RESOLVED | Noted: 2022-09-11 | Resolved: 2022-09-12

## 2022-09-12 LAB
ALBUMIN SERPL BCP-MCNC: 2.9 G/DL (ref 3.5–5.2)
ALP SERPL-CCNC: 181 U/L (ref 55–135)
ALT SERPL W/O P-5'-P-CCNC: 26 U/L (ref 10–44)
ANION GAP SERPL CALC-SCNC: 10 MMOL/L (ref 8–16)
AST SERPL-CCNC: 31 U/L (ref 10–40)
BASOPHILS # BLD AUTO: 0.01 K/UL (ref 0–0.2)
BASOPHILS NFR BLD: 0.2 % (ref 0–1.9)
BILIRUB SERPL-MCNC: 1.6 MG/DL (ref 0.1–1)
BUN SERPL-MCNC: 37 MG/DL (ref 8–23)
CALCIUM SERPL-MCNC: 8.4 MG/DL (ref 8.7–10.5)
CHLORIDE SERPL-SCNC: 104 MMOL/L (ref 95–110)
CO2 SERPL-SCNC: 24 MMOL/L (ref 23–29)
CREAT SERPL-MCNC: 1.7 MG/DL (ref 0.5–1.4)
DIFFERENTIAL METHOD: ABNORMAL
EOSINOPHIL # BLD AUTO: 0 K/UL (ref 0–0.5)
EOSINOPHIL NFR BLD: 0.5 % (ref 0–8)
ERYTHROCYTE [DISTWIDTH] IN BLOOD BY AUTOMATED COUNT: 19 % (ref 11.5–14.5)
EST. GFR  (NO RACE VARIABLE): 38 ML/MIN/1.73 M^2
GLUCOSE SERPL-MCNC: 121 MG/DL (ref 70–110)
HCT VFR BLD AUTO: 29.6 % (ref 40–54)
HGB BLD-MCNC: 9.3 G/DL (ref 14–18)
IMM GRANULOCYTES # BLD AUTO: 0.02 K/UL (ref 0–0.04)
IMM GRANULOCYTES NFR BLD AUTO: 0.3 % (ref 0–0.5)
LYMPHOCYTES # BLD AUTO: 0.8 K/UL (ref 1–4.8)
LYMPHOCYTES NFR BLD: 13.5 % (ref 18–48)
MAGNESIUM SERPL-MCNC: 2.1 MG/DL (ref 1.6–2.6)
MCH RBC QN AUTO: 24.5 PG (ref 27–31)
MCHC RBC AUTO-ENTMCNC: 31.4 G/DL (ref 32–36)
MCV RBC AUTO: 78 FL (ref 82–98)
MONOCYTES # BLD AUTO: 1 K/UL (ref 0.3–1)
MONOCYTES NFR BLD: 16.1 % (ref 4–15)
NEUTROPHILS # BLD AUTO: 4.1 K/UL (ref 1.8–7.7)
NEUTROPHILS NFR BLD: 69.4 % (ref 38–73)
NRBC BLD-RTO: 0 /100 WBC
PLATELET # BLD AUTO: 166 K/UL (ref 150–450)
PMV BLD AUTO: 11.4 FL (ref 9.2–12.9)
POCT GLUCOSE: 128 MG/DL (ref 70–110)
POCT GLUCOSE: 133 MG/DL (ref 70–110)
POCT GLUCOSE: 139 MG/DL (ref 70–110)
POTASSIUM SERPL-SCNC: 3.6 MMOL/L (ref 3.5–5.1)
PROT SERPL-MCNC: 6.1 G/DL (ref 6–8.4)
RBC # BLD AUTO: 3.8 M/UL (ref 4.6–6.2)
SODIUM SERPL-SCNC: 138 MMOL/L (ref 136–145)
WBC # BLD AUTO: 5.91 K/UL (ref 3.9–12.7)

## 2022-09-12 PROCEDURE — 99900035 HC TECH TIME PER 15 MIN (STAT)

## 2022-09-12 PROCEDURE — 80053 COMPREHEN METABOLIC PANEL: CPT | Performed by: NURSE PRACTITIONER

## 2022-09-12 PROCEDURE — 97530 THERAPEUTIC ACTIVITIES: CPT | Mod: CQ

## 2022-09-12 PROCEDURE — 25000003 PHARM REV CODE 250: Performed by: NURSE PRACTITIONER

## 2022-09-12 PROCEDURE — 94761 N-INVAS EAR/PLS OXIMETRY MLT: CPT

## 2022-09-12 PROCEDURE — G0378 HOSPITAL OBSERVATION PER HR: HCPCS

## 2022-09-12 PROCEDURE — 94640 AIRWAY INHALATION TREATMENT: CPT | Mod: XB

## 2022-09-12 PROCEDURE — 97530 THERAPEUTIC ACTIVITIES: CPT

## 2022-09-12 PROCEDURE — 25000242 PHARM REV CODE 250 ALT 637 W/ HCPCS: Performed by: FAMILY MEDICINE

## 2022-09-12 PROCEDURE — 96372 THER/PROPH/DIAG INJ SC/IM: CPT | Performed by: NURSE PRACTITIONER

## 2022-09-12 PROCEDURE — 85025 COMPLETE CBC W/AUTO DIFF WBC: CPT | Performed by: NURSE PRACTITIONER

## 2022-09-12 PROCEDURE — 63600175 PHARM REV CODE 636 W HCPCS: Performed by: NURSE PRACTITIONER

## 2022-09-12 PROCEDURE — 97535 SELF CARE MNGMENT TRAINING: CPT

## 2022-09-12 PROCEDURE — 36415 COLL VENOUS BLD VENIPUNCTURE: CPT | Performed by: NURSE PRACTITIONER

## 2022-09-12 PROCEDURE — 83735 ASSAY OF MAGNESIUM: CPT | Performed by: NURSE PRACTITIONER

## 2022-09-12 RX ORDER — ENOXAPARIN SODIUM 100 MG/ML
30 INJECTION SUBCUTANEOUS EVERY 24 HOURS
Status: DISCONTINUED | OUTPATIENT
Start: 2022-09-12 | End: 2022-09-14

## 2022-09-12 RX ORDER — FLUTICASONE FUROATE AND VILANTEROL 100; 25 UG/1; UG/1
1 POWDER RESPIRATORY (INHALATION) DAILY
Qty: 60 EACH | Refills: 0 | OUTPATIENT
Start: 2022-09-12

## 2022-09-12 RX ORDER — SPIRONOLACTONE 25 MG/1
25 TABLET ORAL DAILY
Qty: 30 TABLET | Refills: 1 | OUTPATIENT
Start: 2022-09-12

## 2022-09-12 RX ORDER — EMPAGLIFLOZIN 10 MG/1
10 TABLET, FILM COATED ORAL DAILY
Qty: 30 TABLET | Refills: 1 | OUTPATIENT
Start: 2022-09-12 | End: 2023-09-12

## 2022-09-12 RX ORDER — PRAVASTATIN SODIUM 80 MG/1
80 TABLET ORAL NIGHTLY
Qty: 30 TABLET | Refills: 1 | OUTPATIENT
Start: 2022-09-12

## 2022-09-12 RX ORDER — TIOTROPIUM BROMIDE 18 UG/1
18 CAPSULE ORAL; RESPIRATORY (INHALATION) NIGHTLY
Qty: 30 CAPSULE | Refills: 1 | OUTPATIENT
Start: 2022-09-12

## 2022-09-12 RX ORDER — PANTOPRAZOLE SODIUM 40 MG/1
40 TABLET, DELAYED RELEASE ORAL DAILY
Qty: 30 TABLET | Refills: 1 | OUTPATIENT
Start: 2022-09-12

## 2022-09-12 RX ORDER — TAMSULOSIN HYDROCHLORIDE 0.4 MG/1
0.4 CAPSULE ORAL NIGHTLY
Qty: 30 CAPSULE | Refills: 1 | OUTPATIENT
Start: 2022-09-12 | End: 2023-09-12

## 2022-09-12 RX ORDER — SODIUM CHLORIDE 9 MG/ML
INJECTION, SOLUTION INTRAVENOUS CONTINUOUS
Status: DISCONTINUED | OUTPATIENT
Start: 2022-09-12 | End: 2022-09-15

## 2022-09-12 RX ADMIN — SODIUM CHLORIDE: 0.9 INJECTION, SOLUTION INTRAVENOUS at 05:09

## 2022-09-12 RX ADMIN — SUCRALFATE 1 G: 1 SUSPENSION ORAL at 05:09

## 2022-09-12 RX ADMIN — SUCRALFATE 1 G: 1 SUSPENSION ORAL at 11:09

## 2022-09-12 RX ADMIN — ALUMINUM HYDROXIDE, MAGNESIUM HYDROXIDE, AND DIMETHICONE 30 ML: 200; 20; 200 SUSPENSION ORAL at 04:09

## 2022-09-12 RX ADMIN — ALUMINUM HYDROXIDE, MAGNESIUM HYDROXIDE, AND DIMETHICONE 30 ML: 200; 20; 200 SUSPENSION ORAL at 10:09

## 2022-09-12 RX ADMIN — ALUMINUM HYDROXIDE, MAGNESIUM HYDROXIDE, AND DIMETHICONE 30 ML: 200; 20; 200 SUSPENSION ORAL at 05:09

## 2022-09-12 RX ADMIN — TIMOLOL MALEATE 1 DROP: 5 SOLUTION/ DROPS OPHTHALMIC at 09:09

## 2022-09-12 RX ADMIN — SUCRALFATE 1 G: 1 SUSPENSION ORAL at 12:09

## 2022-09-12 RX ADMIN — QUETIAPINE FUMARATE 25 MG: 25 TABLET ORAL at 10:09

## 2022-09-12 RX ADMIN — SUCRALFATE 1 G: 1 SUSPENSION ORAL at 07:09

## 2022-09-12 RX ADMIN — SUCRALFATE 1 G: 1 SUSPENSION ORAL at 01:09

## 2022-09-12 RX ADMIN — TAMSULOSIN HYDROCHLORIDE 0.4 MG: 0.4 CAPSULE ORAL at 10:09

## 2022-09-12 RX ADMIN — SODIUM CHLORIDE: 0.9 INJECTION, SOLUTION INTRAVENOUS at 04:09

## 2022-09-12 RX ADMIN — CIPROFLOXACIN 750 MG: 750 TABLET, FILM COATED ORAL at 09:09

## 2022-09-12 RX ADMIN — IPRATROPIUM BROMIDE 0.5 MG: 0.5 SOLUTION RESPIRATORY (INHALATION) at 12:09

## 2022-09-12 RX ADMIN — IPRATROPIUM BROMIDE 0.5 MG: 0.5 SOLUTION RESPIRATORY (INHALATION) at 01:09

## 2022-09-12 RX ADMIN — ENOXAPARIN SODIUM 30 MG: 30 INJECTION SUBCUTANEOUS at 04:09

## 2022-09-12 RX ADMIN — ASPIRIN 81 MG: 81 TABLET, COATED ORAL at 09:09

## 2022-09-12 RX ADMIN — ACETAMINOPHEN 650 MG: 325 TABLET ORAL at 10:09

## 2022-09-12 RX ADMIN — IPRATROPIUM BROMIDE 0.5 MG: 0.5 SOLUTION RESPIRATORY (INHALATION) at 08:09

## 2022-09-12 RX ADMIN — IPRATROPIUM BROMIDE 0.5 MG: 0.5 SOLUTION RESPIRATORY (INHALATION) at 07:09

## 2022-09-12 RX ADMIN — TIMOLOL MALEATE 1 DROP: 5 SOLUTION/ DROPS OPHTHALMIC at 10:09

## 2022-09-12 NOTE — PROGRESS NOTES
[facility] - Med Surg 3  [facility] Medicine  Progress Note    Patient Name: Joseph Toussaint  MRN: [id number]  Patient Class: OP- Observation   Admission Date: 9/10/2022  Length of Stay: 0 days  Attending Physician: Ren Moura MD  Primary Care Provider: Abad Iqbal MD        Subjective:     Principal Problem:Acute renal failure        HPI:  89 y.o. male patient with a PMHx of HTN, COPD, CHF, and DM who presents to the Emergency Department for evaluation of a fall which occurred x 2 days pta. Pt family called for transport to get pt checked out. Pt denies any complaints and is not in pain. No associated sxs reported. Patient denies any CP, SOB, fever, back pain, abd pain, and all other sxs at this time. No prior Tx reported. In the ED, UA consistent with UTI, dx with UTI on 8/27, unclear if patient completed antibiotic regimen. BUN/Cr: 39/2.1, Bili: 2.0, H/H: 10.4/32.3. Afebrile, vitals stable. Oriented to self only on exam. Intitiated on antibiotics in the ED, urine culture pending, treated with IV fluid bolus. Patient is a full code, surrogate decision maker is his son Kevin Toussaint. Placed in observation under the care of hospital medicine.       Overview/Hospital Course:  The patient is a 89 yo male with HTN, COPD, CHF, and DM who was placed in observation for frequent falls, MELISSA, and presumed UTI on IVF and IV  Rocephin.     9/11/22: Pt AAOx3 at times but with notable cognitive deficits. Denies complaints. MELISSA improving 2.1>1.9. Hyperchloremic metabolic acidosis noted- will switch NS IVFs to Bicarb drip. Blood cultures show NGTD. Urine culture showed no growth but urine output is very cloudy -almost purulent. Will switch IV Cipro to oral dose. PT/OT evaluated pt and recommended SNF. Called pt's nephew Kraig (POA) and provided status update.  Pt lives with his nephew and his wife. Currently, Kraig is in the hospital recovering from surgery. He requested discharge to SNF.    9/12/22: Denies complaints. No acute  events. MELISSA slowly improving 2.1>1.9>1.7. cont IVF. SNF placement pending       Interval History: see hospital course    Review of Systems   Unable to perform ROS: Dementia   Objective:     Vital Signs (Most Recent):  Temp: 97.6 °F (36.4 °C) (09/12/22 1209)  Pulse: 82 (09/12/22 1259)  Resp: 18 (09/12/22 1259)  BP: 137/85 (09/12/22 1209)  SpO2: 98 % (09/12/22 1259) Vital Signs (24h Range):  Temp:  [97.6 °F (36.4 °C)-98 °F (36.7 °C)] 97.6 °F (36.4 °C)  Pulse:  [68-82] 82  Resp:  [16-20] 18  SpO2:  [92 %-99 %] 98 %  BP: (110-137)/(69-85) 137/85     Weight: 68.7 kg (151 lb 7.3 oz)  Body mass index is 20.83 kg/m².    Intake/Output Summary (Last 24 hours) at 9/12/2022 1412  Last data filed at 9/12/2022 0500  Gross per 24 hour   Intake 1296.84 ml   Output 900 ml   Net 396.84 ml        Physical Exam  Vitals and nursing note reviewed.   Constitutional:       Appearance: He is well-developed.   HENT:      Head: Normocephalic and atraumatic.      Nose: Nose normal.   Eyes:      General: No scleral icterus.     Pupils: Pupils are equal, round, and reactive to light.   Cardiovascular:      Rate and Rhythm: Normal rate and regular rhythm.      Heart sounds: Normal heart sounds. No murmur heard.    No friction rub. No gallop.   Pulmonary:      Effort: Pulmonary effort is normal. No respiratory distress.      Breath sounds: Normal breath sounds. No wheezing.   Abdominal:      General: Bowel sounds are normal. There is no distension.      Palpations: Abdomen is soft.      Tenderness: There is no abdominal tenderness.   Genitourinary:     Comments: Condom catheter in place with purulent UOP  Musculoskeletal:         General: Normal range of motion.      Cervical back: Normal range of motion and neck supple.   Skin:     General: Skin is warm and dry.      Findings: No rash.   Neurological:      Mental Status: He is alert and oriented to person, place, and time.      Cranial Nerves: No cranial nerve deficit.   Psychiatric:          Attention and Perception: He is inattentive.         Behavior: Behavior normal.         Cognition and Memory: Cognition is impaired. He exhibits impaired recent memory.       Significant Labs: All pertinent labs within the past 24 hours have been reviewed.    Significant Imaging: I have reviewed all pertinent imaging results/findings within the past 24 hours.      Assessment/Plan:      * Acute renal failure  Cr: 2.1 on admission   at baseline, serum cr is in normal range    --IV fluids  --Cautious hydration  --Repeat CMP in AM    9/12/22: Denies complaints. No acute events. MELISSA slowly improving 2.1>1.9>1.7. cont IVF. SNF placement pending     Acute cystitis with hematuria  Dx with UTI on 8/27 in ED, unclear if completed antibiotic regimen    --Follow urine culture  --Continue IV Cipro    9/11/22:Blood cultures show NGTD. Urine culture showed no growth but urine output is very cloudy -almost purulent. Will switch IV Cipro to oral dose.    Falls  Patient reported falling at home    --Avasys  --Fall Precautions    9/11/22: PT/OT  SNF placement - CM consulted         Cognitive impairment  Confused on admission    --Avasys  --Fall precautions  --Neuro checks Q4H x24 hours    9/11/22: Pt AAOx3 at times but with notable cognitive deficits.      Chronic systolic congestive heart failure  Patient is identified as having Systolic (HFrEF) heart failure that is Chronic. CHF is currently controlled. Latest ECHO performed and demonstrates- Results for orders placed during the hospital encounter of 07/19/22    Echo    Interpretation Summary  · Concentric hypertrophy and severely decreased systolic function.  · Mild left atrial enlargement.  · The estimated ejection fraction is 20%.  · Left ventricular diastolic dysfunction.  · There is left ventricular global hypokinesis.  · Moderate right ventricular enlargement with mildly to moderately reduced right ventricular systolic function.  · Moderate right atrial enlargement.  · There  is mild aortic valve stenosis.  · Aortic valve area is 1.90 cm2; peak velocity is 1.05 m/s; mean gradient is 2 mmHg.  · There is severe pulmonary hypertension.  · Moderate tricuspid regurgitation.  · Elevated central venous pressure (15 mmHg).  · The estimated PA systolic pressure is 103 mmHg.  . Continue ACE/ARB, Furosemide and Aldactone and monitor clinical status closely. Monitor on telemetry. Patient is on CHF pathway.  Monitor strict Is&Os and daily weights.  Place on fluid restriction of 1.5 L. Continue to stress to patient importance of self efficacy and  on diet for CHF. Last BNP reviewed- and noted below No results for input(s): BNP, BNPTRIAGEBLO in the last 168 hours..          Essential hypertension  Normotensive on admission    --continue home medications  --Hydralazine PRN  --Vitals Q4H      COPD suggested by initial evaluation  Negative for symptoms of exacerbation    --Continue home regimen            VTE Risk Mitigation (From admission, onward)           Ordered     Reason for No Pharmacological VTE Prophylaxis  Once        Question:  Reasons:  Answer:  Already adequately anticoagulated on oral Anticoagulants    09/10/22 1158     IP VTE HIGH RISK PATIENT  Once         09/10/22 1158     Place sequential compression device  Until discontinued         09/10/22 1158                    Discharge Planning   TATIANA:      Code Status: Full Code   Is the patient medically ready for discharge?: Yes    Reason for patient still in hospital (select all that apply): Patient trending condition  Discharge Plan A: Skilled Nursing Facility                  Malaika Brand NP  Department of Hospital Medicine   [facility] - Med Surg 3

## 2022-09-12 NOTE — PLAN OF CARE
Continue OT POC.  Min A for rolling, mod A x2 for sup<>sit, Max A x2 for sit>stand from EOB with RW, Mod A x2 for step t/f > BSC and sit>stand from BSC with RW. Max A for lower body dressing, mod A for toileting.

## 2022-09-12 NOTE — PT/OT/SLP PROGRESS
Physical Therapy Treatment    Patient Name:  Joseph Toussaint   MRN:  [id number]    Recommendations:     Discharge Recommendations:  nursing facility, skilled   Discharge Equipment Recommendations:  (TBD AT NEXT LEVEL OF CARE)   Barriers to discharge: None    Assessment:     Joseph Toussaint is a 89 y.o. male admitted with a medical diagnosis of Acute renal failure.  He presents with the following impairments/functional limitations:  weakness, impaired endurance, impaired functional mobility, impaired self care skills, gait instability, impaired balance, decreased lower extremity function, decreased upper extremity function, pain, impaired cardiopulmonary response to activity, decreased safety awareness, decreased coordination, decreased ROM.    Pt demonstrates improved STS transfer progressing to MOD (A) of 2. Pt continues to show progress towards an improved level of functional mobility.    Rehab Prognosis: Fair; patient would benefit from acute skilled PT services to address these deficits and reach maximum level of function.    Recent Surgery: * No surgery found *      Co-treatment performed due to patient's multiple deficits requiring two skilled therapists to appropriately and safely assess patient's strength and endurance while facilitating functional tasks in addition to accommodating for patient's activity tolerance.   Plan:     During this hospitalization, patient to be seen 3 x/week to address the identified rehab impairments via gait training, therapeutic activities, therapeutic exercises and progress toward the following goals:    Plan of Care Expires:  09/24/22    Subjective     Chief Complaint: (L) shoulder pain  Patient/Family Comments/goals: to get to bedside commode on this date  Pain/Comfort:  Pain Rating 1:  (pt did not rate)  Location - Side 1: Bilateral  Location 1: shoulder  Pain Addressed 1: Reposition, Distraction, Cessation of Activity  Pain Rating Post-Intervention 1:  (pt did not rate, but  at rest)      Objective:     Communicated with patient's nurse prior to session.  Patient found HOB elevated, but slumped over to (L) side with telemetry, peripheral IV, PureWick upon PT entry to room.     Additional staff present: OTMarcella    General Precautions: Standard, fall   Orthopedic Precautions:N/A   Braces: N/A  Respiratory Status: Room air     Functional Mobility:  Bed Mobility:     Rolling Left: minimum assistance for brief donning  Scooting: maximal assistance, progressing to CGA to plant feet on floor  Supine to Sit: moderate assistance of 2 persons with use of overhead trapeze with (R) UE  Sit to Supine: moderate assistance of 2 persons  For LE and trunk management  Transfers:     Sit to Stand:  maximal assistance and of 2 persons with rolling walker  Pt able to come to full upright stand  Cued for sequencing, hand placement, anterior weight-shifting and hip extension  Bed to Bedside commode: moderate assistance  of 2 persons with rolling walker using Step Transfer  Inc'd assist with RW navigation  Balance:   Sitting: CGA  Standing: Mod (A) of 2       AM-PAC 6 CLICK MOBILITY  Turning over in bed (including adjusting bedclothes, sheets and blankets)?: 2  Sitting down on and standing up from a chair with arms (e.g., wheelchair, bedside commode, etc.): 2  Moving from lying on back to sitting on the side of the bed?: 2  Moving to and from a bed to a chair (including a wheelchair)?: 2  Need to walk in hospital room?: 1  Climbing 3-5 steps with a railing?: 1  Basic Mobility Total Score: 10       Therapeutic Activities and Exercises:   Pt educated on proper bed positioning, sit-to-stand transfer technique, and the importance of out of bed mobility to reduce functional decline during hospitalization.    Pt assisted to bedside commode for positive BM. Pt requires  set-up assistance for pericare in sitting.    Patient left HOB elevated with all lines intact, call button in reach, and pt's nurse  notified.    GOALS:   Multidisciplinary Problems       Physical Therapy Goals          Problem: Physical Therapy    Goal Priority Disciplines Outcome Goal Variances Interventions   Physical Therapy Goal     PT, PT/OT Ongoing, Progressing     Description: Patient will be seen a minimal of 3 out of 7 days a week.    LTG to be met by 09/24:    Bed mobility: SPV  Transfers: CGA with RW   Gait: CGA with RW                       Time Tracking:     PT Received On: 09/12/22  PT Start Time: 1122     PT Stop Time: 1205  PT Total Time (min): 43 min     Billable Minutes: Therapeutic Activity 43    Treatment Type: Treatment  PT/PTA: PTA     PTA Visit Number: 1     09/12/2022

## 2022-09-12 NOTE — PLAN OF CARE
[facility] - Med Surg 3  Initial Discharge Assessment       Primary Care Provider: Abad qIbal MD    Admission Diagnosis: Intravascular volume depletion [E86.1]  Acute cystitis without hematuria [N30.00]  Chest pain [R07.9]  Acute renal failure, unspecified acute renal failure type [N17.9]    Admission Date: 9/10/2022  Expected Discharge Date:     Discharge Barriers Identified: None    Payor: BCBS MGD MEDICARE / Plan: BCBS [employer] [place] / Product Type: Medicare Advantage /     Extended Emergency Contact Information  Primary Emergency Contact: Toussaint,Kevin  Mobile Phone: [phone]  Relation: Son  Preferred language: English   needed? No    Discharge Plan A: Skilled Nursing Facility  Discharge Plan B: Home Health      [id number] [place] - [id number]  [id number] [id number]  Phone: [phone] Fax: [phone]      Initial Assessment (most recent)       Adult Discharge Assessment - 09/12/22 0955          Discharge Assessment    Assessment Type Discharge Planning Assessment     Confirmed/corrected address, phone number and insurance Yes     Confirmed Demographics Correct on Facesheet     Source of Information family     Communicated TATIANA with patient/caregiver Date not available/Unable to determine     Reason For Admission Acute renal failure     Lives With child(dyan), adult     Facility Arrived From: home     Do you expect to return to your current living situation? Yes     Do you have help at home or someone to help you manage your care at home? Yes     Who are your caregiver(s) and their phone number(s)? Toussaint,Kevin (Son)     Prior to hospitilization cognitive status: Alert/Oriented     Current cognitive status: Not Oriented to Place;Not Oriented to Time     Walking or Climbing Stairs Difficulty ambulation difficulty, requires equipment     Mobility Management walker, cane     Dressing/Bathing Difficulty none     Home Accessibility  wheelchair accessible     Home Layout Able to live on 1st floor     Equipment Currently Used at Home cane, quad;walker, standard     Readmission within 30 days? No     Patient currently being followed by outpatient case management? No     Do you currently have service(s) that help you manage your care at home? Yes     Name and Contact number of agency [facility] [phone]     Is the pt/caregiver preference to resume services with current agency Yes     Do you take prescription medications? Yes     Do you have prescription coverage? Yes     Coverage Medicare     Do you have any problems affording any of your prescribed medications? No     Is the patient taking medications as prescribed? yes     Who is going to help you get home at discharge? Kraig Cardona     How do you get to doctors appointments? family or friend will provide     Are you on dialysis? No     Do you take coumadin? No     Discharge Plan A Skilled Nursing Facility     Discharge Plan B Home Health     DME Needed Upon Discharge  none     Discharge Plan discussed with: Patient;Adult children     Discharge Barriers Identified None        Relationship/Environment    Name(s) of Who Lives With Patient Kevin Toussaint, Son                   Anticipated DC dispo: skilled nursing facility   Prior Level of Function: Dependent with ADLs, lives with brendan Cornell   PCP: Abad Iqbal MD    Comments:  CM met with patient at bedside and spoke to Kraig cardona, to introduce role and discuss discharge planning. Patient lives with his son, Kraig, who will also be help at home and can provide transport at time of discharge. CM discharge needs depends on hospital progress. CM will continue following to assist with other needs.

## 2022-09-12 NOTE — ASSESSMENT & PLAN NOTE
Cr: 2.1 on admission   at baseline, serum cr is in normal range    --IV fluids  --Cautious hydration  --Repeat CMP in AM    9/12/22: Denies complaints. No acute events. MELISSA slowly improving 2.1>1.9>1.7. cont IVF. SNF placement pending

## 2022-09-12 NOTE — PLAN OF CARE
09/12/22 1111   Post-Acute Status   Post-Acute Authorization Placement   Post-Acute Placement Status Referrals Sent   Coverage BSBC MGD MCARE   Discharge Plan   Discharge Plan A Skilled Nursing Facility     Discussed recommendation of SNF with patient's son. Reviewed in network facility with high rating per Medicare's website. He is agreeable to referral being sent to the following: [facility], [facility], [facility] , [facility], [facility], [place], and [facility].

## 2022-09-12 NOTE — SUBJECTIVE & OBJECTIVE
Interval History: see hospital course    Review of Systems   Unable to perform ROS: Dementia   Objective:     Vital Signs (Most Recent):  Temp: 97.6 °F (36.4 °C) (09/12/22 1209)  Pulse: 82 (09/12/22 1259)  Resp: 18 (09/12/22 1259)  BP: 137/85 (09/12/22 1209)  SpO2: 98 % (09/12/22 1259) Vital Signs (24h Range):  Temp:  [97.6 °F (36.4 °C)-98 °F (36.7 °C)] 97.6 °F (36.4 °C)  Pulse:  [68-82] 82  Resp:  [16-20] 18  SpO2:  [92 %-99 %] 98 %  BP: (110-137)/(69-85) 137/85     Weight: 68.7 kg (151 lb 7.3 oz)  Body mass index is 20.83 kg/m².    Intake/Output Summary (Last 24 hours) at 9/12/2022 1412  Last data filed at 9/12/2022 0500  Gross per 24 hour   Intake 1296.84 ml   Output 900 ml   Net 396.84 ml        Physical Exam  Vitals and nursing note reviewed.   Constitutional:       Appearance: He is well-developed.   HENT:      Head: Normocephalic and atraumatic.      Nose: Nose normal.   Eyes:      General: No scleral icterus.     Pupils: Pupils are equal, round, and reactive to light.   Cardiovascular:      Rate and Rhythm: Normal rate and regular rhythm.      Heart sounds: Normal heart sounds. No murmur heard.    No friction rub. No gallop.   Pulmonary:      Effort: Pulmonary effort is normal. No respiratory distress.      Breath sounds: Normal breath sounds. No wheezing.   Abdominal:      General: Bowel sounds are normal. There is no distension.      Palpations: Abdomen is soft.      Tenderness: There is no abdominal tenderness.   Genitourinary:     Comments: Condom catheter in place with purulent UOP  Musculoskeletal:         General: Normal range of motion.      Cervical back: Normal range of motion and neck supple.   Skin:     General: Skin is warm and dry.      Findings: No rash.   Neurological:      Mental Status: He is alert and oriented to person, place, and time.      Cranial Nerves: No cranial nerve deficit.   Psychiatric:         Attention and Perception: He is inattentive.         Behavior: Behavior normal.          Cognition and Memory: Cognition is impaired. He exhibits impaired recent memory.       Significant Labs: All pertinent labs within the past 24 hours have been reviewed.    Significant Imaging: I have reviewed all pertinent imaging results/findings within the past 24 hours.

## 2022-09-12 NOTE — PROGRESS NOTES
Pharmacist Renal Dose Adjustment Note    Joseph Toussaint is a 89 y.o. male being treated with the medication enoxaparin.     Patient Data:    Vital Signs (Most Recent):  Temp: 97.6 °F (36.4 °C) (09/12/22 1209)  Pulse: 82 (09/12/22 1259)  Resp: 18 (09/12/22 1259)  BP: 137/85 (09/12/22 1209)  SpO2: 98 % (09/12/22 1259) Vital Signs (72h Range):  Temp:  [97.2 °F (36.2 °C)-98.7 °F (37.1 °C)]   Pulse:  [60-92]   Resp:  [16-20]   BP: (110-146)/(69-87)   SpO2:  [92 %-100 %]      Recent Labs   Lab 09/10/22  0716 09/11/22  0423 09/12/22  0350   CREATININE 2.1* 1.9* 1.7*     Serum creatinine: 1.7 mg/dL (H) 09/12/22 0350  Estimated creatinine clearance: 28.1 mL/min (A)    Enoxaparin 40 mg subq every 24 hours will be changed to enoxaparin 30 mg subq every 24 hours per renal dose protocol for CrCl < 30 ml/min.     Thank you,  Pharmacist's Name: Jose Manuel Hatfield

## 2022-09-12 NOTE — PT/OT/SLP PROGRESS
Occupational Therapy   Treatment    Name: Joseph Toussaint  MRN: [id number]  Admitting Diagnosis:  Acute renal failure       Recommendations:     Discharge Recommendations: nursing facility, skilled  Discharge Equipment Recommendations:  other (see comments) (TBD at next level of care)  Barriers to discharge:  None    Assessment:     Joseph Toussaint is a 89 y.o. male with a medical diagnosis of Acute renal failure.  He presents with the following performance deficits affecting function are weakness, impaired endurance, impaired self care skills, impaired functional mobility, gait instability, impaired balance, decreased upper extremity function, decreased lower extremity function, decreased safety awareness, pain, decreased ROM, decreased coordination, impaired cardiopulmonary response to activity.     Rehab Prognosis:  Fair; patient would benefit from acute skilled OT services to address these deficits and reach maximum level of function.       Plan:     Patient to be seen 2 x/week to address the above listed problems via self-care/home management, therapeutic activities, therapeutic exercises  Plan of Care Expires: 09/25/22  Plan of Care Reviewed with: patient    Subjective     Pain/Comfort:  Pain Rating 1: 0/10    L shoulder weakness and discomfort.     Objective:     Communicated with: nurse Sommers and Epic chart review prior to session.  Patient found HOB elevated with telemetry, peripheral IV, PureWick, Other (comments) (AVASYS and trapeze bar) upon OT entry to room.    General Precautions: Standard, fall   Orthopedic Precautions:N/A   Braces: N/A  Respiratory Status: Room air     Occupational Performance:     Bed Mobility:    Patient completed Rolling/Turning to Left with  minimum assistance  Patient completed Scooting/Bridging with moderate assistance  Patient completed Supine to Sit with moderate assistance, 2 persons, and with trapeze  Patient completed Sit to Supine with moderate assistance and 2 persons      Functional Mobility/Transfers:  Patient completed Toilet Transfer Step Transfer technique with moderate assistance and of 2 persons with  rolling walker to [facility]  Pt completed sit>stand from EOB with Max A x2 using RW.  Pt completed sit>stand from BSC with Mod A x2 using RW.    Activities of Daily Living:  Lower Body Dressing: maximal assistance to imelda brief. Pt fatigued after toileting.  Toileting: moderate assistance at [facility] to have BM. Pt required assist to doff brief and complete cleaning.     [facility] 6 Click ADL: 13    Treatment & Education:  Pt able to complete full stand with upright posture today. Pt reporting L shoulder weakness today. Educated on techniques to use to increase independence and decrease fall risk with functional transfers. Educated on importance of OOB activity and calling for A to meet needs. Encouraged completion of B UE AROM therex throughout the day to tolerance to increase functional strength and activity tolerance. Patient stated understanding and in agreement with POC.     Patient left HOB elevated with all lines intact, call button in reach, nurse notified, and AVASYS present    GOALS:   Multidisciplinary Problems       Occupational Therapy Goals          Problem: Occupational Therapy    Goal Priority Disciplines Outcome Interventions   Occupational Therapy Goal     OT, PT/OT Ongoing, Progressing    Description: Goals to be met by: 9/25/22     Patient will increase functional independence with ADLs by performing:    Toileting from bedside commode with Contact Guard Assistance for hygiene and clothing management.   Toilet transfer to bedside commode with Contact Guard Assistance.  Increased functional strength in B UE grossly by 1/2 MM grade.                         Time Tracking:     OT Date of Treatment: 09/12/22  OT Start Time: 1120  OT Stop Time: 1205  OT Total Time (min): 45 min    Billable Minutes:Self Care/Home Management 15  Therapeutic Activity 30    OT/TRICIA: OT      Marcella  David, OT     9/12/2022

## 2022-09-13 LAB
ALBUMIN SERPL BCP-MCNC: 2.8 G/DL (ref 3.5–5.2)
ALP SERPL-CCNC: 180 U/L (ref 55–135)
ALT SERPL W/O P-5'-P-CCNC: 27 U/L (ref 10–44)
ANION GAP SERPL CALC-SCNC: 13 MMOL/L (ref 8–16)
AST SERPL-CCNC: 21 U/L (ref 10–40)
BASOPHILS # BLD AUTO: 0.02 K/UL (ref 0–0.2)
BASOPHILS NFR BLD: 0.3 % (ref 0–1.9)
BILIRUB SERPL-MCNC: 1.5 MG/DL (ref 0.1–1)
BNP SERPL-MCNC: >4900 PG/ML (ref 0–99)
BUN SERPL-MCNC: 37 MG/DL (ref 8–23)
CALCIUM SERPL-MCNC: 8.7 MG/DL (ref 8.7–10.5)
CHLORIDE SERPL-SCNC: 106 MMOL/L (ref 95–110)
CO2 SERPL-SCNC: 20 MMOL/L (ref 23–29)
CREAT SERPL-MCNC: 1.7 MG/DL (ref 0.5–1.4)
DIFFERENTIAL METHOD: ABNORMAL
EOSINOPHIL # BLD AUTO: 0 K/UL (ref 0–0.5)
EOSINOPHIL NFR BLD: 0.3 % (ref 0–8)
ERYTHROCYTE [DISTWIDTH] IN BLOOD BY AUTOMATED COUNT: 19.6 % (ref 11.5–14.5)
EST. GFR  (NO RACE VARIABLE): 38 ML/MIN/1.73 M^2
GLUCOSE SERPL-MCNC: 123 MG/DL (ref 70–110)
HCT VFR BLD AUTO: 31.6 % (ref 40–54)
HGB BLD-MCNC: 10.1 G/DL (ref 14–18)
IMM GRANULOCYTES # BLD AUTO: 0.02 K/UL (ref 0–0.04)
IMM GRANULOCYTES NFR BLD AUTO: 0.3 % (ref 0–0.5)
LYMPHOCYTES # BLD AUTO: 1 K/UL (ref 1–4.8)
LYMPHOCYTES NFR BLD: 16.6 % (ref 18–48)
MAGNESIUM SERPL-MCNC: 2.3 MG/DL (ref 1.6–2.6)
MCH RBC QN AUTO: 24.8 PG (ref 27–31)
MCHC RBC AUTO-ENTMCNC: 32 G/DL (ref 32–36)
MCV RBC AUTO: 78 FL (ref 82–98)
MONOCYTES # BLD AUTO: 0.9 K/UL (ref 0.3–1)
MONOCYTES NFR BLD: 16.2 % (ref 4–15)
NEUTROPHILS # BLD AUTO: 3.8 K/UL (ref 1.8–7.7)
NEUTROPHILS NFR BLD: 66.3 % (ref 38–73)
NRBC BLD-RTO: 1 /100 WBC
PLATELET # BLD AUTO: 172 K/UL (ref 150–450)
PMV BLD AUTO: 10.7 FL (ref 9.2–12.9)
POCT GLUCOSE: 134 MG/DL (ref 70–110)
POCT GLUCOSE: 135 MG/DL (ref 70–110)
POTASSIUM SERPL-SCNC: 4 MMOL/L (ref 3.5–5.1)
PROT SERPL-MCNC: 6.1 G/DL (ref 6–8.4)
RBC # BLD AUTO: 4.07 M/UL (ref 4.6–6.2)
SODIUM SERPL-SCNC: 139 MMOL/L (ref 136–145)
WBC # BLD AUTO: 5.74 K/UL (ref 3.9–12.7)

## 2022-09-13 PROCEDURE — 85025 COMPLETE CBC W/AUTO DIFF WBC: CPT | Performed by: NURSE PRACTITIONER

## 2022-09-13 PROCEDURE — 25000242 PHARM REV CODE 250 ALT 637 W/ HCPCS: Performed by: FAMILY MEDICINE

## 2022-09-13 PROCEDURE — G0378 HOSPITAL OBSERVATION PER HR: HCPCS

## 2022-09-13 PROCEDURE — 94761 N-INVAS EAR/PLS OXIMETRY MLT: CPT

## 2022-09-13 PROCEDURE — 36415 COLL VENOUS BLD VENIPUNCTURE: CPT | Performed by: NURSE PRACTITIONER

## 2022-09-13 PROCEDURE — 63600175 PHARM REV CODE 636 W HCPCS: Performed by: NURSE PRACTITIONER

## 2022-09-13 PROCEDURE — 25000003 PHARM REV CODE 250: Performed by: NURSE PRACTITIONER

## 2022-09-13 PROCEDURE — 80053 COMPREHEN METABOLIC PANEL: CPT | Performed by: NURSE PRACTITIONER

## 2022-09-13 PROCEDURE — 94640 AIRWAY INHALATION TREATMENT: CPT | Mod: XB

## 2022-09-13 PROCEDURE — 96372 THER/PROPH/DIAG INJ SC/IM: CPT | Performed by: NURSE PRACTITIONER

## 2022-09-13 PROCEDURE — 83735 ASSAY OF MAGNESIUM: CPT | Performed by: NURSE PRACTITIONER

## 2022-09-13 PROCEDURE — 83880 ASSAY OF NATRIURETIC PEPTIDE: CPT | Performed by: NURSE PRACTITIONER

## 2022-09-13 PROCEDURE — 99900035 HC TECH TIME PER 15 MIN (STAT)

## 2022-09-13 RX ADMIN — TIMOLOL MALEATE 1 DROP: 5 SOLUTION/ DROPS OPHTHALMIC at 09:09

## 2022-09-13 RX ADMIN — ALUMINUM HYDROXIDE, MAGNESIUM HYDROXIDE, AND DIMETHICONE 30 ML: 200; 20; 200 SUSPENSION ORAL at 08:09

## 2022-09-13 RX ADMIN — SUCRALFATE 1 G: 1 SUSPENSION ORAL at 11:09

## 2022-09-13 RX ADMIN — ALUMINUM HYDROXIDE, MAGNESIUM HYDROXIDE, AND DIMETHICONE 30 ML: 200; 20; 200 SUSPENSION ORAL at 04:09

## 2022-09-13 RX ADMIN — CIPROFLOXACIN 750 MG: 750 TABLET, FILM COATED ORAL at 09:09

## 2022-09-13 RX ADMIN — IPRATROPIUM BROMIDE 0.5 MG: 0.5 SOLUTION RESPIRATORY (INHALATION) at 01:09

## 2022-09-13 RX ADMIN — IPRATROPIUM BROMIDE 0.5 MG: 0.5 SOLUTION RESPIRATORY (INHALATION) at 12:09

## 2022-09-13 RX ADMIN — ASPIRIN 81 MG: 81 TABLET, COATED ORAL at 09:09

## 2022-09-13 RX ADMIN — SUCRALFATE 1 G: 1 SUSPENSION ORAL at 05:09

## 2022-09-13 RX ADMIN — TAMSULOSIN HYDROCHLORIDE 0.4 MG: 0.4 CAPSULE ORAL at 08:09

## 2022-09-13 RX ADMIN — ALUMINUM HYDROXIDE, MAGNESIUM HYDROXIDE, AND DIMETHICONE 30 ML: 200; 20; 200 SUSPENSION ORAL at 05:09

## 2022-09-13 RX ADMIN — TIMOLOL MALEATE 1 DROP: 5 SOLUTION/ DROPS OPHTHALMIC at 08:09

## 2022-09-13 RX ADMIN — QUETIAPINE FUMARATE 25 MG: 25 TABLET ORAL at 08:09

## 2022-09-13 RX ADMIN — IPRATROPIUM BROMIDE 0.5 MG: 0.5 SOLUTION RESPIRATORY (INHALATION) at 07:09

## 2022-09-13 RX ADMIN — IPRATROPIUM BROMIDE 0.5 MG: 0.5 SOLUTION RESPIRATORY (INHALATION) at 09:09

## 2022-09-13 RX ADMIN — ENOXAPARIN SODIUM 30 MG: 30 INJECTION SUBCUTANEOUS at 05:09

## 2022-09-13 RX ADMIN — ALUMINUM HYDROXIDE, MAGNESIUM HYDROXIDE, AND DIMETHICONE 30 ML: 200; 20; 200 SUSPENSION ORAL at 11:09

## 2022-09-13 NOTE — PLAN OF CARE
Problem: Diabetes Comorbidity  Goal: Blood Glucose Level Within Targeted Range  Intervention: Monitor and Manage Glycemia  Flowsheets (Taken 9/13/2022 0245)  Glycemic Management:   blood glucose monitored   oral hydration promoted     Problem: Fall Injury Risk  Goal: Absence of Fall and Fall-Related Injury  Intervention: Identify and Manage Contributors  Flowsheets (Taken 9/13/2022 0245)  Self-Care Promotion: independence encouraged  Medication Review/Management: medications reviewed  Intervention: Promote Injury-Free Environment  Flowsheets (Taken 9/13/2022 0245)  Safety Promotion/Fall Prevention:   assistive device/personal item within reach   bed alarm set   bed alarm refused   Fall Risk reviewed with patient/family   medications reviewed   lighting adjusted   instructed to call staff for mobility     Problem: Skin Injury Risk Increased  Goal: Skin Health and Integrity  Intervention: Optimize Skin Protection  Flowsheets (Taken 9/13/2022 0245)  Pressure Reduction Techniques:   frequent weight shift encouraged   weight shift assistance provided  Head of Bed (HOB) Positioning: HOB elevated  Intervention: Promote and Optimize Oral Intake  Flowsheets (Taken 9/13/2022 0245)  Oral Nutrition Promotion: rest periods promoted

## 2022-09-13 NOTE — PLAN OF CARE
Spoke with Nimo of [facility]. She stated that they were unable to verify benefits and needs a copy of pt's ins card. Swer checked but card is not on file in pt's chart. Swer contacted registration (Ivelisse Eaton) who will email Swer with copy of pt's card. Swer will then forward to [facility].    Tor Morocho LMSW 9/13/2022 11:56 AM     Ins card received and uploaded to [facility].    Tor Morocho LMSW 9/13/2022 12:00 PM

## 2022-09-13 NOTE — PROGRESS NOTES
[facility] - Med Surg 3  [facility] Medicine  Progress Note    Patient Name: Joseph Toussaint  MRN: [id number]  Patient Class: OP- Observation   Admission Date: 9/10/2022  Length of Stay: 0 days  Attending Physician: Ren Moura MD  Primary Care Provider: Abad Iqbal MD        Subjective:     Principal Problem:Acute renal failure        HPI:  89 y.o. male patient with a PMHx of HTN, COPD, CHF, and DM who presents to the Emergency Department for evaluation of a fall which occurred x 2 days pta. Pt family called for transport to get pt checked out. Pt denies any complaints and is not in pain. No associated sxs reported. Patient denies any CP, SOB, fever, back pain, abd pain, and all other sxs at this time. No prior Tx reported. In the ED, UA consistent with UTI, dx with UTI on 8/27, unclear if patient completed antibiotic regimen. BUN/Cr: 39/2.1, Bili: 2.0, H/H: 10.4/32.3. Afebrile, vitals stable. Oriented to self only on exam. Intitiated on antibiotics in the ED, urine culture pending, treated with IV fluid bolus. Patient is a full code, surrogate decision maker is his son Kevin Toussaint. Placed in observation under the care of hospital medicine.       Overview/Hospital Course:  The patient is a 89 yo male with HTN, COPD, CHF, and DM who was placed in observation for frequent falls, MELISSA, and presumed UTI on IVF and IV  Rocephin.     9/11/22: Pt AAOx3 at times but with notable cognitive deficits. Denies complaints. MELISSA improving 2.1>1.9. Hyperchloremic metabolic acidosis noted- will switch NS IVFs to Bicarb drip. Blood cultures show NGTD. Urine culture showed no growth but urine output is very cloudy -almost purulent. Will switch IV Cipro to oral dose. PT/OT evaluated pt and recommended SNF. Called pt's nephew Kraig (POA) and provided status update.  Pt lives with his nephew and his wife. Currently, Kraig is in the hospital recovering from surgery. He requested discharge to SNF.    9/12/22: Denies complaints. No acute  events. MELISSA slowly improving 2.1>1.9>1.7. cont IVF. SNF placement pending     9/13/22: No acute events. Serum Cr remains 1.7. Will check BNP and Renal U/S. Hold IVFs  Awaiting SNF placement       Interval History: see hospital course    Review of Systems   Unable to perform ROS: Dementia   Objective:     Vital Signs (Most Recent):  Temp: 98 °F (36.7 °C) (09/13/22 1149)  Pulse: 83 (09/13/22 1350)  Resp: 16 (09/13/22 1350)  BP: 125/78 (09/13/22 1149)  SpO2: 98 % (09/13/22 1350) Vital Signs (24h Range):  Temp:  [97.9 °F (36.6 °C)-98 °F (36.7 °C)] 98 °F (36.7 °C)  Pulse:  [67-88] 83  Resp:  [16-20] 16  SpO2:  [96 %-98 %] 98 %  BP: (112-140)/(70-78) 125/78     Weight: 68.7 kg (151 lb 7.3 oz)  Body mass index is 20.83 kg/m².    Intake/Output Summary (Last 24 hours) at 9/13/2022 1417  Last data filed at 9/13/2022 0200  Gross per 24 hour   Intake 540 ml   Output 500 ml   Net 40 ml        Physical Exam  Vitals and nursing note reviewed.   Constitutional:       Appearance: He is well-developed.   HENT:      Head: Normocephalic and atraumatic.      Nose: Nose normal.   Eyes:      General: No scleral icterus.     Pupils: Pupils are equal, round, and reactive to light.   Cardiovascular:      Rate and Rhythm: Normal rate and regular rhythm.      Heart sounds: Normal heart sounds. No murmur heard.    No friction rub. No gallop.   Pulmonary:      Effort: Pulmonary effort is normal. No respiratory distress.      Breath sounds: Normal breath sounds. No wheezing.   Abdominal:      General: Bowel sounds are normal. There is no distension.      Palpations: Abdomen is soft.      Tenderness: There is no abdominal tenderness.   Genitourinary:     Comments: Condom catheter in place   Musculoskeletal:         General: Normal range of motion.      Cervical back: Normal range of motion and neck supple.   Skin:     General: Skin is warm and dry.      Findings: No rash.   Neurological:      Mental Status: He is alert and oriented to person,  place, and time.      Cranial Nerves: No cranial nerve deficit.   Psychiatric:         Attention and Perception: He is inattentive.         Behavior: Behavior normal.         Cognition and Memory: Cognition is impaired. He exhibits impaired recent memory.       Significant Labs: All pertinent labs within the past 24 hours have been reviewed.    Significant Imaging: I have reviewed all pertinent imaging results/findings within the past 24 hours.      Assessment/Plan:      * Acute renal failure  Cr: 2.1 on admission   at baseline, serum cr is in normal range    --IV fluids  --Cautious hydration  --Repeat CMP in AM    9/12/22: Denies complaints. No acute events. MELISSA slowly improving 2.1>1.9>1.7. cont IVF. SNF placement pending     9/13/22: No acute events. Serum Cr remains 1.7. Will check BNP and Renal U/S. Hold IVFs  Awaiting SNF placement     Acute cystitis with hematuria  Dx with UTI on 8/27 in ED, unclear if completed antibiotic regimen    --Follow urine culture  --Continue IV Cipro    9/11/22:Blood cultures show NGTD. Urine culture showed no growth but urine output is very cloudy -almost purulent. Will switch IV Cipro to oral dose.    Falls  Patient reported falling at home    --Avasys  --Fall Precautions    9/11/22: PT/OT  SNF placement - CM consulted     Cognitive impairment  Confused on admission    --Avasys  --Fall precautions  --Neuro checks Q4H x24 hours    9/11/22: Pt AAOx3 at times but with notable cognitive deficits.      Chronic systolic congestive heart failure  Patient is identified as having Systolic (HFrEF) heart failure that is Chronic. CHF is currently controlled. Latest ECHO performed and demonstrates- Results for orders placed during the hospital encounter of 07/19/22    Echo    Interpretation Summary  · Concentric hypertrophy and severely decreased systolic function.  · Mild left atrial enlargement.  · The estimated ejection fraction is 20%.  · Left ventricular diastolic dysfunction.  · There is  left ventricular global hypokinesis.  · Moderate right ventricular enlargement with mildly to moderately reduced right ventricular systolic function.  · Moderate right atrial enlargement.  · There is mild aortic valve stenosis.  · Aortic valve area is 1.90 cm2; peak velocity is 1.05 m/s; mean gradient is 2 mmHg.  · There is severe pulmonary hypertension.  · Moderate tricuspid regurgitation.  · Elevated central venous pressure (15 mmHg).  · The estimated PA systolic pressure is 103 mmHg.  . Continue ACE/ARB, Furosemide and Aldactone and monitor clinical status closely. Monitor on telemetry. Patient is on CHF pathway.  Monitor strict Is&Os and daily weights.  Place on fluid restriction of 1.5 L. Continue to stress to patient importance of self efficacy and  on diet for CHF. Last BNP reviewed- and noted below No results for input(s): BNP, BNPTRIAGEBLO in the last 168 hours..          Essential hypertension  Normotensive on admission    --continue home medications  --Hydralazine PRN  --Vitals Q4H      COPD suggested by initial evaluation  Negative for symptoms of exacerbation    --Continue home regimen            VTE Risk Mitigation (From admission, onward)         Ordered     enoxaparin injection 30 mg  Daily         09/12/22 1416     Reason for No Pharmacological VTE Prophylaxis  Once        Question:  Reasons:  Answer:  Already adequately anticoagulated on oral Anticoagulants    09/10/22 1158     IP VTE HIGH RISK PATIENT  Once         09/10/22 1158     Place sequential compression device  Until discontinued         09/10/22 1158                Discharge Planning   TATIAAN:      Code Status: Full Code   Is the patient medically ready for discharge?: Yes    Reason for patient still in hospital (select all that apply): Patient trending condition  Discharge Plan A: Skilled Nursing Facility                  Malaika Brand NP  Department of Hospital Medicine   [facility] - [facility]

## 2022-09-13 NOTE — NURSING
Pt remained free from falls entire shift. Pt alert and oriented to self. Confusion intermittent throughout shift, pt was redirected. IV infusing Ns, bed in lowest position, pt id band on, and call light in reach.

## 2022-09-13 NOTE — SUBJECTIVE & OBJECTIVE
Interval History: see hospital course    Review of Systems   Unable to perform ROS: Dementia   Objective:     Vital Signs (Most Recent):  Temp: 98 °F (36.7 °C) (09/13/22 1149)  Pulse: 83 (09/13/22 1350)  Resp: 16 (09/13/22 1350)  BP: 125/78 (09/13/22 1149)  SpO2: 98 % (09/13/22 1350) Vital Signs (24h Range):  Temp:  [97.9 °F (36.6 °C)-98 °F (36.7 °C)] 98 °F (36.7 °C)  Pulse:  [67-88] 83  Resp:  [16-20] 16  SpO2:  [96 %-98 %] 98 %  BP: (112-140)/(70-78) 125/78     Weight: 68.7 kg (151 lb 7.3 oz)  Body mass index is 20.83 kg/m².    Intake/Output Summary (Last 24 hours) at 9/13/2022 1417  Last data filed at 9/13/2022 0200  Gross per 24 hour   Intake 540 ml   Output 500 ml   Net 40 ml        Physical Exam  Vitals and nursing note reviewed.   Constitutional:       Appearance: He is well-developed.   HENT:      Head: Normocephalic and atraumatic.      Nose: Nose normal.   Eyes:      General: No scleral icterus.     Pupils: Pupils are equal, round, and reactive to light.   Cardiovascular:      Rate and Rhythm: Normal rate and regular rhythm.      Heart sounds: Normal heart sounds. No murmur heard.    No friction rub. No gallop.   Pulmonary:      Effort: Pulmonary effort is normal. No respiratory distress.      Breath sounds: Normal breath sounds. No wheezing.   Abdominal:      General: Bowel sounds are normal. There is no distension.      Palpations: Abdomen is soft.      Tenderness: There is no abdominal tenderness.   Genitourinary:     Comments: Condom catheter in place   Musculoskeletal:         General: Normal range of motion.      Cervical back: Normal range of motion and neck supple.   Skin:     General: Skin is warm and dry.      Findings: No rash.   Neurological:      Mental Status: He is alert and oriented to person, place, and time.      Cranial Nerves: No cranial nerve deficit.   Psychiatric:         Attention and Perception: He is inattentive.         Behavior: Behavior normal.         Cognition and Memory:  Cognition is impaired. He exhibits impaired recent memory.       Significant Labs: All pertinent labs within the past 24 hours have been reviewed.    Significant Imaging: I have reviewed all pertinent imaging results/findings within the past 24 hours.

## 2022-09-13 NOTE — ASSESSMENT & PLAN NOTE
Cr: 2.1 on admission   at baseline, serum cr is in normal range    --IV fluids  --Cautious hydration  --Repeat CMP in AM    9/12/22: Denies complaints. No acute events. MELISSA slowly improving 2.1>1.9>1.7. cont IVF. SNF placement pending     9/13/22: No acute events. Serum Cr remains 1.7. Will check BNP and Renal U/S. Hold IVFs  Awaiting SNF placement

## 2022-09-14 LAB
ANION GAP SERPL CALC-SCNC: 13 MMOL/L (ref 8–16)
BACTERIA #/AREA URNS HPF: ABNORMAL /HPF
BILIRUB UR QL STRIP: NEGATIVE
BUN SERPL-MCNC: 36 MG/DL (ref 8–23)
CALCIUM SERPL-MCNC: 8.5 MG/DL (ref 8.7–10.5)
CHLORIDE SERPL-SCNC: 107 MMOL/L (ref 95–110)
CLARITY UR: ABNORMAL
CO2 SERPL-SCNC: 20 MMOL/L (ref 23–29)
COLOR UR: ABNORMAL
CREAT SERPL-MCNC: 1.5 MG/DL (ref 0.5–1.4)
EST. GFR  (NO RACE VARIABLE): 44 ML/MIN/1.73 M^2
GLUCOSE SERPL-MCNC: 133 MG/DL (ref 70–110)
GLUCOSE UR QL STRIP: ABNORMAL
HGB UR QL STRIP: ABNORMAL
HYALINE CASTS #/AREA URNS LPF: 0 /LPF
KETONES UR QL STRIP: NEGATIVE
LEUKOCYTE ESTERASE UR QL STRIP: ABNORMAL
MICROSCOPIC COMMENT: ABNORMAL
NITRITE UR QL STRIP: NEGATIVE
PH UR STRIP: 7 [PH] (ref 5–8)
POCT GLUCOSE: 128 MG/DL (ref 70–110)
POTASSIUM SERPL-SCNC: 4 MMOL/L (ref 3.5–5.1)
PROT UR QL STRIP: ABNORMAL
RBC #/AREA URNS HPF: >100 /HPF (ref 0–4)
SODIUM SERPL-SCNC: 140 MMOL/L (ref 136–145)
SP GR UR STRIP: 1.01 (ref 1–1.03)
URN SPEC COLLECT METH UR: ABNORMAL
UROBILINOGEN UR STRIP-ACNC: NEGATIVE EU/DL
WBC #/AREA URNS HPF: >100 /HPF (ref 0–5)
WBC CLUMPS URNS QL MICRO: ABNORMAL

## 2022-09-14 PROCEDURE — 25000003 PHARM REV CODE 250: Performed by: NURSE PRACTITIONER

## 2022-09-14 PROCEDURE — 97530 THERAPEUTIC ACTIVITIES: CPT | Mod: CQ

## 2022-09-14 PROCEDURE — 81000 URINALYSIS NONAUTO W/SCOPE: CPT | Performed by: NURSE PRACTITIONER

## 2022-09-14 PROCEDURE — 94640 AIRWAY INHALATION TREATMENT: CPT | Mod: XB

## 2022-09-14 PROCEDURE — 97110 THERAPEUTIC EXERCISES: CPT

## 2022-09-14 PROCEDURE — 80048 BASIC METABOLIC PNL TOTAL CA: CPT | Performed by: NURSE PRACTITIONER

## 2022-09-14 PROCEDURE — 97530 THERAPEUTIC ACTIVITIES: CPT

## 2022-09-14 PROCEDURE — A4217 STERILE WATER/SALINE, 500 ML: HCPCS | Performed by: NURSE PRACTITIONER

## 2022-09-14 PROCEDURE — 99223 PR INITIAL HOSPITAL CARE,LEVL III: ICD-10-PCS | Mod: ,,, | Performed by: UROLOGY

## 2022-09-14 PROCEDURE — 87086 URINE CULTURE/COLONY COUNT: CPT | Performed by: NURSE PRACTITIONER

## 2022-09-14 PROCEDURE — 36415 COLL VENOUS BLD VENIPUNCTURE: CPT | Performed by: NURSE PRACTITIONER

## 2022-09-14 PROCEDURE — 94761 N-INVAS EAR/PLS OXIMETRY MLT: CPT

## 2022-09-14 PROCEDURE — G0378 HOSPITAL OBSERVATION PER HR: HCPCS

## 2022-09-14 PROCEDURE — 99223 1ST HOSP IP/OBS HIGH 75: CPT | Mod: ,,, | Performed by: UROLOGY

## 2022-09-14 PROCEDURE — 25000242 PHARM REV CODE 250 ALT 637 W/ HCPCS: Performed by: FAMILY MEDICINE

## 2022-09-14 PROCEDURE — 63600175 PHARM REV CODE 636 W HCPCS: Performed by: NURSE PRACTITIONER

## 2022-09-14 RX ORDER — OLANZAPINE 5 MG/1
10 TABLET, ORALLY DISINTEGRATING ORAL ONCE
Status: DISCONTINUED | OUTPATIENT
Start: 2022-09-14 | End: 2022-09-14

## 2022-09-14 RX ORDER — ENOXAPARIN SODIUM 100 MG/ML
40 INJECTION SUBCUTANEOUS EVERY 24 HOURS
Status: DISCONTINUED | OUTPATIENT
Start: 2022-09-14 | End: 2022-09-29 | Stop reason: HOSPADM

## 2022-09-14 RX ORDER — FLUCONAZOLE 150 MG/1
150 TABLET ORAL DAILY
Status: DISCONTINUED | OUTPATIENT
Start: 2022-09-14 | End: 2022-09-14

## 2022-09-14 RX ORDER — QUETIAPINE FUMARATE 25 MG/1
25 TABLET, FILM COATED ORAL 2 TIMES DAILY
Status: DISCONTINUED | OUTPATIENT
Start: 2022-09-14 | End: 2022-09-15

## 2022-09-14 RX ADMIN — SUCRALFATE 1 G: 1 SUSPENSION ORAL at 06:09

## 2022-09-14 RX ADMIN — IPRATROPIUM BROMIDE 0.5 MG: 0.5 SOLUTION RESPIRATORY (INHALATION) at 07:09

## 2022-09-14 RX ADMIN — IPRATROPIUM BROMIDE 0.5 MG: 0.5 SOLUTION RESPIRATORY (INHALATION) at 12:09

## 2022-09-14 RX ADMIN — QUETIAPINE FUMARATE 25 MG: 25 TABLET ORAL at 03:09

## 2022-09-14 RX ADMIN — TIMOLOL MALEATE 1 DROP: 5 SOLUTION/ DROPS OPHTHALMIC at 07:09

## 2022-09-14 RX ADMIN — ALUMINUM HYDROXIDE, MAGNESIUM HYDROXIDE, AND DIMETHICONE 30 ML: 200; 20; 200 SUSPENSION ORAL at 06:09

## 2022-09-14 RX ADMIN — IPRATROPIUM BROMIDE 0.5 MG: 0.5 SOLUTION RESPIRATORY (INHALATION) at 02:09

## 2022-09-14 RX ADMIN — CIPROFLOXACIN 750 MG: 750 TABLET, FILM COATED ORAL at 07:09

## 2022-09-14 RX ADMIN — IPRATROPIUM BROMIDE 0.5 MG: 0.5 SOLUTION RESPIRATORY (INHALATION) at 08:09

## 2022-09-14 RX ADMIN — TAMSULOSIN HYDROCHLORIDE 0.4 MG: 0.4 CAPSULE ORAL at 09:09

## 2022-09-14 RX ADMIN — QUETIAPINE FUMARATE 25 MG: 25 TABLET ORAL at 09:09

## 2022-09-14 RX ADMIN — CEFTRIAXONE 1 G: 1 INJECTION, SOLUTION INTRAVENOUS at 03:09

## 2022-09-14 RX ADMIN — ASPIRIN 81 MG: 81 TABLET, COATED ORAL at 07:09

## 2022-09-14 RX ADMIN — SODIUM BICARBONATE: 84 INJECTION, SOLUTION INTRAVENOUS at 04:09

## 2022-09-14 RX ADMIN — ACETAMINOPHEN 650 MG: 325 TABLET ORAL at 07:09

## 2022-09-14 NOTE — ASSESSMENT & PLAN NOTE
Cr: 2.1 on admission   at baseline, serum cr is in normal range    --IV fluids  --Cautious hydration  --Repeat CMP in AM    9/12/22: Denies complaints. No acute events. MELISSA slowly improving 2.1>1.9>1.7. cont IVF. SNF placement pending     9/13/22: No acute events. Serum Cr remains 1.7. Will check BNP and Renal U/S. Hold IVFs  Awaiting SNF placement     9/14/22: Increased agitation - Seroquel increased to BID. Serum Cr 1.5. Renal U/s showed mild hydronephrosis on right and moderate on Left. Sanchez palced-900 ml creamy white UOP immediately returned. Will d/c Cipro- add IV Rocephin. Urology consulted. Awaiting SNF placement

## 2022-09-14 NOTE — PT/OT/SLP PROGRESS
Physical Therapy  Treatment    Joseph Toussaint   MRN: [id number]   Admitting Diagnosis: Acute renal failure    PT Received On: 09/14/22  PT Start Time: 1315     PT Stop Time: 1345    PT Total Time (min): 30 min       Billable Minutes:  Therapeutic Activity 30    Treatment Type: Treatment  PT/PTA: PTA     PTA Visit Number: 2       General Precautions: Standard, fall  Orthopedic Precautions: N/A   Braces: N/A  Respiratory Status: Room air    Spiritual, Cultural Beliefs, Mandaeism Practices, Values that Affect Care: no    Subjective:  Communicated with patient's nurse, Roselyn, and agreed to PT session prior to session.  Patient perseverating on needing his clothing (which was not in room) so he could leave. Re directed towards task as needed.     Pain/Comfort  Pain Rating 1: 0/10    Objective:   Patient found with: telemetry, peripheral IV, PureWick, Other (comments) (AVASYS; 1:1 sitter)    Functional Mobility:  Supine > sit EOB: Mod-Max A     Seated EOB x15 min total focusing on increased tolerance to upright position, core stability, trunk control and CV endurance. Required CGA-Mod A to maintain sitting balance. Increased physical assistance required when performing dynamic tasks. Significant posterior lean throughout requiring cues to correct as well as maintain B foot contact with floor.     STS from EOB w/ HHAx2 Max A of 2 (B foot block to prevent forward slide; max VC to increase anterior weight shift; significant posterior lean throughout stand; trial sustained for ~10 sec)  Patient requested not to complete a second trial at this time due to fatigue.     T/F to chair not attempted at this time due to safety and cognition concerns.    Lateral scoot towards HOB: Total A of 2    Sit > supine: Mod A     Educated patient on importance of increased tolerance to upright position and direct impact on CV endurance and strength. Patient encouraged to utilize elevated HOB to simulate chair position until able to safely  complete chair T/F. Patient given a minimum goal of majority of the day to be spent in upright, especially with all meals. Encouraged patient to perform AROM TE to BLE throughout the day within all available planes of motion. Re enforced importance of utilizing call light to meet needs in room and not attempt to get up without staff assistance. Patient verbalized understanding and agreed to comply.     AM-PAC 6 CLICK MOBILITY  How much help from another person does this patient currently need?   1 = Unable, Total/Dependent Assistance  2 = A lot, Maximum/Moderate Assistance  3 = A little, Minimum/Contact Guard/Supervision  4 = None, Modified [place]/Independent    Turning over in bed (including adjusting bedclothes, sheets and blankets)?: 2  Sitting down on and standing up from a chair with arms (e.g., wheelchair, bedside commode, etc.): 2  Moving from lying on back to sitting on the side of the bed?: 2  Moving to and from a bed to a chair (including a wheelchair)?: 1  Need to walk in hospital room?: 1  Climbing 3-5 steps with a railing?: 1  Basic Mobility Total Score: 9    AM-PAC Raw Score CMS G-Code Modifier Level of Impairment Assistance   6 % Total / Unable   7 - 9 CM 80 - 100% Maximal Assist   10 - 14 CL 60 - 80% Moderate Assist   15 - 19 CK 40 - 60% Moderate Assist   20 - 22 CJ 20 - 40% Minimal Assist   23 CI 1-20% SBA / CGA   24 CH 0% Independent/ Mod I     Patient left with bed in chair position with bed alarm on, nurse notified, and sitter & AVASYS present.    Assessment:  Joseph Toussaint is a 89 y.o. male with a medical diagnosis of Acute renal failure and presents with overall decline in functional mobility. Patient would continue to benefit from skilled PT to address functional limitations listed below in order to return to PLOF/decrease caregiver burden.     Rehab identified problem list/impairments: Rehab identified problem list/impairments: weakness, impaired endurance, impaired  sensation, impaired self care skills, impaired functional mobility, gait instability, impaired balance, impaired cognition, decreased coordination, decreased upper extremity function, decreased lower extremity function, decreased safety awareness, pain, decreased ROM, abnormal tone, impaired coordination, impaired cardiopulmonary response to activity    Rehab potential is fair.    Activity tolerance: Poor    Discharge recommendations: Discharge Facility/Level of Care Needs: nursing facility, skilled     Barriers to discharge:      Equipment recommendations: Equipment Needed After Discharge: other (see comments) (TBD BY NEXT LEVEL OF CARE)     GOALS:   Multidisciplinary Problems       Physical Therapy Goals          Problem: Physical Therapy    Goal Priority Disciplines Outcome Goal Variances Interventions   Physical Therapy Goal     PT, PT/OT Ongoing, Progressing     Description: Patient will be seen a minimal of 3 out of 7 days a week.    LTG to be met by 09/24:    Bed mobility: SPV  Transfers: CGA with RW   Gait: CGA with RW                       PLAN:    Patient to be seen 3 x/week  to address the above listed problems via gait training, therapeutic activities, therapeutic exercises, neuromuscular re-education  Plan of Care expires: 09/24/22  Plan of Care reviewed with: patient         09/14/2022

## 2022-09-14 NOTE — CONSULTS
Chief Complaint: retention, bilateral hydronephrosis    HPI:   Joseph Toussaint is a 89 y.o. male with PMHx of HTN, COPD, CHF, and DM admitted to the hospital after a fall. Evaluation in the ED showed UTI, Scr up to 2.1. Patient was started on IVF and antibiotics and creatinine initially improved, however it plateaued around 1.5 from a normal baseline. Renal US was obtained which showed bilateral hydronephrosis and a distended bladder. Patient notes some prior issues with BPH and difficult urination, was not on BPH meds prior to admission. Sanchez placed drained 900mL of thick, creamy urine. No known prior urologic procedures.     PMH:  Past Medical History:   Diagnosis Date    Acute coronary syndrome         CHF (congestive heart failure)     COPD (chronic obstructive pulmonary disease)     Diabetes mellitus     HTN (hypertension)        PSH:  No past surgical history on file.    Family History:  No family history on file.    Social History:  Social History     Tobacco Use    Smoking status: Former     Packs/day: 1.00     Types: Cigarettes     Quit date: 2022     Years since quittin.1    Smokeless tobacco: Never   Substance Use Topics    Alcohol use: Not Currently     Comment: occasionally    Drug use: Not Currently        Review of Systems:  General: No fever, chills  Skin: No rashes  Chest:  Denies cough and sputum production  Heart: Denies chest pain  Resp: Denies dyspnea  Abdomen: Denies diarrhea, abdominal pain, hematemesis, or blood in stool.  Musculoskeletal: No joint stiffness or swelling. Denies back pain.  : see HPI  Neuro: no dizziness or weakness    Allergies:  Penicillins    Medications:    Current Facility-Administered Medications:     0.9%  NaCl infusion, , Intravenous, Continuous, Malaika Brand NP, Stopped at 22 0756    acetaminophen tablet 650 mg, 650 mg, Oral, Q4H PRN, April AMY Fournier NP, 650 mg at 22 0748    albuterol nebulizer solution 2.5 mg, 2.5 mg,  Nebulization, Q6H PRN, Ren Moura MD    aluminum-magnesium hydroxide-simethicone 200-200-20 mg/5 mL suspension 30 mL, 30 mL, Oral, QID (AC & HS), Vidya Fournier NP, 30 mL at 09/14/22 0617    aspirin EC tablet 81 mg, 81 mg, Oral, Daily, Vidya Fournier NP, 81 mg at 09/14/22 0748    bisacodyL suppository 10 mg, 10 mg, Rectal, Daily PRN, Vidya Fournier NP    cefTRIAXone (ROCEPHIN) 1 g/50 mL D5W IVPB, 1 g, Intravenous, Q24H, Malaika Brand NP    dextrose 10% bolus 125 mL, 12.5 g, Intravenous, PRN, Vidya Fournier NP    dextrose 10% bolus 250 mL, 25 g, Intravenous, PRN, Vidya Fournier NP    enoxaparin injection 30 mg, 30 mg, Subcutaneous, Daily, Malaika Brand NP, 30 mg at 09/13/22 1729    glucagon (human recombinant) injection 1 mg, 1 mg, Intramuscular, PRN, Vidya Fournier NP    glucose chewable tablet 16 g, 16 g, Oral, PRN, Vidya Fournier NP    glucose chewable tablet 24 g, 24 g, Oral, PRN, Vidya Fournier NP    hydrALAZINE injection 10 mg, 10 mg, Intravenous, Q8H PRN, Vidya Fournier NP    insulin aspart U-100 pen 0-5 Units, 0-5 Units, Subcutaneous, QID (AC + HS) PRN, Vidya Fournier NP    ipratropium 0.02 % nebulizer solution 0.5 mg, 0.5 mg, Nebulization, Q6H, Ren Moura MD, 0.5 mg at 09/14/22 0835    naloxone 0.4 mg/mL injection 0.02 mg, 0.02 mg, Intravenous, PRN, Vidya Fournier NP    ondansetron injection 4 mg, 4 mg, Intravenous, Q8H PRN, Vidya Fournier NP    QUEtiapine tablet 25 mg, 25 mg, Oral, BID, Malaika Brand NP    sodium chloride 0.9% flush 10 mL, 10 mL, Intravenous, Q12H PRN, Vidya Fournier NP    sucralfate 100 mg/mL suspension 1 g, 1 g, Oral, Q6H, Vidya Fournier NP, 1 g at 09/14/22 0617    tamsulosin 24 hr capsule 0.4 mg, 0.4 mg, Oral, QHS, Vidya Fournier NP, 0.4 mg at 09/13/22 2049    timolol maleate 0.5% ophthalmic solution 1 drop, 1 drop, Both Eyes, BID, Vidya Fournier NP, 1 drop at 09/14/22 0748    Physical Exam:  Vitals:    09/14/22 1042   BP:  135/82   Pulse: 78   Resp: 18   Temp: 97.6 °F (36.4 °C)     Body mass index is 20.83 kg/m².  General: awake, alert, cooperative  Head: NC/AT  Ears: external ears normal  Eyes: sclera normal  Lungs: normal inspiration, NAD  Heart: well-perfused  Abdomen: Soft, NT, ND  : Normal uncirc'd phallus, meatus normal in size and position, christopher in place draining thin red urine  Skin: The skin is warm and dry  Ext: No c/c/e.  Neuro: grossly intact, AOx3    RADIOLOGY:  US RETROPERITONEAL COMPLETE 09/13/2022     CLINICAL HISTORY:  ARF;     TECHNIQUE:  Ultrasound of the kidneys and urinary bladder was performed including color flow and Doppler evaluation of the kidneys.     COMPARISON:  None.     FINDINGS:  Right kidney: The right kidney measures up to 11.3 cm.  Moderate right-sided hydronephrosis.  Resistive index measures 0.66     Left kidney: The left kidney measures up to 12.1 cm with mild hydronephrosis of the left kidney.  Left renal cyst measures 4.2 cm.  Resistive index measures 0.76.     Debris noted in the bladder.  Splenic resistive index measures 0.6.     Impression:     As above       LABS:  I personally reviewed the following lab values:  Lab Results   Component Value Date    WBC 5.74 09/13/2022    HGB 10.1 (L) 09/13/2022    HCT 31.6 (L) 09/13/2022     09/13/2022     09/14/2022    K 4.0 09/14/2022     09/14/2022    CREATININE 1.5 (H) 09/14/2022    BUN 36 (H) 09/14/2022    CO2 20 (L) 09/14/2022    TSH 1.146 08/27/2022    HGBA1C 6.6 (H) 07/20/2022    ALT 27 09/13/2022    AST 21 09/13/2022       URINALYSIS:   Component Ref Range & Units 10:30 4 d ago   RBC, UA 0 - 4 /hpf >100 High   5 High     WBC, UA 0 - 5 /hpf >100 High   >100 High     WBC Clumps, UA None-Rare Many Abnormal   Few Abnormal     Bacteria None-Occ /hpf Many Abnormal   Many Abnormal       Assessment/Plan:   Joseph Toussaint is a 89 y.o. male with retention. Continue christopher x 7 days, agree with starting flomax. Gross hematuria possibly  related to patient pulling on the christopher or possibly related to decompression. Thin and draining, no obvious need for CBI at this time. Will continue to follow, call with questions or concerns.     Thank you for allowing me the opportunity to participate in this patient's care.     Yifan Sarmiento MD  Urology

## 2022-09-14 NOTE — ASSESSMENT & PLAN NOTE
Dx with UTI on 8/27 in ED, unclear if completed antibiotic regimen    --Follow urine culture  --Continue IV Cipro    9/11/22:Blood cultures show NGTD. Urine culture showed no growth but urine output is very cloudy -almost purulent. Will switch IV Cipro to oral dose.    9/14/22:  Serum Cr 1.5. Renal U/s showed mild hydronephrosis on right and moderate on Left. Sanchez palced-900 ml creamy white UOP immediately returned. Will d/c Cipro- add IV Rocephin. Urology consulted. Awaiting SNF placement

## 2022-09-14 NOTE — PROGRESS NOTES
Pharmacist Renal Dose Adjustment Note    Joseph Toussaint is a 89 y.o. male being treated with the medication enoxaparin.    Patient Data:    Vital Signs (Most Recent):  Temp: 97.6 °F (36.4 °C) (09/14/22 1042)  Pulse: 78 (09/14/22 1042)  Resp: 18 (09/14/22 1042)  BP: 135/82 (09/14/22 1042)  SpO2: 97 % (09/14/22 1042) Vital Signs (72h Range):  Temp:  [97.6 °F (36.4 °C)-98.1 °F (36.7 °C)]   Pulse:  [67-88]   Resp:  [16-20]   BP: (110-140)/(69-85)   SpO2:  [92 %-100 %]      Recent Labs   Lab 09/12/22  0350 09/13/22  0543 09/14/22  0623   CREATININE 1.7* 1.7* 1.5*     Serum creatinine: 1.5 mg/dL (H) 09/14/22 0623  Estimated creatinine clearance: 31.8 mL/min (A)    Enoxaparin 30 mg subQ q24 h has been changed to 40 mg q24 h per [facility]'s renal dose adjustment protocol.     Pharmacist's Name: Wilton Hare, PharmD 9/14/2022 2:28 PM  Pharmacist's Extension: [phone]

## 2022-09-14 NOTE — PLAN OF CARE
Continue OT POC.  Mod A- Max A for rolling and sup>sit, Mod A for forward scoot and sit>sup, Total A for lateral scoot, and Max A x2 for sit>stand (maintained for 10 seconds).  Pt with poor dynamic sitting balance. LUE weakness.

## 2022-09-14 NOTE — NURSING
Patient refused AM labs. Patient is confused, attempted multiple times to redirect patient that he did not already have blood drawn. However he insists that he already had his blood drawn.

## 2022-09-14 NOTE — PT/OT/SLP PROGRESS
Occupational Therapy   Treatment    Name: Joseph Toussaint  MRN: [id number]  Admitting Diagnosis:  Acute renal failure       Recommendations:     Discharge Recommendations: nursing facility, skilled  Discharge Equipment Recommendations:  other (see comments) (TBD)  Barriers to discharge:  None    Assessment:     Joseph Toussaint is a 89 y.o. male with a medical diagnosis of Acute renal failure.  He presents with the following performance deficits affecting function are weakness, impaired endurance, impaired self care skills, impaired sensation, impaired functional mobility, gait instability, impaired balance, impaired cognition, decreased coordination, decreased upper extremity function, decreased lower extremity function, decreased safety awareness, abnormal tone, decreased ROM, impaired coordination, impaired cardiopulmonary response to activity.     Rehab Prognosis:  Fair; patient would benefit from acute skilled OT services to address these deficits and reach maximum level of function.       Plan:     Patient to be seen 2 x/week to address the above listed problems via self-care/home management, therapeutic activities, therapeutic exercises  Plan of Care Expires: 09/25/22  Plan of Care Reviewed with: patient    Subjective     Pain/Comfort:  Pain Rating 1: 0/10    Objective:     Communicated with: nurse Dempsey and epic chart review prior to session.  Patient found supine with christopher catheter, peripheral IV, telemetry, Other (comments) (KIMIS, 1:1 sitter) upon OT entry to room.    General Precautions: Standard, fall   Orthopedic Precautions:N/A   Braces: N/A  Respiratory Status: Room air     Occupational Performance:     Bed Mobility:    Patient completed Rolling/Turning to Right with moderate assistance and maximal assistance  Patient completed Supine to Sit with moderate assistance and maximal assistance  Patient completed Sit to Supine with moderate assistance   Forward scoot with Mod A.   Lateral scoot to R side  with Total A.    Functional Mobility/Transfers:  Patient completed Sit <> Stand Transfer with maximal assistance and of 2 persons  with  B foot block to prevent forward slide from EOB and therapists supporting under BUE. Verbal cueing for anterior weightshift to correct for upright position. Pt able to maintain for ~10 seconds.     Did not attempt t/f to chair due to safety concerns.      [facility] 6 Click ADL: 14    Treatment & Education:  Pt demonstrating posterior lean while sitting EOB ~15 minutes, requiring CGA-Mod A and requiring increased physical assist with dynamic tasks. Verbal cueing to correct for upright position and to maintain B foot contact on floor. Verbal cueing for sequencing bed mobility and for pt to use bed rail to roll and perform sup>sit instead of relying on trapeze bar today. Pt educated on and performed x10 reps BUE AROM digit flexion/ext with verbal cues to extend digits fully, x10 reps RUE AROM and LUE AAROM (due to weakness) elbow flexion/ext, and x5 reps BUE chest press. Pt's LUE continues to have limited AROM and strength, fingers and wrist in flexed position. Pt fatigued and requesting to return to bed.    Patient left HOB elevated with all lines intact, call button in reach, nurse notified, and AVASYS and sitter present    GOALS:   Multidisciplinary Problems       Occupational Therapy Goals          Problem: Occupational Therapy    Goal Priority Disciplines Outcome Interventions   Occupational Therapy Goal     OT, PT/OT Ongoing, Progressing    Description: Goals to be met by: 9/25/22     Patient will increase functional independence with ADLs by performing:    Toileting from bedside commode with Contact Guard Assistance for hygiene and clothing management.   Toilet transfer to bedside commode with Contact Guard Assistance.  Increased functional strength in B UE grossly by 1/2 MM grade.                         Time Tracking:     OT Date of Treatment: 09/14/22  OT Start Time: 0120  OT  Stop Time: 0145  OT Total Time (min): 25 min    Billable Minutes:Therapeutic Activity 15  Therapeutic Exercise 10    OT/TRICIA: OT      Marcella Hernandez OT     9/14/2022

## 2022-09-14 NOTE — SUBJECTIVE & OBJECTIVE
Interval History: see hospital course    Review of Systems   Unable to perform ROS: Dementia   Objective:     Vital Signs (Most Recent):  Temp: 97.6 °F (36.4 °C) (09/14/22 1042)  Pulse: 78 (09/14/22 1042)  Resp: 18 (09/14/22 1042)  BP: 135/82 (09/14/22 1042)  SpO2: 97 % (09/14/22 1042) Vital Signs (24h Range):  Temp:  [97.6 °F (36.4 °C)-98.1 °F (36.7 °C)] 97.6 °F (36.4 °C)  Pulse:  [69-83] 78  Resp:  [16-20] 18  SpO2:  [95 %-100 %] 97 %  BP: (116-135)/(71-82) 135/82     Weight: 68.7 kg (151 lb 7.3 oz)  Body mass index is 20.83 kg/m².    Intake/Output Summary (Last 24 hours) at 9/14/2022 1207  Last data filed at 9/14/2022 1000  Gross per 24 hour   Intake 2822.09 ml   Output 2050 ml   Net 772.09 ml        Physical Exam  Vitals and nursing note reviewed.   Constitutional:       Appearance: He is well-developed.   HENT:      Head: Normocephalic and atraumatic.      Nose: Nose normal.   Eyes:      General: No scleral icterus.     Pupils: Pupils are equal, round, and reactive to light.   Cardiovascular:      Rate and Rhythm: Normal rate and regular rhythm.      Heart sounds: Normal heart sounds. No murmur heard.    No friction rub. No gallop.   Pulmonary:      Effort: Pulmonary effort is normal. No respiratory distress.      Breath sounds: Normal breath sounds. No wheezing.   Abdominal:      General: Bowel sounds are normal. There is no distension.      Palpations: Abdomen is soft.      Tenderness: There is no abdominal tenderness.   Genitourinary:     Comments: Sanchez placed- UOP thick and creamy   Musculoskeletal:         General: Normal range of motion.      Cervical back: Normal range of motion and neck supple.   Skin:     General: Skin is warm and dry.      Findings: No rash.   Neurological:      Mental Status: He is alert and oriented to person, place, and time.      Cranial Nerves: No cranial nerve deficit.   Psychiatric:         Attention and Perception: He is inattentive.         Behavior: Behavior normal.          Cognition and Memory: Cognition is impaired. He exhibits impaired recent memory.       Significant Labs: All pertinent labs within the past 24 hours have been reviewed.    Significant Imaging: I have reviewed all pertinent imaging results/findings within the past 24 hours.

## 2022-09-14 NOTE — ASSESSMENT & PLAN NOTE
Confused on admission\  Cont home emd Seroquel HS    --Avasys  --Fall precautions  --Neuro checks Q4H x24 hours    9/11/22: Pt AAOx3 at times but with notable cognitive deficits.    9/14/22: Increased agitation - Seroquel increased to BID.

## 2022-09-14 NOTE — PROGRESS NOTES
[facility] - Med Surg 3  [facility] Medicine  Progress Note    Patient Name: Joseph Toussaint  MRN: [id number]  Patient Class: OP- Observation   Admission Date: 9/10/2022  Length of Stay: 0 days  Attending Physician: Ren Moura MD  Primary Care Provider: Abad Iqbal MD        Subjective:     Principal Problem:Acute renal failure        HPI:  89 y.o. male patient with a PMHx of HTN, COPD, CHF, and DM who presents to the Emergency Department for evaluation of a fall which occurred x 2 days pta. Pt family called for transport to get pt checked out. Pt denies any complaints and is not in pain. No associated sxs reported. Patient denies any CP, SOB, fever, back pain, abd pain, and all other sxs at this time. No prior Tx reported. In the ED, UA consistent with UTI, dx with UTI on 8/27, unclear if patient completed antibiotic regimen. BUN/Cr: 39/2.1, Bili: 2.0, H/H: 10.4/32.3. Afebrile, vitals stable. Oriented to self only on exam. Intitiated on antibiotics in the ED, urine culture pending, treated with IV fluid bolus. Patient is a full code, surrogate decision maker is his son Kevin Toussaint. Placed in observation under the care of hospital medicine.       Overview/Hospital Course:  The patient is a 89 yo male with HTN, COPD, CHF, and DM who was placed in observation for frequent falls, MELISSA, and presumed UTI on IVF and IV  Rocephin.     9/11/22: Pt AAOx3 at times but with notable cognitive deficits. Denies complaints. MELISSA improving 2.1>1.9. Hyperchloremic metabolic acidosis noted- will switch NS IVFs to Bicarb drip. Blood cultures show NGTD. Urine culture showed no growth but urine output is very cloudy -almost purulent. Will switch IV Cipro to oral dose. PT/OT evaluated pt and recommended SNF. Called pt's nephew Kraig (POA) and provided status update.  Pt lives with his nephew and his wife. Currently, Kraig is in the hospital recovering from surgery. He requested discharge to SNF.    9/12/22: Denies complaints. No acute  events. MELISSA slowly improving 2.1>1.9>1.7. cont IVF. SNF placement pending     9/13/22: No acute events. Serum Cr remains 1.7. Will check BNP and Renal U/S. Hold IVFs  Awaiting SNF placement     9/14/22: Increased agitation - Seroquel increased to BID. Serum Cr 1.5. Renal U/s showed mild hydronephrosis on right and moderate on Left. Sanchez palced-900 ml creamy white UOP immediately returned. Will d/c Cipro- add IV Rocephin. Urology consulted. Awaiting SNF placement       Interval History: see hospital course    Review of Systems   Unable to perform ROS: Dementia   Objective:     Vital Signs (Most Recent):  Temp: 97.6 °F (36.4 °C) (09/14/22 1042)  Pulse: 78 (09/14/22 1042)  Resp: 18 (09/14/22 1042)  BP: 135/82 (09/14/22 1042)  SpO2: 97 % (09/14/22 1042) Vital Signs (24h Range):  Temp:  [97.6 °F (36.4 °C)-98.1 °F (36.7 °C)] 97.6 °F (36.4 °C)  Pulse:  [69-83] 78  Resp:  [16-20] 18  SpO2:  [95 %-100 %] 97 %  BP: (116-135)/(71-82) 135/82     Weight: 68.7 kg (151 lb 7.3 oz)  Body mass index is 20.83 kg/m².    Intake/Output Summary (Last 24 hours) at 9/14/2022 1207  Last data filed at 9/14/2022 1000  Gross per 24 hour   Intake 2822.09 ml   Output 2050 ml   Net 772.09 ml        Physical Exam  Vitals and nursing note reviewed.   Constitutional:       Appearance: He is well-developed.   HENT:      Head: Normocephalic and atraumatic.      Nose: Nose normal.   Eyes:      General: No scleral icterus.     Pupils: Pupils are equal, round, and reactive to light.   Cardiovascular:      Rate and Rhythm: Normal rate and regular rhythm.      Heart sounds: Normal heart sounds. No murmur heard.    No friction rub. No gallop.   Pulmonary:      Effort: Pulmonary effort is normal. No respiratory distress.      Breath sounds: Normal breath sounds. No wheezing.   Abdominal:      General: Bowel sounds are normal. There is no distension.      Palpations: Abdomen is soft.      Tenderness: There is no abdominal tenderness.   Genitourinary:      Comments: Sanchez placed- UOP thick and creamy   Musculoskeletal:         General: Normal range of motion.      Cervical back: Normal range of motion and neck supple.   Skin:     General: Skin is warm and dry.      Findings: No rash.   Neurological:      Mental Status: He is alert and oriented to person, place, and time.      Cranial Nerves: No cranial nerve deficit.   Psychiatric:         Attention and Perception: He is inattentive.         Behavior: Behavior normal.         Cognition and Memory: Cognition is impaired. He exhibits impaired recent memory.       Significant Labs: All pertinent labs within the past 24 hours have been reviewed.    Significant Imaging: I have reviewed all pertinent imaging results/findings within the past 24 hours.      Assessment/Plan:      * Acute renal failure  Cr: 2.1 on admission   at baseline, serum cr is in normal range    --IV fluids  --Cautious hydration  --Repeat CMP in AM    9/12/22: Denies complaints. No acute events. MELISSA slowly improving 2.1>1.9>1.7. cont IVF. SNF placement pending     9/13/22: No acute events. Serum Cr remains 1.7. Will check BNP and Renal U/S. Hold IVFs  Awaiting SNF placement     9/14/22: Increased agitation - Seroquel increased to BID. Serum Cr 1.5. Renal U/s showed mild hydronephrosis on right and moderate on Left. Sanchez palced-900 ml creamy white UOP immediately returned. Will d/c Cipro- add IV Rocephin. Urology consulted. Awaiting SNF placement     Acute cystitis with hematuria  Dx with UTI on 8/27 in ED, unclear if completed antibiotic regimen    --Follow urine culture  --Continue IV Cipro    9/11/22:Blood cultures show NGTD. Urine culture showed no growth but urine output is very cloudy -almost purulent. Will switch IV Cipro to oral dose.    9/14/22:  Serum Cr 1.5. Renal U/s showed mild hydronephrosis on right and moderate on Left. Sanchez palced-900 ml creamy white UOP immediately returned. Will d/c Cipro- add IV Rocephin. Urology consulted. Awaiting  SNF placement     Falls  Patient reported falling at home    --Avasys  --Fall Precautions    9/11/22: PT/OT  SNF placement - CM consulted     Cognitive impairment  Confused on admission\  Cont home emd Seroquel HS    --Avasys  --Fall precautions  --Neuro checks Q4H x24 hours    9/11/22: Pt AAOx3 at times but with notable cognitive deficits.    9/14/22: Increased agitation - Seroquel increased to BID.    Chronic systolic congestive heart failure  Patient is identified as having Systolic (HFrEF) heart failure that is Chronic. CHF is currently controlled. Latest ECHO performed and demonstrates- Results for orders placed during the hospital encounter of 07/19/22    Echo    Interpretation Summary  · Concentric hypertrophy and severely decreased systolic function.  · Mild left atrial enlargement.  · The estimated ejection fraction is 20%.  · Left ventricular diastolic dysfunction.  · There is left ventricular global hypokinesis.  · Moderate right ventricular enlargement with mildly to moderately reduced right ventricular systolic function.  · Moderate right atrial enlargement.  · There is mild aortic valve stenosis.  · Aortic valve area is 1.90 cm2; peak velocity is 1.05 m/s; mean gradient is 2 mmHg.  · There is severe pulmonary hypertension.  · Moderate tricuspid regurgitation.  · Elevated central venous pressure (15 mmHg).  · The estimated PA systolic pressure is 103 mmHg.  . Continue ACE/ARB, Furosemide and Aldactone and monitor clinical status closely. Monitor on telemetry. Patient is on CHF pathway.  Monitor strict Is&Os and daily weights.  Place on fluid restriction of 1.5 L. Continue to stress to patient importance of self efficacy and  on diet for CHF. Last BNP reviewed- and noted below No results for input(s): BNP, BNPTRIAGEBLO in the last 168 hours..          Essential hypertension  Normotensive on admission    --continue home medications  --Hydralazine PRN  --Vitals Q4H      COPD suggested by initial  evaluation  Negative for symptoms of exacerbation    --Continue home regimen            VTE Risk Mitigation (From admission, onward)         Ordered     enoxaparin injection 30 mg  Daily         09/12/22 1416     Reason for No Pharmacological VTE Prophylaxis  Once        Question:  Reasons:  Answer:  Already adequately anticoagulated on oral Anticoagulants    09/10/22 1158     IP VTE HIGH RISK PATIENT  Once         09/10/22 1158     Place sequential compression device  Until discontinued         09/10/22 1158                Discharge Planning   TATIANA:      Code Status: Full Code   Is the patient medically ready for discharge?: Yes    Reason for patient still in hospital (select all that apply): Patient trending condition  Discharge Plan A: Skilled Nursing Facility                  Malaika Brand NP  Department of Hospital Medicine   [facility] - Med S[facility]

## 2022-09-15 PROBLEM — R33.9 URINARY RETENTION: Status: ACTIVE | Noted: 2022-09-15

## 2022-09-15 PROBLEM — N13.30 HYDRONEPHROSIS, BILATERAL: Status: ACTIVE | Noted: 2022-09-15

## 2022-09-15 PROBLEM — R31.0 GROSS HEMATURIA: Status: ACTIVE | Noted: 2022-09-15

## 2022-09-15 LAB
ACANTHOCYTES BLD QL SMEAR: PRESENT
ANION GAP SERPL CALC-SCNC: 11 MMOL/L (ref 8–16)
ANISOCYTOSIS BLD QL SMEAR: SLIGHT
BASOPHILS # BLD AUTO: 0.02 K/UL (ref 0–0.2)
BASOPHILS NFR BLD: 0.3 % (ref 0–1.9)
BUN SERPL-MCNC: 29 MG/DL (ref 8–23)
BURR CELLS BLD QL SMEAR: ABNORMAL
CALCIUM SERPL-MCNC: 8.3 MG/DL (ref 8.7–10.5)
CHLORIDE SERPL-SCNC: 106 MMOL/L (ref 95–110)
CO2 SERPL-SCNC: 23 MMOL/L (ref 23–29)
CREAT SERPL-MCNC: 1.2 MG/DL (ref 0.5–1.4)
DIFFERENTIAL METHOD: ABNORMAL
EOSINOPHIL # BLD AUTO: 0.1 K/UL (ref 0–0.5)
EOSINOPHIL NFR BLD: 0.9 % (ref 0–8)
ERYTHROCYTE [DISTWIDTH] IN BLOOD BY AUTOMATED COUNT: 19 % (ref 11.5–14.5)
EST. GFR  (NO RACE VARIABLE): 57 ML/MIN/1.73 M^2
GIANT PLATELETS BLD QL SMEAR: PRESENT
GLUCOSE SERPL-MCNC: 120 MG/DL (ref 70–110)
HCT VFR BLD AUTO: 29.3 % (ref 40–54)
HGB BLD-MCNC: 9.5 G/DL (ref 14–18)
IMM GRANULOCYTES # BLD AUTO: 0.02 K/UL (ref 0–0.04)
IMM GRANULOCYTES NFR BLD AUTO: 0.3 % (ref 0–0.5)
LYMPHOCYTES # BLD AUTO: 0.9 K/UL (ref 1–4.8)
LYMPHOCYTES NFR BLD: 14.6 % (ref 18–48)
MCH RBC QN AUTO: 24.3 PG (ref 27–31)
MCHC RBC AUTO-ENTMCNC: 32.4 G/DL (ref 32–36)
MCV RBC AUTO: 75 FL (ref 82–98)
MONOCYTES # BLD AUTO: 0.6 K/UL (ref 0.3–1)
MONOCYTES NFR BLD: 11 % (ref 4–15)
NEUTROPHILS # BLD AUTO: 4.3 K/UL (ref 1.8–7.7)
NEUTROPHILS NFR BLD: 72.9 % (ref 38–73)
NRBC BLD-RTO: 0 /100 WBC
PLATELET # BLD AUTO: 182 K/UL (ref 150–450)
PLATELET BLD QL SMEAR: ABNORMAL
PMV BLD AUTO: 10.3 FL (ref 9.2–12.9)
POCT GLUCOSE: 114 MG/DL (ref 70–110)
POCT GLUCOSE: 129 MG/DL (ref 70–110)
POCT GLUCOSE: 130 MG/DL (ref 70–110)
POCT GLUCOSE: 133 MG/DL (ref 70–110)
POIKILOCYTOSIS BLD QL SMEAR: SLIGHT
POTASSIUM SERPL-SCNC: 3.3 MMOL/L (ref 3.5–5.1)
RBC # BLD AUTO: 3.91 M/UL (ref 4.6–6.2)
SODIUM SERPL-SCNC: 140 MMOL/L (ref 136–145)
WBC # BLD AUTO: 5.83 K/UL (ref 3.9–12.7)

## 2022-09-15 PROCEDURE — 85025 COMPLETE CBC W/AUTO DIFF WBC: CPT | Performed by: NURSE PRACTITIONER

## 2022-09-15 PROCEDURE — 99223 1ST HOSP IP/OBS HIGH 75: CPT | Mod: ,,, | Performed by: UROLOGY

## 2022-09-15 PROCEDURE — 63600175 PHARM REV CODE 636 W HCPCS: Performed by: NURSE PRACTITIONER

## 2022-09-15 PROCEDURE — 99900035 HC TECH TIME PER 15 MIN (STAT)

## 2022-09-15 PROCEDURE — 94761 N-INVAS EAR/PLS OXIMETRY MLT: CPT

## 2022-09-15 PROCEDURE — 25000003 PHARM REV CODE 250: Performed by: NURSE PRACTITIONER

## 2022-09-15 PROCEDURE — 36415 COLL VENOUS BLD VENIPUNCTURE: CPT | Performed by: NURSE PRACTITIONER

## 2022-09-15 PROCEDURE — A4217 STERILE WATER/SALINE, 500 ML: HCPCS | Performed by: NURSE PRACTITIONER

## 2022-09-15 PROCEDURE — 94640 AIRWAY INHALATION TREATMENT: CPT

## 2022-09-15 PROCEDURE — 99223 PR INITIAL HOSPITAL CARE,LEVL III: ICD-10-PCS | Mod: ,,, | Performed by: UROLOGY

## 2022-09-15 PROCEDURE — 25000242 PHARM REV CODE 250 ALT 637 W/ HCPCS: Performed by: FAMILY MEDICINE

## 2022-09-15 PROCEDURE — 11000001 HC ACUTE MED/SURG PRIVATE ROOM

## 2022-09-15 PROCEDURE — 80048 BASIC METABOLIC PNL TOTAL CA: CPT | Performed by: NURSE PRACTITIONER

## 2022-09-15 RX ORDER — QUETIAPINE FUMARATE 25 MG/1
25 TABLET, FILM COATED ORAL NIGHTLY
Status: DISCONTINUED | OUTPATIENT
Start: 2022-09-15 | End: 2022-09-20

## 2022-09-15 RX ORDER — POTASSIUM CHLORIDE 20 MEQ/1
40 TABLET, EXTENDED RELEASE ORAL ONCE
Status: COMPLETED | OUTPATIENT
Start: 2022-09-15 | End: 2022-09-15

## 2022-09-15 RX ADMIN — IPRATROPIUM BROMIDE 0.5 MG: 0.5 SOLUTION RESPIRATORY (INHALATION) at 07:09

## 2022-09-15 RX ADMIN — QUETIAPINE FUMARATE 25 MG: 25 TABLET ORAL at 08:09

## 2022-09-15 RX ADMIN — SUCRALFATE 1 G: 1 SUSPENSION ORAL at 05:09

## 2022-09-15 RX ADMIN — SUCRALFATE 1 G: 1 SUSPENSION ORAL at 06:09

## 2022-09-15 RX ADMIN — CEFTRIAXONE 1 G: 1 INJECTION, SOLUTION INTRAVENOUS at 01:09

## 2022-09-15 RX ADMIN — IPRATROPIUM BROMIDE 0.5 MG: 0.5 SOLUTION RESPIRATORY (INHALATION) at 01:09

## 2022-09-15 RX ADMIN — ASPIRIN 81 MG: 81 TABLET, COATED ORAL at 08:09

## 2022-09-15 RX ADMIN — SODIUM BICARBONATE: 84 INJECTION, SOLUTION INTRAVENOUS at 06:09

## 2022-09-15 RX ADMIN — TAMSULOSIN HYDROCHLORIDE 0.4 MG: 0.4 CAPSULE ORAL at 08:09

## 2022-09-15 RX ADMIN — TIMOLOL MALEATE 1 DROP: 5 SOLUTION/ DROPS OPHTHALMIC at 08:09

## 2022-09-15 RX ADMIN — ALUMINUM HYDROXIDE, MAGNESIUM HYDROXIDE, AND DIMETHICONE 30 ML: 200; 20; 200 SUSPENSION ORAL at 06:09

## 2022-09-15 RX ADMIN — ALUMINUM HYDROXIDE, MAGNESIUM HYDROXIDE, AND DIMETHICONE 30 ML: 200; 20; 200 SUSPENSION ORAL at 08:09

## 2022-09-15 RX ADMIN — POTASSIUM CHLORIDE 40 MEQ: 1500 TABLET, EXTENDED RELEASE ORAL at 04:09

## 2022-09-15 NOTE — ASSESSMENT & PLAN NOTE
Cr: 2.1 on admission   at baseline, serum cr is in normal range  Hold home meds Spironolactone, Lasix, and Entresto   --IV fluids  --Cautious hydration  --Repeat CMP in AM    9/12/22: Denies complaints. No acute events. MELISSA slowly improving 2.1>1.9>1.7. cont IVF. SNF placement pending     9/13/22: No acute events. Serum Cr remains 1.7. Will check BNP and Renal U/S. Hold IVFs  Awaiting SNF placement     9/14/22: Increased agitation - Seroquel increased to BID. Serum Cr 1.5. Renal U/s showed mild hydronephrosis on right and moderate on Left. Christopher palced-900 ml creamy white UOP immediately returned. Will d/c Cipro- add IV Rocephin. Urology consulted. Awaiting SNF placement     9/15/22:  Serum Cr normalized after placing christopher. Hydronephrosis 2/2 urinary retention.

## 2022-09-15 NOTE — ASSESSMENT & PLAN NOTE
Confused on admission\  Cont home emd Seroquel HS    --Avasys  --Fall precautions  --Neuro checks Q4H x24 hours    9/11/22: Pt AAOx3 at times but with notable cognitive deficits.    9/14/22: Increased agitation - Seroquel increased to BID.    9/15/22: Seroquel in am made pt too drowsy- will decrease back to just nightly.

## 2022-09-15 NOTE — PROGRESS NOTES
Chief Complaint: retention, bilateral hydronephrosis    HPI:   09/15/2022 - urine still bloody but thin, creatinine down to 1.2    2022 - 89 y.o. male with PMHx of HTN, COPD, CHF, and DM admitted to the hospital after a fall. Evaluation in the ED showed UTI, Scr up to 2.1. Patient was started on IVF and antibiotics and creatinine initially improved, however it plateaued around 1.5 from a normal baseline. Renal US was obtained which showed bilateral hydronephrosis and a distended bladder. Patient notes some prior issues with BPH and difficult urination, was not on BPH meds prior to admission. Sanchez placed drained 900mL of thick, creamy urine. No known prior urologic procedures.     PMH:  Past Medical History:   Diagnosis Date    Acute coronary syndrome     1979    CHF (congestive heart failure)     COPD (chronic obstructive pulmonary disease)     Diabetes mellitus     HTN (hypertension)        PSH:  No past surgical history on file.    Family History:  No family history on file.    Social History:  Social History     Tobacco Use    Smoking status: Former     Packs/day: 1.00     Types: Cigarettes     Quit date: 2022     Years since quittin.1    Smokeless tobacco: Never   Substance Use Topics    Alcohol use: Not Currently     Comment: occasionally    Drug use: Not Currently        Review of Systems:  General: No fever, chills  Skin: No rashes  Chest:  Denies cough and sputum production  Heart: Denies chest pain  Resp: Denies dyspnea  Abdomen: Denies diarrhea, abdominal pain, hematemesis, or blood in stool.  Musculoskeletal: No joint stiffness or swelling. Denies back pain.  : see HPI  Neuro: no dizziness or weakness    Allergies:  Penicillins    Medications:    Current Facility-Administered Medications:     0.9%  NaCl infusion, , Intravenous, Continuous, Malaika Brand NP, Stopped at 22 0730    acetaminophen tablet 650 mg, 650 mg, Oral, Q4H PRN, April AMY Fournier NP, 650 mg at 22 0748     albuterol nebulizer solution 2.5 mg, 2.5 mg, Nebulization, Q6H PRN, Ren Moura MD    aluminum-magnesium hydroxide-simethicone 200-200-20 mg/5 mL suspension 30 mL, 30 mL, Oral, QID (AC & HS), Vidya Fournier NP, 30 mL at 09/15/22 0606    aspirin EC tablet 81 mg, 81 mg, Oral, Daily, Vidya Fournier NP, 81 mg at 09/14/22 0748    bisacodyL suppository 10 mg, 10 mg, Rectal, Daily PRN, Vidya Fournier NP    cefTRIAXone (ROCEPHIN) 1 g/50 mL D5W IVPB, 1 g, Intravenous, Q24H, Malaika Brand NP, Stopped at 09/14/22 1550    dextrose 10% bolus 125 mL, 12.5 g, Intravenous, PRN, Vidya Fournier NP    dextrose 10% bolus 250 mL, 25 g, Intravenous, PRN, Vidya Fournier NP    enoxaparin injection 40 mg, 40 mg, Subcutaneous, Daily, Ren Moura MD    glucagon (human recombinant) injection 1 mg, 1 mg, Intramuscular, PRN, Vidya Fournier NP    glucose chewable tablet 16 g, 16 g, Oral, PRN, Vidya Fournier NP    glucose chewable tablet 24 g, 24 g, Oral, PRN, Vidya Fournier NP    hydrALAZINE injection 10 mg, 10 mg, Intravenous, Q8H PRN, Vidya Fournier NP    insulin aspart U-100 pen 0-5 Units, 0-5 Units, Subcutaneous, QID (AC + HS) PRN, Vidya Fournier NP    ipratropium 0.02 % nebulizer solution 0.5 mg, 0.5 mg, Nebulization, Q6H, Ren Moura MD, 0.5 mg at 09/15/22 0716    naloxone 0.4 mg/mL injection 0.02 mg, 0.02 mg, Intravenous, PRN, Vidya Fournier NP    ondansetron injection 4 mg, 4 mg, Intravenous, Q8H PRN, Vidya Fournier NP    QUEtiapine tablet 25 mg, 25 mg, Oral, BID, Malaika Brand NP, 25 mg at 09/14/22 2107    sodium bicarbonate 150 mEq in sterile water 1,000 mL infusion, , Intravenous, Continuous, Malaika Brand NP, Last Rate: 75 mL/hr at 09/15/22 0606, New Bag at 09/15/22 0606    sodium chloride 0.9% flush 10 mL, 10 mL, Intravenous, Q12H PRN, Vidya Fournier NP    sucralfate 100 mg/mL suspension 1 g, 1 g, Oral, Q6H, Vidya Fournier NP, 1 g at 09/15/22 0606    tamsulosin 24 hr capsule  0.4 mg, 0.4 mg, Oral, QHS, April AMY Fournier, NP, 0.4 mg at 09/14/22 2106    timolol maleate 0.5% ophthalmic solution 1 drop, 1 drop, Both Eyes, BID, April AMY Fournier, CHEPE, 1 drop at 09/14/22 0748    Physical Exam:  Vitals:    09/15/22 0731   BP: 132/64   Pulse: 71   Resp: 19   Temp: 97.7 °F (36.5 °C)     Body mass index is 20.83 kg/m².  General: awake, alert, cooperative  Head: NC/AT  Ears: external ears normal  Eyes: sclera normal  Lungs: normal inspiration, NAD  Heart: well-perfused  Abdomen: Soft, NT, ND  : Normal uncirc'd phallus, meatus normal in size and position, christopher in place draining thin red urine  Skin: The skin is warm and dry  Ext: No c/c/e.  Neuro: grossly intact, AOx3    RADIOLOGY:  US RETROPERITONEAL COMPLETE 09/13/2022     CLINICAL HISTORY:  ARF;     TECHNIQUE:  Ultrasound of the kidneys and urinary bladder was performed including color flow and Doppler evaluation of the kidneys.     COMPARISON:  None.     FINDINGS:  Right kidney: The right kidney measures up to 11.3 cm.  Moderate right-sided hydronephrosis.  Resistive index measures 0.66     Left kidney: The left kidney measures up to 12.1 cm with mild hydronephrosis of the left kidney.  Left renal cyst measures 4.2 cm.  Resistive index measures 0.76.     Debris noted in the bladder.  Splenic resistive index measures 0.6.     Impression:     As above       LABS:  I personally reviewed the following lab values:  Lab Results   Component Value Date    WBC 5.83 09/15/2022    HGB 9.5 (L) 09/15/2022    HCT 29.3 (L) 09/15/2022     09/15/2022     09/15/2022    K 3.3 (L) 09/15/2022     09/15/2022    CREATININE 1.2 09/15/2022    BUN 29 (H) 09/15/2022    CO2 23 09/15/2022    TSH 1.146 08/27/2022    HGBA1C 6.6 (H) 07/20/2022    ALT 27 09/13/2022    AST 21 09/13/2022       URINALYSIS:   Component Ref Range & Units 10:30 4 d ago   RBC, UA 0 - 4 /hpf >100 High   5 High     WBC, UA 0 - 5 /hpf >100 High   >100 High     WBC Clumps, UA None-Rare  Many Abnormal   Few Abnormal     Bacteria None-Occ /hpf Many Abnormal   Many Abnormal       Assessment/Plan:   Joseph Toussaint is a 89 y.o. male with retention and gross hematuria. Hydro almost certainly due to retention, as Scr has titrated down with the christopher. Exchange indwelling christopher for 20Fr 3 way and start CBI to help clear the hematuria, which is likely due to a combination of decompressive and due to his UTI. Call with further questions or concerns.    Yifan Sarmiento MD  Urology

## 2022-09-15 NOTE — ASSESSMENT & PLAN NOTE
Normotensive   Home medications Spironolactone and Entresto on hold   --Hydralazine PRN  --Vitals Q4H

## 2022-09-15 NOTE — ASSESSMENT & PLAN NOTE
Patient is identified as having Systolic (HFrEF) heart failure that is Chronic. CHF is currently controlled. Latest ECHO performed and demonstrates- Results for orders placed during the hospital encounter of 07/19/22    Echo    Interpretation Summary  · Concentric hypertrophy and severely decreased systolic function.  · Mild left atrial enlargement.  · The estimated ejection fraction is 20%.  · Left ventricular diastolic dysfunction.  · There is left ventricular global hypokinesis.  · Moderate right ventricular enlargement with mildly to moderately reduced right ventricular systolic function.  · Moderate right atrial enlargement.  · There is mild aortic valve stenosis.  · Aortic valve area is 1.90 cm2; peak velocity is 1.05 m/s; mean gradient is 2 mmHg.  · There is severe pulmonary hypertension.  · Moderate tricuspid regurgitation.  · Elevated central venous pressure (15 mmHg).  · The estimated PA systolic pressure is 103 mmHg.  . Continue ACE/ARB, Furosemide and Aldactone and monitor clinical status closely. Monitor on telemetry. Patient is on CHF pathway.  Monitor strict Is&Os and daily weights.  Place on fluid restriction of 1.5 L. Continue to stress to patient importance of self efficacy and  on diet for CHF. Last BNP reviewed- and noted below   Recent Labs   Lab 09/13/22  1449   BNP >4,900*   .  EF 20%  Monitor I/o  Daily weights  Spironolactone and Entresto, Lasix, on hold 2/2 normotensive and MELISSA

## 2022-09-15 NOTE — ASSESSMENT & PLAN NOTE
Dx with UTI on 8/27 in ED, unclear if completed antibiotic regimen    --Follow urine culture  --Continue IV Cipro    9/11/22:Blood cultures show NGTD. Urine culture showed no growth but urine output is very cloudy -almost purulent. Will switch IV Cipro to oral dose.    9/14/22:  Serum Cr 1.5. Renal U/s showed mild hydronephrosis on right and moderate on Left. Sanchez palced-900 ml creamy white UOP immediately returned. Will d/c Cipro- add IV Rocephin. Urology consulted. Awaiting SNF placement     9/15/22:  Cont IV Rocephin. Repeat urine culture pending.

## 2022-09-15 NOTE — SUBJECTIVE & OBJECTIVE
Interval History: see hospital course    Review of Systems   Unable to perform ROS: Dementia   Objective:     Vital Signs (Most Recent):  Temp: 97.7 °F (36.5 °C) (09/15/22 1117)  Pulse: 73 (09/15/22 1316)  Resp: 18 (09/15/22 1316)  BP: 123/75 (09/15/22 1117)  SpO2: 98 % (09/15/22 1316) Vital Signs (24h Range):  Temp:  [97.7 °F (36.5 °C)-98.6 °F (37 °C)] 97.7 °F (36.5 °C)  Pulse:  [70-85] 73  Resp:  [17-20] 18  SpO2:  [96 %-100 %] 98 %  BP: (123-144)/(64-86) 123/75     Weight: 68.7 kg (151 lb 7.3 oz)  Body mass index is 20.83 kg/m².    Intake/Output Summary (Last 24 hours) at 9/15/2022 1438  Last data filed at 9/15/2022 1200  Gross per 24 hour   Intake 2051.33 ml   Output 1900 ml   Net 151.33 ml        Physical Exam  Vitals and nursing note reviewed.   Constitutional:       Appearance: He is well-developed.   HENT:      Head: Normocephalic and atraumatic.      Nose: Nose normal.   Eyes:      General: No scleral icterus.     Pupils: Pupils are equal, round, and reactive to light.   Cardiovascular:      Rate and Rhythm: Normal rate and regular rhythm.      Heart sounds: Normal heart sounds. No murmur heard.    No friction rub. No gallop.   Pulmonary:      Effort: Pulmonary effort is normal. No respiratory distress.      Breath sounds: Normal breath sounds. No wheezing.   Abdominal:      General: Bowel sounds are normal. There is no distension.      Palpations: Abdomen is soft.      Tenderness: There is no abdominal tenderness.   Genitourinary:     Comments: Sanchez placed- gross hematuria UOP. 3way catheter with CBI placed  Musculoskeletal:         General: Normal range of motion.      Cervical back: Normal range of motion and neck supple.   Skin:     General: Skin is warm and dry.      Findings: No rash.   Neurological:      Mental Status: He is alert and oriented to person, place, and time.      Cranial Nerves: No cranial nerve deficit.   Psychiatric:         Behavior: Behavior normal.         Cognition and Memory:  Cognition is impaired. He exhibits impaired recent memory.       Significant Labs: All pertinent labs within the past 24 hours have been reviewed.    Significant Imaging: I have reviewed all pertinent imaging results/findings within the past 24 hours.

## 2022-09-15 NOTE — PLAN OF CARE
Pt remains fall free this shift.  Pt AAOx2 verbal, clear speech, confused  Skin warm and dry. No new skin issues.  Room air  Voids per Sanchez, urine bloody, physician aware.  CBG AC/ HS with PRN SS insulin but refuses accuchecks  Bed low, side rails up x 2, wheels locked, call light in reach.  Bed alarm maintained for safety.  Patient instructed to call for assistance.  Hourly rounding completed.  24 hour chart check completed  POC updated and reviewed with pt. Will continue POC.

## 2022-09-15 NOTE — PLAN OF CARE
[facility] - Med [facility]  Discharge Reassessment    Primary Care Provider: Abad Iqbal MD    Expected Discharge Date:     Reassessment (most recent)       Discharge Reassessment - 09/15/22 1506          Discharge Reassessment    Assessment Type Discharge Planning Reassessment     Discharge Plan discussed with: Patient;Adult children     Communicated TATIANA with patient/caregiver Date not available/Unable to determine     Discharge Plan A Skilled Nursing Facility     Discharge Plan B Skilled Nursing Facility     DME Needed Upon Discharge  none     Discharge Barriers Identified Nursing Home rejection        Post-Acute Status    Post-Acute Authorization Placement     Post-Acute Placement Status Referrals Sent     Discharge Delays Post-Acute Set-up                   Pt denied by [facility]. Per request of attending, blanket referral will be sent. Pt's son agreed to referrals.    Tor Morocho LMSW 9/15/2022 3:09 PM

## 2022-09-15 NOTE — PROGRESS NOTES
[facility] - Med Surg 3  [facility] Medicine  Progress Note    Patient Name: Joseph Toussaint  MRN: [id number]  Patient Class: OP- Observation   Admission Date: 9/10/2022  Length of Stay: 0 days  Attending Physician: Ren Moura MD  Primary Care Provider: Abad Iqbal MD        Subjective:     Principal Problem:Acute renal failure        HPI:  89 y.o. male patient with a PMHx of HTN, COPD, CHF, and DM who presents to the Emergency Department for evaluation of a fall which occurred x 2 days pta. Pt family called for transport to get pt checked out. Pt denies any complaints and is not in pain. No associated sxs reported. Patient denies any CP, SOB, fever, back pain, abd pain, and all other sxs at this time. No prior Tx reported. In the ED, UA consistent with UTI, dx with UTI on 8/27, unclear if patient completed antibiotic regimen. BUN/Cr: 39/2.1, Bili: 2.0, H/H: 10.4/32.3. Afebrile, vitals stable. Oriented to self only on exam. Intitiated on antibiotics in the ED, urine culture pending, treated with IV fluid bolus. Patient is a full code, surrogate decision maker is his son Kevin Toussaint. Placed in observation under the care of hospital medicine.       Overview/Hospital Course:  The patient is a 89 yo male with HTN, COPD, CHF, and DM who was placed in observation for frequent falls, MELISSA, and presumed UTI on IVF and IV  Rocephin.     9/11/22: Pt AAOx3 at times but with notable cognitive deficits. Denies complaints. MELISSA improving 2.1>1.9. Hyperchloremic metabolic acidosis noted- will switch NS IVFs to Bicarb drip. Blood cultures show NGTD. Urine culture showed no growth but urine output is very cloudy -almost purulent. Will switch IV Cipro to oral dose. PT/OT evaluated pt and recommended SNF. Called pt's nephew Kraig (POA) and provided status update.  Pt lives with his nephew and his wife. Currently, Kraig is in the hospital recovering from surgery. He requested discharge to SNF.    9/12/22: Denies complaints. No acute  events. MELISSA slowly improving 2.1>1.9>1.7. cont IVF. SNF placement pending     9/13/22: No acute events. Serum Cr remains 1.7. Will check BNP and Renal U/S. Hold IVFs  Awaiting SNF placement     9/14/22: Increased agitation - Seroquel increased to BID. Serum Cr 1.5. Renal U/s showed mild hydronephrosis on right and moderate on Left with distended bladder. Christopher placed-900 ml creamy white UOP immediately returned. Will d/c Cipro- add IV Rocephin. Urology consulted. Awaiting SNF placement     9/15/22: Seroquel in am made pt too drowsy- will decrease back to just nightly. Gross hematuria to UOP. Urology recommended continuous bladder irrigation. Serum Cr normalized after placing christopher. Hydronephrosis 2/2 urinary retention. Cont IV Rocephin. Repeat urine culture pending. Pt will be placed in SNF when hematuria resolves and repeat urine culture results.       Interval History: see hospital course    Review of Systems   Unable to perform ROS: Dementia   Objective:     Vital Signs (Most Recent):  Temp: 97.7 °F (36.5 °C) (09/15/22 1117)  Pulse: 73 (09/15/22 1316)  Resp: 18 (09/15/22 1316)  BP: 123/75 (09/15/22 1117)  SpO2: 98 % (09/15/22 1316) Vital Signs (24h Range):  Temp:  [97.7 °F (36.5 °C)-98.6 °F (37 °C)] 97.7 °F (36.5 °C)  Pulse:  [70-85] 73  Resp:  [17-20] 18  SpO2:  [96 %-100 %] 98 %  BP: (123-144)/(64-86) 123/75     Weight: 68.7 kg (151 lb 7.3 oz)  Body mass index is 20.83 kg/m².    Intake/Output Summary (Last 24 hours) at 9/15/2022 1438  Last data filed at 9/15/2022 1200  Gross per 24 hour   Intake 2051.33 ml   Output 1900 ml   Net 151.33 ml        Physical Exam  Vitals and nursing note reviewed.   Constitutional:       Appearance: He is well-developed.   HENT:      Head: Normocephalic and atraumatic.      Nose: Nose normal.   Eyes:      General: No scleral icterus.     Pupils: Pupils are equal, round, and reactive to light.   Cardiovascular:      Rate and Rhythm: Normal rate and regular rhythm.      Heart sounds:  Normal heart sounds. No murmur heard.    No friction rub. No gallop.   Pulmonary:      Effort: Pulmonary effort is normal. No respiratory distress.      Breath sounds: Normal breath sounds. No wheezing.   Abdominal:      General: Bowel sounds are normal. There is no distension.      Palpations: Abdomen is soft.      Tenderness: There is no abdominal tenderness.   Genitourinary:     Comments: Christopher placed- gross hematuria UOP. 3way catheter with CBI placed  Musculoskeletal:         General: Normal range of motion.      Cervical back: Normal range of motion and neck supple.   Skin:     General: Skin is warm and dry.      Findings: No rash.   Neurological:      Mental Status: He is alert and oriented to person, place, and time.      Cranial Nerves: No cranial nerve deficit.   Psychiatric:         Behavior: Behavior normal.         Cognition and Memory: Cognition is impaired. He exhibits impaired recent memory.       Significant Labs: All pertinent labs within the past 24 hours have been reviewed.    Significant Imaging: I have reviewed all pertinent imaging results/findings within the past 24 hours.      Assessment/Plan:      * Acute renal failure  Cr: 2.1 on admission   at baseline, serum cr is in normal range  Hold home meds Spironolactone, Lasix, and Entresto   --IV fluids  --Cautious hydration  --Repeat CMP in AM    9/12/22: Denies complaints. No acute events. MELISSA slowly improving 2.1>1.9>1.7. cont IVF. SNF placement pending     9/13/22: No acute events. Serum Cr remains 1.7. Will check BNP and Renal U/S. Hold IVFs  Awaiting SNF placement     9/14/22: Increased agitation - Seroquel increased to BID. Serum Cr 1.5. Renal U/s showed mild hydronephrosis on right and moderate on Left. Christopher palced-900 ml creamy white UOP immediately returned. Will d/c Cipro- add IV Rocephin. Urology consulted. Awaiting SNF placement     9/15/22:  Serum Cr normalized after placing christopher. Hydronephrosis 2/2 urinary retention.      Gross  hematuria  9/15/22: Urology recommended exchanging christopher for 3 way catheter and start CBI      Urinary retention  Christopher placed and urinary retention resolved       Hydronephrosis, bilateral  2/2 urinary retention   Serum Cr normalized       Acute cystitis with hematuria  Dx with UTI on 8/27 in ED, unclear if completed antibiotic regimen    --Follow urine culture  --Continue IV Cipro    9/11/22:Blood cultures show NGTD. Urine culture showed no growth but urine output is very cloudy -almost purulent. Will switch IV Cipro to oral dose.    9/14/22:  Serum Cr 1.5. Renal U/s showed mild hydronephrosis on right and moderate on Left. Christopher palced-900 ml creamy white UOP immediately returned. Will d/c Cipro- add IV Rocephin. Urology consulted. Awaiting SNF placement     9/15/22:  Cont IV Rocephin. Repeat urine culture pending.     Falls  Patient reported falling at home    --Avasys  --Fall Precautions    9/11/22: PT/OT  SNF placement - CM consulted     Cognitive impairment  Confused on admission\  Cont home emd Seroquel HS    --Avasys  --Fall precautions  --Neuro checks Q4H x24 hours    9/11/22: Pt AAOx3 at times but with notable cognitive deficits.    9/14/22: Increased agitation - Seroquel increased to BID.    9/15/22: Seroquel in am made pt too drowsy- will decrease back to just nightly.     Chronic systolic congestive heart failure  Patient is identified as having Systolic (HFrEF) heart failure that is Chronic. CHF is currently controlled. Latest ECHO performed and demonstrates- Results for orders placed during the hospital encounter of 07/19/22    Echo    Interpretation Summary  · Concentric hypertrophy and severely decreased systolic function.  · Mild left atrial enlargement.  · The estimated ejection fraction is 20%.  · Left ventricular diastolic dysfunction.  · There is left ventricular global hypokinesis.  · Moderate right ventricular enlargement with mildly to moderately reduced right ventricular systolic  function.  · Moderate right atrial enlargement.  · There is mild aortic valve stenosis.  · Aortic valve area is 1.90 cm2; peak velocity is 1.05 m/s; mean gradient is 2 mmHg.  · There is severe pulmonary hypertension.  · Moderate tricuspid regurgitation.  · Elevated central venous pressure (15 mmHg).  · The estimated PA systolic pressure is 103 mmHg.  . Continue ACE/ARB, Furosemide and Aldactone and monitor clinical status closely. Monitor on telemetry. Patient is on CHF pathway.  Monitor strict Is&Os and daily weights.  Place on fluid restriction of 1.5 L. Continue to stress to patient importance of self efficacy and  on diet for CHF. Last BNP reviewed- and noted below   Recent Labs   Lab 09/13/22  1449   BNP >4,900*   .  EF 20%  Monitor I/o  Daily weights  Spironolactone and Entresto, Lasix, on hold 2/2 normotensive and MELISSA        Essential hypertension  Normotensive   Home medications Spironolactone and Entresto on hold   --Hydralazine PRN  --Vitals Q4H      COPD suggested by initial evaluation  Negative for symptoms of exacerbation    --Continue home regimen            VTE Risk Mitigation (From admission, onward)         Ordered     enoxaparin injection 40 mg  Daily         09/14/22 1426     Reason for No Pharmacological VTE Prophylaxis  Once        Question:  Reasons:  Answer:  Already adequately anticoagulated on oral Anticoagulants    09/10/22 1158     IP VTE HIGH RISK PATIENT  Once         09/10/22 1158     Place sequential compression device  Until discontinued         09/10/22 1158                Discharge Planning   TATIANA:      Code Status: Full Code   Is the patient medically ready for discharge?: Yes    Reason for patient still in hospital (select all that apply): Patient trending condition  Discharge Plan A: Skilled Nursing Facility                  Malaika Brand NP  Department of Hospital Medicine   [facility] - Med Surg 3

## 2022-09-15 NOTE — PLAN OF CARE
Problem: Adult Inpatient Plan of Care  Goal: Plan of Care Review  Outcome: Ongoing, Progressing  Goal: Optimal Comfort and Wellbeing  Outcome: Ongoing, Progressing     Problem: Diabetes Comorbidity  Goal: Blood Glucose Level Within Targeted Range  Outcome: Ongoing, Progressing     Problem: Fluid and Electrolyte Imbalance (Acute Kidney Injury/Impairment)  Goal: Fluid and Electrolyte Balance  Outcome: Ongoing, Progressing     Problem: Oral Intake Inadequate (Acute Kidney Injury/Impairment)  Goal: Optimal Nutrition Intake  Outcome: Ongoing, Progressing     Sanchez replaced with 3-way, CBI started, red-tinged, pt sleepy but arousable and eating meals, good appetite. No BM today. IVF d/c'd. IV antibiotics.

## 2022-09-16 PROBLEM — E11.9 TYPE 2 DIABETES MELLITUS: Status: ACTIVE | Noted: 2022-09-16

## 2022-09-16 PROBLEM — D50.9 MICROCYTIC ANEMIA: Status: ACTIVE | Noted: 2022-09-16

## 2022-09-16 PROBLEM — R53.81 DEBILITY: Status: ACTIVE | Noted: 2022-09-16

## 2022-09-16 PROBLEM — N17.9 ACUTE RENAL FAILURE: Status: RESOLVED | Noted: 2022-09-10 | Resolved: 2022-09-16

## 2022-09-16 PROBLEM — E87.6 HYPOKALEMIA: Status: ACTIVE | Noted: 2022-09-16

## 2022-09-16 PROBLEM — E44.0 MALNUTRITION OF MODERATE DEGREE: Status: ACTIVE | Noted: 2022-09-16

## 2022-09-16 LAB
ALBUMIN SERPL BCP-MCNC: 2.5 G/DL (ref 3.5–5.2)
ALP SERPL-CCNC: 147 U/L (ref 55–135)
ALT SERPL W/O P-5'-P-CCNC: 17 U/L (ref 10–44)
ANION GAP SERPL CALC-SCNC: 11 MMOL/L (ref 8–16)
AST SERPL-CCNC: 12 U/L (ref 10–40)
BACTERIA UR CULT: NO GROWTH
BASOPHILS # BLD AUTO: 0.02 K/UL (ref 0–0.2)
BASOPHILS NFR BLD: 0.3 % (ref 0–1.9)
BILIRUB SERPL-MCNC: 1 MG/DL (ref 0.1–1)
BUN SERPL-MCNC: 25 MG/DL (ref 8–23)
CALCIUM SERPL-MCNC: 8.5 MG/DL (ref 8.7–10.5)
CHLORIDE SERPL-SCNC: 106 MMOL/L (ref 95–110)
CO2 SERPL-SCNC: 26 MMOL/L (ref 23–29)
CREAT SERPL-MCNC: 1.1 MG/DL (ref 0.5–1.4)
DIFFERENTIAL METHOD: ABNORMAL
EOSINOPHIL # BLD AUTO: 0.1 K/UL (ref 0–0.5)
EOSINOPHIL NFR BLD: 1.1 % (ref 0–8)
ERYTHROCYTE [DISTWIDTH] IN BLOOD BY AUTOMATED COUNT: 18.8 % (ref 11.5–14.5)
EST. GFR  (NO RACE VARIABLE): >60 ML/MIN/1.73 M^2
GLUCOSE SERPL-MCNC: 112 MG/DL (ref 70–110)
HCT VFR BLD AUTO: 31.7 % (ref 40–54)
HGB BLD-MCNC: 9.8 G/DL (ref 14–18)
IMM GRANULOCYTES # BLD AUTO: 0.03 K/UL (ref 0–0.04)
IMM GRANULOCYTES NFR BLD AUTO: 0.5 % (ref 0–0.5)
LYMPHOCYTES # BLD AUTO: 1 K/UL (ref 1–4.8)
LYMPHOCYTES NFR BLD: 16.3 % (ref 18–48)
MAGNESIUM SERPL-MCNC: 2.4 MG/DL (ref 1.6–2.6)
MCH RBC QN AUTO: 24 PG (ref 27–31)
MCHC RBC AUTO-ENTMCNC: 30.9 G/DL (ref 32–36)
MCV RBC AUTO: 78 FL (ref 82–98)
MONOCYTES # BLD AUTO: 0.7 K/UL (ref 0.3–1)
MONOCYTES NFR BLD: 10.8 % (ref 4–15)
NEUTROPHILS # BLD AUTO: 4.5 K/UL (ref 1.8–7.7)
NEUTROPHILS NFR BLD: 71 % (ref 38–73)
NRBC BLD-RTO: 0 /100 WBC
PLATELET # BLD AUTO: 208 K/UL (ref 150–450)
PMV BLD AUTO: 11 FL (ref 9.2–12.9)
POCT GLUCOSE: 121 MG/DL (ref 70–110)
POCT GLUCOSE: 134 MG/DL (ref 70–110)
POCT GLUCOSE: 134 MG/DL (ref 70–110)
POCT GLUCOSE: 135 MG/DL (ref 70–110)
POTASSIUM SERPL-SCNC: 3.5 MMOL/L (ref 3.5–5.1)
PROT SERPL-MCNC: 5.6 G/DL (ref 6–8.4)
RBC # BLD AUTO: 4.08 M/UL (ref 4.6–6.2)
SODIUM SERPL-SCNC: 143 MMOL/L (ref 136–145)
WBC # BLD AUTO: 6.27 K/UL (ref 3.9–12.7)

## 2022-09-16 PROCEDURE — 99233 PR SUBSEQUENT HOSPITAL CARE,LEVL III: ICD-10-PCS | Mod: ,,, | Performed by: UROLOGY

## 2022-09-16 PROCEDURE — 25000003 PHARM REV CODE 250: Performed by: NURSE PRACTITIONER

## 2022-09-16 PROCEDURE — S5010 5% DEXTROSE AND 0.45% SALINE: HCPCS | Performed by: NURSE PRACTITIONER

## 2022-09-16 PROCEDURE — 25000242 PHARM REV CODE 250 ALT 637 W/ HCPCS: Performed by: FAMILY MEDICINE

## 2022-09-16 PROCEDURE — 11000001 HC ACUTE MED/SURG PRIVATE ROOM

## 2022-09-16 PROCEDURE — 83735 ASSAY OF MAGNESIUM: CPT | Performed by: NURSE PRACTITIONER

## 2022-09-16 PROCEDURE — 97530 THERAPEUTIC ACTIVITIES: CPT | Mod: CQ

## 2022-09-16 PROCEDURE — 97530 THERAPEUTIC ACTIVITIES: CPT

## 2022-09-16 PROCEDURE — 80053 COMPREHEN METABOLIC PANEL: CPT | Performed by: NURSE PRACTITIONER

## 2022-09-16 PROCEDURE — 63600175 PHARM REV CODE 636 W HCPCS: Performed by: NURSE PRACTITIONER

## 2022-09-16 PROCEDURE — 85025 COMPLETE CBC W/AUTO DIFF WBC: CPT | Performed by: NURSE PRACTITIONER

## 2022-09-16 PROCEDURE — 94640 AIRWAY INHALATION TREATMENT: CPT

## 2022-09-16 PROCEDURE — 36415 COLL VENOUS BLD VENIPUNCTURE: CPT | Performed by: NURSE PRACTITIONER

## 2022-09-16 PROCEDURE — 99233 SBSQ HOSP IP/OBS HIGH 50: CPT | Mod: ,,, | Performed by: UROLOGY

## 2022-09-16 PROCEDURE — 94761 N-INVAS EAR/PLS OXIMETRY MLT: CPT

## 2022-09-16 PROCEDURE — 63600175 PHARM REV CODE 636 W HCPCS: Performed by: FAMILY MEDICINE

## 2022-09-16 PROCEDURE — 25000003 PHARM REV CODE 250: Performed by: FAMILY MEDICINE

## 2022-09-16 RX ORDER — DEXTROSE MONOHYDRATE AND SODIUM CHLORIDE 5; .45 G/100ML; G/100ML
INJECTION, SOLUTION INTRAVENOUS CONTINUOUS
Status: DISCONTINUED | OUTPATIENT
Start: 2022-09-16 | End: 2022-09-16

## 2022-09-16 RX ORDER — MUPIROCIN 20 MG/G
OINTMENT TOPICAL 2 TIMES DAILY
Status: COMPLETED | OUTPATIENT
Start: 2022-09-16 | End: 2022-09-21

## 2022-09-16 RX ORDER — POTASSIUM CHLORIDE 20 MEQ/1
40 TABLET, EXTENDED RELEASE ORAL ONCE
Status: COMPLETED | OUTPATIENT
Start: 2022-09-16 | End: 2022-09-16

## 2022-09-16 RX ORDER — DEXTROSE MONOHYDRATE AND SODIUM CHLORIDE 5; .45 G/100ML; G/100ML
INJECTION, SOLUTION INTRAVENOUS ONCE
Status: DISCONTINUED | OUTPATIENT
Start: 2022-09-16 | End: 2022-09-29 | Stop reason: HOSPADM

## 2022-09-16 RX ADMIN — ALUMINUM HYDROXIDE, MAGNESIUM HYDROXIDE, AND DIMETHICONE 30 ML: 200; 20; 200 SUSPENSION ORAL at 11:09

## 2022-09-16 RX ADMIN — TIMOLOL MALEATE 1 DROP: 5 SOLUTION/ DROPS OPHTHALMIC at 09:09

## 2022-09-16 RX ADMIN — SUCRALFATE 1 G: 1 SUSPENSION ORAL at 11:09

## 2022-09-16 RX ADMIN — CEFTRIAXONE 1 G: 1 INJECTION, SOLUTION INTRAVENOUS at 02:09

## 2022-09-16 RX ADMIN — DEXTROSE AND SODIUM CHLORIDE: 5; .45 INJECTION, SOLUTION INTRAVENOUS at 03:09

## 2022-09-16 RX ADMIN — POTASSIUM CHLORIDE 40 MEQ: 1500 TABLET, EXTENDED RELEASE ORAL at 10:09

## 2022-09-16 RX ADMIN — SUCRALFATE 1 G: 1 SUSPENSION ORAL at 05:09

## 2022-09-16 RX ADMIN — ALUMINUM HYDROXIDE, MAGNESIUM HYDROXIDE, AND DIMETHICONE 30 ML: 200; 20; 200 SUSPENSION ORAL at 06:09

## 2022-09-16 RX ADMIN — IPRATROPIUM BROMIDE 0.5 MG: 0.5 SOLUTION RESPIRATORY (INHALATION) at 01:09

## 2022-09-16 RX ADMIN — TAMSULOSIN HYDROCHLORIDE 0.4 MG: 0.4 CAPSULE ORAL at 09:09

## 2022-09-16 RX ADMIN — ALUMINUM HYDROXIDE, MAGNESIUM HYDROXIDE, AND DIMETHICONE 30 ML: 200; 20; 200 SUSPENSION ORAL at 03:09

## 2022-09-16 RX ADMIN — ENOXAPARIN SODIUM 40 MG: 100 INJECTION SUBCUTANEOUS at 05:09

## 2022-09-16 RX ADMIN — IPRATROPIUM BROMIDE 0.5 MG: 0.5 SOLUTION RESPIRATORY (INHALATION) at 12:09

## 2022-09-16 RX ADMIN — TIMOLOL MALEATE 1 DROP: 5 SOLUTION/ DROPS OPHTHALMIC at 10:09

## 2022-09-16 RX ADMIN — IPRATROPIUM BROMIDE 0.5 MG: 0.5 SOLUTION RESPIRATORY (INHALATION) at 07:09

## 2022-09-16 RX ADMIN — ASPIRIN 81 MG: 81 TABLET, COATED ORAL at 10:09

## 2022-09-16 RX ADMIN — SUCRALFATE 1 G: 1 SUSPENSION ORAL at 06:09

## 2022-09-16 RX ADMIN — MUPIROCIN: 20 OINTMENT TOPICAL at 09:09

## 2022-09-16 RX ADMIN — QUETIAPINE FUMARATE 25 MG: 25 TABLET ORAL at 09:09

## 2022-09-16 RX ADMIN — IPRATROPIUM BROMIDE 0.5 MG: 0.5 SOLUTION RESPIRATORY (INHALATION) at 06:09

## 2022-09-16 RX ADMIN — THERA TABS 1 TABLET: TAB at 10:09

## 2022-09-16 RX ADMIN — ALUMINUM HYDROXIDE, MAGNESIUM HYDROXIDE, AND DIMETHICONE 30 ML: 200; 20; 200 SUSPENSION ORAL at 09:09

## 2022-09-16 NOTE — PT/OT/SLP PROGRESS
Physical Therapy  Treatment    Joseph Toussaint   MRN: [id number]   Admitting Diagnosis: Acute renal failure    PT Received On: 09/16/22  PT Start Time: 1330     PT Stop Time: 1400    PT Total Time (min): 30 min       Billable Minutes:  Therapeutic Activity 30    Treatment Type: Treatment  PT/PTA: PTA     PTA Visit Number: 3       General Precautions: Standard, fall  Orthopedic Precautions: N/A   Braces: N/A  Respiratory Status: Room air    Spiritual, Cultural Beliefs, Confucianist Practices, Values that Affect Care: no    Subjective:  Communicated with patient's nurse, Ernestine, and completed Epic chart review prior to session.  Patient agreed to PT session with max encouragement.    Pain/Comfort  Pain Rating 1: 0/10    Objective:   Patient found with: christopher catheter, peripheral IV, telemetry, bed alarm, Other (comments) (AVASYS; CBI)    Functional Mobility:  Supine > Sit EOB: Mod A    Seated EOB x10 min total focusing on increased tolerance to upright position, core stability, trunk control and CV endurance.   CGA-Mod A to maintain sitting balance throughout. Max VC to maintain hand placement and to increase anterior weight shift in order to achieve & maintain midline orientation.     STS x2 trials from EOB w/ HHAx2: Max A of 2 (severe posterior weight shift with forward flexed posture; could correct for short intervals once cues but fatigues quickly)  Stand trials were ~10-15 sec long and required extended seated rest breaks between each due to fatigue.    Sit > supine: Min A     Educated patient on importance of increased tolerance to upright position and direct impact on CV endurance and strength. Patient encouraged to sit up in chair for a minimum of 2 consecutive hours per day. Encouraged patient to perform AROM TE to BLE throughout the day within all available planes of motion. Re enforced importance of utilizing call light to meet needs in room and not attempt to get up without staff assistance. Patient verbalized  understanding and agreed to comply.      AM-PAC 6 CLICK MOBILITY  How much help from another person does this patient currently need?   1 = Unable, Total/Dependent Assistance  2 = A lot, Maximum/Moderate Assistance  3 = A little, Minimum/Contact Guard/Supervision  4 = None, Modified [place]/Independent    Turning over in bed (including adjusting bedclothes, sheets and blankets)?: 2  Sitting down on and standing up from a chair with arms (e.g., wheelchair, bedside commode, etc.): 2  Moving from lying on back to sitting on the side of the bed?: 2  Moving to and from a bed to a chair (including a wheelchair)?: 2  Need to walk in hospital room?: 2  Climbing 3-5 steps with a railing?: 1  Basic Mobility Total Score: 11    AM-PAC Raw Score CMS G-Code Modifier Level of Impairment Assistance   6 % Total / Unable   7 - 9 CM 80 - 100% Maximal Assist   10 - 14 CL 60 - 80% Moderate Assist   15 - 19 CK 40 - 60% Moderate Assist   20 - 22 CJ 20 - 40% Minimal Assist   23 CI 1-20% SBA / CGA   24 CH 0% Independent/ Mod I     Patient left with bed in chair position with call button in reach, bed alarm on, and AVASYS & nurse present.    Assessment:  Joseph Toussaint is a 89 y.o. male with a medical diagnosis of Acute renal failure and presents with overall decline in functional mobility. Patient would continue to benefit from skilled PT to address functional limitations listed below in order to return to PLOF/decrease caregiver burden.     Rehab identified problem list/impairments: Rehab identified problem list/impairments: weakness, impaired endurance, impaired sensation, impaired self care skills, impaired functional mobility, impaired balance, impaired cognition, decreased coordination, decreased upper extremity function, decreased lower extremity function, decreased safety awareness, pain, abnormal tone, decreased ROM, impaired coordination, impaired cardiopulmonary response to activity    Rehab potential is  fair.    Activity tolerance: Poor    Discharge recommendations: Discharge Facility/Level of Care Needs: nursing facility, skilled     Barriers to discharge:      Equipment recommendations: Equipment Needed After Discharge: other (see comments) (TBD BY NEXT LEVEL OF CARE)     GOALS:   Multidisciplinary Problems       Physical Therapy Goals          Problem: Physical Therapy    Goal Priority Disciplines Outcome Goal Variances Interventions   Physical Therapy Goal     PT, PT/OT Ongoing, Progressing     Description: Patient will be seen a minimal of 3 out of 7 days a week.    LTG to be met by 09/24:    Bed mobility: SPV  Transfers: CGA with RW   Gait: CGA with RW                       PLAN:    Patient to be seen 3 x/week  to address the above listed problems via gait training, therapeutic activities, therapeutic exercises, neuromuscular re-education  Plan of Care expires: 09/24/22  Plan of Care reviewed with: patient         09/16/2022

## 2022-09-16 NOTE — PT/OT/SLP PROGRESS
Occupational Therapy   Treatment    Name: Joseph Toussaint  MRN: [id number]  Admitting Diagnosis:  Acute renal failure       Recommendations:     Discharge Recommendations: nursing facility, skilled  Discharge Equipment Recommendations:  other (see comments) (TBD by next level of care)  Barriers to discharge:  None    Assessment:     Joseph Toussaint is a 89 y.o. male with a medical diagnosis of Acute renal failure.  He presents with the following performance deficits affecting function are weakness, impaired endurance, impaired self care skills, impaired functional mobility, gait instability, impaired balance, impaired cognition, decreased coordination, decreased upper extremity function, decreased lower extremity function, decreased safety awareness, decreased ROM, impaired coordination, abnormal tone, impaired cardiopulmonary response to activity.     Rehab Prognosis:  Fair; patient would benefit from acute skilled OT services to address these deficits and reach maximum level of function.       Plan:     Patient to be seen 2 x/week to address the above listed problems via self-care/home management, therapeutic activities, therapeutic exercises  Plan of Care Expires: 09/25/22  Plan of Care Reviewed with: patient    Subjective     Pain/Comfort:  Pain Rating 1: 0/10    Objective:     Communicated with: nurse [facility] review prior to session.  Patient found supine with christopher catheter, peripheral IV, telemetry (LUIS MANUEL nurse present) upon OT entry to room.    General Precautions: Standard, fall   Orthopedic Precautions:N/A   Braces: N/A  Respiratory Status: Room air     Occupational Performance:     Bed Mobility:    Patient completed Rolling/Turning to Right with moderate assistance  Patient completed Scooting/Bridging with moderate assistance  Patient completed Supine to Sit with moderate assistance  Patient completed Sit to Supine with minimum assistance     Functional Mobility/Transfers:  Patient  completed Sit <> Stand Transfer with maximal assistance and of 2 persons  with  hand-held assist  x2 trials with B foot block to prevent sliding forward. Able to maintain ~10-15 seconds    [facility] 6 Click ADL: 14    Treatment & Education:  Pt sat EOB ~10 minutes, requiring SBA to Mod A due to posterior lean. Pt requiring verbal cueing to anteriorly weightshift for upright posture during sitting and standing tasks. Verbal cueing for sequencing of bed mobility tasks and hand placement onto bed rail for assist. Pt required manual assist to extend LUE fingers and wrist in order to weightbear through LUE to push up from bed.  Reviewed role of OT in acute setting and benefits of participation. Educated on techniques to use to increase independence and decrease fall risk with functional transfers. Educated on importance of OOB activity and calling for A to meet needs. Encouraged completion of B UE AROM/self-ROM therex throughout the day to tolerance to increase functional strength and activity tolerance. Patient stated understanding and in agreement with POC.     Patient left with bed in chair position with all lines intact, call button in reach, and nurse and AVASYS present    GOALS:   Multidisciplinary Problems       Occupational Therapy Goals          Problem: Occupational Therapy    Goal Priority Disciplines Outcome Interventions   Occupational Therapy Goal     OT, PT/OT Ongoing, Progressing    Description: Goals to be met by: 9/25/22     Patient will increase functional independence with ADLs by performing:    Toileting from bedside commode with Contact Guard Assistance for hygiene and clothing management.   Toilet transfer to bedside commode with Contact Guard Assistance.  Increased functional strength in B UE grossly by 1/2 MM grade.                         Time Tracking:     OT Date of Treatment: 09/16/22  OT Start Time: 0130  OT Stop Time: 0200  OT Total Time (min): 30 min    Billable Minutes:Therapeutic Activity  30    OT/TRICIA: OT      Marcella Hernandez OT     9/16/2022

## 2022-09-16 NOTE — ASSESSMENT & PLAN NOTE
Patient reported falling at home    --Avasys  --Fall Precautions    9/11/22: PT/OT  SNF placement - CM consulted     9/16 Stable  Awaiting SNF placement

## 2022-09-16 NOTE — PROGRESS NOTES
Chief Complaint: retention, bilateral hydronephrosis    HPI:   2022 - NAEO, urine has cleared with CBI, Scr down to 1.1    09/15/2022 - urine still bloody but thin, creatinine down to 1.2    2022 - 89 y.o. male with PMHx of HTN, COPD, CHF, and DM admitted to the hospital after a fall. Evaluation in the ED showed UTI, Scr up to 2.1. Patient was started on IVF and antibiotics and creatinine initially improved, however it plateaued around 1.5 from a normal baseline. Renal US was obtained which showed bilateral hydronephrosis and a distended bladder. Patient notes some prior issues with BPH and difficult urination, was not on BPH meds prior to admission. Sanchez placed drained 900mL of thick, creamy urine. No known prior urologic procedures.     PMH:  Past Medical History:   Diagnosis Date    Acute coronary syndrome         CHF (congestive heart failure)     COPD (chronic obstructive pulmonary disease)     Diabetes mellitus     HTN (hypertension)        PSH:  No past surgical history on file.    Family History:  No family history on file.    Social History:  Social History     Tobacco Use    Smoking status: Former     Packs/day: 1.00     Types: Cigarettes     Quit date: 2022     Years since quittin.1    Smokeless tobacco: Never   Substance Use Topics    Alcohol use: Not Currently     Comment: occasionally    Drug use: Not Currently        Review of Systems:  General: No fever, chills  Skin: No rashes  Chest:  Denies cough and sputum production  Heart: Denies chest pain  Resp: Denies dyspnea  Abdomen: Denies diarrhea, abdominal pain, hematemesis, or blood in stool.  Musculoskeletal: No joint stiffness or swelling. Denies back pain.  : see HPI  Neuro: no dizziness or weakness    Allergies:  Penicillins    Medications:    Current Facility-Administered Medications:     acetaminophen tablet 650 mg, 650 mg, Oral, Q4H PRN, April AMY Fournier NP, 650 mg at 22 0748    albuterol nebulizer solution  2.5 mg, 2.5 mg, Nebulization, Q6H PRN, Ren Moura MD    aluminum-magnesium hydroxide-simethicone 200-200-20 mg/5 mL suspension 30 mL, 30 mL, Oral, QID (AC & HS), Vidya Fournier, NP, 30 mL at 09/16/22 0607    aspirin EC tablet 81 mg, 81 mg, Oral, Daily, Vidya Fournier NP, 81 mg at 09/15/22 0856    bisacodyL suppository 10 mg, 10 mg, Rectal, Daily PRN, Vidya Fournier NP    cefTRIAXone (ROCEPHIN) 1 g/50 mL D5W IVPB, 1 g, Intravenous, Q24H, Malaika Brand NP, Stopped at 09/15/22 1428    dextrose 10% bolus 125 mL, 12.5 g, Intravenous, PRN, Vidya Fournier NP    dextrose 10% bolus 250 mL, 25 g, Intravenous, PRN, Vidya Fournier NP    enoxaparin injection 40 mg, 40 mg, Subcutaneous, Daily, Ren Moura MD    glucagon (human recombinant) injection 1 mg, 1 mg, Intramuscular, PRN, Vidya Fournier NP    glucose chewable tablet 16 g, 16 g, Oral, PRN, Vidya Fournier NP    glucose chewable tablet 24 g, 24 g, Oral, PRN, iVdya Fournier, CHEPE    hydrALAZINE injection 10 mg, 10 mg, Intravenous, Q8H PRN, Vidya Fournier NP    insulin aspart U-100 pen 0-5 Units, 0-5 Units, Subcutaneous, QID (AC + HS) PRN, Vidya Fournier NP    ipratropium 0.02 % nebulizer solution 0.5 mg, 0.5 mg, Nebulization, Q6H, Ren Moura MD, 0.5 mg at 09/16/22 0657    naloxone 0.4 mg/mL injection 0.02 mg, 0.02 mg, Intravenous, PRN, Vidya Fournier NP    ondansetron injection 4 mg, 4 mg, Intravenous, Q8H PRN, Vidya Fournier NP    QUEtiapine tablet 25 mg, 25 mg, Oral, QHS, Malaika Brand NP, 25 mg at 09/15/22 2021    sodium chloride 0.9% flush 10 mL, 10 mL, Intravenous, Q12H PRN, Vidya Fournier NP    sucralfate 100 mg/mL suspension 1 g, 1 g, Oral, Q6H, Vidya Fournier NP, 1 g at 09/16/22 0607    tamsulosin 24 hr capsule 0.4 mg, 0.4 mg, Oral, QHS, Vidya Fournier NP, 0.4 mg at 09/15/22 2021    timolol maleate 0.5% ophthalmic solution 1 drop, 1 drop, Both Eyes, BID, Vidya Fournier NP, 1 drop at 09/15/22 2021    Physical  Exam:  Vitals:    09/16/22 0717   BP: (!) 87/53   Pulse: 78   Resp: 20   Temp: 98.2 °F (36.8 °C)     Body mass index is 20.83 kg/m².  General: awake, alert, cooperative  Head: NC/AT  Ears: external ears normal  Eyes: sclera normal  Lungs: normal inspiration, NAD  Heart: well-perfused  Abdomen: Soft, NT, ND  : Normal uncirc'd phallus, meatus normal in size and position, christopher in place on CBI draining completely clear effluent, titrated down    Skin: The skin is warm and dry  Ext: No c/c/e.  Neuro: grossly intact, AOx3    RADIOLOGY:  US RETROPERITONEAL COMPLETE 09/13/2022     CLINICAL HISTORY:  ARF;     TECHNIQUE:  Ultrasound of the kidneys and urinary bladder was performed including color flow and Doppler evaluation of the kidneys.     COMPARISON:  None.     FINDINGS:  Right kidney: The right kidney measures up to 11.3 cm.  Moderate right-sided hydronephrosis.  Resistive index measures 0.66     Left kidney: The left kidney measures up to 12.1 cm with mild hydronephrosis of the left kidney.  Left renal cyst measures 4.2 cm.  Resistive index measures 0.76.     Debris noted in the bladder.  Splenic resistive index measures 0.6.     Impression:     As above       LABS:  I personally reviewed the following lab values:  Lab Results   Component Value Date    WBC 6.27 09/16/2022    HGB 9.8 (L) 09/16/2022    HCT 31.7 (L) 09/16/2022     09/16/2022     09/16/2022    K 3.5 09/16/2022     09/16/2022    CREATININE 1.1 09/16/2022    BUN 25 (H) 09/16/2022    CO2 26 09/16/2022    TSH 1.146 08/27/2022    HGBA1C 6.6 (H) 07/20/2022    ALT 17 09/16/2022    AST 12 09/16/2022       URINALYSIS:   Component Ref Range & Units 10:30 4 d ago   RBC, UA 0 - 4 /hpf >100 High   5 High     WBC, UA 0 - 5 /hpf >100 High   >100 High     WBC Clumps, UA None-Rare Many Abnormal   Few Abnormal     Bacteria None-Occ /hpf Many Abnormal   Many Abnormal       Assessment/Plan:   Joseph Toussaint is a 89 y.o. male with retention and gross  hematuria. Scr down with just christopher decompression, and CBI has cleared his gross hematuria which is likely due to a combination of decompression and UTI. Can clamp later this morning. If he remains clear, OK for d/c from a urology perspective. He should discharge with the christopher in place, and will follow-up with us in 1 week for a voiding trial. Call with further questions or concerns.    Yifan Sarmiento MD  Urology

## 2022-09-16 NOTE — ASSESSMENT & PLAN NOTE
9/15/22: Urology recommended exchanging christopher for 3 way catheter and start CBI    9/16 Resolved  Monitor for recurrence

## 2022-09-16 NOTE — PLAN OF CARE
Continue OT POC.   Mod A rolling to R and sup>sit, Min A sit>sup, Max A x2 sit>stand with HHA. Pt with posterior lean, requiring verbal cueing.

## 2022-09-16 NOTE — PLAN OF CARE
Discussed Plan of Care with patient and verbalized understanding - Patient remains AAO to self- remains free of falls, accidents and trauma during the day shift. Bed is in the low position and the call light is within reach. IV fluids started x1 liter. CBI clamped. Will continue to monitor

## 2022-09-16 NOTE — ASSESSMENT & PLAN NOTE
Normotensive   Home medications Spironolactone and Entresto on hold   --Hydralazine PRN  --Vitals Q4H  9/16 Stable

## 2022-09-16 NOTE — PLAN OF CARE
[facility] - Med Surg 3  Discharge Reassessment    Primary Care Provider: Abad Iqbal MD    Expected Discharge Date: 9/16/2022    Reassessment (most recent)       Discharge Reassessment - 09/16/22 1404          Discharge Reassessment    Assessment Type Discharge Planning Reassessment     Communicated TATIANA with patient/caregiver Date not available/Unable to determine     Discharge Barriers Identified No family/friends to help;Nursing Home rejection     Why the patient remains in the hospital Placement issues        Post-Acute Status    Post-Acute Placement Status Referrals Sent     Discharge Delays Post-Acute Set-up                   Third unsuccessful attempt to reach pt's son by phone today.    Tor Morocho LMSW 9/16/2022 2:05 PM

## 2022-09-16 NOTE — ASSESSMENT & PLAN NOTE
2/2 urinary retention   Serum Cr normalized     9/16 Patient to discharge with christopher and follow up with Urology as Outpatient

## 2022-09-16 NOTE — SUBJECTIVE & OBJECTIVE
Interval History:  Gross hematuria resolved. CBI clamped. Continue current treatment. SNF placement pending.     Review of Systems   Constitutional:  Positive for activity change and fatigue. Negative for chills, diaphoresis and fever.   HENT:  Positive for hearing loss. Negative for ear discharge, ear pain and facial swelling.    Eyes:  Negative for pain and redness.   Respiratory:  Negative for cough and shortness of breath.    Cardiovascular:  Negative for leg swelling.   Gastrointestinal:  Negative for abdominal pain, constipation, diarrhea, nausea and vomiting.   Endocrine: Negative for polydipsia and polyphagia.   Genitourinary:  Negative for flank pain and hematuria.        CBI clamped  Hematuria resolved this am   Musculoskeletal:  Positive for arthralgias and gait problem. Negative for back pain, neck pain and neck stiffness.   Skin:  Negative for color change.   Allergic/Immunologic: Negative for food allergies.   Neurological:  Positive for weakness. Negative for facial asymmetry and speech difficulty.   Hematological:  Does not bruise/bleed easily.   Psychiatric/Behavioral:  Negative for agitation, behavioral problems, hallucinations and suicidal ideas. The patient is not nervous/anxious.    Objective:     Vital Signs (Most Recent):  Temp: 97.9 °F (36.6 °C) (09/16/22 1143)  Pulse: 62 (09/16/22 1314)  Resp: 17 (09/16/22 1314)  BP: 114/65 (09/16/22 1143)  SpO2: 100 % (09/16/22 1143) Vital Signs (24h Range):  Temp:  [97.7 °F (36.5 °C)-98.4 °F (36.9 °C)] 97.9 °F (36.6 °C)  Pulse:  [62-80] 62  Resp:  [17-20] 17  SpO2:  [95 %-100 %] 100 %  BP: ()/(53-77) 114/65     Weight: 68.7 kg (151 lb 7.3 oz)  Body mass index is 20.83 kg/m².    Intake/Output Summary (Last 24 hours) at 9/16/2022 1415  Last data filed at 9/16/2022 1200  Gross per 24 hour   Intake 1074.62 ml   Output -300 ml   Net 1374.62 ml      Physical Exam  Vitals and nursing note reviewed.   Constitutional:       General: He is not in acute  distress.     Appearance: He is well-developed. He is not diaphoretic.   HENT:      Head: Normocephalic and atraumatic.      Mouth/Throat:      Mouth: Mucous membranes are moist.   Eyes:      General: No scleral icterus.        Right eye: No discharge.         Left eye: No discharge.      Extraocular Movements: Extraocular movements intact.      Conjunctiva/sclera: Conjunctivae normal.      Pupils: Pupils are equal, round, and reactive to light.   Cardiovascular:      Rate and Rhythm: Normal rate and regular rhythm.      Heart sounds: Normal heart sounds. No murmur heard.    No friction rub. No gallop.   Pulmonary:      Effort: Pulmonary effort is normal. No respiratory distress.      Breath sounds: No wheezing or rhonchi.      Comments: BBS diminished  Abdominal:      General: Bowel sounds are normal. There is no distension.      Palpations: Abdomen is soft.      Tenderness: There is no abdominal tenderness. There is no guarding.   Genitourinary:     Comments:  CBI clamped- No hematuria at this time  Musculoskeletal:      Cervical back: Normal range of motion and neck supple. No rigidity or tenderness.      Right lower leg: No edema.      Left lower leg: No edema.      Comments: Stiff joints  General debility   Skin:     General: Skin is warm and dry.      Capillary Refill: Capillary refill takes 2 to 3 seconds.   Neurological:      Mental Status: He is alert.      Motor: Weakness present.      Gait: Gait abnormal.      Comments: Oriented person and place  Forgetful  Responds appropriately to simple commands   Psychiatric:         Cognition and Memory: Cognition is impaired. He exhibits impaired recent memory.      Comments: Calm and cooperative        Lab Results   Component Value Date    WBC 6.27 09/16/2022    HGB 9.8 (L) 09/16/2022    HCT 31.7 (L) 09/16/2022    MCV 78 (L) 09/16/2022     09/16/2022       BMP  Lab Results   Component Value Date     09/16/2022    K 3.5 09/16/2022     09/16/2022     CO2 26 09/16/2022    BUN 25 (H) 09/16/2022    CREATININE 1.1 09/16/2022    CALCIUM 8.5 (L) 09/16/2022    ANIONGAP 11 09/16/2022    ESTGFRAFRICA >60 07/26/2022    ESTGFRAFRICA >60 07/26/2022    EGFRNONAA 53 (A) 07/26/2022    EGFRNONAA 53 (A) 07/26/2022

## 2022-09-16 NOTE — PLAN OF CARE
Patient remains free of falls and injuries during shift. No signs of pain or discomfort. CBI running. Urine is now a clear light yellow. Remains only oriented to self.

## 2022-09-16 NOTE — ASSESSMENT & PLAN NOTE
9/16 Fall and skin precautions  Turn q 2 hours  Continue Physical therapy and Occupational therapy  Discharge to SNF once accepted and bed available

## 2022-09-16 NOTE — PROGRESS NOTES
[facility] - Med Surg 3  [facility]  Progress Note    Patient Name: Joseph Toussaint  MRN: [id number]  Patient Class: IP- Inpatient   Admission Date: 9/10/2022  Length of Stay: 1 days  Attending Physician: Ren Moura MD  Primary Care Provider: Abad Iqbal MD      Subjective:     Principal Problem:Acute renal failure      HPI:  89 y.o. male patient with a PMHx of HTN, COPD, CHF, and DM who presents to the Emergency Department for evaluation of a fall which occurred x 2 days pta. Pt family called for transport to get pt checked out. Pt denies any complaints and is not in pain. No associated sxs reported. Patient denies any CP, SOB, fever, back pain, abd pain, and all other sxs at this time. No prior Tx reported. In the ED, UA consistent with UTI, dx with UTI on 8/27, unclear if patient completed antibiotic regimen. BUN/Cr: 39/2.1, Bili: 2.0, H/H: 10.4/32.3. Afebrile, vitals stable. Oriented to self only on exam. Intitiated on antibiotics in the ED, urine culture pending, treated with IV fluid bolus. Patient is a full code, surrogate decision maker is his son Kevin Toussaint. Placed in observation under the care of hospital medicine.       Overview/Hospital Course:  The patient is a 89 yo male with HTN, COPD, CHF, and DM who was placed in observation for frequent falls, MELISSA, and presumed UTI on IVF and IV  Rocephin.     9/11/22: Pt AAOx3 at times but with notable cognitive deficits. Denies complaints. MELISSA improving 2.1>1.9. Hyperchloremic metabolic acidosis noted- will switch NS IVFs to Bicarb drip. Blood cultures show NGTD. Urine culture showed no growth but urine output is very cloudy -almost purulent. Will switch IV Cipro to oral dose. PT/OT evaluated pt and recommended SNF. Called pt's nephew Kraig (POA) and provided status update.  Pt lives with his nephew and his wife. Currently, Kraig is in the hospital recovering from surgery. He requested discharge to SNF.    9/12/22: Denies complaints. No acute  events. MELISSA slowly improving 2.1>1.9>1.7. cont IVF. SNF placement pending     9/13/22: No acute events. Serum Cr remains 1.7. Will check BNP and Renal U/S. Hold IVFs  Awaiting SNF placement     9/14/22: Increased agitation - Seroquel increased to BID. Serum Cr 1.5. Renal U/s showed mild hydronephrosis on right and moderate on Left with distended bladder. Christopher placed-900 ml creamy white UOP immediately returned. Will d/c Cipro- add IV Rocephin. Urology consulted. Awaiting SNF placement     9/15/22: Seroquel in am made pt too drowsy- will decrease back to just nightly. Gross hematuria to UOP. Urology recommended continuous bladder irrigation. Serum Cr normalized after placing christopher. Hydronephrosis 2/2 urinary retention. Cont IV Rocephin. Repeat urine culture pending. Pt will be placed in SNF when hematuria resolves and repeat urine culture results.     9/16 Gross hematuria resolved. CBI clamped. Continue current treatment. SNF placement pending.       Interval History:  Gross hematuria resolved. CBI clamped. Continue current treatment. SNF placement pending.     Review of Systems   Constitutional:  Positive for activity change and fatigue. Negative for chills, diaphoresis and fever.   HENT:  Positive for hearing loss. Negative for ear discharge, ear pain and facial swelling.    Eyes:  Negative for pain and redness.   Respiratory:  Negative for cough and shortness of breath.    Cardiovascular:  Negative for leg swelling.   Gastrointestinal:  Negative for abdominal pain, constipation, diarrhea, nausea and vomiting.   Endocrine: Negative for polydipsia and polyphagia.   Genitourinary:  Negative for flank pain and hematuria.        CBI clamped  Hematuria resolved this am   Musculoskeletal:  Positive for arthralgias and gait problem. Negative for back pain, neck pain and neck stiffness.   Skin:  Negative for color change.   Allergic/Immunologic: Negative for food allergies.   Neurological:  Positive for weakness. Negative  for facial asymmetry and speech difficulty.   Hematological:  Does not bruise/bleed easily.   Psychiatric/Behavioral:  Negative for agitation, behavioral problems, hallucinations and suicidal ideas. The patient is not nervous/anxious.    Objective:     Vital Signs (Most Recent):  Temp: 97.9 °F (36.6 °C) (09/16/22 1143)  Pulse: 62 (09/16/22 1314)  Resp: 17 (09/16/22 1314)  BP: 114/65 (09/16/22 1143)  SpO2: 100 % (09/16/22 1143) Vital Signs (24h Range):  Temp:  [97.7 °F (36.5 °C)-98.4 °F (36.9 °C)] 97.9 °F (36.6 °C)  Pulse:  [62-80] 62  Resp:  [17-20] 17  SpO2:  [95 %-100 %] 100 %  BP: ()/(53-77) 114/65     Weight: 68.7 kg (151 lb 7.3 oz)  Body mass index is 20.83 kg/m².    Intake/Output Summary (Last 24 hours) at 9/16/2022 1415  Last data filed at 9/16/2022 1200  Gross per 24 hour   Intake 1074.62 ml   Output -300 ml   Net 1374.62 ml      Physical Exam  Vitals and nursing note reviewed.   Constitutional:       General: He is not in acute distress.     Appearance: He is well-developed. He is not diaphoretic.   HENT:      Head: Normocephalic and atraumatic.      Mouth/Throat:      Mouth: Mucous membranes are moist.   Eyes:      General: No scleral icterus.        Right eye: No discharge.         Left eye: No discharge.      Extraocular Movements: Extraocular movements intact.      Conjunctiva/sclera: Conjunctivae normal.      Pupils: Pupils are equal, round, and reactive to light.   Cardiovascular:      Rate and Rhythm: Normal rate and regular rhythm.      Heart sounds: Normal heart sounds. No murmur heard.    No friction rub. No gallop.   Pulmonary:      Effort: Pulmonary effort is normal. No respiratory distress.      Breath sounds: No wheezing or rhonchi.      Comments: BBS diminished  Abdominal:      General: Bowel sounds are normal. There is no distension.      Palpations: Abdomen is soft.      Tenderness: There is no abdominal tenderness. There is no guarding.   Genitourinary:     Comments:  CBI clamped- No  hematuria at this time  Musculoskeletal:      Cervical back: Normal range of motion and neck supple. No rigidity or tenderness.      Right lower leg: No edema.      Left lower leg: No edema.      Comments: Stiff joints  General debility   Skin:     General: Skin is warm and dry.      Capillary Refill: Capillary refill takes 2 to 3 seconds.   Neurological:      Mental Status: He is alert.      Motor: Weakness present.      Gait: Gait abnormal.      Comments: Oriented person and place  Forgetful  Responds appropriately to simple commands   Psychiatric:         Cognition and Memory: Cognition is impaired. He exhibits impaired recent memory.      Comments: Calm and cooperative        Lab Results   Component Value Date    WBC 6.27 09/16/2022    HGB 9.8 (L) 09/16/2022    HCT 31.7 (L) 09/16/2022    MCV 78 (L) 09/16/2022     09/16/2022       BMP  Lab Results   Component Value Date     09/16/2022    K 3.5 09/16/2022     09/16/2022    CO2 26 09/16/2022    BUN 25 (H) 09/16/2022    CREATININE 1.1 09/16/2022    CALCIUM 8.5 (L) 09/16/2022    ANIONGAP 11 09/16/2022    ESTGFRAFRICA >60 07/26/2022    ESTGFRAFRICA >60 07/26/2022    EGFRNONAA 53 (A) 07/26/2022    EGFRNONAA 53 (A) 07/26/2022           Assessment/Plan:      Debility  9/16 Fall and skin precautions  Turn q 2 hours  Continue Physical therapy and Occupational therapy  Discharge to SNF once accepted and bed available        Malnutrition of moderate degree  9/16 Nutrition consulted. Most recent weight and BMI monitored  Add po supplement    Measurements:  Wt Readings from Last 1 Encounters:   09/10/22 68.7 kg (151 lb 7.3 oz)   Body mass index is 20.83 kg/m².    Recommendations:      Patient has been screened and assessed by RD. RD will follow patient.      Type 2 diabetes mellitus   9/16 Dm diet  Accuchecks with correctional SSI  Blood sugars running 129-135  Lab Results   Component Value Date    HGBA1C 6.6 (H) 07/20/2022           Hypokalemia  9/16 Add 40  meq po x 1 dose      Microcytic anemia  9/16 Add MVI      Hydronephrosis, bilateral  2/2 urinary retention   Serum Cr normalized     9/16 Patient to discharge with christopher and follow up with Urology as Outpatient      Urinary retention  9/16 Patient to discharge with christopher and follow up with Urology as Outpatient       Gross hematuria  9/15/22: Urology recommended exchanging christopher for 3 way catheter and start CBI    9/16 Resolved  Monitor for recurrence      Falls  Patient reported falling at home    --Avasys  --Fall Precautions    9/11/22: PT/OT  SNF placement - CM consulted     9/16 Stable  Awaiting SNF placement    Acute cystitis with hematuria  Dx with UTI on 8/27 in ED, unclear if completed antibiotic regimen    --Follow urine culture  --Continue IV Cipro    9/11/22:Blood cultures show NGTD. Urine culture showed no growth but urine output is very cloudy -almost purulent. Will switch IV Cipro to oral dose.    9/14/22:  Serum Cr 1.5. Renal U/s showed mild hydronephrosis on right and moderate on Left. Christopher palced-900 ml creamy white UOP immediately returned. Will d/c Cipro- add IV Rocephin. Urology consulted. Awaiting SNF placement     9/15/22:  Cont IV Rocephin. Repeat urine culture pending.   9/16 CBI clamped. Hematuria resolved at this time    Cognitive impairment  Confused on admission\  Cont home emd Seroquel HS    --Avasys  --Fall precautions  --Neuro checks Q4H x24 hours    9/11/22: Pt AAOx3 at times but with notable cognitive deficits.    9/14/22: Increased agitation - Seroquel increased to BID.    9/15/22: Seroquel in am made pt too drowsy- will decrease back to just nightly.     9/16 Stable. Alert, appropriate    Chronic systolic congestive heart failure  Patient is identified as having Systolic (HFrEF) heart failure that is Chronic. CHF is currently controlled. Latest ECHO performed and demonstrates- Results for orders placed during the hospital encounter of 07/19/22    Echo    Interpretation Summary  ·  Concentric hypertrophy and severely decreased systolic function.  · Mild left atrial enlargement.  · The estimated ejection fraction is 20%.  · Left ventricular diastolic dysfunction.  · There is left ventricular global hypokinesis.  · Moderate right ventricular enlargement with mildly to moderately reduced right ventricular systolic function.  · Moderate right atrial enlargement.  · There is mild aortic valve stenosis.  · Aortic valve area is 1.90 cm2; peak velocity is 1.05 m/s; mean gradient is 2 mmHg.  · There is severe pulmonary hypertension.  · Moderate tricuspid regurgitation.  · Elevated central venous pressure (15 mmHg).  · The estimated PA systolic pressure is 103 mmHg.  . Continue ACE/ARB, Furosemide and Aldactone and monitor clinical status closely. Monitor on telemetry. Patient is on CHF pathway.  Monitor strict Is&Os and daily weights.  Place on fluid restriction of 1.5 L. Continue to stress to patient importance of self efficacy and  on diet for CHF. Last BNP reviewed- and noted below   Recent Labs   Lab 09/13/22  1449   BNP >4,900*   .  EF 20%  Monitor I/o  Daily weights  Spironolactone and Entresto, Lasix, on hold 2/2 normotensive and MELISSA    9/16 stable    Essential hypertension  Normotensive   Home medications Spironolactone and Entresto on hold   --Hydralazine PRN  --Vitals Q4H  9/16 Stable      COPD suggested by initial evaluation  Negative for symptoms of exacerbation    --Continue home regimen    9/16 Stable            VTE Risk Mitigation (From admission, onward)         Ordered     enoxaparin injection 40 mg  Daily         09/14/22 1426     Reason for No Pharmacological VTE Prophylaxis  Once        Question:  Reasons:  Answer:  Already adequately anticoagulated on oral Anticoagulants    09/10/22 1158     IP VTE HIGH RISK PATIENT  Once         09/10/22 1158     Place sequential compression device  Until discontinued         09/10/22 1158                Discharge Planning   TATIANA: 9/16/2022      Code Status: Full Code   Is the patient medically ready for discharge?: Yes    Reason for patient still in hospital (select all that apply): Treatment  Discharge Plan A: Skilled Nursing Facility   Discharge Delays: (!) Post-Acute Set-up    Time spent seeing patient( greater than 1/2 spent in direct contact) : 42 minutes      JESSENIA Bah  Department of Hospital Medicine   [facility] - Med Surg 3

## 2022-09-16 NOTE — PLAN OF CARE
Spoke to pt's nephew* Kraig who requested that we seek authorization at [facility]. Called [facility], was transferred to [facility]. No answer, left message.    Tor Morocho LMSW 9/16/2022 2:49 PM

## 2022-09-16 NOTE — ASSESSMENT & PLAN NOTE
9/16 Dm diet  Accuchecks with correctional SSI  Blood sugars running 129-135  Lab Results   Component Value Date    HGBA1C 6.6 (H) 07/20/2022

## 2022-09-16 NOTE — PLAN OF CARE
Pt now accepted to several facilities. Attempted to call pt's son, no answer, mailbox full.    Tor Morocho LMSW 9/16/2022 11:22 AM

## 2022-09-16 NOTE — ASSESSMENT & PLAN NOTE
9/16 Nutrition consulted. Most recent weight and BMI monitored  Add po supplement    Measurements:  Wt Readings from Last 1 Encounters:   09/10/22 68.7 kg (151 lb 7.3 oz)   Body mass index is 20.83 kg/m².    Recommendations:      Patient has been screened and assessed by RD. RD will follow patient.

## 2022-09-16 NOTE — ASSESSMENT & PLAN NOTE
Dx with UTI on 8/27 in ED, unclear if completed antibiotic regimen    --Follow urine culture  --Continue IV Cipro    9/11/22:Blood cultures show NGTD. Urine culture showed no growth but urine output is very cloudy -almost purulent. Will switch IV Cipro to oral dose.    9/14/22:  Serum Cr 1.5. Renal U/s showed mild hydronephrosis on right and moderate on Left. Sanchez palced-900 ml creamy white UOP immediately returned. Will d/c Cipro- add IV Rocephin. Urology consulted. Awaiting SNF placement     9/15/22:  Cont IV Rocephin. Repeat urine culture pending.   9/16 CBI clamped. Hematuria resolved at this time

## 2022-09-16 NOTE — ASSESSMENT & PLAN NOTE
Confused on admission\  Cont home emd Seroquel HS    --Avasys  --Fall precautions  --Neuro checks Q4H x24 hours    9/11/22: Pt AAOx3 at times but with notable cognitive deficits.    9/14/22: Increased agitation - Seroquel increased to BID.    9/15/22: Seroquel in am made pt too drowsy- will decrease back to just nightly.     9/16 Stable. Alert, appropriate

## 2022-09-17 PROBLEM — R31.0 GROSS HEMATURIA: Status: RESOLVED | Noted: 2022-09-15 | Resolved: 2022-09-17

## 2022-09-17 LAB
POCT GLUCOSE: 118 MG/DL (ref 70–110)
POCT GLUCOSE: 137 MG/DL (ref 70–110)
POCT GLUCOSE: 145 MG/DL (ref 70–110)

## 2022-09-17 PROCEDURE — 25000003 PHARM REV CODE 250: Performed by: NURSE PRACTITIONER

## 2022-09-17 PROCEDURE — 99900035 HC TECH TIME PER 15 MIN (STAT)

## 2022-09-17 PROCEDURE — 11000001 HC ACUTE MED/SURG PRIVATE ROOM

## 2022-09-17 PROCEDURE — 94640 AIRWAY INHALATION TREATMENT: CPT

## 2022-09-17 PROCEDURE — 63600175 PHARM REV CODE 636 W HCPCS: Performed by: NURSE PRACTITIONER

## 2022-09-17 PROCEDURE — 25000242 PHARM REV CODE 250 ALT 637 W/ HCPCS: Performed by: FAMILY MEDICINE

## 2022-09-17 PROCEDURE — 99231 SBSQ HOSP IP/OBS SF/LOW 25: CPT | Mod: ,,, | Performed by: UROLOGY

## 2022-09-17 PROCEDURE — 94761 N-INVAS EAR/PLS OXIMETRY MLT: CPT

## 2022-09-17 PROCEDURE — 99231 PR SUBSEQUENT HOSPITAL CARE,LEVL I: ICD-10-PCS | Mod: ,,, | Performed by: UROLOGY

## 2022-09-17 PROCEDURE — 63600175 PHARM REV CODE 636 W HCPCS: Performed by: FAMILY MEDICINE

## 2022-09-17 RX ADMIN — SUCRALFATE 1 G: 1 SUSPENSION ORAL at 01:09

## 2022-09-17 RX ADMIN — TIMOLOL MALEATE 1 DROP: 5 SOLUTION/ DROPS OPHTHALMIC at 09:09

## 2022-09-17 RX ADMIN — MUPIROCIN: 20 OINTMENT TOPICAL at 09:09

## 2022-09-17 RX ADMIN — CEFTRIAXONE 1 G: 1 INJECTION, SOLUTION INTRAVENOUS at 02:09

## 2022-09-17 RX ADMIN — ALUMINUM HYDROXIDE, MAGNESIUM HYDROXIDE, AND DIMETHICONE 30 ML: 200; 20; 200 SUSPENSION ORAL at 05:09

## 2022-09-17 RX ADMIN — IPRATROPIUM BROMIDE 0.5 MG: 0.5 SOLUTION RESPIRATORY (INHALATION) at 01:09

## 2022-09-17 RX ADMIN — SUCRALFATE 1 G: 1 SUSPENSION ORAL at 05:09

## 2022-09-17 RX ADMIN — ALUMINUM HYDROXIDE, MAGNESIUM HYDROXIDE, AND DIMETHICONE 30 ML: 200; 20; 200 SUSPENSION ORAL at 09:09

## 2022-09-17 RX ADMIN — IPRATROPIUM BROMIDE 0.5 MG: 0.5 SOLUTION RESPIRATORY (INHALATION) at 07:09

## 2022-09-17 RX ADMIN — ALUMINUM HYDROXIDE, MAGNESIUM HYDROXIDE, AND DIMETHICONE 30 ML: 200; 20; 200 SUSPENSION ORAL at 11:09

## 2022-09-17 RX ADMIN — ENOXAPARIN SODIUM 40 MG: 100 INJECTION SUBCUTANEOUS at 05:09

## 2022-09-17 RX ADMIN — SUCRALFATE 1 G: 1 SUSPENSION ORAL at 11:09

## 2022-09-17 RX ADMIN — ASPIRIN 81 MG: 81 TABLET, COATED ORAL at 09:09

## 2022-09-17 RX ADMIN — THERA TABS 1 TABLET: TAB at 09:09

## 2022-09-17 RX ADMIN — TAMSULOSIN HYDROCHLORIDE 0.4 MG: 0.4 CAPSULE ORAL at 09:09

## 2022-09-17 RX ADMIN — QUETIAPINE FUMARATE 25 MG: 25 TABLET ORAL at 09:09

## 2022-09-17 NOTE — ASSESSMENT & PLAN NOTE
Patient reported falling at home    --Avasys  --Fall Precautions    9/11/22: PT/OT  SNF placement - CM consulted     9/17 Awaiting SNF placement

## 2022-09-17 NOTE — ASSESSMENT & PLAN NOTE
9/16 Fall and skin precautions  Turn q 2 hours  Continue Physical therapy and Occupational therapy  9/17 Awaiting SNF placement

## 2022-09-17 NOTE — ASSESSMENT & PLAN NOTE
Patient is identified as having Systolic (HFrEF) heart failure that is Chronic. CHF is currently controlled. Latest ECHO performed and demonstrates- Results for orders placed during the hospital encounter of 07/19/22    Echo    Interpretation Summary  · Concentric hypertrophy and severely decreased systolic function.  · Mild left atrial enlargement.  · The estimated ejection fraction is 20%.  · Left ventricular diastolic dysfunction.  · There is left ventricular global hypokinesis.  · Moderate right ventricular enlargement with mildly to moderately reduced right ventricular systolic function.  · Moderate right atrial enlargement.  · There is mild aortic valve stenosis.  · Aortic valve area is 1.90 cm2; peak velocity is 1.05 m/s; mean gradient is 2 mmHg.  · There is severe pulmonary hypertension.  · Moderate tricuspid regurgitation.  · Elevated central venous pressure (15 mmHg).  · The estimated PA systolic pressure is 103 mmHg.  . Continue ACE/ARB, Furosemide and Aldactone and monitor clinical status closely. Monitor on telemetry. Patient is on CHF pathway.  Monitor strict Is&Os and daily weights.  Place on fluid restriction of 1.5 L. Continue to stress to patient importance of self efficacy and  on diet for CHF. Last BNP reviewed- and noted below   Recent Labs   Lab 09/13/22  1449   BNP >4,900*   .  EF 20%  Monitor I/o  Daily weights  Spironolactone and Entresto, Lasix, on hold 2/2 normotensive and MELISSA    9/17 stable

## 2022-09-17 NOTE — ASSESSMENT & PLAN NOTE
Dx with UTI on 8/27 in ED, unclear if completed antibiotic regimen    --Follow urine culture  --Continue IV Cipro    9/11/22:Blood cultures show NGTD. Urine culture showed no growth but urine output is very cloudy -almost purulent. Will switch IV Cipro to oral dose.    9/14/22:  Serum Cr 1.5. Renal U/s showed mild hydronephrosis on right and moderate on Left. Sanchez palced-900 ml creamy white UOP immediately returned. Will d/c Cipro- add IV Rocephin. Urology consulted. Awaiting SNF placement     9/15/22:  Cont IV Rocephin. Repeat urine culture pending.   9/16 CBI clamped. Hematuria resolved at this time  9/17 Urine culture no growth

## 2022-09-17 NOTE — PLAN OF CARE
Problem: Fluid and Electrolyte Imbalance (Acute Kidney Injury/Impairment)  Goal: Fluid and Electrolyte Balance  Intervention: Monitor and Manage Fluid and Electrolyte Balance  Flowsheets (Taken 9/17/2022 0029)  Fluid/Electrolyte Management: fluids provided     Problem: Renal Function Impairment (Acute Kidney Injury/Impairment)  Goal: Effective Renal Function  Intervention: Monitor and Support Renal Function  Flowsheets (Taken 9/17/2022 0029)  Medication Review/Management: medications reviewed     Problem: Skin Injury Risk Increased  Goal: Skin Health and Integrity  Intervention: Optimize Skin Protection  Flowsheets (Taken 9/17/2022 0029)  Pressure Reduction Techniques:   rest period provided between sit times   frequent weight shift encouraged   pressure points protected  Skin Protection: incontinence pads utilized     Problem: Infection  Goal: Absence of Infection Signs and Symptoms  Intervention: Prevent or Manage Infection  Flowsheets (Taken 9/17/2022 0029)  Fever Reduction/Comfort Measures:   lightweight bedding   lightweight clothing  Infection Management: aseptic technique maintained

## 2022-09-17 NOTE — PLAN OF CARE
"SW consulted noting that patient is medically clear for SNF.  Per last note of SHRUTHI Morocho LMSW   "Spoke to pt's nephew* Kraig who requested that we seek authorization at [facility]. Called [facility], was transferred to [facility]. No answer, left message."    Previous notes also stated that attempts was made to son to verify which accepting facility to submit auth and there was no answer.   GADIEL sent a message to Claudia the liaison at [facility]  to see if patient can go to their facility or if auth was submitted.  However, Claudia did not respond to the text or call. It is not clear if the facility submitted auth at this time.     Liliam Hart LMSW 9/17/2022 10:37 AM    "

## 2022-09-17 NOTE — ASSESSMENT & PLAN NOTE
Normotensive   Home medications Spironolactone and Entresto on hold   --Hydralazine PRN  --Vitals Q4H  9/17 Stable

## 2022-09-17 NOTE — ASSESSMENT & PLAN NOTE
Confused on admission\  Cont home emd Seroquel HS    --Avasys  --Fall precautions  --Neuro checks Q4H x24 hours    9/11/22: Pt AAOx3 at times but with notable cognitive deficits.    9/14/22: Increased agitation - Seroquel increased to BID.    9/15/22: Seroquel in am made pt too drowsy- will decrease back to just nightly.     9/17 Stable. Alert, appropriate

## 2022-09-17 NOTE — PLAN OF CARE
Discussed Plan of Care with patient and verbalized understanding - Patient remains AAOx1- remains free of falls, accidents and trauma during the day shift. Bed is in the low position and the call light is within reach. Patient with large amount of loose stool today, NIKITA hose placed on patient per MD order. Will continue to monitor

## 2022-09-17 NOTE — PROGRESS NOTES
[facility] - Med Surg 3  [facility] Medicine  Progress Note    Patient Name: Joseph Toussaint  MRN: [id number]  Patient Class: IP- Inpatient   Admission Date: 9/10/2022  Length of Stay: 2 days  Attending Physician: Matt Rossi MD  Primary Care Provider: Abad Iqbal MD      Subjective:     Principal Problem:Acute renal failure      HPI:  89 y.o. male patient with a PMHx of HTN, COPD, CHF, and DM who presents to the Emergency Department for evaluation of a fall which occurred x 2 days pta. Pt family called for transport to get pt checked out. Pt denies any complaints and is not in pain. No associated sxs reported. Patient denies any CP, SOB, fever, back pain, abd pain, and all other sxs at this time. No prior Tx reported. In the ED, UA consistent with UTI, dx with UTI on 8/27, unclear if patient completed antibiotic regimen. BUN/Cr: 39/2.1, Bili: 2.0, H/H: 10.4/32.3. Afebrile, vitals stable. Oriented to self only on exam. Intitiated on antibiotics in the ED, urine culture pending, treated with IV fluid bolus. Patient is a full code, surrogate decision maker is his son Kevin Toussaint. Placed in observation under the care of hospital medicine.       Overview/Hospital Course:  The patient is a 89 yo male with HTN, COPD, CHF, and DM who was placed in observation for frequent falls, MELISSA, and presumed UTI on IVF and IV  Rocephin.     9/11/22: Pt AAOx3 at times but with notable cognitive deficits. Denies complaints. MELISSA improving 2.1>1.9. Hyperchloremic metabolic acidosis noted- will switch NS IVFs to Bicarb drip. Blood cultures show NGTD. Urine culture showed no growth but urine output is very cloudy -almost purulent. Will switch IV Cipro to oral dose. PT/OT evaluated pt and recommended SNF. Called pt's nephew Kraig (POA) and provided status update.  Pt lives with his nephew and his wife. Currently, Kraig is in the hospital recovering from surgery. He requested discharge to SNF.    9/12/22: Denies complaints. No acute  events. MELISSA slowly improving 2.1>1.9>1.7. cont IVF. SNF placement pending     9/13/22: No acute events. Serum Cr remains 1.7. Will check BNP and Renal U/S. Hold IVFs  Awaiting SNF placement     9/14/22: Increased agitation - Seroquel increased to BID. Serum Cr 1.5. Renal U/s showed mild hydronephrosis on right and moderate on Left with distended bladder. Christopher placed-900 ml creamy white UOP immediately returned. Will d/c Cipro- add IV Rocephin. Urology consulted. Awaiting SNF placement     9/15/22: Seroquel in am made pt too drowsy- will decrease back to just nightly. Gross hematuria to UOP. Urology recommended continuous bladder irrigation. Serum Cr normalized after placing christopher. Hydronephrosis 2/2 urinary retention. Cont IV Rocephin. Repeat urine culture pending. Pt will be placed in SNF when hematuria resolves and repeat urine culture results.     9/16 Gross hematuria resolved. CBI clamped. Continue current treatment.   9/17 Patient remains stable and improved. No hematuria at this time. SNF placement pending.       Interval History:   Patient remains stable and improved. No hematuria at this time. SNF placement pending.     Review of Systems   Constitutional:  Positive for activity change and fatigue. Negative for chills, diaphoresis and fever.   HENT:  Positive for hearing loss. Negative for ear discharge, ear pain and facial swelling.    Eyes:  Negative for pain and redness.   Respiratory:  Negative for cough and shortness of breath.    Cardiovascular:  Negative for leg swelling.   Gastrointestinal:  Negative for abdominal pain, constipation, diarrhea, nausea and vomiting.   Endocrine: Negative for polydipsia and polyphagia.   Genitourinary:  Negative for flank pain and hematuria.               Musculoskeletal:  Positive for arthralgias and gait problem. Negative for back pain, neck pain and neck stiffness.   Skin:  Negative for color change.   Allergic/Immunologic: Negative for food allergies.    Neurological:  Positive for weakness. Negative for facial asymmetry and speech difficulty.   Hematological:  Does not bruise/bleed easily.   Psychiatric/Behavioral:  Negative for agitation, behavioral problems, hallucinations and suicidal ideas. The patient is not nervous/anxious.    Objective:     Vital Signs (Most Recent):  Temp: 97.6 °F (36.4 °C) (09/17/22 0740)  Pulse: 65 (09/17/22 0754)  Resp: 20 (09/17/22 0754)  BP: 112/70 (09/17/22 0740)  SpO2: 99 % (09/17/22 0754) Vital Signs (24h Range):  Temp:  [97.6 °F (36.4 °C)-98.4 °F (36.9 °C)] 97.6 °F (36.4 °C)  Pulse:  [65-80] 65  Resp:  [18-20] 20  SpO2:  [95 %-100 %] 99 %  BP: (107-120)/(67-81) 112/70     Weight: 86.9 kg (191 lb 9.3 oz)  Body mass index is 26.35 kg/m².    Intake/Output Summary (Last 24 hours) at 9/17/2022 1424  Last data filed at 9/17/2022 0900  Gross per 24 hour   Intake 1119.29 ml   Output 1275 ml   Net -155.71 ml      Physical Exam  Vitals and nursing note reviewed.   Constitutional:       General: He is not in acute distress.     Appearance: He is well-developed. He is not diaphoretic.   HENT:      Head: Normocephalic and atraumatic.      Mouth/Throat:      Mouth: Mucous membranes are moist.   Eyes:      General: No scleral icterus.        Right eye: No discharge.         Left eye: No discharge.      Extraocular Movements: Extraocular movements intact.      Conjunctiva/sclera: Conjunctivae normal.      Pupils: Pupils are equal, round, and reactive to light.   Cardiovascular:      Rate and Rhythm: Normal rate and regular rhythm.      Heart sounds: Normal heart sounds. No murmur heard.    No friction rub. No gallop.   Pulmonary:      Effort: Pulmonary effort is normal. No respiratory distress.      Breath sounds: No wheezing or rhonchi.      Comments: BBS diminished  Abdominal:      General: Bowel sounds are normal. There is no distension.      Palpations: Abdomen is soft.      Tenderness: There is no abdominal tenderness. There is no guarding.    Genitourinary:     Comments: Zully urine draining in bag  Musculoskeletal:      Cervical back: Normal range of motion and neck supple. No rigidity or tenderness.      Right lower leg: No edema.      Left lower leg: No edema.      Comments: Stiff joints  General debility   Skin:     General: Skin is warm and dry.      Capillary Refill: Capillary refill takes 2 to 3 seconds.   Neurological:      Mental Status: He is alert.      Motor: Weakness present.      Gait: Gait abnormal.      Comments: Oriented person and place  Forgetful  Responds appropriately to simple commands   Psychiatric:         Cognition and Memory: Cognition is impaired. He exhibits impaired recent memory.      Comments: Calm and cooperative          Lab Results   Component Value Date    WBC 6.27 09/16/2022    HGB 9.8 (L) 09/16/2022    HCT 31.7 (L) 09/16/2022    MCV 78 (L) 09/16/2022     09/16/2022         BMP  Lab Results   Component Value Date     09/16/2022    K 3.5 09/16/2022     09/16/2022    CO2 26 09/16/2022    BUN 25 (H) 09/16/2022    CREATININE 1.1 09/16/2022    CALCIUM 8.5 (L) 09/16/2022    ANIONGAP 11 09/16/2022    ESTGFRAFRICA >60 07/26/2022    ESTGFRAFRICA >60 07/26/2022    EGFRNONAA 53 (A) 07/26/2022    EGFRNONAA 53 (A) 07/26/2022           Assessment/Plan:      Debility  9/16 Fall and skin precautions  Turn q 2 hours  Continue Physical therapy and Occupational therapy  9/17 Awaiting SNF placement        Malnutrition of moderate degree  9/16 Nutrition consulted. Most recent weight and BMI monitored  Add po supplement    Measurements:  Wt Readings from Last 1 Encounters:   09/10/22 68.7 kg (151 lb 7.3 oz)   Body mass index is 20.83 kg/m².    Recommendations:      Patient has been screened and assessed by RD. RD will follow patient.      Type 2 diabetes mellitus   9/16 Dm diet  Accuchecks with correctional SSI  Blood sugars running 129-135  Lab Results   Component Value Date    HGBA1C 6.6 (H) 07/20/2022 9/17 Blood  sugars running 121-145        Hypokalemia  9/16 Add 40 meq po x 1 dose      Microcytic anemia  9/16 Add MVI      Hydronephrosis, bilateral  2/2 urinary retention   Serum Cr normalized     9/16 Patient to discharge with christopher and follow up with Urology as Outpatient      Urinary retention  9/16 Patient to discharge with christopher and follow up with Urology as Outpatient       Falls  Patient reported falling at home    --Avasys  --Fall Precautions    9/11/22: PT/OT  SNF placement - CM consulted     9/17 Awaiting SNF placement    Acute cystitis with hematuria  Dx with UTI on 8/27 in ED, unclear if completed antibiotic regimen    --Follow urine culture  --Continue IV Cipro    9/11/22:Blood cultures show NGTD. Urine culture showed no growth but urine output is very cloudy -almost purulent. Will switch IV Cipro to oral dose.    9/14/22:  Serum Cr 1.5. Renal U/s showed mild hydronephrosis on right and moderate on Left. Christopher palced-900 ml creamy white UOP immediately returned. Will d/c Cipro- add IV Rocephin. Urology consulted. Awaiting SNF placement     9/15/22:  Cont IV Rocephin. Repeat urine culture pending.   9/16 CBI clamped. Hematuria resolved at this time  9/17 Urine culture no growth    Cognitive impairment  Confused on admission\  Cont home emd Seroquel HS    --Avasys  --Fall precautions  --Neuro checks Q4H x24 hours    9/11/22: Pt AAOx3 at times but with notable cognitive deficits.    9/14/22: Increased agitation - Seroquel increased to BID.    9/15/22: Seroquel in am made pt too drowsy- will decrease back to just nightly.     9/17 Stable. Alert, appropriate    Chronic systolic congestive heart failure  Patient is identified as having Systolic (HFrEF) heart failure that is Chronic. CHF is currently controlled. Latest ECHO performed and demonstrates- Results for orders placed during the hospital encounter of 07/19/22    Echo    Interpretation Summary  · Concentric hypertrophy and severely decreased systolic  function.  · Mild left atrial enlargement.  · The estimated ejection fraction is 20%.  · Left ventricular diastolic dysfunction.  · There is left ventricular global hypokinesis.  · Moderate right ventricular enlargement with mildly to moderately reduced right ventricular systolic function.  · Moderate right atrial enlargement.  · There is mild aortic valve stenosis.  · Aortic valve area is 1.90 cm2; peak velocity is 1.05 m/s; mean gradient is 2 mmHg.  · There is severe pulmonary hypertension.  · Moderate tricuspid regurgitation.  · Elevated central venous pressure (15 mmHg).  · The estimated PA systolic pressure is 103 mmHg.  . Continue ACE/ARB, Furosemide and Aldactone and monitor clinical status closely. Monitor on telemetry. Patient is on CHF pathway.  Monitor strict Is&Os and daily weights.  Place on fluid restriction of 1.5 L. Continue to stress to patient importance of self efficacy and  on diet for CHF. Last BNP reviewed- and noted below   Recent Labs   Lab 09/13/22  1449   BNP >4,900*   .  EF 20%  Monitor I/o  Daily weights  Spironolactone and Entresto, Lasix, on hold 2/2 normotensive and MELISSA    9/17 stable    Essential hypertension  Normotensive   Home medications Spironolactone and Entresto on hold   --Hydralazine PRN  --Vitals Q4H  9/17 Stable      COPD suggested by initial evaluation  Negative for symptoms of exacerbation    --Continue home regimen    9/17 Stable            VTE Risk Mitigation (From admission, onward)         Ordered     Place NIKITA hose  Until discontinued         09/17/22 0846     enoxaparin injection 40 mg  Daily         09/14/22 1426     Reason for No Pharmacological VTE Prophylaxis  Once        Question:  Reasons:  Answer:  Already adequately anticoagulated on oral Anticoagulants    09/10/22 1158     IP VTE HIGH RISK PATIENT  Once         09/10/22 1158     Place sequential compression device  Until discontinued         09/10/22 1158                Discharge Planning   TATIANA:  9/16/2022     Code Status: Full Code   Is the patient medically ready for discharge?: Yes    Reason for patient still in hospital (select all that apply): Treatment  Discharge Plan A: Skilled Nursing Facility   Discharge Delays: (!) Post-Acute Set-up    Time spent seeing patient( greater than 1/2 spent in direct contact) :  36 minutes      JESSENIA Bah  Department of Hospital Medicine   [facility] S[facility]

## 2022-09-17 NOTE — ASSESSMENT & PLAN NOTE
9/16 Dm diet  Accuchecks with correctional SSI  Blood sugars running 129-135  Lab Results   Component Value Date    HGBA1C 6.6 (H) 07/20/2022 9/17 Blood sugars running 121-145

## 2022-09-17 NOTE — SUBJECTIVE & OBJECTIVE
Interval History:   Patient remains stable and improved. No hematuria at this time. SNF placement pending.     Review of Systems   Constitutional:  Positive for activity change and fatigue. Negative for chills, diaphoresis and fever.   HENT:  Positive for hearing loss. Negative for ear discharge, ear pain and facial swelling.    Eyes:  Negative for pain and redness.   Respiratory:  Negative for cough and shortness of breath.    Cardiovascular:  Negative for leg swelling.   Gastrointestinal:  Negative for abdominal pain, constipation, diarrhea, nausea and vomiting.   Endocrine: Negative for polydipsia and polyphagia.   Genitourinary:  Negative for flank pain and hematuria.               Musculoskeletal:  Positive for arthralgias and gait problem. Negative for back pain, neck pain and neck stiffness.   Skin:  Negative for color change.   Allergic/Immunologic: Negative for food allergies.   Neurological:  Positive for weakness. Negative for facial asymmetry and speech difficulty.   Hematological:  Does not bruise/bleed easily.   Psychiatric/Behavioral:  Negative for agitation, behavioral problems, hallucinations and suicidal ideas. The patient is not nervous/anxious.    Objective:     Vital Signs (Most Recent):  Temp: 97.6 °F (36.4 °C) (09/17/22 0740)  Pulse: 65 (09/17/22 0754)  Resp: 20 (09/17/22 0754)  BP: 112/70 (09/17/22 0740)  SpO2: 99 % (09/17/22 0754) Vital Signs (24h Range):  Temp:  [97.6 °F (36.4 °C)-98.4 °F (36.9 °C)] 97.6 °F (36.4 °C)  Pulse:  [65-80] 65  Resp:  [18-20] 20  SpO2:  [95 %-100 %] 99 %  BP: (107-120)/(67-81) 112/70     Weight: 86.9 kg (191 lb 9.3 oz)  Body mass index is 26.35 kg/m².    Intake/Output Summary (Last 24 hours) at 9/17/2022 1424  Last data filed at 9/17/2022 0900  Gross per 24 hour   Intake 1119.29 ml   Output 1275 ml   Net -155.71 ml      Physical Exam  Vitals and nursing note reviewed.   Constitutional:       General: He is not in acute distress.     Appearance: He is  well-developed. He is not diaphoretic.   HENT:      Head: Normocephalic and atraumatic.      Mouth/Throat:      Mouth: Mucous membranes are moist.   Eyes:      General: No scleral icterus.        Right eye: No discharge.         Left eye: No discharge.      Extraocular Movements: Extraocular movements intact.      Conjunctiva/sclera: Conjunctivae normal.      Pupils: Pupils are equal, round, and reactive to light.   Cardiovascular:      Rate and Rhythm: Normal rate and regular rhythm.      Heart sounds: Normal heart sounds. No murmur heard.    No friction rub. No gallop.   Pulmonary:      Effort: Pulmonary effort is normal. No respiratory distress.      Breath sounds: No wheezing or rhonchi.      Comments: BBS diminished  Abdominal:      General: Bowel sounds are normal. There is no distension.      Palpations: Abdomen is soft.      Tenderness: There is no abdominal tenderness. There is no guarding.   Genitourinary:     Comments: Zully urine draining in bag  Musculoskeletal:      Cervical back: Normal range of motion and neck supple. No rigidity or tenderness.      Right lower leg: No edema.      Left lower leg: No edema.      Comments: Stiff joints  General debility   Skin:     General: Skin is warm and dry.      Capillary Refill: Capillary refill takes 2 to 3 seconds.   Neurological:      Mental Status: He is alert.      Motor: Weakness present.      Gait: Gait abnormal.      Comments: Oriented person and place  Forgetful  Responds appropriately to simple commands   Psychiatric:         Cognition and Memory: Cognition is impaired. He exhibits impaired recent memory.      Comments: Calm and cooperative          Lab Results   Component Value Date    WBC 6.27 09/16/2022    HGB 9.8 (L) 09/16/2022    HCT 31.7 (L) 09/16/2022    MCV 78 (L) 09/16/2022     09/16/2022         BMP  Lab Results   Component Value Date     09/16/2022    K 3.5 09/16/2022     09/16/2022    CO2 26 09/16/2022    BUN 25 (H)  09/16/2022    CREATININE 1.1 09/16/2022    CALCIUM 8.5 (L) 09/16/2022    ANIONGAP 11 09/16/2022    ESTGFRAFRICA >60 07/26/2022    ESTGFRAFRICA >60 07/26/2022    EGFRNONAA 53 (A) 07/26/2022    EGFRNONAA 53 (A) 07/26/2022

## 2022-09-17 NOTE — PROGRESS NOTES
Chief Complaint: retention, bilateral hydronephrosis    HPI:     2022- No complaints. No pain. Urine clear with CBI clamped.     2022 - NAEO, urine has cleared with CBI, Scr down to 1.1    09/15/2022 - urine still bloody but thin, creatinine down to 1.2    2022 - 89 y.o. male with PMHx of HTN, COPD, CHF, and DM admitted to the hospital after a fall. Evaluation in the ED showed UTI, Scr up to 2.1. Patient was started on IVF and antibiotics and creatinine initially improved, however it plateaued around 1.5 from a normal baseline. Renal US was obtained which showed bilateral hydronephrosis and a distended bladder. Patient notes some prior issues with BPH and difficult urination, was not on BPH meds prior to admission. Sanchez placed drained 900mL of thick, creamy urine. No known prior urologic procedures.     PMH:  Past Medical History:   Diagnosis Date    Acute coronary syndrome         CHF (congestive heart failure)     COPD (chronic obstructive pulmonary disease)     Diabetes mellitus     HTN (hypertension)        PSH:  No past surgical history on file.    Family History:  No family history on file.    Social History:  Social History     Tobacco Use    Smoking status: Former     Packs/day: 1.00     Types: Cigarettes     Quit date: 2022     Years since quittin.1    Smokeless tobacco: Never   Substance Use Topics    Alcohol use: Not Currently     Comment: occasionally    Drug use: Not Currently        Review of Systems:  General: No fever, chills  Skin: No rashes  Chest:  Denies cough and sputum production  Heart: Denies chest pain  Resp: Denies dyspnea  Abdomen: Denies diarrhea, abdominal pain, hematemesis, or blood in stool.  Musculoskeletal: No joint stiffness or swelling. Denies back pain.  : see HPI  Neuro: no dizziness or weakness    Allergies:  Penicillins    Medications:    Current Facility-Administered Medications:     acetaminophen tablet 650 mg, 650 mg, Oral, Q4H PRN, April  J. Irlanda, NP, 650 mg at 09/14/22 0748    albuterol nebulizer solution 2.5 mg, 2.5 mg, Nebulization, Q6H PRN, Ren Moura MD    aluminum-magnesium hydroxide-simethicone 200-200-20 mg/5 mL suspension 30 mL, 30 mL, Oral, QID (AC & HS), Vidya Fournier NP, 30 mL at 09/17/22 0545    aspirin EC tablet 81 mg, 81 mg, Oral, Daily, Vidya Fournier NP, 81 mg at 09/16/22 1000    bisacodyL suppository 10 mg, 10 mg, Rectal, Daily PRN, Vidya Fournier NP    cefTRIAXone (ROCEPHIN) 1 g/50 mL D5W IVPB, 1 g, Intravenous, Q24H, Malaika Brand, CHEPE, Stopped at 09/16/22 1433    dextrose 10% bolus 125 mL, 12.5 g, Intravenous, PRN, Vidya Fournier NP    dextrose 10% bolus 250 mL, 25 g, Intravenous, PRN, Vidya Fournier NP    dextrose 5 % and 0.45 % NaCl infusion, , Intravenous, Once, Kajal Grant, REKHA, Last Rate: 75 mL/hr at 09/16/22 1816, Rate Verify at 09/16/22 1816    enoxaparin injection 40 mg, 40 mg, Subcutaneous, Daily, Ren Moura MD, 40 mg at 09/16/22 1744    glucagon (human recombinant) injection 1 mg, 1 mg, Intramuscular, PRN, Vidya Fournier NP    glucose chewable tablet 16 g, 16 g, Oral, PRN, Vidya Fournier NP    glucose chewable tablet 24 g, 24 g, Oral, PRN, Vidya Fournier NP    hydrALAZINE injection 10 mg, 10 mg, Intravenous, Q8H PRN, Vidya Fournier NP    insulin aspart U-100 pen 0-5 Units, 0-5 Units, Subcutaneous, QID (AC + HS) PRN, Vidya Fournier NP    ipratropium 0.02 % nebulizer solution 0.5 mg, 0.5 mg, Nebulization, Q6H, Ren Moura MD, 0.5 mg at 09/17/22 0754    multivitamin tablet, 1 tablet, Oral, Daily, JESSENIA Covington, 1 tablet at 09/16/22 1000    mupirocin 2 % ointment, , Nasal, BID, Ren Morua MD, Given at 09/16/22 210    naloxone 0.4 mg/mL injection 0.02 mg, 0.02 mg, Intravenous, PRN, April AMY Fournier NP    ondansetron injection 4 mg, 4 mg, Intravenous, Q8H PRN, April AMY Fournier NP    QUEtiapine tablet 25 mg, 25 mg, Oral, QHS, Malaika Brand NP, 25 mg at 09/16/22  2109    sodium chloride 0.9% flush 10 mL, 10 mL, Intravenous, Q12H PRN, April AMY Fournier NP    sucralfate 100 mg/mL suspension 1 g, 1 g, Oral, Q6H, April AMY Founrier NP, 1 g at 09/17/22 0543    tamsulosin 24 hr capsule 0.4 mg, 0.4 mg, Oral, QHS, April AMY Fournier NP, 0.4 mg at 09/16/22 2109    timolol maleate 0.5% ophthalmic solution 1 drop, 1 drop, Both Eyes, BID, April AYM Fournier NP, 1 drop at 09/16/22 2109    Physical Exam:  Vitals:    09/17/22 0754   BP:    Pulse: 65   Resp: 20   Temp:      Body mass index is 26.35 kg/m².  General: awake, alert, cooperative  Head: NC/AT  Ears: external ears normal  Eyes: sclera normal  Lungs: normal inspiration, NAD  Heart: well-perfused  Abdomen: Soft, NT, ND  : Normal uncirc'd phallus, meatus normal in size and position, christopher in place with CBI clamped draining yellow urine  Skin: The skin is warm and dry  Ext: No c/c/e.  Neuro: grossly intact, AOx3    RADIOLOGY:  US RETROPERITONEAL COMPLETE 09/13/2022     CLINICAL HISTORY:  ARF;     TECHNIQUE:  Ultrasound of the kidneys and urinary bladder was performed including color flow and Doppler evaluation of the kidneys.     COMPARISON:  None.     FINDINGS:  Right kidney: The right kidney measures up to 11.3 cm.  Moderate right-sided hydronephrosis.  Resistive index measures 0.66     Left kidney: The left kidney measures up to 12.1 cm with mild hydronephrosis of the left kidney.  Left renal cyst measures 4.2 cm.  Resistive index measures 0.76.     Debris noted in the bladder.  Splenic resistive index measures 0.6.     Impression:     As above       LABS:  I personally reviewed the following lab values:  Lab Results   Component Value Date    WBC 6.27 09/16/2022    HGB 9.8 (L) 09/16/2022    HCT 31.7 (L) 09/16/2022     09/16/2022     09/16/2022    K 3.5 09/16/2022     09/16/2022    CREATININE 1.1 09/16/2022    BUN 25 (H) 09/16/2022    CO2 26 09/16/2022    TSH 1.146 08/27/2022    HGBA1C 6.6 (H) 07/20/2022    ALT 17  09/16/2022    AST 12 09/16/2022       URINALYSIS:   Component Ref Range & Units 10:30 4 d ago   RBC, UA 0 - 4 /hpf >100 High   5 High     WBC, UA 0 - 5 /hpf >100 High   >100 High     WBC Clumps, UA None-Rare Many Abnormal   Few Abnormal     Bacteria None-Occ /hpf Many Abnormal   Many Abnormal       Assessment/Plan:   Joseph Toussaint is a 89 y.o. male with retention and gross hematuria. Scr down with just christopher decompression, and urine now clear. When ready from a medical standpoint, he should discharge with the christopher in place, and will follow-up with us in 1 week for a voiding trial. Call with further questions or concerns.    Tiara Mariano MD  Urology

## 2022-09-18 LAB
BASOPHILS # BLD AUTO: 0.02 K/UL (ref 0–0.2)
BASOPHILS NFR BLD: 0.2 % (ref 0–1.9)
DIFFERENTIAL METHOD: ABNORMAL
EOSINOPHIL # BLD AUTO: 0.1 K/UL (ref 0–0.5)
EOSINOPHIL NFR BLD: 0.7 % (ref 0–8)
ERYTHROCYTE [DISTWIDTH] IN BLOOD BY AUTOMATED COUNT: 19.1 % (ref 11.5–14.5)
HCT VFR BLD AUTO: 31.7 % (ref 40–54)
HGB BLD-MCNC: 9.4 G/DL (ref 14–18)
IMM GRANULOCYTES # BLD AUTO: 0.04 K/UL (ref 0–0.04)
IMM GRANULOCYTES NFR BLD AUTO: 0.5 % (ref 0–0.5)
LYMPHOCYTES # BLD AUTO: 1.2 K/UL (ref 1–4.8)
LYMPHOCYTES NFR BLD: 15.1 % (ref 18–48)
MAGNESIUM SERPL-MCNC: 2.5 MG/DL (ref 1.6–2.6)
MCH RBC QN AUTO: 23.7 PG (ref 27–31)
MCHC RBC AUTO-ENTMCNC: 29.7 G/DL (ref 32–36)
MCV RBC AUTO: 80 FL (ref 82–98)
MONOCYTES # BLD AUTO: 0.9 K/UL (ref 0.3–1)
MONOCYTES NFR BLD: 11 % (ref 4–15)
NEUTROPHILS # BLD AUTO: 5.9 K/UL (ref 1.8–7.7)
NEUTROPHILS NFR BLD: 72.5 % (ref 38–73)
NRBC BLD-RTO: 0 /100 WBC
PLATELET # BLD AUTO: 183 K/UL (ref 150–450)
PMV BLD AUTO: 10.1 FL (ref 9.2–12.9)
POCT GLUCOSE: 110 MG/DL (ref 70–110)
POCT GLUCOSE: 138 MG/DL (ref 70–110)
POCT GLUCOSE: 146 MG/DL (ref 70–110)
POCT GLUCOSE: 151 MG/DL (ref 70–110)
RBC # BLD AUTO: 3.97 M/UL (ref 4.6–6.2)
WBC # BLD AUTO: 8.1 K/UL (ref 3.9–12.7)

## 2022-09-18 PROCEDURE — 97530 THERAPEUTIC ACTIVITIES: CPT | Mod: CQ

## 2022-09-18 PROCEDURE — 94761 N-INVAS EAR/PLS OXIMETRY MLT: CPT

## 2022-09-18 PROCEDURE — 36415 COLL VENOUS BLD VENIPUNCTURE: CPT | Performed by: NURSE PRACTITIONER

## 2022-09-18 PROCEDURE — 11000001 HC ACUTE MED/SURG PRIVATE ROOM

## 2022-09-18 PROCEDURE — 25000003 PHARM REV CODE 250: Performed by: NURSE PRACTITIONER

## 2022-09-18 PROCEDURE — 63600175 PHARM REV CODE 636 W HCPCS: Performed by: FAMILY MEDICINE

## 2022-09-18 PROCEDURE — 83735 ASSAY OF MAGNESIUM: CPT | Performed by: NURSE PRACTITIONER

## 2022-09-18 PROCEDURE — 94640 AIRWAY INHALATION TREATMENT: CPT

## 2022-09-18 PROCEDURE — 85025 COMPLETE CBC W/AUTO DIFF WBC: CPT | Performed by: NURSE PRACTITIONER

## 2022-09-18 PROCEDURE — 99231 SBSQ HOSP IP/OBS SF/LOW 25: CPT | Mod: ,,, | Performed by: UROLOGY

## 2022-09-18 PROCEDURE — 99231 PR SUBSEQUENT HOSPITAL CARE,LEVL I: ICD-10-PCS | Mod: ,,, | Performed by: UROLOGY

## 2022-09-18 PROCEDURE — 63600175 PHARM REV CODE 636 W HCPCS: Performed by: NURSE PRACTITIONER

## 2022-09-18 PROCEDURE — 25000242 PHARM REV CODE 250 ALT 637 W/ HCPCS: Performed by: FAMILY MEDICINE

## 2022-09-18 RX ADMIN — TIMOLOL MALEATE 1 DROP: 5 SOLUTION/ DROPS OPHTHALMIC at 09:09

## 2022-09-18 RX ADMIN — TAMSULOSIN HYDROCHLORIDE 0.4 MG: 0.4 CAPSULE ORAL at 08:09

## 2022-09-18 RX ADMIN — ENOXAPARIN SODIUM 40 MG: 100 INJECTION SUBCUTANEOUS at 05:09

## 2022-09-18 RX ADMIN — IPRATROPIUM BROMIDE 0.5 MG: 0.5 SOLUTION RESPIRATORY (INHALATION) at 07:09

## 2022-09-18 RX ADMIN — ALUMINUM HYDROXIDE, MAGNESIUM HYDROXIDE, AND DIMETHICONE 30 ML: 200; 20; 200 SUSPENSION ORAL at 05:09

## 2022-09-18 RX ADMIN — QUETIAPINE FUMARATE 25 MG: 25 TABLET ORAL at 08:09

## 2022-09-18 RX ADMIN — SUCRALFATE 1 G: 1 SUSPENSION ORAL at 11:09

## 2022-09-18 RX ADMIN — TIMOLOL MALEATE 1 DROP: 5 SOLUTION/ DROPS OPHTHALMIC at 08:09

## 2022-09-18 RX ADMIN — SUCRALFATE 1 G: 1 SUSPENSION ORAL at 05:09

## 2022-09-18 RX ADMIN — SUCRALFATE 1 G: 1 SUSPENSION ORAL at 12:09

## 2022-09-18 RX ADMIN — IPRATROPIUM BROMIDE 0.5 MG: 0.5 SOLUTION RESPIRATORY (INHALATION) at 01:09

## 2022-09-18 RX ADMIN — THERA TABS 1 TABLET: TAB at 09:09

## 2022-09-18 RX ADMIN — IPRATROPIUM BROMIDE 0.5 MG: 0.5 SOLUTION RESPIRATORY (INHALATION) at 02:09

## 2022-09-18 RX ADMIN — ALUMINUM HYDROXIDE, MAGNESIUM HYDROXIDE, AND DIMETHICONE 30 ML: 200; 20; 200 SUSPENSION ORAL at 08:09

## 2022-09-18 RX ADMIN — ALUMINUM HYDROXIDE, MAGNESIUM HYDROXIDE, AND DIMETHICONE 30 ML: 200; 20; 200 SUSPENSION ORAL at 11:09

## 2022-09-18 RX ADMIN — CEFTRIAXONE 1 G: 1 INJECTION, SOLUTION INTRAVENOUS at 01:09

## 2022-09-18 RX ADMIN — ASPIRIN 81 MG: 81 TABLET, COATED ORAL at 09:09

## 2022-09-18 RX ADMIN — MUPIROCIN: 20 OINTMENT TOPICAL at 08:09

## 2022-09-18 RX ADMIN — MUPIROCIN: 20 OINTMENT TOPICAL at 09:09

## 2022-09-18 NOTE — ASSESSMENT & PLAN NOTE
Dx with UTI on 8/27 in ED, unclear if completed antibiotic regimen    --Follow urine culture  --Continue IV Cipro    9/11/22:Blood cultures show NGTD. Urine culture showed no growth but urine output is very cloudy -almost purulent. Will switch IV Cipro to oral dose.    9/14/22:  Serum Cr 1.5. Renal U/s showed mild hydronephrosis on right and moderate on Left. Sanchez palced-900 ml creamy white UOP immediately returned. Will d/c Cipro- add IV Rocephin. Urology consulted. Awaiting SNF placement     9/15/22:  Cont IV Rocephin. Repeat urine culture pending.   9/16 CBI clamped. Hematuria resolved at this time  9/17 Urine culture no growth  9/18  Very mild hematuria noted

## 2022-09-18 NOTE — PLAN OF CARE
Problem: Diabetes Comorbidity  Goal: Blood Glucose Level Within Targeted Range  Intervention: Monitor and Manage Glycemia  Flowsheets (Taken 9/17/2022 2309)  Glycemic Management: blood glucose monitored     Problem: Renal Function Impairment (Acute Kidney Injury/Impairment)  Goal: Effective Renal Function  Intervention: Monitor and Support Renal Function  Flowsheets (Taken 9/17/2022 2309)  Medication Review/Management: medications reviewed     Problem: Fall Injury Risk  Goal: Absence of Fall and Fall-Related Injury  Intervention: Identify and Manage Contributors  Flowsheets (Taken 9/17/2022 2309)  Medication Review/Management: medications reviewed  Intervention: Promote Injury-Free Environment  Flowsheets (Taken 9/17/2022 2309)  Safety Promotion/Fall Prevention:   assistive device/personal item within reach   bed alarm set   Fall Risk reviewed with patient/family   nonskid shoes/socks when out of bed   lighting adjusted   instructed to call staff for mobility   medications reviewed   /camera at bedside     Problem: Infection  Goal: Absence of Infection Signs and Symptoms  Intervention: Prevent or Manage Infection  Flowsheets (Taken 9/17/2022 2309)  Fever Reduction/Comfort Measures:   lightweight clothing   lightweight bedding  Infection Management: aseptic technique maintained

## 2022-09-18 NOTE — PLAN OF CARE
Discussed Plan of Care with patient and verbalized understanding - Patient remains AAOx1 - remains free of falls, accidents and trauma during the day shift. Bed is in the low position and the call light is within reach. Sanchez still in place, at first assessment urine was cloudy orange in color, changed to cherry color and then to clear yellow. Will continue to monitor

## 2022-09-18 NOTE — CONSULTS
SW spoke with liaison from [facility] of  who stated that they will submit for auth first thing in the morning.      Liliam Hart LMSW 9/18/2022 8:20 AM

## 2022-09-18 NOTE — ASSESSMENT & PLAN NOTE
9/16 Fall and skin precautions  Turn q 2 hours  Continue Physical therapy and Occupational therapy  9/18 Awaiting SNF placement

## 2022-09-18 NOTE — PT/OT/SLP PROGRESS
Physical Therapy  Treatment    Joseph Toussaint   MRN: [id number]   Admitting Diagnosis: Acute renal failure    PT Received On: 09/18/22  PT Start Time: 0755     PT Stop Time: 0825    PT Total Time (min): 30 min       Billable Minutes:  Therapeutic Activity 30    Treatment Type: Treatment  PT/PTA: PTA     PTA Visit Number: 4       General Precautions: Standard, fall  Orthopedic Precautions: N/A   Braces: N/A  Respiratory Status: Room air    Spiritual, Cultural Beliefs, Caodaism Practices, Values that Affect Care: no    Subjective:  Communicated with patient's nurse, Ernestine, and completed Epic chart review prior to session.  Patient agreed to PT session.     Pain/Comfort  Pain Rating 1: 0/10    Objective:   Patient found with: bed alarm, christopher catheter, peripheral IV, Other (comments) (AVASYS; CBI; NIKITA HOSE TO BLE)    Functional Mobility:  Supine > sit EOB: Mod A (VC for sequencing of task)    Seated EOB x10 min total focusing on increased tolerance to upright position, core stability, trunk control and CV endurance.   Required Min-Mod A to maintain sitting balance while EOB with BUE self support    STS x2 trials from EOB w/ HHAx2   First trial Mod A of 2 and sustained for ~15 sec   Second trial Max A of 2 and sustained for ~15 sec    Planned to attempt chair T/F but patient was unable to initiate stepping for stand pivot T/F.    Completed x10 reps AROM TE to BLE while EOB: LAQ, Hip Flex, AP    Educated patient on importance of increased tolerance to upright position and direct impact on CV endurance and strength. Patient encouraged to sit up in chair for a minimum of 2 consecutive hours per day. Encouraged patient to perform AROM TE to BLE throughout the day within all available planes of motion. Re enforced importance of utilizing call light to meet needs in room and not attempt to get up without staff assistance. Patient verbalized understanding and agreed to comply.      AM-PAC 6 CLICK MOBILITY  How much help from  another person does this patient currently need?   1 = Unable, Total/Dependent Assistance  2 = A lot, Maximum/Moderate Assistance  3 = A little, Minimum/Contact Guard/Supervision  4 = None, Modified Ross/Independent    Turning over in bed (including adjusting bedclothes, sheets and blankets)?: 2  Sitting down on and standing up from a chair with arms (e.g., wheelchair, bedside commode, etc.): 2  Moving from lying on back to sitting on the side of the bed?: 2  Moving to and from a bed to a chair (including a wheelchair)?: 2  Need to walk in hospital room?: 1  Climbing 3-5 steps with a railing?: 1  Basic Mobility Total Score: 10    AM-PAC Raw Score CMS G-Code Modifier Level of Impairment Assistance   6 % Total / Unable   7 - 9 CM 80 - 100% Maximal Assist   10 - 14 CL 60 - 80% Moderate Assist   15 - 19 CK 40 - 60% Moderate Assist   20 - 22 CJ 20 - 40% Minimal Assist   23 CI 1-20% SBA / CGA   24 CH 0% Independent/ Mod I     Patient left with bed in chair position with call button in reach, bed alarm on, nurse notified, and AVASYS present.    Assessment:  Joseph Toussaint is a 89 y.o. male with a medical diagnosis of Acute renal failure and presents with overall decline in functional mobility. Patient would continue to benefit from skilled PT to address functional limitations listed below in order to return to PLOF/decrease caregiver burden.     Rehab identified problem list/impairments: Rehab identified problem list/impairments: weakness, impaired endurance, impaired sensation, impaired functional mobility, gait instability, impaired balance, visual deficits, impaired cognition, decreased coordination, decreased upper extremity function, decreased lower extremity function, decreased safety awareness, pain, abnormal tone, decreased ROM, impaired coordination, impaired cardiopulmonary response to activity    Rehab potential is fair.    Activity tolerance: Poor    Discharge recommendations: Discharge  Facility/Level of Care Needs: nursing facility, skilled     Barriers to discharge:      Equipment recommendations: Equipment Needed After Discharge: other (see comments) (TBD BY NEXT LEVEL OF CARE)     GOALS:   Multidisciplinary Problems       Physical Therapy Goals          Problem: Physical Therapy    Goal Priority Disciplines Outcome Goal Variances Interventions   Physical Therapy Goal     PT, PT/OT Ongoing, Progressing     Description: Patient will be seen a minimal of 3 out of 7 days a week.    LTG to be met by 09/24:    Bed mobility: SPV  Transfers: CGA with RW   Gait: CGA with RW                       PLAN:    Patient to be seen 3 x/week  to address the above listed problems via gait training, therapeutic activities, therapeutic exercises, neuromuscular re-education  Plan of Care expires: 09/24/22  Plan of Care reviewed with: patient         09/18/2022   RSV (acute bronchiolitis due to respiratory syncytial virus)

## 2022-09-18 NOTE — SUBJECTIVE & OBJECTIVE
Interval History:  Stable. SNF placement pending.     Review of Systems   Constitutional:  Positive for activity change and fatigue. Negative for chills, diaphoresis and fever.   HENT:  Positive for hearing loss. Negative for ear discharge, ear pain and facial swelling.    Eyes:  Negative for pain and redness.   Respiratory:  Negative for cough and shortness of breath.    Cardiovascular:  Negative for leg swelling.   Gastrointestinal:  Negative for abdominal pain, constipation, diarrhea, nausea and vomiting.   Endocrine: Negative for polydipsia and polyphagia.   Genitourinary:  Negative for flank pain and hematuria.               Musculoskeletal:  Positive for arthralgias and gait problem. Negative for back pain, neck pain and neck stiffness.   Skin:  Negative for color change.   Allergic/Immunologic: Negative for food allergies.   Neurological:  Positive for weakness. Negative for facial asymmetry and speech difficulty.   Hematological:  Does not bruise/bleed easily.   Psychiatric/Behavioral:  Negative for agitation, behavioral problems, hallucinations and suicidal ideas. The patient is not nervous/anxious.    Objective:     Vital Signs (Most Recent):  Temp: 98.2 °F (36.8 °C) (09/18/22 0736)  Pulse: 77 (09/18/22 1126)  Resp: 17 (09/18/22 1126)  BP: (!) 159/80 (09/18/22 1126)  SpO2: 99 % (09/18/22 1126)   Vital Signs (24h Range):  Temp:  [98 °F (36.7 °C)-98.3 °F (36.8 °C)] 98.2 °F (36.8 °C)  Pulse:  [63-78] 77  Resp:  [16-18] 17  SpO2:  [97 %-100 %] 99 %  BP: ()/(60-80) 159/80     Weight: 86.9 kg (191 lb 9.3 oz)  Body mass index is 26.35 kg/m².    Intake/Output Summary (Last 24 hours) at 9/18/2022 1127  Last data filed at 9/18/2022 0745  Gross per 24 hour   Intake 1642.57 ml   Output 1200 ml   Net 442.57 ml      Physical Exam  Vitals and nursing note reviewed.   Constitutional:       General: He is not in acute distress.     Appearance: He is well-developed. He is not diaphoretic.   HENT:      Head:  Normocephalic and atraumatic.      Mouth/Throat:      Mouth: Mucous membranes are moist.   Eyes:      General: No scleral icterus.        Right eye: No discharge.         Left eye: No discharge.      Extraocular Movements: Extraocular movements intact.      Conjunctiva/sclera: Conjunctivae normal.      Pupils: Pupils are equal, round, and reactive to light.   Cardiovascular:      Rate and Rhythm: Normal rate and regular rhythm.      Heart sounds: Normal heart sounds. No murmur heard.    No friction rub. No gallop.   Pulmonary:      Effort: Pulmonary effort is normal. No respiratory distress.      Breath sounds: No wheezing or rhonchi.      Comments: BBS diminished  Abdominal:      General: Bowel sounds are normal. There is no distension.      Palpations: Abdomen is soft.      Tenderness: There is no abdominal tenderness. There is no guarding.   Genitourinary:     Comments: Very mild hematuria  draining in bag  Musculoskeletal:      Cervical back: Normal range of motion and neck supple. No rigidity or tenderness.      Right lower leg: No edema.      Left lower leg: No edema.      Comments: Stiff joints  General debility   Skin:     General: Skin is warm and dry.      Capillary Refill: Capillary refill takes 2 to 3 seconds.   Neurological:      Mental Status: He is alert.      Motor: Weakness present.      Gait: Gait abnormal.      Comments: Oriented person and place  Forgetful  Responds appropriately to simple commands   Psychiatric:         Cognition and Memory: Cognition is impaired. He exhibits impaired recent memory.      Comments: Calm and cooperative       Lab Results   Component Value Date    WBC 8.10 09/18/2022    HGB 9.4 (L) 09/18/2022    HCT 31.7 (L) 09/18/2022    MCV 80 (L) 09/18/2022     09/18/2022       BMP  Lab Results   Component Value Date     09/16/2022    K 3.5 09/16/2022     09/16/2022    CO2 26 09/16/2022    BUN 25 (H) 09/16/2022    CREATININE 1.1 09/16/2022    CALCIUM 8.5 (L)  09/16/2022    ANIONGAP 11 09/16/2022    ESTGFRAFRICA >60 07/26/2022    ESTGFRAFRICA >60 07/26/2022    EGFRNONAA 53 (A) 07/26/2022    EGFRNONAA 53 (A) 07/26/2022

## 2022-09-18 NOTE — ASSESSMENT & PLAN NOTE
Patient reported falling at home    --Avasys  --Fall Precautions    9/11/22: PT/OT  SNF placement - CM consulted     9/18 Awaiting SNF placement

## 2022-09-18 NOTE — PROGRESS NOTES
Chief Complaint: retention, bilateral hydronephrosis    HPI:     2022- No pain or complaints. Urine a little bloody today. CBI off.     2022- No complaints. No pain. Urine clear with CBI clamped.     2022 - NAEO, urine has cleared with CBI, Scr down to 1.1    09/15/2022 - urine still bloody but thin, creatinine down to 1.2    2022 - 89 y.o. male with PMHx of HTN, COPD, CHF, and DM admitted to the hospital after a fall. Evaluation in the ED showed UTI, Scr up to 2.1. Patient was started on IVF and antibiotics and creatinine initially improved, however it plateaued around 1.5 from a normal baseline. Renal US was obtained which showed bilateral hydronephrosis and a distended bladder. Patient notes some prior issues with BPH and difficult urination, was not on BPH meds prior to admission. Sanchez placed drained 900mL of thick, creamy urine. No known prior urologic procedures.     PMH:  Past Medical History:   Diagnosis Date    Acute coronary syndrome         CHF (congestive heart failure)     COPD (chronic obstructive pulmonary disease)     Diabetes mellitus     HTN (hypertension)        PSH:  No past surgical history on file.    Family History:  No family history on file.    Social History:  Social History     Tobacco Use    Smoking status: Former     Packs/day: 1.00     Types: Cigarettes     Quit date: 2022     Years since quittin.1    Smokeless tobacco: Never   Substance Use Topics    Alcohol use: Not Currently     Comment: occasionally    Drug use: Not Currently        Review of Systems:  General: No fever, chills  Skin: No rashes  Chest:  Denies cough and sputum production  Heart: Denies chest pain  Resp: Denies dyspnea  Abdomen: Denies diarrhea, abdominal pain, hematemesis, or blood in stool.  Musculoskeletal: No joint stiffness or swelling. Denies back pain.  : see HPI  Neuro: no dizziness or weakness    Allergies:  Penicillins    Medications:    Current  Facility-Administered Medications:     acetaminophen tablet 650 mg, 650 mg, Oral, Q4H PRN, Vidya Fournier NP, 650 mg at 09/14/22 0748    albuterol nebulizer solution 2.5 mg, 2.5 mg, Nebulization, Q6H PRN, Ren Moura MD    aluminum-magnesium hydroxide-simethicone 200-200-20 mg/5 mL suspension 30 mL, 30 mL, Oral, QID (AC & HS), Vidya Fournier NP, 30 mL at 09/18/22 0545    aspirin EC tablet 81 mg, 81 mg, Oral, Daily, Vidya Fournier NP, 81 mg at 09/18/22 0913    bisacodyL suppository 10 mg, 10 mg, Rectal, Daily PRN, Vidya Fournier NP    cefTRIAXone (ROCEPHIN) 1 g/50 mL D5W IVPB, 1 g, Intravenous, Q24H, Malaika Brand, CHEPE, Stopped at 09/17/22 1452    dextrose 10% bolus 125 mL, 12.5 g, Intravenous, PRN, Vidya Fournier NP    dextrose 10% bolus 250 mL, 25 g, Intravenous, PRN, Vidya Fournier NP    dextrose 5 % and 0.45 % NaCl infusion, , Intravenous, Once, Kajal Grant, JESSENIA, Last Rate: 75 mL/hr at 09/16/22 1816, Rate Verify at 09/16/22 1816    enoxaparin injection 40 mg, 40 mg, Subcutaneous, Daily, Ren Moura MD, 40 mg at 09/17/22 1755    glucagon (human recombinant) injection 1 mg, 1 mg, Intramuscular, PRN, Vidya Fournier NP    glucose chewable tablet 16 g, 16 g, Oral, PRN, Vidya Fournier NP    glucose chewable tablet 24 g, 24 g, Oral, PRN, Vidya Fournier NP    hydrALAZINE injection 10 mg, 10 mg, Intravenous, Q8H PRN, Vidya Fournier NP    insulin aspart U-100 pen 0-5 Units, 0-5 Units, Subcutaneous, QID (AC + HS) PRN, Vidya Fournier NP    ipratropium 0.02 % nebulizer solution 0.5 mg, 0.5 mg, Nebulization, Q6H, Ren Moura MD, 0.5 mg at 09/18/22 0715    multivitamin tablet, 1 tablet, Oral, Daily, JESSENIA Covington, 1 tablet at 09/18/22 0913    mupirocin 2 % ointment, , Nasal, BID, Ren Moura MD, Given at 09/18/22 0913    naloxone 0.4 mg/mL injection 0.02 mg, 0.02 mg, Intravenous, PRN, Vidya Fournier, NP    ondansetron injection 4 mg, 4 mg, Intravenous, Q8H PRN, April AMY  CHEPE Fournier    QUEtiapine tablet 25 mg, 25 mg, Oral, QHS, Malaika CARPIO Cathie, CHEPE, 25 mg at 09/17/22 2112    sodium chloride 0.9% flush 10 mL, 10 mL, Intravenous, Q12H PRN, Vidya Fournier NP    sucralfate 100 mg/mL suspension 1 g, 1 g, Oral, Q6H, Vidya Fournier NP, 1 g at 09/18/22 0545    tamsulosin 24 hr capsule 0.4 mg, 0.4 mg, Oral, QHS, Vidya Fournier NP, 0.4 mg at 09/17/22 2112    timolol maleate 0.5% ophthalmic solution 1 drop, 1 drop, Both Eyes, BID, Vidya Fournier NP, 1 drop at 09/18/22 0913    Physical Exam:  Vitals:    09/18/22 0736   BP: 97/60   Pulse: 75   Resp: 18   Temp: 98.2 °F (36.8 °C)     Body mass index is 26.35 kg/m².  General: awake, alert, cooperative  Head: NC/AT  Ears: external ears normal  Eyes: sclera normal  Lungs: normal inspiration, NAD  Heart: well-perfused  Abdomen: Soft, NT, ND  : Normal uncirc'd phallus, meatus normal in size and position, christopher in place with CBI clamped draining dark yellow/light red urine, thin  Skin: The skin is warm and dry  Ext: No c/c/e.  Neuro: grossly intact, AOx3    RADIOLOGY:  US RETROPERITONEAL COMPLETE 09/13/2022     CLINICAL HISTORY:  ARF;     TECHNIQUE:  Ultrasound of the kidneys and urinary bladder was performed including color flow and Doppler evaluation of the kidneys.     COMPARISON:  None.     FINDINGS:  Right kidney: The right kidney measures up to 11.3 cm.  Moderate right-sided hydronephrosis.  Resistive index measures 0.66     Left kidney: The left kidney measures up to 12.1 cm with mild hydronephrosis of the left kidney.  Left renal cyst measures 4.2 cm.  Resistive index measures 0.76.     Debris noted in the bladder.  Splenic resistive index measures 0.6.     Impression:     As above       LABS:  I personally reviewed the following lab values:   Latest Reference Range & Units 09/18/22 05:56   WBC 3.90 - 12.70 K/uL 8.10   RBC 4.60 - 6.20 M/uL 3.97 (L)   Hemoglobin 14.0 - 18.0 g/dL 9.4 (L)   Hematocrit 40.0 - 54.0 % 31.7 (L)   MCV 82 -  98 fL 80 (L)   MCH 27.0 - 31.0 pg 23.7 (L)   MCHC 32.0 - 36.0 g/dL 29.7 (L)   RDW 11.5 - 14.5 % 19.1 (H)   Platelets 150 - 450 K/uL 183   (L): Data is abnormally low  (H): Data is abnormally high      URINALYSIS:   Component Ref Range & Units 10:30 4 d ago   RBC, UA 0 - 4 /hpf >100 High   5 High     WBC, UA 0 - 5 /hpf >100 High   >100 High     WBC Clumps, UA None-Rare Many Abnormal   Few Abnormal     Bacteria None-Occ /hpf Many Abnormal   Many Abnormal       Assessment/Plan:   Joseph Toussaint is a 89 y.o. male with retention and gross hematuria. Scr down with just christopher decompression, and urine slightly bloody off CBI. When ready from a medical standpoint, he should discharge with the christopher in place, and will follow-up with us in 1 week for a voiding trial. Call with further questions or concerns.    Tiara Mariano MD  Urology

## 2022-09-18 NOTE — PROGRESS NOTES
[facility] - Med Surg 3  [facility] Medicine  Progress Note    Patient Name: Joseph Toussaint  MRN: [id number]  Patient Class: IP- Inpatient   Admission Date: 9/10/2022  Length of Stay: 3 days  Attending Physician: Matt Rossi MD  Primary Care Provider: Abad Iqbal MD      Subjective:     Principal Problem:Acute renal failure      HPI:  89 y.o. male patient with a PMHx of HTN, COPD, CHF, and DM who presents to the Emergency Department for evaluation of a fall which occurred x 2 days pta. Pt family called for transport to get pt checked out. Pt denies any complaints and is not in pain. No associated sxs reported. Patient denies any CP, SOB, fever, back pain, abd pain, and all other sxs at this time. No prior Tx reported. In the ED, UA consistent with UTI, dx with UTI on 8/27, unclear if patient completed antibiotic regimen. BUN/Cr: 39/2.1, Bili: 2.0, H/H: 10.4/32.3. Afebrile, vitals stable. Oriented to self only on exam. Intitiated on antibiotics in the ED, urine culture pending, treated with IV fluid bolus. Patient is a full code, surrogate decision maker is his son Kevin Toussaint. Placed in observation under the care of hospital medicine.       Overview/Hospital Course:  The patient is a 89 yo male with HTN, COPD, CHF, and DM who was placed in observation for frequent falls, MELISSA, and presumed UTI on IVF and IV  Rocephin.     9/11/22: Pt AAOx3 at times but with notable cognitive deficits. Denies complaints. MELISSA improving 2.1>1.9. Hyperchloremic metabolic acidosis noted- will switch NS IVFs to Bicarb drip. Blood cultures show NGTD. Urine culture showed no growth but urine output is very cloudy -almost purulent. Will switch IV Cipro to oral dose. PT/OT evaluated pt and recommended SNF. Called pt's nephew Kraig (POA) and provided status update.  Pt lives with his nephew and his wife. Currently, Kraig is in the hospital recovering from surgery. He requested discharge to SNF.    9/12/22: Denies complaints. No acute  events. MELISSA slowly improving 2.1>1.9>1.7. cont IVF. SNF placement pending     9/13/22: No acute events. Serum Cr remains 1.7. Will check BNP and Renal U/S. Hold IVFs  Awaiting SNF placement     9/14/22: Increased agitation - Seroquel increased to BID. Serum Cr 1.5. Renal U/s showed mild hydronephrosis on right and moderate on Left with distended bladder. Christopher placed-900 ml creamy white UOP immediately returned. Will d/c Cipro- add IV Rocephin. Urology consulted. Awaiting SNF placement     9/15/22: Seroquel in am made pt too drowsy- will decrease back to just nightly. Gross hematuria to UOP. Urology recommended continuous bladder irrigation. Serum Cr normalized after placing christopher. Hydronephrosis 2/2 urinary retention. Cont IV Rocephin. Repeat urine culture pending. Pt will be placed in SNF when hematuria resolves and repeat urine culture results.     9/16 Gross hematuria resolved. CBI clamped. Continue current treatment.   9/17 Patient remains stable and improved. No hematuria at this time.   9/18 Stable. SNF placement pending.       Interval History:  Stable. SNF placement pending.     Review of Systems   Constitutional:  Positive for activity change and fatigue. Negative for chills, diaphoresis and fever.   HENT:  Positive for hearing loss. Negative for ear discharge, ear pain and facial swelling.    Eyes:  Negative for pain and redness.   Respiratory:  Negative for cough and shortness of breath.    Cardiovascular:  Negative for leg swelling.   Gastrointestinal:  Negative for abdominal pain, constipation, diarrhea, nausea and vomiting.   Endocrine: Negative for polydipsia and polyphagia.   Genitourinary:  Negative for flank pain and hematuria.               Musculoskeletal:  Positive for arthralgias and gait problem. Negative for back pain, neck pain and neck stiffness.   Skin:  Negative for color change.   Allergic/Immunologic: Negative for food allergies.   Neurological:  Positive for weakness. Negative for  facial asymmetry and speech difficulty.   Hematological:  Does not bruise/bleed easily.   Psychiatric/Behavioral:  Negative for agitation, behavioral problems, hallucinations and suicidal ideas. The patient is not nervous/anxious.    Objective:     Vital Signs (Most Recent):  Temp: 98.2 °F (36.8 °C) (09/18/22 0736)  Pulse: 77 (09/18/22 1126)  Resp: 17 (09/18/22 1126)  BP: (!) 159/80 (09/18/22 1126)  SpO2: 99 % (09/18/22 1126)   Vital Signs (24h Range):  Temp:  [98 °F (36.7 °C)-98.3 °F (36.8 °C)] 98.2 °F (36.8 °C)  Pulse:  [63-78] 77  Resp:  [16-18] 17  SpO2:  [97 %-100 %] 99 %  BP: ()/(60-80) 159/80     Weight: 86.9 kg (191 lb 9.3 oz)  Body mass index is 26.35 kg/m².    Intake/Output Summary (Last 24 hours) at 9/18/2022 1127  Last data filed at 9/18/2022 0745  Gross per 24 hour   Intake 1642.57 ml   Output 1200 ml   Net 442.57 ml      Physical Exam  Vitals and nursing note reviewed.   Constitutional:       General: He is not in acute distress.     Appearance: He is well-developed. He is not diaphoretic.   HENT:      Head: Normocephalic and atraumatic.      Mouth/Throat:      Mouth: Mucous membranes are moist.   Eyes:      General: No scleral icterus.        Right eye: No discharge.         Left eye: No discharge.      Extraocular Movements: Extraocular movements intact.      Conjunctiva/sclera: Conjunctivae normal.      Pupils: Pupils are equal, round, and reactive to light.   Cardiovascular:      Rate and Rhythm: Normal rate and regular rhythm.      Heart sounds: Normal heart sounds. No murmur heard.    No friction rub. No gallop.   Pulmonary:      Effort: Pulmonary effort is normal. No respiratory distress.      Breath sounds: No wheezing or rhonchi.      Comments: BBS diminished  Abdominal:      General: Bowel sounds are normal. There is no distension.      Palpations: Abdomen is soft.      Tenderness: There is no abdominal tenderness. There is no guarding.   Genitourinary:     Comments: Very mild hematuria   draining in bag  Musculoskeletal:      Cervical back: Normal range of motion and neck supple. No rigidity or tenderness.      Right lower leg: No edema.      Left lower leg: No edema.      Comments: Stiff joints  General debility   Skin:     General: Skin is warm and dry.      Capillary Refill: Capillary refill takes 2 to 3 seconds.   Neurological:      Mental Status: He is alert.      Motor: Weakness present.      Gait: Gait abnormal.      Comments: Oriented person and place  Forgetful  Responds appropriately to simple commands   Psychiatric:         Cognition and Memory: Cognition is impaired. He exhibits impaired recent memory.      Comments: Calm and cooperative       Lab Results   Component Value Date    WBC 8.10 09/18/2022    HGB 9.4 (L) 09/18/2022    HCT 31.7 (L) 09/18/2022    MCV 80 (L) 09/18/2022     09/18/2022       BMP  Lab Results   Component Value Date     09/16/2022    K 3.5 09/16/2022     09/16/2022    CO2 26 09/16/2022    BUN 25 (H) 09/16/2022    CREATININE 1.1 09/16/2022    CALCIUM 8.5 (L) 09/16/2022    ANIONGAP 11 09/16/2022    ESTGFRAFRICA >60 07/26/2022    ESTGFRAFRICA >60 07/26/2022    EGFRNONAA 53 (A) 07/26/2022    EGFRNONAA 53 (A) 07/26/2022           Assessment/Plan:      Debility  9/16 Fall and skin precautions  Turn q 2 hours  Continue Physical therapy and Occupational therapy  9/18 Awaiting SNF placement        Malnutrition of moderate degree  9/16 Nutrition consulted. Most recent weight and BMI monitored  Add po supplement    Measurements:  Wt Readings from Last 1 Encounters:   09/10/22 68.7 kg (151 lb 7.3 oz)   Body mass index is 20.83 kg/m².    Recommendations:      Patient has been screened and assessed by RD. RD will follow patient.      Type 2 diabetes mellitus   9/16 Dm diet  Accuchecks with correctional SSI  Blood sugars running 129-135  Lab Results   Component Value Date    HGBA1C 6.6 (H) 07/20/2022 9/17 Blood sugars running 121-145  9/18 Blood sugars  running 118-151      Hypokalemia  9/16 Add 40 meq po x 1 dose      Microcytic anemia  9/16 Add MVI      Hydronephrosis, bilateral  2/2 urinary retention   Serum Cr normalized     9/16 Patient to discharge with christopher and follow up with Urology as Outpatient      Urinary retention  9/16 Patient to discharge with christopher and follow up with Urology as Outpatient       Falls  Patient reported falling at home    --Avasys  --Fall Precautions    9/11/22: PT/OT  SNF placement - CM consulted     9/18 Awaiting SNF placement    Acute cystitis with hematuria  Dx with UTI on 8/27 in ED, unclear if completed antibiotic regimen    --Follow urine culture  --Continue IV Cipro    9/11/22:Blood cultures show NGTD. Urine culture showed no growth but urine output is very cloudy -almost purulent. Will switch IV Cipro to oral dose.    9/14/22:  Serum Cr 1.5. Renal U/s showed mild hydronephrosis on right and moderate on Left. Christopher palced-900 ml creamy white UOP immediately returned. Will d/c Cipro- add IV Rocephin. Urology consulted. Awaiting SNF placement     9/15/22:  Cont IV Rocephin. Repeat urine culture pending.   9/16 CBI clamped. Hematuria resolved at this time  9/17 Urine culture no growth  9/18  Very mild hematuria noted    Cognitive impairment  Confused on admission\  Cont home emd Seroquel HS    --Avasys  --Fall precautions  --Neuro checks Q4H x24 hours    9/11/22: Pt AAOx3 at times but with notable cognitive deficits.    9/14/22: Increased agitation - Seroquel increased to BID.    9/15/22: Seroquel in am made pt too drowsy- will decrease back to just nightly.     9/17 Stable. Alert, appropriate    Chronic systolic congestive heart failure  Patient is identified as having Systolic (HFrEF) heart failure that is Chronic. CHF is currently controlled. Latest ECHO performed and demonstrates- Results for orders placed during the hospital encounter of 07/19/22    Echo    Interpretation Summary  · Concentric hypertrophy and severely  decreased systolic function.  · Mild left atrial enlargement.  · The estimated ejection fraction is 20%.  · Left ventricular diastolic dysfunction.  · There is left ventricular global hypokinesis.  · Moderate right ventricular enlargement with mildly to moderately reduced right ventricular systolic function.  · Moderate right atrial enlargement.  · There is mild aortic valve stenosis.  · Aortic valve area is 1.90 cm2; peak velocity is 1.05 m/s; mean gradient is 2 mmHg.  · There is severe pulmonary hypertension.  · Moderate tricuspid regurgitation.  · Elevated central venous pressure (15 mmHg).  · The estimated PA systolic pressure is 103 mmHg.  . Continue ACE/ARB, Furosemide and Aldactone and monitor clinical status closely. Monitor on telemetry. Patient is on CHF pathway.  Monitor strict Is&Os and daily weights.  Place on fluid restriction of 1.5 L. Continue to stress to patient importance of self efficacy and  on diet for CHF. Last BNP reviewed- and noted below   Recent Labs   Lab 09/13/22  1449   BNP >4,900*   .  EF 20%  Monitor I/o  Daily weights  Spironolactone and Entresto, Lasix, on hold 2/2 normotensive and MELISSA    9/17 stable    Essential hypertension  Normotensive   Home medications Spironolactone and Entresto on hold   --Hydralazine PRN  --Vitals Q4H  9/17 Stable      COPD suggested by initial evaluation  Negative for symptoms of exacerbation    --Continue home regimen    9/17 Stable            VTE Risk Mitigation (From admission, onward)         Ordered     Place NIKITA hose  Until discontinued         09/17/22 0846     enoxaparin injection 40 mg  Daily         09/14/22 1426     Reason for No Pharmacological VTE Prophylaxis  Once        Question:  Reasons:  Answer:  Already adequately anticoagulated on oral Anticoagulants    09/10/22 1158     IP VTE HIGH RISK PATIENT  Once         09/10/22 1158     Place sequential compression device  Until discontinued         09/10/22 1158                Discharge  Planning   TATIANA: 9/18/2022     Code Status: Full Code   Is the patient medically ready for discharge?: Yes    Reason for patient still in hospital (select all that apply): Pending disposition  Discharge Plan A: Skilled Nursing Facility   Discharge Delays: (!) Post-Acute Set-up    Time spent seeing patient( greater than 1/2 spent in direct contact) : 32 minutes      JESSENIA Bah  Department of Hospital Medicine   [facility] - Med S[facility]

## 2022-09-18 NOTE — ASSESSMENT & PLAN NOTE
9/16 Dm diet  Accuchecks with correctional SSI  Blood sugars running 129-135  Lab Results   Component Value Date    HGBA1C 6.6 (H) 07/20/2022 9/17 Blood sugars running 121-145  9/18 Blood sugars running 118-151

## 2022-09-18 NOTE — PLAN OF CARE
SW spoke with liaison from [facility] of  who stated that they will submit for auth first thing in the morning.     Liliam Hart LMSW 9/18/2022 8:18 AM

## 2022-09-19 LAB
POCT GLUCOSE: 129 MG/DL (ref 70–110)
POCT GLUCOSE: 132 MG/DL (ref 70–110)

## 2022-09-19 PROCEDURE — 11000001 HC ACUTE MED/SURG PRIVATE ROOM

## 2022-09-19 PROCEDURE — 99233 SBSQ HOSP IP/OBS HIGH 50: CPT | Mod: ,,, | Performed by: UROLOGY

## 2022-09-19 PROCEDURE — 94640 AIRWAY INHALATION TREATMENT: CPT

## 2022-09-19 PROCEDURE — 25000003 PHARM REV CODE 250: Performed by: NURSE PRACTITIONER

## 2022-09-19 PROCEDURE — 63600175 PHARM REV CODE 636 W HCPCS: Performed by: NURSE PRACTITIONER

## 2022-09-19 PROCEDURE — 94761 N-INVAS EAR/PLS OXIMETRY MLT: CPT

## 2022-09-19 PROCEDURE — 63600175 PHARM REV CODE 636 W HCPCS: Performed by: FAMILY MEDICINE

## 2022-09-19 PROCEDURE — 99233 PR SUBSEQUENT HOSPITAL CARE,LEVL III: ICD-10-PCS | Mod: ,,, | Performed by: UROLOGY

## 2022-09-19 PROCEDURE — 25000242 PHARM REV CODE 250 ALT 637 W/ HCPCS: Performed by: FAMILY MEDICINE

## 2022-09-19 RX ADMIN — IPRATROPIUM BROMIDE 0.5 MG: 0.5 SOLUTION RESPIRATORY (INHALATION) at 01:09

## 2022-09-19 RX ADMIN — QUETIAPINE FUMARATE 25 MG: 25 TABLET ORAL at 08:09

## 2022-09-19 RX ADMIN — IPRATROPIUM BROMIDE 0.5 MG: 0.5 SOLUTION RESPIRATORY (INHALATION) at 07:09

## 2022-09-19 RX ADMIN — SUCRALFATE 1 G: 1 SUSPENSION ORAL at 12:09

## 2022-09-19 RX ADMIN — SUCRALFATE 1 G: 1 SUSPENSION ORAL at 05:09

## 2022-09-19 RX ADMIN — ALUMINUM HYDROXIDE, MAGNESIUM HYDROXIDE, AND DIMETHICONE 30 ML: 200; 20; 200 SUSPENSION ORAL at 08:09

## 2022-09-19 RX ADMIN — TIMOLOL MALEATE 1 DROP: 5 SOLUTION/ DROPS OPHTHALMIC at 08:09

## 2022-09-19 RX ADMIN — ASPIRIN 81 MG: 81 TABLET, COATED ORAL at 08:09

## 2022-09-19 RX ADMIN — ALUMINUM HYDROXIDE, MAGNESIUM HYDROXIDE, AND DIMETHICONE 30 ML: 200; 20; 200 SUSPENSION ORAL at 05:09

## 2022-09-19 RX ADMIN — CEFTRIAXONE 1 G: 1 INJECTION, SOLUTION INTRAVENOUS at 02:09

## 2022-09-19 RX ADMIN — TAMSULOSIN HYDROCHLORIDE 0.4 MG: 0.4 CAPSULE ORAL at 08:09

## 2022-09-19 RX ADMIN — ENOXAPARIN SODIUM 40 MG: 100 INJECTION SUBCUTANEOUS at 04:09

## 2022-09-19 RX ADMIN — ALUMINUM HYDROXIDE, MAGNESIUM HYDROXIDE, AND DIMETHICONE 30 ML: 200; 20; 200 SUSPENSION ORAL at 04:09

## 2022-09-19 RX ADMIN — IPRATROPIUM BROMIDE 0.5 MG: 0.5 SOLUTION RESPIRATORY (INHALATION) at 12:09

## 2022-09-19 RX ADMIN — MUPIROCIN: 20 OINTMENT TOPICAL at 08:09

## 2022-09-19 RX ADMIN — THERA TABS 1 TABLET: TAB at 08:09

## 2022-09-19 RX ADMIN — ACETAMINOPHEN 650 MG: 325 TABLET ORAL at 09:09

## 2022-09-19 NOTE — SUBJECTIVE & OBJECTIVE
Interval History:  9/18 Stable. SNF placement pending.      Review of Systems   Constitutional:  Positive for activity change and fatigue. Negative for chills, diaphoresis and fever.   HENT:  Positive for hearing loss. Negative for ear discharge, ear pain and facial swelling.    Eyes:  Negative for pain and redness.   Respiratory:  Negative for cough and shortness of breath.    Cardiovascular:  Negative for leg swelling.   Gastrointestinal:  Negative for abdominal pain, constipation, diarrhea, nausea and vomiting.   Endocrine: Negative for polydipsia and polyphagia.   Genitourinary:  Negative for flank pain and hematuria.               Musculoskeletal:  Positive for arthralgias and gait problem. Negative for back pain, neck pain and neck stiffness.   Skin:  Negative for color change.   Allergic/Immunologic: Negative for food allergies.   Neurological:  Positive for weakness. Negative for facial asymmetry and speech difficulty.   Hematological:  Does not bruise/bleed easily.   Psychiatric/Behavioral:  Negative for agitation, behavioral problems, hallucinations and suicidal ideas. The patient is not nervous/anxious.    Objective:     Vital Signs (Most Recent):  Temp: 97 °F (36.1 °C) (09/19/22 1233)  Pulse: 73 (09/19/22 1234)  Resp: 18 (09/19/22 1234)  BP: (!) 128/90 (09/19/22 1233)  SpO2: 100 % (09/19/22 1234)   Vital Signs (24h Range):  Temp:  [96.7 °F (35.9 °C)-98.3 °F (36.8 °C)] 97 °F (36.1 °C)  Pulse:  [73-81] 73  Resp:  [16-20] 18  SpO2:  [96 %-100 %] 100 %  BP: (110-128)/(69-90) 128/90     Weight: 87 kg (191 lb 12.8 oz)  Body mass index is 26.38 kg/m².    Intake/Output Summary (Last 24 hours) at 9/19/2022 1300  Last data filed at 9/19/2022 0745  Gross per 24 hour   Intake 289.39 ml   Output 725 ml   Net -435.61 ml      Physical Exam  Vitals and nursing note reviewed.   Constitutional:       General: He is not in acute distress.     Appearance: He is well-developed. He is not diaphoretic.   HENT:      Head:  Normocephalic and atraumatic.      Mouth/Throat:      Mouth: Mucous membranes are moist.   Eyes:      General: No scleral icterus.        Right eye: No discharge.         Left eye: No discharge.      Extraocular Movements: Extraocular movements intact.      Conjunctiva/sclera: Conjunctivae normal.      Pupils: Pupils are equal, round, and reactive to light.   Cardiovascular:      Rate and Rhythm: Normal rate and regular rhythm.      Heart sounds: Normal heart sounds. No murmur heard.    No friction rub. No gallop.   Pulmonary:      Effort: Pulmonary effort is normal. No respiratory distress.      Breath sounds: No wheezing or rhonchi.      Comments: BBS diminished  Abdominal:      General: Bowel sounds are normal. There is no distension.      Palpations: Abdomen is soft.      Tenderness: There is no abdominal tenderness. There is no guarding.   Genitourinary:     Comments: Zully urine draining in bag  Musculoskeletal:      Cervical back: Normal range of motion and neck supple. No rigidity or tenderness.      Right lower leg: No edema.      Left lower leg: No edema.      Comments: Stiff joints  General debility   Skin:     General: Skin is warm and dry.      Capillary Refill: Capillary refill takes 2 to 3 seconds.   Neurological:      Mental Status: He is alert.      Motor: Weakness present.      Gait: Gait abnormal.      Comments: Oriented person and place  Forgetful  Responds appropriately to simple commands   Psychiatric:         Cognition and Memory: Cognition is impaired. He exhibits impaired recent memory.      Comments: Calm and cooperative        Lab Results   Component Value Date    WBC 8.10 09/18/2022    HGB 9.4 (L) 09/18/2022    HCT 31.7 (L) 09/18/2022    MCV 80 (L) 09/18/2022     09/18/2022       BMP  Lab Results   Component Value Date     09/16/2022    K 3.5 09/16/2022     09/16/2022    CO2 26 09/16/2022    BUN 25 (H) 09/16/2022    CREATININE 1.1 09/16/2022    CALCIUM 8.5 (L)  09/16/2022    ANIONGAP 11 09/16/2022    ESTGFRAFRICA >60 07/26/2022    ESTGFRAFRICA >60 07/26/2022    EGFRNONAA 53 (A) 07/26/2022    EGFRNONAA 53 (A) 07/26/2022

## 2022-09-19 NOTE — PLAN OF CARE
Received call from Claudia. She is now denying pt due to past debt at sister facility.    Tor Morocho LMSW 9/19/2022 4:16 PM

## 2022-09-19 NOTE — PROGRESS NOTES
Chief Complaint: retention, bilateral hydronephrosis    HPI:   2022 - NAEO, urine clear this AM off CBI    2022- No pain or complaints. Urine a little bloody today. CBI off.     2022- No complaints. No pain. Urine clear with CBI clamped.     2022 - NAEO, urine has cleared with CBI, Scr down to 1.1    09/15/2022 - urine still bloody but thin, creatinine down to 1.2    2022 - 89 y.o. male with PMHx of HTN, COPD, CHF, and DM admitted to the hospital after a fall. Evaluation in the ED showed UTI, Scr up to 2.1. Patient was started on IVF and antibiotics and creatinine initially improved, however it plateaued around 1.5 from a normal baseline. Renal US was obtained which showed bilateral hydronephrosis and a distended bladder. Patient notes some prior issues with BPH and difficult urination, was not on BPH meds prior to admission. Sanchez placed drained 900mL of thick, creamy urine. No known prior urologic procedures.     PMH:  Past Medical History:   Diagnosis Date    Acute coronary syndrome         CHF (congestive heart failure)     COPD (chronic obstructive pulmonary disease)     Diabetes mellitus     HTN (hypertension)        PSH:  No past surgical history on file.    Family History:  No family history on file.    Social History:  Social History     Tobacco Use    Smoking status: Former     Packs/day: 1.00     Types: Cigarettes     Quit date: 2022     Years since quittin.1    Smokeless tobacco: Never   Substance Use Topics    Alcohol use: Not Currently     Comment: occasionally    Drug use: Not Currently        Review of Systems:  General: No fever, chills  Skin: No rashes  Chest:  Denies cough and sputum production  Heart: Denies chest pain  Resp: Denies dyspnea  Abdomen: Denies diarrhea, abdominal pain, hematemesis, or blood in stool.  Musculoskeletal: No joint stiffness or swelling. Denies back pain.  : see HPI  Neuro: no dizziness or  weakness    Allergies:  Penicillins    Medications:    Current Facility-Administered Medications:     acetaminophen tablet 650 mg, 650 mg, Oral, Q4H PRN, Vidya Fournier NP, 650 mg at 09/19/22 0952    albuterol nebulizer solution 2.5 mg, 2.5 mg, Nebulization, Q6H PRN, Ren Moura MD    aluminum-magnesium hydroxide-simethicone 200-200-20 mg/5 mL suspension 30 mL, 30 mL, Oral, QID (AC & HS), Vidya Fournier NP, 30 mL at 09/19/22 0554    aspirin EC tablet 81 mg, 81 mg, Oral, Daily, Vidya Fournier NP, 81 mg at 09/19/22 0809    bisacodyL suppository 10 mg, 10 mg, Rectal, Daily PRN, Vidya Fournire NP    cefTRIAXone (ROCEPHIN) 1 g/50 mL D5W IVPB, 1 g, Intravenous, Q24H, Malaika Brand NP, Last Rate: 100 mL/hr at 09/19/22 1400, 1 g at 09/19/22 1400    dextrose 10% bolus 125 mL, 12.5 g, Intravenous, PRN, Vidya Fournier NP    dextrose 10% bolus 250 mL, 25 g, Intravenous, PRN, Vidya Fournier NP    dextrose 5 % and 0.45 % NaCl infusion, , Intravenous, Once, Kajal Grant, JESSENIA, Last Rate: 75 mL/hr at 09/16/22 1816, Rate Verify at 09/16/22 1816    enoxaparin injection 40 mg, 40 mg, Subcutaneous, Daily, Ren Moura MD, 40 mg at 09/18/22 1710    glucagon (human recombinant) injection 1 mg, 1 mg, Intramuscular, PRN, Vidya Fournier NP    glucose chewable tablet 16 g, 16 g, Oral, PRN, Vidya Fournier NP    glucose chewable tablet 24 g, 24 g, Oral, PRN, Vidya Fournier NP    hydrALAZINE injection 10 mg, 10 mg, Intravenous, Q8H PRN, Vidya Fournier NP    influenza (QUADRIVALENT ADJUVANTED PF) vaccine 0.5 mL, 0.5 mL, Intramuscular, vaccine x 1 dose, Matt Rossi MD    insulin aspart U-100 pen 0-5 Units, 0-5 Units, Subcutaneous, QID (AC + HS) PRN, Vidya Fournier NP    ipratropium 0.02 % nebulizer solution 0.5 mg, 0.5 mg, Nebulization, Q6H, Ren Moura MD, 0.5 mg at 09/19/22 1234    multivitamin tablet, 1 tablet, Oral, Daily, JESSENIA Covington, 1 tablet at 09/19/22 0809    mupirocin 2 %  ointment, , Nasal, BID, Ren Moura MD, Given at 09/19/22 0809    naloxone 0.4 mg/mL injection 0.02 mg, 0.02 mg, Intravenous, PRN, Vidya Fournier NP    ondansetron injection 4 mg, 4 mg, Intravenous, Q8H PRN, Vidya Fournier NP    pneumoc 20-leonor conj-dip cr(PF) (PREVNAR-20 (PF)) injection Syrg 0.5 mL, 0.5 mL, Intramuscular, vaccine x 1 dose, Matt Rossi MD    QUEtiapine tablet 25 mg, 25 mg, Oral, QHS, Malaika Brand NP, 25 mg at 09/18/22 2024    sodium chloride 0.9% flush 10 mL, 10 mL, Intravenous, Q12H PRN, Vidya Fournier NP    sucralfate 100 mg/mL suspension 1 g, 1 g, Oral, Q6H, Vidya Fournier NP, 1 g at 09/19/22 0554    tamsulosin 24 hr capsule 0.4 mg, 0.4 mg, Oral, QHS, Vidya Fournier NP, 0.4 mg at 09/18/22 2024    timolol maleate 0.5% ophthalmic solution 1 drop, 1 drop, Both Eyes, BID, Vidya Fournier NP, 1 drop at 09/19/22 0809    Physical Exam:  Vitals:    09/19/22 1234   BP:    Pulse: 73   Resp: 18   Temp:      Body mass index is 26.38 kg/m².  General: awake, alert, cooperative  Head: NC/AT  Ears: external ears normal  Eyes: sclera normal  Lungs: normal inspiration, NAD  Heart: well-perfused  Abdomen: Soft, NT, ND  : Normal uncirc'd phallus, meatus normal in size and position, christopher in place with CBI clamped draining yellow urine  Skin: The skin is warm and dry  Ext: No c/c/e.  Neuro: grossly intact, AOx3    RADIOLOGY:  US RETROPERITONEAL COMPLETE 09/13/2022     CLINICAL HISTORY:  ARF;     TECHNIQUE:  Ultrasound of the kidneys and urinary bladder was performed including color flow and Doppler evaluation of the kidneys.     COMPARISON:  None.     FINDINGS:  Right kidney: The right kidney measures up to 11.3 cm.  Moderate right-sided hydronephrosis.  Resistive index measures 0.66     Left kidney: The left kidney measures up to 12.1 cm with mild hydronephrosis of the left kidney.  Left renal cyst measures 4.2 cm.  Resistive index measures 0.76.     Debris noted in the bladder.   Splenic resistive index measures 0.6.     Impression:     As above       LABS:  I personally reviewed the following lab values:   Latest Reference Range & Units 09/18/22 05:56   WBC 3.90 - 12.70 K/uL 8.10   RBC 4.60 - 6.20 M/uL 3.97 (L)   Hemoglobin 14.0 - 18.0 g/dL 9.4 (L)   Hematocrit 40.0 - 54.0 % 31.7 (L)   MCV 82 - 98 fL 80 (L)   MCH 27.0 - 31.0 pg 23.7 (L)   MCHC 32.0 - 36.0 g/dL 29.7 (L)   RDW 11.5 - 14.5 % 19.1 (H)   Platelets 150 - 450 K/uL 183   (L): Data is abnormally low  (H): Data is abnormally high      URINALYSIS:   Component Ref Range & Units 10:30 4 d ago   RBC, UA 0 - 4 /hpf >100 High   5 High     WBC, UA 0 - 5 /hpf >100 High   >100 High     WBC Clumps, UA None-Rare Many Abnormal   Few Abnormal     Bacteria None-Occ /hpf Many Abnormal   Many Abnormal       Assessment/Plan:   Joseph Toussaint is a 89 y.o. male with retention and gross hematuria. Scr down with just christopher decompression. CBI off, urine clear.  When ready from a medical standpoint, he should discharge with the christopher in place, and will follow-up with us in 1 week for a voiding trial. Call with further questions or concerns.    Yifan Sarmiento MD  Urology

## 2022-09-19 NOTE — ASSESSMENT & PLAN NOTE
9/16 Nutrition consulted. Most recent weight and BMI monitored  Add po supplement    Measurements:  Wt Readings from Last 1 Encounters:   09/19/22 87 kg (191 lb 12.8 oz)   Body mass index is 26.38 kg/m².    Recommendations:      Patient has been screened and assessed by RD. RD will follow patient.

## 2022-09-19 NOTE — PLAN OF CARE
Problem: Adult Inpatient Plan of Care  Goal: Absence of Hospital-Acquired Illness or Injury  Intervention: Identify and Manage Fall Risk  Flowsheets (Taken 9/19/2022 8697)  Safety Promotion/Fall Prevention:   bed alarm set   assistive device/personal item within reach   instructed to call staff for mobility   side rails raised x 2     Problem: Diabetes Comorbidity  Goal: Blood Glucose Level Within Targeted Range  Outcome: Ongoing, Progressing     Problem: Adult Inpatient Plan of Care  Goal: Absence of Hospital-Acquired Illness or Injury  Outcome: Ongoing, Progressing

## 2022-09-19 NOTE — PLAN OF CARE
Called [facility]. Asked to be transferred to DON. DON unavailable. Was transferred to admissions manager Claudia. No answer. Left message.    Tor Morocho LMSW 9/19/2022 4:06 PM

## 2022-09-19 NOTE — PLAN OF CARE
Spoke with family. They agreed to request authorization at [facility]. Called Jose Alfredo at [facility], left message.    Tor Morocho LMSW 9/19/2022 4:26 PM

## 2022-09-19 NOTE — ASSESSMENT & PLAN NOTE
Patient reported falling at home    --Avasys  --Fall Precautions    9/11/22: PT/OT  SNF placement - CM consulted     9/19 Awaiting SNF placement

## 2022-09-19 NOTE — PROGRESS NOTES
[facility] - Med Surg 3  [facility] Medicine  Progress Note    Patient Name: Joseph Toussaint  MRN: [id number]  Patient Class: IP- Inpatient   Admission Date: 9/10/2022  Length of Stay: 4 days  Attending Physician: Matt Rossi MD  Primary Care Provider: Abad Iqbal MD      Subjective:     Principal Problem:Acute renal failure      HPI:  89 y.o. male patient with a PMHx of HTN, COPD, CHF, and DM who presents to the Emergency Department for evaluation of a fall which occurred x 2 days pta. Pt family called for transport to get pt checked out. Pt denies any complaints and is not in pain. No associated sxs reported. Patient denies any CP, SOB, fever, back pain, abd pain, and all other sxs at this time. No prior Tx reported. In the ED, UA consistent with UTI, dx with UTI on 8/27, unclear if patient completed antibiotic regimen. BUN/Cr: 39/2.1, Bili: 2.0, H/H: 10.4/32.3. Afebrile, vitals stable. Oriented to self only on exam. Intitiated on antibiotics in the ED, urine culture pending, treated with IV fluid bolus. Patient is a full code, surrogate decision maker is his son Kevin Toussaint. Placed in observation under the care of hospital medicine.       Overview/Hospital Course:  The patient is a 89 yo male with HTN, COPD, CHF, and DM who was placed in observation for frequent falls, MELISSA, and presumed UTI on IVF and IV  Rocephin.     9/11/22: Pt AAOx3 at times but with notable cognitive deficits. Denies complaints. MELISSA improving 2.1>1.9. Hyperchloremic metabolic acidosis noted- will switch NS IVFs to Bicarb drip. Blood cultures show NGTD. Urine culture showed no growth but urine output is very cloudy -almost purulent. Will switch IV Cipro to oral dose. PT/OT evaluated pt and recommended SNF. Called pt's nephew Kraig (POA) and provided status update.  Pt lives with his nephew and his wife. Currently, Kraig is in the hospital recovering from surgery. He requested discharge to SNF.    9/12/22: Denies complaints. No acute  events. MELISSA slowly improving 2.1>1.9>1.7. cont IVF. SNF placement pending     9/13/22: No acute events. Serum Cr remains 1.7. Will check BNP and Renal U/S. Hold IVFs  Awaiting SNF placement     9/14/22: Increased agitation - Seroquel increased to BID. Serum Cr 1.5. Renal U/s showed mild hydronephrosis on right and moderate on Left with distended bladder. Christopher placed-900 ml creamy white UOP immediately returned. Will d/c Cipro- add IV Rocephin. Urology consulted. Awaiting SNF placement     9/15/22: Seroquel in am made pt too drowsy- will decrease back to just nightly. Gross hematuria to UOP. Urology recommended continuous bladder irrigation. Serum Cr normalized after placing christopher. Hydronephrosis 2/2 urinary retention. Cont IV Rocephin. Repeat urine culture pending. Pt will be placed in SNF when hematuria resolves and repeat urine culture results.     9/16 Gross hematuria resolved. CBI clamped. Continue current treatment.   9/17 Patient remains stable and improved. No hematuria at this time.   9/18 Stable. SNF placement pending.   9/19 Stable. SNF placement pending.       Interval History:  9/18 Stable. SNF placement pending.      Review of Systems   Constitutional:  Positive for activity change and fatigue. Negative for chills, diaphoresis and fever.   HENT:  Positive for hearing loss. Negative for ear discharge, ear pain and facial swelling.    Eyes:  Negative for pain and redness.   Respiratory:  Negative for cough and shortness of breath.    Cardiovascular:  Negative for leg swelling.   Gastrointestinal:  Negative for abdominal pain, constipation, diarrhea, nausea and vomiting.   Endocrine: Negative for polydipsia and polyphagia.   Genitourinary:  Negative for flank pain and hematuria.               Musculoskeletal:  Positive for arthralgias and gait problem. Negative for back pain, neck pain and neck stiffness.   Skin:  Negative for color change.   Allergic/Immunologic: Negative for food allergies.    Neurological:  Positive for weakness. Negative for facial asymmetry and speech difficulty.   Hematological:  Does not bruise/bleed easily.   Psychiatric/Behavioral:  Negative for agitation, behavioral problems, hallucinations and suicidal ideas. The patient is not nervous/anxious.    Objective:     Vital Signs (Most Recent):  Temp: 97 °F (36.1 °C) (09/19/22 1233)  Pulse: 73 (09/19/22 1234)  Resp: 18 (09/19/22 1234)  BP: (!) 128/90 (09/19/22 1233)  SpO2: 100 % (09/19/22 1234)   Vital Signs (24h Range):  Temp:  [96.7 °F (35.9 °C)-98.3 °F (36.8 °C)] 97 °F (36.1 °C)  Pulse:  [73-81] 73  Resp:  [16-20] 18  SpO2:  [96 %-100 %] 100 %  BP: (110-128)/(69-90) 128/90     Weight: 87 kg (191 lb 12.8 oz)  Body mass index is 26.38 kg/m².    Intake/Output Summary (Last 24 hours) at 9/19/2022 1300  Last data filed at 9/19/2022 0745  Gross per 24 hour   Intake 289.39 ml   Output 725 ml   Net -435.61 ml      Physical Exam  Vitals and nursing note reviewed.   Constitutional:       General: He is not in acute distress.     Appearance: He is well-developed. He is not diaphoretic.   HENT:      Head: Normocephalic and atraumatic.      Mouth/Throat:      Mouth: Mucous membranes are moist.   Eyes:      General: No scleral icterus.        Right eye: No discharge.         Left eye: No discharge.      Extraocular Movements: Extraocular movements intact.      Conjunctiva/sclera: Conjunctivae normal.      Pupils: Pupils are equal, round, and reactive to light.   Cardiovascular:      Rate and Rhythm: Normal rate and regular rhythm.      Heart sounds: Normal heart sounds. No murmur heard.    No friction rub. No gallop.   Pulmonary:      Effort: Pulmonary effort is normal. No respiratory distress.      Breath sounds: No wheezing or rhonchi.      Comments: BBS diminished  Abdominal:      General: Bowel sounds are normal. There is no distension.      Palpations: Abdomen is soft.      Tenderness: There is no abdominal tenderness. There is no  guarding.   Genitourinary:     Comments: Zully urine draining in bag  Musculoskeletal:      Cervical back: Normal range of motion and neck supple. No rigidity or tenderness.      Right lower leg: No edema.      Left lower leg: No edema.      Comments: Stiff joints  General debility   Skin:     General: Skin is warm and dry.      Capillary Refill: Capillary refill takes 2 to 3 seconds.   Neurological:      Mental Status: He is alert.      Motor: Weakness present.      Gait: Gait abnormal.      Comments: Oriented person and place  Forgetful  Responds appropriately to simple commands   Psychiatric:         Cognition and Memory: Cognition is impaired. He exhibits impaired recent memory.      Comments: Calm and cooperative        Lab Results   Component Value Date    WBC 8.10 09/18/2022    HGB 9.4 (L) 09/18/2022    HCT 31.7 (L) 09/18/2022    MCV 80 (L) 09/18/2022     09/18/2022       BMP  Lab Results   Component Value Date     09/16/2022    K 3.5 09/16/2022     09/16/2022    CO2 26 09/16/2022    BUN 25 (H) 09/16/2022    CREATININE 1.1 09/16/2022    CALCIUM 8.5 (L) 09/16/2022    ANIONGAP 11 09/16/2022    ESTGFRAFRICA >60 07/26/2022    ESTGFRAFRICA >60 07/26/2022    EGFRNONAA 53 (A) 07/26/2022    EGFRNONAA 53 (A) 07/26/2022           Assessment/Plan:      Debility  9/16 Fall and skin precautions  Turn q 2 hours  Continue Physical therapy and Occupational therapy  9/19 Awaiting SNF placement        Malnutrition of moderate degree  9/16 Nutrition consulted. Most recent weight and BMI monitored  Add po supplement    Measurements:  Wt Readings from Last 1 Encounters:   09/19/22 87 kg (191 lb 12.8 oz)   Body mass index is 26.38 kg/m².    Recommendations:      Patient has been screened and assessed by RD. RD will follow patient.      Type 2 diabetes mellitus   9/16 Dm diet  Accuchecks with correctional SSI  Blood sugars running 129-135  Lab Results   Component Value Date    HGBA1C 6.6 (H) 07/20/2022 9/17  Blood sugars running 121-145  9/18 Blood sugars running 118-151  9/19 Blood sugars running 110-146    Hypokalemia  9/16 Add 40 meq po x 1 dose      Microcytic anemia  9/16 Add MVI      Hydronephrosis, bilateral  2/2 urinary retention   Serum Cr normalized     9/16 Patient to discharge with christopher and follow up with Urology as Outpatient      Urinary retention  9/16 Patient to discharge with christopher and follow up with Urology as Outpatient       Falls  Patient reported falling at home    --Avasys  --Fall Precautions    9/11/22: PT/OT  SNF placement - CM consulted     9/19 Awaiting SNF placement    Acute cystitis with hematuria  Dx with UTI on 8/27 in ED, unclear if completed antibiotic regimen    --Follow urine culture  --Continue IV Cipro    9/11/22:Blood cultures show NGTD. Urine culture showed no growth but urine output is very cloudy -almost purulent. Will switch IV Cipro to oral dose.    9/14/22:  Serum Cr 1.5. Renal U/s showed mild hydronephrosis on right and moderate on Left. Christopher palced-900 ml creamy white UOP immediately returned. Will d/c Cipro- add IV Rocephin. Urology consulted. Awaiting SNF placement     9/15/22:  Cont IV Rocephin. Repeat urine culture pending.   9/16 CBI clamped. Hematuria resolved at this time  9/17 Urine culture no growth  9/18  Very mild hematuria noted  9/19 Zully urine. No hematuria at this time.  Urine culture no growth    Cognitive impairment  Confused on admission\  Cont home emd Seroquel HS    --Avasys  --Fall precautions  --Neuro checks Q4H x24 hours    9/11/22: Pt AAOx3 at times but with notable cognitive deficits.    9/14/22: Increased agitation - Seroquel increased to BID.    9/15/22: Seroquel in am made pt too drowsy- will decrease back to just nightly.     9/17 Stable. Alert, appropriate    Chronic systolic congestive heart failure  Patient is identified as having Systolic (HFrEF) heart failure that is Chronic. CHF is currently controlled. Latest ECHO performed and  demonstrates- Results for orders placed during the hospital encounter of 07/19/22    Echo    Interpretation Summary  · Concentric hypertrophy and severely decreased systolic function.  · Mild left atrial enlargement.  · The estimated ejection fraction is 20%.  · Left ventricular diastolic dysfunction.  · There is left ventricular global hypokinesis.  · Moderate right ventricular enlargement with mildly to moderately reduced right ventricular systolic function.  · Moderate right atrial enlargement.  · There is mild aortic valve stenosis.  · Aortic valve area is 1.90 cm2; peak velocity is 1.05 m/s; mean gradient is 2 mmHg.  · There is severe pulmonary hypertension.  · Moderate tricuspid regurgitation.  · Elevated central venous pressure (15 mmHg).  · The estimated PA systolic pressure is 103 mmHg.  . Continue ACE/ARB, Furosemide and Aldactone and monitor clinical status closely. Monitor on telemetry. Patient is on CHF pathway.  Monitor strict Is&Os and daily weights.  Place on fluid restriction of 1.5 L. Continue to stress to patient importance of self efficacy and  on diet for CHF. Last BNP reviewed- and noted below   Recent Labs   Lab 09/13/22  1449   BNP >4,900*   .  EF 20%  Monitor I/o  Daily weights  Spironolactone and Entresto, Lasix, on hold 2/2 normotensive and MELISSA    9/17 stable    Essential hypertension  Normotensive   Home medications Spironolactone and Entresto on hold   --Hydralazine PRN  --Vitals Q4H  9/17 Stable      COPD suggested by initial evaluation  Negative for symptoms of exacerbation    --Continue home regimen    9/17 Stable            VTE Risk Mitigation (From admission, onward)         Ordered     Place NIKITA hose  Until discontinued         09/17/22 0846     enoxaparin injection 40 mg  Daily         09/14/22 1426     Reason for No Pharmacological VTE Prophylaxis  Once        Question:  Reasons:  Answer:  Already adequately anticoagulated on oral Anticoagulants    09/10/22 1158     IP VTE  HIGH RISK PATIENT  Once         09/10/22 1158     Place sequential compression device  Until discontinued         09/10/22 1158                Discharge Planning   TATIANA: 9/19/2022     Code Status: Full Code   Is the patient medically ready for discharge?: Yes    Reason for patient still in hospital (select all that apply): Treatment  Discharge Plan A: Skilled Nursing Facility   Discharge Delays: (!) Post-Acute Set-up    Time spent seeing patient( greater than 1/2 spent in direct contact) : 36 minutes        JESSENIA Bah  Department of Hospital Medicine   [facility] - Med Surg 3

## 2022-09-19 NOTE — PLAN OF CARE
Attempted to call the [facility] for update. No answer.    Tor Morocho LMSW 9/19/2022 10:41 AM

## 2022-09-19 NOTE — ASSESSMENT & PLAN NOTE
Dx with UTI on 8/27 in ED, unclear if completed antibiotic regimen    --Follow urine culture  --Continue IV Cipro    9/11/22:Blood cultures show NGTD. Urine culture showed no growth but urine output is very cloudy -almost purulent. Will switch IV Cipro to oral dose.    9/14/22:  Serum Cr 1.5. Renal U/s showed mild hydronephrosis on right and moderate on Left. Sanchez palced-900 ml creamy white UOP immediately returned. Will d/c Cipro- add IV Rocephin. Urology consulted. Awaiting SNF placement     9/15/22:  Cont IV Rocephin. Repeat urine culture pending.   9/16 CBI clamped. Hematuria resolved at this time  9/17 Urine culture no growth  9/18  Very mild hematuria noted  9/19 Zully urine. No hematuria at this time.  Urine culture no growth

## 2022-09-19 NOTE — ASSESSMENT & PLAN NOTE
9/16 Dm diet  Accuchecks with correctional SSI  Blood sugars running 129-135  Lab Results   Component Value Date    HGBA1C 6.6 (H) 07/20/2022 9/17 Blood sugars running 121-145  9/18 Blood sugars running 118-151  9/19 Blood sugars running 110-146

## 2022-09-19 NOTE — PLAN OF CARE
Spoke with BONNIE Miranda at [facility]. She took Anders's information and said that she'd have Claudia from admissions return call.    Tor Morocho LMSW 9/19/2022 2:42 PM

## 2022-09-19 NOTE — ASSESSMENT & PLAN NOTE
9/16 Fall and skin precautions  Turn q 2 hours  Continue Physical therapy and Occupational therapy  9/19 Awaiting SNF placement

## 2022-09-20 PROBLEM — F03.90 DEMENTIA WITHOUT BEHAVIORAL DISTURBANCE: Status: ACTIVE | Noted: 2022-08-01

## 2022-09-20 LAB
POCT GLUCOSE: 124 MG/DL (ref 70–110)
POCT GLUCOSE: 152 MG/DL (ref 70–110)
SARS-COV-2 RDRP RESP QL NAA+PROBE: NEGATIVE

## 2022-09-20 PROCEDURE — 63600175 PHARM REV CODE 636 W HCPCS: Performed by: NURSE PRACTITIONER

## 2022-09-20 PROCEDURE — 25000003 PHARM REV CODE 250: Performed by: NURSE PRACTITIONER

## 2022-09-20 PROCEDURE — 90471 IMMUNIZATION ADMIN: CPT | Performed by: INTERNAL MEDICINE

## 2022-09-20 PROCEDURE — 94640 AIRWAY INHALATION TREATMENT: CPT

## 2022-09-20 PROCEDURE — 63600175 PHARM REV CODE 636 W HCPCS: Performed by: FAMILY MEDICINE

## 2022-09-20 PROCEDURE — 25000242 PHARM REV CODE 250 ALT 637 W/ HCPCS: Performed by: INTERNAL MEDICINE

## 2022-09-20 PROCEDURE — 63600175 PHARM REV CODE 636 W HCPCS: Performed by: INTERNAL MEDICINE

## 2022-09-20 PROCEDURE — U0002 COVID-19 LAB TEST NON-CDC: HCPCS | Performed by: INTERNAL MEDICINE

## 2022-09-20 PROCEDURE — G0008 ADMIN INFLUENZA VIRUS VAC: HCPCS | Performed by: INTERNAL MEDICINE

## 2022-09-20 PROCEDURE — 99900035 HC TECH TIME PER 15 MIN (STAT)

## 2022-09-20 PROCEDURE — 11000001 HC ACUTE MED/SURG PRIVATE ROOM

## 2022-09-20 PROCEDURE — 97162 PT EVAL MOD COMPLEX 30 MIN: CPT

## 2022-09-20 PROCEDURE — 90694 VACC AIIV4 NO PRSRV 0.5ML IM: CPT | Performed by: INTERNAL MEDICINE

## 2022-09-20 PROCEDURE — 97530 THERAPEUTIC ACTIVITIES: CPT

## 2022-09-20 PROCEDURE — 94761 N-INVAS EAR/PLS OXIMETRY MLT: CPT

## 2022-09-20 PROCEDURE — 97110 THERAPEUTIC EXERCISES: CPT

## 2022-09-20 PROCEDURE — 97116 GAIT TRAINING THERAPY: CPT

## 2022-09-20 PROCEDURE — 25000242 PHARM REV CODE 250 ALT 637 W/ HCPCS: Performed by: NURSE PRACTITIONER

## 2022-09-20 PROCEDURE — 25000242 PHARM REV CODE 250 ALT 637 W/ HCPCS: Performed by: FAMILY MEDICINE

## 2022-09-20 RX ORDER — IPRATROPIUM BROMIDE 0.5 MG/2.5ML
0.5 SOLUTION RESPIRATORY (INHALATION)
Status: DISCONTINUED | OUTPATIENT
Start: 2022-09-20 | End: 2022-09-29 | Stop reason: HOSPADM

## 2022-09-20 RX ORDER — MAG HYDROX/ALUMINUM HYD/SIMETH 200-200-20
30 SUSPENSION, ORAL (FINAL DOSE FORM) ORAL EVERY 6 HOURS PRN
Status: DISCONTINUED | OUTPATIENT
Start: 2022-09-20 | End: 2022-09-29 | Stop reason: HOSPADM

## 2022-09-20 RX ORDER — BUDESONIDE 0.5 MG/2ML
0.5 INHALANT ORAL EVERY 12 HOURS
Status: DISCONTINUED | OUTPATIENT
Start: 2022-09-20 | End: 2022-09-29 | Stop reason: HOSPADM

## 2022-09-20 RX ORDER — QUETIAPINE FUMARATE 25 MG/1
25 TABLET, FILM COATED ORAL ONCE
Status: COMPLETED | OUTPATIENT
Start: 2022-09-20 | End: 2022-09-20

## 2022-09-20 RX ORDER — SUCRALFATE 1 G/10ML
1 SUSPENSION ORAL
Status: DISCONTINUED | OUTPATIENT
Start: 2022-09-20 | End: 2022-09-29 | Stop reason: HOSPADM

## 2022-09-20 RX ORDER — IPRATROPIUM BROMIDE 0.5 MG/2.5ML
0.5 SOLUTION RESPIRATORY (INHALATION) 4 TIMES DAILY
Status: DISCONTINUED | OUTPATIENT
Start: 2022-09-20 | End: 2022-09-20

## 2022-09-20 RX ORDER — FUROSEMIDE 20 MG/1
20 TABLET ORAL DAILY
Status: DISCONTINUED | OUTPATIENT
Start: 2022-09-20 | End: 2022-09-29 | Stop reason: HOSPADM

## 2022-09-20 RX ADMIN — TIMOLOL MALEATE 1 DROP: 5 SOLUTION/ DROPS OPHTHALMIC at 08:09

## 2022-09-20 RX ADMIN — ENOXAPARIN SODIUM 40 MG: 100 INJECTION SUBCUTANEOUS at 04:09

## 2022-09-20 RX ADMIN — IPRATROPIUM BROMIDE 0.5 MG: 0.5 SOLUTION RESPIRATORY (INHALATION) at 07:09

## 2022-09-20 RX ADMIN — INFLUENZA A VIRUS A/VICTORIA/2570/2019 IVR-215 (H1N1) ANTIGEN (FORMALDEHYDE INACTIVATED), INFLUENZA A VIRUS A/DARWIN/6/2021 IVR-227 (H3N2) ANTIGEN (FORMALDEHYDE INACTIVATED), INFLUENZA B VIRUS B/AUSTRIA/1359417/2021 BVR-26 ANTIGEN (FORMALDEHYDE INACTIVATED), INFLUENZA B VIRUS B/PHUKET/3073/2013 BVR-1B ANTIGEN (FORMALDEHYDE INACTIVATED) 0.5 ML: 15; 15; 15; 15 INJECTION, SUSPENSION INTRAMUSCULAR at 09:09

## 2022-09-20 RX ADMIN — CEFTRIAXONE 1 G: 1 INJECTION, SOLUTION INTRAVENOUS at 01:09

## 2022-09-20 RX ADMIN — BUDESONIDE 0.5 MG: 0.5 INHALANT ORAL at 07:09

## 2022-09-20 RX ADMIN — FUROSEMIDE 20 MG: 20 TABLET ORAL at 01:09

## 2022-09-20 RX ADMIN — IPRATROPIUM BROMIDE 0.5 MG: 0.5 SOLUTION RESPIRATORY (INHALATION) at 12:09

## 2022-09-20 RX ADMIN — ACETAMINOPHEN 650 MG: 325 TABLET ORAL at 04:09

## 2022-09-20 RX ADMIN — TAMSULOSIN HYDROCHLORIDE 0.4 MG: 0.4 CAPSULE ORAL at 08:09

## 2022-09-20 RX ADMIN — MUPIROCIN: 20 OINTMENT TOPICAL at 08:09

## 2022-09-20 RX ADMIN — THERA TABS 1 TABLET: TAB at 08:09

## 2022-09-20 RX ADMIN — ASPIRIN 81 MG: 81 TABLET, COATED ORAL at 08:09

## 2022-09-20 RX ADMIN — QUETIAPINE FUMARATE 25 MG: 25 TABLET ORAL at 09:09

## 2022-09-20 NOTE — ASSESSMENT & PLAN NOTE
9/16 Dm diet  Accuchecks with correctional SSI  Blood sugars running 129-135  Lab Results   Component Value Date    HGBA1C 6.6 (H) 07/20/2022 9/17 Blood sugars running 121-145  9/18 Blood sugars running 118-151  9/19 Blood sugars running 110-146  9/20 Blood sugars running 124-132

## 2022-09-20 NOTE — ASSESSMENT & PLAN NOTE
Confused on admission\  Cont home emd Seroquel HS    --Avasys  --Fall precautions  --Neuro checks Q4H x24 hours    9/11/22: Pt AAOx3 at times but with notable cognitive deficits.    9/14/22: Increased agitation - Seroquel increased to BID.    9/15/22: Seroquel in am made pt too drowsy- will decrease back to just nightly.     9/17 Stable. Alert, appropriate    9/20 Sleepy but arousable. Calm and cooperative. Discontinue Seroquel

## 2022-09-20 NOTE — PLAN OF CARE
Plan of care reviewed with pt. A&O x2, disoriented to time and situation. IV intact,dry, and clean. Medications given with no complications. On room air. Pt ambulates with assistance x1, turns in bed independently. No pain reported. BG monitored. Sanchez in place and intact for retention. Bed alarm on. Avasys in place. Not tele monitored. Instructed to call for assistance. Hourly rounding completed.

## 2022-09-20 NOTE — PLAN OF CARE
[facility] to submit for auth for SNF admission. DC orders, PASRR and 142 sent to facility. COVID test ordered.

## 2022-09-20 NOTE — PROGRESS NOTES
[facility] - Med Surg 3  [facility] Medicine  Progress Note    Patient Name: Joseph Toussaint  MRN: [id number]  Patient Class: IP- Inpatient   Admission Date: 9/10/2022  Length of Stay: 5 days  Attending Physician: Matt Rossi MD  Primary Care Provider: Abad Iqbal MD      Subjective:     Principal Problem:Acute renal failure      HPI:  89 y.o. male patient with a PMHx of HTN, COPD, CHF, and DM who presents to the Emergency Department for evaluation of a fall which occurred x 2 days pta. Pt family called for transport to get pt checked out. Pt denies any complaints and is not in pain. No associated sxs reported. Patient denies any CP, SOB, fever, back pain, abd pain, and all other sxs at this time. No prior Tx reported. In the ED, UA consistent with UTI, dx with UTI on 8/27, unclear if patient completed antibiotic regimen. BUN/Cr: 39/2.1, Bili: 2.0, H/H: 10.4/32.3. Afebrile, vitals stable. Oriented to self only on exam. Intitiated on antibiotics in the ED, urine culture pending, treated with IV fluid bolus. Patient is a full code, surrogate decision maker is his son Kevin Toussaint. Placed in observation under the care of hospital medicine.       Overview/Hospital Course:  The patient is a 89 yo male with HTN, COPD, CHF, and DM who was placed in observation for frequent falls, MELISSA, and presumed UTI on IVF and IV  Rocephin.     9/11/22: Pt AAOx3 at times but with notable cognitive deficits. Denies complaints. MELISSA improving 2.1>1.9. Hyperchloremic metabolic acidosis noted- will switch NS IVFs to Bicarb drip. Blood cultures show NGTD. Urine culture showed no growth but urine output is very cloudy -almost purulent. Will switch IV Cipro to oral dose. PT/OT evaluated pt and recommended SNF. Called pt's nephew Kraig (POA) and provided status update.  Pt lives with his nephew and his wife. Currently, Kraig is in the hospital recovering from surgery. He requested discharge to SNF.    9/12/22: Denies complaints. No acute  events. MELISSA slowly improving 2.1>1.9>1.7. cont IVF. SNF placement pending     9/13/22: No acute events. Serum Cr remains 1.7. Will check BNP and Renal U/S. Hold IVFs  Awaiting SNF placement     9/14/22: Increased agitation - Seroquel increased to BID. Serum Cr 1.5. Renal U/s showed mild hydronephrosis on right and moderate on Left with distended bladder. Christopher placed-900 ml creamy white UOP immediately returned. Will d/c Cipro- add IV Rocephin. Urology consulted. Awaiting SNF placement     9/15/22: Seroquel in am made pt too drowsy- will decrease back to just nightly. Gross hematuria to UOP. Urology recommended continuous bladder irrigation. Serum Cr normalized after placing christopher. Hydronephrosis 2/2 urinary retention. Cont IV Rocephin. Repeat urine culture pending. Pt will be placed in SNF when hematuria resolves and repeat urine culture results.     9/16 Gross hematuria resolved. CBI clamped. Continue current treatment.   9/17 Patient remains stable and improved. No hematuria at this time.   9/18 Stable. SNF placement pending.   9/19 Stable. SNF placement pending.   9/20 Stable. SNF placement pending.       Interval History:   Stable. SNF placement pending.     Review of Systems   Constitutional:  Positive for activity change and fatigue. Negative for chills, diaphoresis and fever.   HENT:  Positive for hearing loss. Negative for ear discharge, ear pain and facial swelling.    Eyes:  Negative for pain and redness.   Respiratory:  Negative for cough and shortness of breath.    Cardiovascular:  Negative for leg swelling.   Gastrointestinal:  Negative for abdominal pain, constipation, diarrhea, nausea and vomiting.   Endocrine: Negative for polydipsia and polyphagia.   Genitourinary:  Negative for flank pain and hematuria.               Musculoskeletal:  Positive for arthralgias and gait problem. Negative for back pain, neck pain and neck stiffness.   Skin:  Negative for color change.   Allergic/Immunologic: Negative  for food allergies.   Neurological:  Positive for weakness. Negative for facial asymmetry and speech difficulty.   Hematological:  Does not bruise/bleed easily.   Psychiatric/Behavioral:  Negative for agitation, behavioral problems, hallucinations and suicidal ideas. The patient is not nervous/anxious.    Objective:     Vital Signs (Most Recent):  Temp: 97.9 °F (36.6 °C) (09/20/22 1145)  Pulse: 72 (09/20/22 1145)  Resp: 17 (09/20/22 1145)  BP: 124/80 (09/20/22 1145)  SpO2: 98 % (09/20/22 0729) Vital Signs (24h Range):  Temp:  [97 °F (36.1 °C)-98.8 °F (37.1 °C)] 97.9 °F (36.6 °C)  Pulse:  [72-78] 72  Resp:  [17-18] 17  SpO2:  [97 %-100 %] 98 %  BP: (124-144)/(71-90) 124/80     Weight: 87 kg (191 lb 12.8 oz)  Body mass index is 26.38 kg/m².    Intake/Output Summary (Last 24 hours) at 9/20/2022 1210  Last data filed at 9/20/2022 0745  Gross per 24 hour   Intake 288.68 ml   Output 500 ml   Net -211.32 ml      Physical Exam  Vitals and nursing note reviewed.   Constitutional:       General: He is not in acute distress.     Appearance: He is well-developed. He is not diaphoretic.   HENT:      Head: Normocephalic and atraumatic.      Mouth/Throat:      Mouth: Mucous membranes are moist.   Eyes:      General: No scleral icterus.        Right eye: No discharge.         Left eye: No discharge.      Extraocular Movements: Extraocular movements intact.      Conjunctiva/sclera: Conjunctivae normal.      Pupils: Pupils are equal, round, and reactive to light.   Cardiovascular:      Rate and Rhythm: Normal rate and regular rhythm.      Heart sounds: Normal heart sounds. No murmur heard.    No friction rub. No gallop.   Pulmonary:      Effort: Pulmonary effort is normal. No respiratory distress.      Breath sounds: No wheezing or rhonchi.      Comments: BBS diminished  Abdominal:      General: Bowel sounds are normal. There is no distension.      Palpations: Abdomen is soft.      Tenderness: There is no abdominal tenderness. There  is no guarding.   Genitourinary:     Comments: Zully urine draining in bag  Musculoskeletal:      Cervical back: Normal range of motion and neck supple. No rigidity or tenderness.      Right lower leg: No edema.      Left lower leg: No edema.      Comments: Stiff joints  General debility   Skin:     General: Skin is warm and dry.      Capillary Refill: Capillary refill takes 2 to 3 seconds.   Neurological:      Mental Status: He is alert.      Motor: Weakness present.      Gait: Gait abnormal.      Comments: Oriented person and place  Forgetful  Responds appropriately to simple commands   Psychiatric:         Cognition and Memory: Cognition is impaired. He exhibits impaired recent memory.      Comments: Calm and cooperative          Lab Results   Component Value Date    WBC 8.10 09/18/2022    HGB 9.4 (L) 09/18/2022    HCT 31.7 (L) 09/18/2022    MCV 80 (L) 09/18/2022     09/18/2022       BMP  Lab Results   Component Value Date     09/16/2022    K 3.5 09/16/2022     09/16/2022    CO2 26 09/16/2022    BUN 25 (H) 09/16/2022    CREATININE 1.1 09/16/2022    CALCIUM 8.5 (L) 09/16/2022    ANIONGAP 11 09/16/2022    ESTGFRAFRICA >60 07/26/2022    ESTGFRAFRICA >60 07/26/2022    EGFRNONAA 53 (A) 07/26/2022    EGFRNONAA 53 (A) 07/26/2022              Assessment/Plan:      Debility  9/16 Fall and skin precautions  Turn q 2 hours  Continue Physical therapy and Occupational therapy  9/20 Awaiting SNF placement        Malnutrition of moderate degree  9/16 Nutrition consulted. Most recent weight and BMI monitored  Add po supplement    Measurements:  Wt Readings from Last 1 Encounters:   09/19/22 87 kg (191 lb 12.8 oz)   Body mass index is 26.38 kg/m².    Recommendations:      Patient has been screened and assessed by RD. RD will follow patient.      Type 2 diabetes mellitus   9/16 Dm diet  Accuchecks with correctional SSI  Blood sugars running 129-135  Lab Results   Component Value Date    HGBA1C 6.6 (H) 07/20/2022      9/17 Blood sugars running 121-145  9/18 Blood sugars running 118-151  9/19 Blood sugars running 110-146  9/20 Blood sugars running 124-132    Hypokalemia  9/16 Add 40 meq po x 1 dose      Microcytic anemia  9/16 Add MVI      Hydronephrosis, bilateral  2/2 urinary retention   Serum Cr normalized     9/16 Patient to discharge with christopher and follow up with Urology as Outpatient      Urinary retention  9/16 Patient to discharge with christopher and follow up with Urology as Outpatient       Falls  Patient reported falling at home    --Avasys  --Fall Precautions    9/11/22: PT/OT  SNF placement - CM consulted     9/19 Awaiting SNF placement    Acute cystitis with hematuria  Dx with UTI on 8/27 in ED, unclear if completed antibiotic regimen    --Follow urine culture  --Continue IV Cipro    9/11/22:Blood cultures show NGTD. Urine culture showed no growth but urine output is very cloudy -almost purulent. Will switch IV Cipro to oral dose.    9/14/22:  Serum Cr 1.5. Renal U/s showed mild hydronephrosis on right and moderate on Left. Christopher palced-900 ml creamy white UOP immediately returned. Will d/c Cipro- add IV Rocephin. Urology consulted. Awaiting SNF placement     9/15/22:  Cont IV Rocephin. Repeat urine culture pending.   9/16 CBI clamped. Hematuria resolved at this time  9/17 Urine culture no growth  9/18  Very mild hematuria noted  9/19 Zully urine. No hematuria at this time.  Urine culture no growth  9/20 Zully urine. No hematuria at this time.  Urine culture no growth    Dementia without behavioral disturbance  Confused on admission\  Cont home emd Seroquel HS    --Avasys  --Fall precautions  --Neuro checks Q4H x24 hours    9/11/22: Pt AAOx3 at times but with notable cognitive deficits.    9/14/22: Increased agitation - Seroquel increased to BID.    9/15/22: Seroquel in am made pt too drowsy- will decrease back to just nightly.     9/17 Stable. Alert, appropriate    9/20 Sleepy but arousable. Calm and cooperative.  Discontinue Seroquel    Chronic systolic congestive heart failure  Patient is identified as having Systolic (HFrEF) heart failure that is Chronic. CHF is currently controlled. Latest ECHO performed and demonstrates- Results for orders placed during the hospital encounter of 07/19/22    Echo    Interpretation Summary  · Concentric hypertrophy and severely decreased systolic function.  · Mild left atrial enlargement.  · The estimated ejection fraction is 20%.  · Left ventricular diastolic dysfunction.  · There is left ventricular global hypokinesis.  · Moderate right ventricular enlargement with mildly to moderately reduced right ventricular systolic function.  · Moderate right atrial enlargement.  · There is mild aortic valve stenosis.  · Aortic valve area is 1.90 cm2; peak velocity is 1.05 m/s; mean gradient is 2 mmHg.  · There is severe pulmonary hypertension.  · Moderate tricuspid regurgitation.  · Elevated central venous pressure (15 mmHg).  · The estimated PA systolic pressure is 103 mmHg.  . Continue ACE/ARB, Furosemide and Aldactone and monitor clinical status closely. Monitor on telemetry. Patient is on CHF pathway.  Monitor strict Is&Os and daily weights.  Place on fluid restriction of 1.5 L. Continue to stress to patient importance of self efficacy and  on diet for CHF. Last BNP reviewed- and noted below   Recent Labs   Lab 09/13/22  1449   BNP >4,900*   .  EF 20%  Monitor I/o  Daily weights  Spironolactone and Entresto, Lasix, on hold 2/2 normotensive and MELISSA    9/17 stable  9/20 Resume Home lasix at reduced dose    Essential hypertension  Normotensive   Home medications Spironolactone and Entresto on hold   --Hydralazine PRN  --Vitals Q4H  9/17 Stable      COPD suggested by initial evaluation  Negative for symptoms of exacerbation    --Continue home regimen    9/17 Stable            VTE Risk Mitigation (From admission, onward)         Ordered     Place NIKITA hose  Until discontinued         09/17/22  0846     enoxaparin injection 40 mg  Daily         09/14/22 1426     Reason for No Pharmacological VTE Prophylaxis  Once        Question:  Reasons:  Answer:  Already adequately anticoagulated on oral Anticoagulants    09/10/22 1158     IP VTE HIGH RISK PATIENT  Once         09/10/22 1158     Place sequential compression device  Until discontinued         09/10/22 1158                Discharge Planning   TATIANA: 9/20/2022     Code Status: Full Code   Is the patient medically ready for discharge?: Yes    Reason for patient still in hospital (select all that apply): Pending disposition  Discharge Plan A: Skilled Nursing Facility   Discharge Delays: (!) Post-Acute Set-up    Time spent seeing patient( greater than 1/2 spent in direct contact) : 34 minutes    JESSENIA Bah  Department of Hospital Medicine   [facility] - Med Surg 3

## 2022-09-20 NOTE — ASSESSMENT & PLAN NOTE
Dx with UTI on 8/27 in ED, unclear if completed antibiotic regimen    --Follow urine culture  --Continue IV Cipro    9/11/22:Blood cultures show NGTD. Urine culture showed no growth but urine output is very cloudy -almost purulent. Will switch IV Cipro to oral dose.    9/14/22:  Serum Cr 1.5. Renal U/s showed mild hydronephrosis on right and moderate on Left. Sanchez palced-900 ml creamy white UOP immediately returned. Will d/c Cipro- add IV Rocephin. Urology consulted. Awaiting SNF placement     9/15/22:  Cont IV Rocephin. Repeat urine culture pending.   9/16 CBI clamped. Hematuria resolved at this time  9/17 Urine culture no growth  9/18  Very mild hematuria noted  9/19 Zully urine. No hematuria at this time.  Urine culture no growth  9/20 Zully urine. No hematuria at this time.  Urine culture no growth

## 2022-09-20 NOTE — ASSESSMENT & PLAN NOTE
9/16 Fall and skin precautions  Turn q 2 hours  Continue Physical therapy and Occupational therapy  9/20 Awaiting SNF placement

## 2022-09-20 NOTE — SUBJECTIVE & OBJECTIVE
Interval History:   Stable. SNF placement pending.     Review of Systems   Constitutional:  Positive for activity change and fatigue. Negative for chills, diaphoresis and fever.   HENT:  Positive for hearing loss. Negative for ear discharge, ear pain and facial swelling.    Eyes:  Negative for pain and redness.   Respiratory:  Negative for cough and shortness of breath.    Cardiovascular:  Negative for leg swelling.   Gastrointestinal:  Negative for abdominal pain, constipation, diarrhea, nausea and vomiting.   Endocrine: Negative for polydipsia and polyphagia.   Genitourinary:  Negative for flank pain and hematuria.               Musculoskeletal:  Positive for arthralgias and gait problem. Negative for back pain, neck pain and neck stiffness.   Skin:  Negative for color change.   Allergic/Immunologic: Negative for food allergies.   Neurological:  Positive for weakness. Negative for facial asymmetry and speech difficulty.   Hematological:  Does not bruise/bleed easily.   Psychiatric/Behavioral:  Negative for agitation, behavioral problems, hallucinations and suicidal ideas. The patient is not nervous/anxious.    Objective:     Vital Signs (Most Recent):  Temp: 97.9 °F (36.6 °C) (09/20/22 1145)  Pulse: 72 (09/20/22 1145)  Resp: 17 (09/20/22 1145)  BP: 124/80 (09/20/22 1145)  SpO2: 98 % (09/20/22 0729) Vital Signs (24h Range):  Temp:  [97 °F (36.1 °C)-98.8 °F (37.1 °C)] 97.9 °F (36.6 °C)  Pulse:  [72-78] 72  Resp:  [17-18] 17  SpO2:  [97 %-100 %] 98 %  BP: (124-144)/(71-90) 124/80     Weight: 87 kg (191 lb 12.8 oz)  Body mass index is 26.38 kg/m².    Intake/Output Summary (Last 24 hours) at 9/20/2022 1210  Last data filed at 9/20/2022 0745  Gross per 24 hour   Intake 288.68 ml   Output 500 ml   Net -211.32 ml      Physical Exam  Vitals and nursing note reviewed.   Constitutional:       General: He is not in acute distress.     Appearance: He is well-developed. He is not diaphoretic.   HENT:      Head: Normocephalic  and atraumatic.      Mouth/Throat:      Mouth: Mucous membranes are moist.   Eyes:      General: No scleral icterus.        Right eye: No discharge.         Left eye: No discharge.      Extraocular Movements: Extraocular movements intact.      Conjunctiva/sclera: Conjunctivae normal.      Pupils: Pupils are equal, round, and reactive to light.   Cardiovascular:      Rate and Rhythm: Normal rate and regular rhythm.      Heart sounds: Normal heart sounds. No murmur heard.    No friction rub. No gallop.   Pulmonary:      Effort: Pulmonary effort is normal. No respiratory distress.      Breath sounds: No wheezing or rhonchi.      Comments: BBS diminished  Abdominal:      General: Bowel sounds are normal. There is no distension.      Palpations: Abdomen is soft.      Tenderness: There is no abdominal tenderness. There is no guarding.   Genitourinary:     Comments: Zully urine draining in bag  Musculoskeletal:      Cervical back: Normal range of motion and neck supple. No rigidity or tenderness.      Right lower leg: No edema.      Left lower leg: No edema.      Comments: Stiff joints  General debility   Skin:     General: Skin is warm and dry.      Capillary Refill: Capillary refill takes 2 to 3 seconds.   Neurological:      Mental Status: He is alert.      Motor: Weakness present.      Gait: Gait abnormal.      Comments: Oriented person and place  Forgetful  Responds appropriately to simple commands   Psychiatric:         Cognition and Memory: Cognition is impaired. He exhibits impaired recent memory.      Comments: Calm and cooperative          Lab Results   Component Value Date    WBC 8.10 09/18/2022    HGB 9.4 (L) 09/18/2022    HCT 31.7 (L) 09/18/2022    MCV 80 (L) 09/18/2022     09/18/2022       BMP  Lab Results   Component Value Date     09/16/2022    K 3.5 09/16/2022     09/16/2022    CO2 26 09/16/2022    BUN 25 (H) 09/16/2022    CREATININE 1.1 09/16/2022    CALCIUM 8.5 (L) 09/16/2022     ANIONGAP 11 09/16/2022    ESTGFRAFRICA >60 07/26/2022    ESTGFRAFRICA >60 07/26/2022    EGFRNONAA 53 (A) 07/26/2022    EGFRNONAA 53 (A) 07/26/2022

## 2022-09-20 NOTE — PLAN OF CARE
TX COMPLETED: facilitated transfers with mod A x 2, ambulated 10 ft mod A with RW. Pt tolerated session well. Recommend SNF.

## 2022-09-20 NOTE — PT/OT/SLP PROGRESS
Occupational Therapy   Treatment    Name: Joseph Toussaint  MRN: [id number]  Admitting Diagnosis:  Acute renal failure       Recommendations:     Discharge Recommendations: nursing facility, skilled  Discharge Equipment Recommendations:  other (see comments) (TBD)  Barriers to discharge:  None    Assessment:     Joseph Toussaint is a 89 y.o. male with a medical diagnosis of Acute renal failure.  He presents with the following performance deficits affecting function are weakness, impaired endurance, impaired sensation, impaired self care skills, impaired functional mobility, gait instability, impaired balance, visual deficits, impaired cognition, decreased coordination, decreased upper extremity function, decreased lower extremity function, decreased safety awareness, abnormal tone, decreased ROM, impaired coordination, impaired cardiopulmonary response to activity.     Rehab Prognosis:  Fair; patient would benefit from acute skilled OT services to address these deficits and reach maximum level of function.       Plan:     Patient to be seen 2 x/week to address the above listed problems via self-care/home management, therapeutic exercises  Plan of Care Expires: 09/25/22  Plan of Care Reviewed with: patient    Subjective     Pain/Comfort:  Pain Rating 1: 0/10    Objective:     Communicated with: nurse Dempsey and [facility] chart review prior to session.  Patient found up in chair with christopher catheter, peripheral IV (AVASYS) upon OT entry to room.    General Precautions: Standard, fall   Orthopedic Precautions:N/A   Braces: N/A  Respiratory Status: Room air    Functional Mobility/Transfers:  Pt performed sit>stand from chair with Mod A x2 using RW x3 trials. Pt required rest breaks in between trials.    Functional Mobility: Patient completed x10ft functional mobility with Mod A and RW and chair in tow to increase dynamic standing balance and activity tolerance needed for ADL completion.     Activities of Daily Living:  Feeding:   stand by assistance drinking from cup without straw      [facility] 6 Click ADL: 14    Treatment & Education:  Pt educated on and performed x10 reps BUE AROM elbow flexion and digit flexion/ext using rolled washcloth while seated in chair. Pt also performed x5 reps self-ROM shoulder flexion, RUE assisting LUE. Reviewed role of OT in acute setting and benefits of participation. Educated on techniques to use to increase independence and decrease fall risk with functional transfers. Educated on importance of OOB activity and calling for A to transfer back to bed. Encouraged completion of B UE AROM/self-ROM therex throughout the day to tolerance to increase functional strength and activity tolerance. Patient stated understanding and in agreement with POC.     Patient left up in chair with all lines intact, call button in reach, and nurse present    GOALS:   Multidisciplinary Problems       Occupational Therapy Goals          Problem: Occupational Therapy    Goal Priority Disciplines Outcome Interventions   Occupational Therapy Goal     OT, PT/OT Ongoing, Progressing    Description: Goals to be met by: 9/25/22     Patient will increase functional independence with ADLs by performing:    Toileting from bedside commode with Contact Guard Assistance for hygiene and clothing management.   Toilet transfer to bedside commode with Contact Guard Assistance.  Increased functional strength in B UE grossly by 1/2 MM grade.                         Time Tracking:     OT Date of Treatment: 09/20/22  OT Start Time: 0315  OT Stop Time: 0340  OT Total Time (min): 25 min    Billable Minutes:Therapeutic Activity 15  Therapeutic Exercise 10    OT/TRICIA: OT      Marcella Hernandez, OT     9/20/2022

## 2022-09-20 NOTE — PT/OT/SLP PROGRESS
"Physical Therapy  Treatment    Joseph Toussaint   MRN: [id number]   Admitting Diagnosis: Acute renal failure       PT Start Time: 1520     PT Stop Time: 1545    PT Total Time (min): 25 min       Billable Minutes:  Gait Training 15 and Therapeutic Activity 10    Treatment Type: Treatment  PT/PTA: PT     PTA Visit Number: 0       General Precautions: Standard, fall  Orthopedic Precautions: N/A   Braces: N/A  Respiratory Status: Room air    Spiritual, Cultural Beliefs, Congregation Practices, Values that Affect Care: no    Subjective:  Communicated with nurse Sterling prior to session.  Pt presents sitting in chair, agreeable to PT services. "I'll see what I can do"    Pain/Comfort  Pain Rating 1: 0/10    Objective:   Patient found with: peripheral IV, telemetry    Functional Mobility    Facilitated transfers x 3 reps with mod A x 2 with increased cuing for sequencing, proper set-up and hand placement to safely achieve standing. Cues for upright posture with neutral body alignment. Unable to maintain standing with UE assist for more than 10-15 secs. Increase cuing for safe descent into seat. Facilitated gait training with RW and chair follow x 10ft mod A with RW, demonstrates slow pace, flexed posture, forward head and shuffled steps. Educated pt on call don't fall policy when returned to bedside chair. Nsg notified.    Therapeutic Activities and Exercises:    Skilled interventions also included seated BLE exercises to promote strength and joint mobility. Exercises included AP, LAQ x 15 reps.      AM-PAC 6 CLICK MOBILITY  How much help from another person does this patient currently need?   1 = Unable, Total/Dependent Assistance  2 = A lot, Maximum/Moderate Assistance  3 = A little, Minimum/Contact Guard/Supervision  4 = None, Modified [place]/Independent    Turning over in bed (including adjusting bedclothes, sheets and blankets)?: 2  Sitting down on and standing up from a chair with arms (e.g., wheelchair, bedside " commode, etc.): 2  Moving from lying on back to sitting on the side of the bed?: 2  Moving to and from a bed to a chair (including a wheelchair)?: 2  Need to walk in hospital room?: 2  Climbing 3-5 steps with a railing?: 1  Basic Mobility Total Score: 11    AM-PAC Raw Score CMS G-Code Modifier Level of Impairment Assistance   6 % Total / Unable   7 - 9 CM 80 - 100% Maximal Assist   10 - 14 CL 60 - 80% Moderate Assist   15 - 19 CK 40 - 60% Moderate Assist   20 - 22 CJ 20 - 40% Minimal Assist   23 CI 1-20% SBA / CGA   24 CH 0% Independent/ Mod I     Patient left up in chair with all lines intact, call button in reach, and nursing  notified and headed to room to address IV.    Assessment:  Joseph Toussaint is a 89 y.o. male with a medical diagnosis of Acute renal failure and presents with deficits in strength, balance, coordination and overall activity tolerance. Pt will benefit from continued PT services to address deficits and promote safe functional mobility. .    Rehab identified problem list/impairments: Rehab identified problem list/impairments: weakness, impaired endurance, impaired functional mobility, gait instability, impaired cognition, decreased safety awareness    Rehab potential is good.    Activity tolerance: Good    Discharge recommendations: Discharge Facility/Level of Care Needs: nursing facility, skilled     Barriers to discharge:      Equipment recommendations:       GOALS:   Multidisciplinary Problems       Physical Therapy Goals          Problem: Physical Therapy    Goal Priority Disciplines Outcome Goal Variances Interventions   Physical Therapy Goal     PT, PT/OT Ongoing, Progressing     Description: Patient will be seen a minimal of 3 out of 7 days a week.    LTG to be met by 09/24:    Bed mobility: SPV  Transfers: CGA with RW   Gait: CGA with RW                       PLAN:    Patient to be seen 3 x/week  to address the above listed problems via gait training, therapeutic activities,  therapeutic exercises, neuromuscular re-education  Plan of Care expires: 09/24/22  Plan of Care reviewed with: patient         09/20/2022

## 2022-09-21 PROBLEM — N30.01 ACUTE CYSTITIS WITH HEMATURIA: Status: RESOLVED | Noted: 2022-09-10 | Resolved: 2022-09-21

## 2022-09-21 PROBLEM — F03.918 DEMENTIA WITH BEHAVIORAL DISTURBANCE: Status: ACTIVE | Noted: 2022-08-01

## 2022-09-21 LAB
ALBUMIN SERPL BCP-MCNC: 2.5 G/DL (ref 3.5–5.2)
ALP SERPL-CCNC: 153 U/L (ref 55–135)
ALT SERPL W/O P-5'-P-CCNC: 18 U/L (ref 10–44)
ANION GAP SERPL CALC-SCNC: 12 MMOL/L (ref 8–16)
AST SERPL-CCNC: 21 U/L (ref 10–40)
BASOPHILS # BLD AUTO: 0.02 K/UL (ref 0–0.2)
BASOPHILS NFR BLD: 0.3 % (ref 0–1.9)
BILIRUB SERPL-MCNC: 0.9 MG/DL (ref 0.1–1)
BUN SERPL-MCNC: 29 MG/DL (ref 8–23)
CALCIUM SERPL-MCNC: 9.3 MG/DL (ref 8.7–10.5)
CHLORIDE SERPL-SCNC: 107 MMOL/L (ref 95–110)
CO2 SERPL-SCNC: 21 MMOL/L (ref 23–29)
CREAT SERPL-MCNC: 1 MG/DL (ref 0.5–1.4)
DIFFERENTIAL METHOD: ABNORMAL
EOSINOPHIL # BLD AUTO: 0.1 K/UL (ref 0–0.5)
EOSINOPHIL NFR BLD: 1.3 % (ref 0–8)
ERYTHROCYTE [DISTWIDTH] IN BLOOD BY AUTOMATED COUNT: 19 % (ref 11.5–14.5)
EST. GFR  (NO RACE VARIABLE): >60 ML/MIN/1.73 M^2
GLUCOSE SERPL-MCNC: 123 MG/DL (ref 70–110)
HCT VFR BLD AUTO: 30 % (ref 40–54)
HGB BLD-MCNC: 9.5 G/DL (ref 14–18)
IMM GRANULOCYTES # BLD AUTO: 0.03 K/UL (ref 0–0.04)
IMM GRANULOCYTES NFR BLD AUTO: 0.5 % (ref 0–0.5)
LYMPHOCYTES # BLD AUTO: 1 K/UL (ref 1–4.8)
LYMPHOCYTES NFR BLD: 15.3 % (ref 18–48)
MAGNESIUM SERPL-MCNC: 2.2 MG/DL (ref 1.6–2.6)
MCH RBC QN AUTO: 24.3 PG (ref 27–31)
MCHC RBC AUTO-ENTMCNC: 31.7 G/DL (ref 32–36)
MCV RBC AUTO: 77 FL (ref 82–98)
MONOCYTES # BLD AUTO: 0.6 K/UL (ref 0.3–1)
MONOCYTES NFR BLD: 9.4 % (ref 4–15)
NEUTROPHILS # BLD AUTO: 4.6 K/UL (ref 1.8–7.7)
NEUTROPHILS NFR BLD: 73.2 % (ref 38–73)
NRBC BLD-RTO: 0 /100 WBC
PLATELET # BLD AUTO: 181 K/UL (ref 150–450)
PMV BLD AUTO: 11 FL (ref 9.2–12.9)
POCT GLUCOSE: 157 MG/DL (ref 70–110)
POCT GLUCOSE: 159 MG/DL (ref 70–110)
POTASSIUM SERPL-SCNC: 4 MMOL/L (ref 3.5–5.1)
PROT SERPL-MCNC: 6.1 G/DL (ref 6–8.4)
RBC # BLD AUTO: 3.91 M/UL (ref 4.6–6.2)
SODIUM SERPL-SCNC: 140 MMOL/L (ref 136–145)
WBC # BLD AUTO: 6.28 K/UL (ref 3.9–12.7)

## 2022-09-21 PROCEDURE — 25000003 PHARM REV CODE 250: Performed by: NURSE PRACTITIONER

## 2022-09-21 PROCEDURE — 25000242 PHARM REV CODE 250 ALT 637 W/ HCPCS: Performed by: NURSE PRACTITIONER

## 2022-09-21 PROCEDURE — 25000242 PHARM REV CODE 250 ALT 637 W/ HCPCS: Performed by: INTERNAL MEDICINE

## 2022-09-21 PROCEDURE — 11000001 HC ACUTE MED/SURG PRIVATE ROOM

## 2022-09-21 PROCEDURE — 83735 ASSAY OF MAGNESIUM: CPT | Performed by: NURSE PRACTITIONER

## 2022-09-21 PROCEDURE — 94640 AIRWAY INHALATION TREATMENT: CPT

## 2022-09-21 PROCEDURE — 36415 COLL VENOUS BLD VENIPUNCTURE: CPT | Performed by: NURSE PRACTITIONER

## 2022-09-21 PROCEDURE — G0427 PR INPT TELEHEALTH CON 70/>M: ICD-10-PCS | Mod: 95,,, | Performed by: PSYCHIATRY & NEUROLOGY

## 2022-09-21 PROCEDURE — 99900035 HC TECH TIME PER 15 MIN (STAT)

## 2022-09-21 PROCEDURE — G0427 INPT/ED TELECONSULT70: HCPCS | Mod: 95,,, | Performed by: PSYCHIATRY & NEUROLOGY

## 2022-09-21 PROCEDURE — 94761 N-INVAS EAR/PLS OXIMETRY MLT: CPT

## 2022-09-21 PROCEDURE — 97116 GAIT TRAINING THERAPY: CPT

## 2022-09-21 PROCEDURE — 63600175 PHARM REV CODE 636 W HCPCS: Performed by: FAMILY MEDICINE

## 2022-09-21 PROCEDURE — 85025 COMPLETE CBC W/AUTO DIFF WBC: CPT | Performed by: NURSE PRACTITIONER

## 2022-09-21 PROCEDURE — 63600175 PHARM REV CODE 636 W HCPCS: Performed by: NURSE PRACTITIONER

## 2022-09-21 PROCEDURE — 97110 THERAPEUTIC EXERCISES: CPT

## 2022-09-21 PROCEDURE — 80053 COMPREHEN METABOLIC PANEL: CPT | Performed by: NURSE PRACTITIONER

## 2022-09-21 RX ORDER — SODIUM CHLORIDE 9 MG/ML
INJECTION, SOLUTION INTRAVENOUS ONCE
Status: DISCONTINUED | OUTPATIENT
Start: 2022-09-21 | End: 2022-09-21

## 2022-09-21 RX ORDER — ENOXAPARIN SODIUM 100 MG/ML
40 INJECTION SUBCUTANEOUS DAILY
Start: 2022-09-21

## 2022-09-21 RX ORDER — FUROSEMIDE 40 MG/1
20 TABLET ORAL DAILY
Start: 2022-09-21

## 2022-09-21 RX ORDER — BISACODYL 10 MG
10 SUPPOSITORY, RECTAL RECTAL DAILY PRN
Refills: 0
Start: 2022-09-21

## 2022-09-21 RX ORDER — QUETIAPINE FUMARATE 25 MG/1
25 TABLET, FILM COATED ORAL NIGHTLY
Status: DISCONTINUED | OUTPATIENT
Start: 2022-09-21 | End: 2022-09-29 | Stop reason: HOSPADM

## 2022-09-21 RX ORDER — MAG HYDROX/ALUMINUM HYD/SIMETH 200-200-20
30 SUSPENSION, ORAL (FINAL DOSE FORM) ORAL EVERY 6 HOURS PRN
Start: 2022-09-21

## 2022-09-21 RX ORDER — ACETAMINOPHEN 325 MG/1
650 TABLET ORAL EVERY 4 HOURS PRN
Refills: 0
Start: 2022-09-21

## 2022-09-21 RX ADMIN — ENOXAPARIN SODIUM 40 MG: 100 INJECTION SUBCUTANEOUS at 05:09

## 2022-09-21 RX ADMIN — THERA TABS 1 TABLET: TAB at 09:09

## 2022-09-21 RX ADMIN — BUDESONIDE 0.5 MG: 0.5 INHALANT ORAL at 07:09

## 2022-09-21 RX ADMIN — FUROSEMIDE 20 MG: 20 TABLET ORAL at 09:09

## 2022-09-21 RX ADMIN — TIMOLOL MALEATE 1 DROP: 5 SOLUTION/ DROPS OPHTHALMIC at 08:09

## 2022-09-21 RX ADMIN — IPRATROPIUM BROMIDE 0.5 MG: 0.5 SOLUTION RESPIRATORY (INHALATION) at 04:09

## 2022-09-21 RX ADMIN — CEFTRIAXONE 1 G: 1 INJECTION, SOLUTION INTRAVENOUS at 10:09

## 2022-09-21 RX ADMIN — IPRATROPIUM BROMIDE 0.5 MG: 0.5 SOLUTION RESPIRATORY (INHALATION) at 07:09

## 2022-09-21 RX ADMIN — TAMSULOSIN HYDROCHLORIDE 0.4 MG: 0.4 CAPSULE ORAL at 08:09

## 2022-09-21 RX ADMIN — ASPIRIN 81 MG: 81 TABLET, COATED ORAL at 09:09

## 2022-09-21 RX ADMIN — MUPIROCIN: 20 OINTMENT TOPICAL at 09:09

## 2022-09-21 RX ADMIN — QUETIAPINE FUMARATE 25 MG: 25 TABLET ORAL at 08:09

## 2022-09-21 RX ADMIN — TIMOLOL MALEATE 1 DROP: 5 SOLUTION/ DROPS OPHTHALMIC at 09:09

## 2022-09-21 NOTE — DISCHARGE SUMMARY
[facility] - Med Surg 3  Hospital Medicine  Discharge Summary    Patient Name: Joseph Toussaint  MRN: [id number]  Patient Class: IP- Inpatient  Admission Date: 9/10/2022  Hospital Length of Stay: 6 days  Discharge Date and Time:  09/21/2022 12:09 PM  Attending Physician: Matt Rossi MD   Discharging Provider: JESSENIA Bah  Primary Care Provider: Abad Iqbal MD    HPI:   89 y.o. male patient with a PMHx of HTN, COPD, CHF, and DM who presents to the Emergency Department for evaluation of a fall which occurred x 2 days pta. Pt family called for transport to get pt checked out. Pt denies any complaints and is not in pain. No associated sxs reported. Patient denies any CP, SOB, fever, back pain, abd pain, and all other sxs at this time. No prior Tx reported. In the ED, UA consistent with UTI, dx with UTI on 8/27, unclear if patient completed antibiotic regimen. BUN/Cr: 39/2.1, Bili: 2.0, H/H: 10.4/32.3. Afebrile, vitals stable. Oriented to self only on exam. Intitiated on antibiotics in the ED, urine culture pending, treated with IV fluid bolus. Patient is a full code, surrogate decision maker is his son Kevin Toussaint. Placed in observation under the care of hospital medicine.       * No surgery found *      Hospital Course:   The patient is a 89 yo male with HTN, COPD, CHF, and DM who was placed in observation for frequent falls, MELISSA, and presumed UTI on IVF and IV  Rocephin.     9/11/22: Pt AAOx3 at times but with notable cognitive deficits. Denies complaints. MELISSA improving 2.1>1.9. Hyperchloremic metabolic acidosis noted- will switch NS IVFs to Bicarb drip. Blood cultures show NGTD. Urine culture showed no growth but urine output is very cloudy -almost purulent. Will switch IV Cipro to oral dose. PT/OT evaluated pt and recommended SNF. Called pt's nephew Kraig (POA) and provided status update.  Pt lives with his nephew and his wife. Currently, Kraig is in the hospital recovering from surgery. He  requested discharge to SNF.    9/12/22: Denies complaints. No acute events. MELISSA slowly improving 2.1>1.9>1.7. cont IVF. SNF placement pending     9/13/22: No acute events. Serum Cr remains 1.7. Will check BNP and Renal U/S. Hold IVFs  Awaiting SNF placement     9/14/22: Increased agitation - Seroquel increased to BID. Serum Cr 1.5. Renal U/s showed mild hydronephrosis on right and moderate on Left with distended bladder. Christopher placed-900 ml creamy white UOP immediately returned. Will d/c Cipro- add IV Rocephin. Urology consulted. Awaiting SNF placement     9/15/22: Seroquel in am made pt too drowsy- will decrease back to just nightly. Gross hematuria to UOP. Urology recommended continuous bladder irrigation. Serum Cr normalized after placing christopher. Hydronephrosis 2/2 urinary retention. Cont IV Rocephin. Repeat urine culture pending. Pt will be placed in SNF when hematuria resolves and repeat urine culture results.     9/16 Gross hematuria resolved. CBI clamped. Continue current treatment.   9/17 Patient remains stable and improved. No hematuria at this time.   9/18 Stable. SNF placement pending.   9/19 Stable. SNF placement pending.   9/20 Stable. SNF placement pending.   9/21 Stable and improved. Discharged to SNF.        Goals of Care Treatment Preferences:  Code Status: Full Code      Consults:   Consults (From admission, onward)        Status Ordering Provider     Inpatient consult to Social Work             Completed ALIZA BEE     Inpatient consult to Urology  Once        Provider:  Yifan Sarmiento MD    Completed MARIMAR LESLIE          Final Active Diagnoses:    Diagnosis Date Noted POA    Microcytic anemia [D50.9] 09/16/2022 Yes    Hypokalemia [E87.6] 09/16/2022 Yes    Type 2 diabetes mellitus [E11.9] 09/16/2022 Yes    Malnutrition of moderate degree [E44.0] 09/16/2022 Yes    Debility [R53.81] 09/16/2022 Yes    Urinary retention [R33.9] 09/15/2022 Yes    Hydronephrosis, bilateral  [N13.30] 09/15/2022 Yes    Falls [W19.XXXA] 09/10/2022 Yes    Dementia without behavioral disturbance [F03.90] 08/01/2022 Yes    Chronic systolic congestive heart failure [I50.22] 07/20/2022 Yes    COPD suggested by initial evaluation [J44.9] 06/13/2022 Yes    Essential hypertension [I10] 10/26/2018 Yes      Problems Resolved During this Admission:    Diagnosis Date Noted Date Resolved POA    PRINCIPAL PROBLEM:  Acute renal failure [N17.9] 09/10/2022 09/16/2022 Yes    Gross hematuria [R31.0] 09/15/2022 09/17/2022 Yes    Hyperchloremic metabolic acidosis [E87.2] 09/11/2022 09/12/2022 Yes    Acute cystitis with hematuria [N30.01] 09/10/2022 09/21/2022 Yes       Discharged Condition: stable    Disposition: [email or web address]    Follow Up:   Follow-up Information     Yifan Sarmiento MD Follow up in 1 week(s).    Specialty: Urology  Why: Discharge with the christopher in place, and will follow-up with us in 1 week for a voiding trial.  Contact information:  [address] [place] [address] DR Melissa HARTLEY [id number] [phone]             Abad Iqbal MD Follow up in 1 week(s).    Specialty: Family Medicine  Contact information:  [address] [place] [address] Michaelle H[place]LEY [address]  [phone]                       Patient Instructions:       Admit to: [facility]-     Diagnoses:   Active Hospital Problems    Diagnosis  POA    Microcytic anemia [D50.9]  Yes    Hypokalemia [E87.6]  Yes    Type 2 diabetes mellitus [E11.9]  Yes    Malnutrition of moderate degree [E44.0]  Yes    Debility [R53.81]  Yes    Urinary retention [R33.9]  Yes    Hydronephrosis, bilateral [N13.30]  Yes    Acute cystitis with hematuria [N30.01]  Yes    Falls [W19.XXXA]  Yes    Dementia without behavioral disturbance [F03.90]  Yes    Chronic systolic congestive heart failure [I50.22]  Yes    COPD suggested by initial evaluation [J44.9]  Yes    Essential hypertension [I10]  Yes      Resolved Hospital Problems     Diagnosis Date Resolved POA    *Acute renal failure [N17.9] 09/16/2022 Yes    Gross hematuria [R31.0] 09/17/2022 Yes    Hyperchloremic metabolic acidosis [E87.2] 09/12/2022 Yes     Allergies:   Review of patient's allergies indicates:   Allergen Reactions    Penicillins        Code Status: Full Code    Vitals: Routine       Diet: diabetic diet: 2000 calorie    Activity: Activity as tolerated  Fall skin and aspiration precautions    Nursing Precautions: Aspiration , Fall, and Pressure ulcer prevention    Bed/Surface: Low Air Loss    Consults: PT to evaluate and treat- 5 times a week and OT to evaluate and treat- 5 times a week    Oxygen: room air    Dialysis: Patient is not on dialysis.      Labs: [place] CBC, CMP, magnesium level    Imaging: None    Miscellaneous Care:   Routine Skin for Bedridden Patients:  Apply moisture barrier cream to all  Diabetes Care: Diabetes: Check blood sugar. Fingerstick blood sugar AC and HS  Sliding Scale/Hypoglycemia Protocol: For FSG<80, give 1 amp D50 or 1 glucose tablet. For FSG , do nothing. For -200, give 1 unit of novolog in addition to pre-meal insulin. For -250, give 2 units of novolog in addition to pre-meal insulin. For -300, give 3 units of novolog in addition to pre-meal insulin. For 301-350, give 4 units of novolog in addition to pre-meal insulin. For 351-400, give 5 units of novolog in addition to pre-meal insulin. For FSG >400, give 5 units of novolog in addition to pre-meal insulin and please call MD  CHF Care: Daily Weight with notification of MD/NP of 2lb or > increase in 24 hours    v/s and O2 sat every shift    Oxygen as needed for sats <90%    Report abnormal breath sounds to MD/NP    Edema checks q shift- notify MD/NP of increased edema    Task segmentation by nursing for daily care to decrease exertion    CHF education to include diet ,medication, and CHF flags for MD notification    IV Access: None     Current Discharge Medication  List        START taking these medications    Details   acetaminophen (TYLENOL) 325 MG tablet Take 2 tablets (650 mg total) by mouth every 4 (four) hours as needed for Pain.  Refills: 0      aluminum-magnesium hydroxide-simethicone (MAALOX) 200-200-20 mg/5 mL Susp Take 30 mLs by mouth every 6 (six) hours as needed (indigestion).      bisacodyL (DULCOLAX) 10 mg Supp Place 1 suppository (10 mg total) rectally daily as needed (Until bowel movement if patient has no bowel movement for 2 days).  Refills: 0      enoxaparin (LOVENOX) 40 mg/0.4 mL Syrg Inject 0.4 mLs (40 mg total) into the skin once daily. As DVT prophylaxis      multivitamin Tab Take 1 tablet by mouth once daily.           CONTINUE these medications which have CHANGED    Details   furosemide (LASIX) 40 MG tablet Take 0.5 tablets (20 mg total) by mouth once daily.           CONTINUE these medications which have NOT CHANGED    Details   albuterol (PROVENTIL/VENTOLIN HFA) 90 mcg/actuation inhaler Inhale 1-2 puffs into the lungs every 6 (six) hours as needed for Wheezing. Rescue  Qty: 6.7 g, Refills: 0      aspirin (ASPIR-81) 81 MG EC tablet Take 1 tablet (81 mg total) by mouth once daily.  Qty: 90 tablet, Refills: 3      fluticasone furoate-vilanteroL (BREO ELLIPTA) 100-25 mcg/dose diskus inhaler Inhale 1 puff into the lungs once daily. Controller  Qty: 60 each, Refills: 0      pantoprazole (PROTONIX) 40 MG tablet Take 1 tablet (40 mg total) by mouth once daily.  Qty: 30 tablet, Refills: 1      pravastatin (PRAVACHOL) 80 MG tablet Take 1 tablet (80 mg total) by mouth nightly.  Qty: 30 tablet, Refills: 1      sacubitriL-valsartan (ENTRESTO) 49-51 mg per tablet Take 1 tablet by mouth 2 (two) times daily.  Qty: 60 tablet, Refills: 11      tamsulosin (FLOMAX) 0.4 mg Cap Take 1 capsule (0.4 mg total) by mouth every evening.  Qty: 30 capsule, Refills: 1      timolol maleate 0.5% (TIMOPTIC) 0.5 % Drop Apply 1 drop to eye 2 (two) times daily.      tiotropium  (SPIRIVA) 18 mcg inhalation capsule Inhale 1 capsule (18 mcg total) into the lungs nightly. Controller  Qty: 30 capsule, Refills: 1           STOP taking these medications       empagliflozin (JARDIANCE) 10 mg tablet Comments:   Reason for Stopping:         metoprolol succinate (TOPROL-XL) 25 MG 24 hr tablet Comments:   Reason for Stopping:         QUEtiapine (SEROQUEL) 25 MG Tab Comments:   Reason for Stopping:         spironolactone (ALDACTONE) 25 MG tablet Comments:   Reason for Stopping:                Follow-up Information       Yifan Sarmiento MD Follow up in 1 week(s).    Specialty: Urology  Why: Discharge with the christopher in place, and will follow-up with us in 1 week for a voiding trial.  Contact information:  [address] [place] [address] DR Melissa HARTLEY [id number] [phone]               Abad Iqbal MD Follow up in 1 week(s).    Specialty: Family Medicine  Contact information:  [address] [place] [address] [place] [address]  [phone]                             Significant Diagnostic Studies:     CMP   Recent Labs   Lab 09/21/22  0503      K 4.0      CO2 21*   *   BUN 29*   CREATININE 1.0   CALCIUM 9.3   PROT 6.1   ALBUMIN 2.5*   BILITOT 0.9   ALKPHOS 153*   AST 21   ALT 18   ANIONGAP 12   , CBC   Recent Labs   Lab 09/21/22  0503   WBC 6.28   HGB 9.5*   HCT 30.0*          Reading Physician Reading Date Result Priority   Vinnie Elliott MD  [phone] 9/13/2022 Routine     Narrative & Impression  EXAMINATION:  US RETROPERITONEAL COMPLETE     CLINICAL HISTORY:  ARF;     TECHNIQUE:  Ultrasound of the kidneys and urinary bladder was performed including color flow and Doppler evaluation of the kidneys.     COMPARISON:  None.     FINDINGS:  Right kidney: The right kidney measures up to 11.3 cm.  Moderate right-sided hydronephrosis.  Resistive index measures 0.66     Left kidney: The left kidney measures up to 12.1 cm with mild hydronephrosis of the left kidney.  Left renal cyst  measures 4.2 cm.  Resistive index measures 0.76.     Debris noted in the bladder.  Splenic resistive index measures 0.6.     Impression:     As above        Electronically signed by: Earl Birmingham  Date:                                            09/13/2022  Time:                                           16:53              Pending Diagnostic Studies:     None         Medications:  Reconciled Home Medications:      Medication List      START taking these medications    acetaminophen 325 MG tablet  Commonly known as: TYLENOL  Take 2 tablets (650 mg total) by mouth every 4 (four) hours as needed for Pain.     aluminum-magnesium hydroxide-simethicone 200-200-20 mg/5 mL Susp  Commonly known as: MAALOX  Take 30 mLs by mouth every 6 (six) hours as needed (indigestion).     bisacodyL 10 mg Supp  Commonly known as: DULCOLAX  Place 1 suppository (10 mg total) rectally daily as needed (Until bowel movement if patient has no bowel movement for 2 days).     enoxaparin 40 mg/0.4 mL Syrg  Commonly known as: LOVENOX  Inject 0.4 mLs (40 mg total) into the skin once daily. As DVT prophylaxis     multivitamin Tab  Take 1 tablet by mouth once daily.        CHANGE how you take these medications    furosemide 40 MG tablet  Commonly known as: LASIX  Take 0.5 tablets (20 mg total) by mouth once daily.  What changed: how much to take        CONTINUE taking these medications    albuterol 90 mcg/actuation inhaler  Commonly known as: PROVENTIL/VENTOLIN HFA  Inhale 1-2 puffs into the lungs every 6 (six) hours as needed for Wheezing. Rescue     aspirin 81 MG EC tablet  Commonly known as: ASPIR-81  Take 1 tablet (81 mg total) by mouth once daily.     BREO ELLIPTA 100-25 mcg/dose diskus inhaler  Generic drug: fluticasone furoate-vilanteroL  Inhale 1 puff into the lungs once daily. Controller     ENTRESTO 49-51 mg per tablet  Generic drug: sacubitriL-valsartan  Take 1 tablet by mouth 2 (two) times daily.     pantoprazole 40 MG tablet  Commonly  known as: PROTONIX  Take 1 tablet (40 mg total) by mouth once daily.     pravastatin 80 MG tablet  Commonly known as: PRAVACHOL  Take 1 tablet (80 mg total) by mouth nightly.     SPIRIVA WITH HANDIHALER 18 mcg inhalation capsule  Generic drug: tiotropium  Inhale 1 capsule (18 mcg total) into the lungs nightly. Controller     tamsulosin 0.4 mg Cap  Commonly known as: FLOMAX  Take 1 capsule (0.4 mg total) by mouth every evening.     timolol maleate 0.5% 0.5 % Drop  Commonly known as: TIMOPTIC  Apply 1 drop to eye 2 (two) times daily.        STOP taking these medications    JARDIANCE 10 mg tablet  Generic drug: empagliflozin     metoprolol succinate 25 MG 24 hr tablet  Commonly known as: TOPROL-XL     QUEtiapine 25 MG Tab  Commonly known as: SEROQUEL     spironolactone 25 MG tablet  Commonly known as: ALDACTONE            Indwelling Lines/Drains at time of discharge:   Lines/Drains/Airways     Drain  Duration                Urethral Catheter 09/15/22 1045 Triple-lumen 22 Fr. 6 days                Time spent on the discharge of patient: 72  minutes         JESSENIA Bah  Department of Hospital Medicine  [facility] - Med Surg 3

## 2022-09-21 NOTE — ASSESSMENT & PLAN NOTE
Confused on admission\  Cont home emd Seroquel HS    --Avasys  --Fall precautions  --Neuro checks Q4H x24 hours    9/11/22: Pt AAOx3 at times but with notable cognitive deficits.    9/14/22: Increased agitation - Seroquel increased to BID.    9/15/22: Seroquel in am made pt too drowsy- will decrease back to just nightly.     9/17 Stable. Alert, appropriate    9/20 Sleepy but arousable. Calm and cooperative. Discontinue Seroquel  9/21 Stable. Pleasant. Watching tv.

## 2022-09-21 NOTE — CARE UPDATE
Patient scheduled to be discharged to SNF and initially agreeable to go but per report when  came to pick him up to go to SNF, patient changed his mind and did not want to go to SNF and became combative with  as he was being placed in the van to leave. Discharge held.     Patient discussed with Nurse who reported patient has been confused but easily redirectable but no combative behavior prior to this.     Nephew to bedside trying to convince patient to go to SNF. Patient lives alone and has no one available to help him with ADLs, etc. Unable to live with Nephew as Nephew works outside the home and unable to care for him.     Telepsych consulted.

## 2022-09-21 NOTE — SUBJECTIVE & OBJECTIVE
Interval History:  Stable.  Awaiting SNF placement.     Review of Systems   Constitutional:  Positive for activity change and fatigue. Negative for chills, diaphoresis and fever.   HENT:  Positive for hearing loss. Negative for ear discharge, ear pain and facial swelling.    Eyes:  Negative for pain and redness.   Respiratory:  Negative for cough and shortness of breath.    Cardiovascular:  Negative for leg swelling.   Gastrointestinal:  Negative for abdominal pain, constipation, diarrhea, nausea and vomiting.   Endocrine: Negative for polydipsia and polyphagia.   Genitourinary:  Negative for flank pain and hematuria.               Musculoskeletal:  Positive for arthralgias and gait problem. Negative for back pain, neck pain and neck stiffness.   Skin:  Negative for color change.   Allergic/Immunologic: Negative for food allergies.   Neurological:  Positive for weakness. Negative for facial asymmetry and speech difficulty.   Hematological:  Does not bruise/bleed easily.   Psychiatric/Behavioral:  Negative for agitation, behavioral problems, hallucinations and suicidal ideas. The patient is not nervous/anxious.    Objective:     Vital Signs (Most Recent):  Temp: 97.9 °F (36.6 °C) (09/21/22 1127)  Pulse: 78 (09/21/22 1127)  Resp: 16 (09/21/22 1127)  BP: 134/71 (09/21/22 1127)  SpO2: 100 % (09/21/22 1127) Vital Signs (24h Range):  Temp:  [97.4 °F (36.3 °C)-98.4 °F (36.9 °C)] 97.9 °F (36.6 °C)  Pulse:  [70-78] 78  Resp:  [16-20] 16  SpO2:  [98 %-100 %] 100 %  BP: (118-139)/(70-89) 134/71     Weight: 87 kg (191 lb 12.8 oz)  Body mass index is 26.38 kg/m².    Intake/Output Summary (Last 24 hours) at 9/21/2022 1244  Last data filed at 9/21/2022 0800  Gross per 24 hour   Intake --   Output 1475 ml   Net -1475 ml      Physical Exam  Vitals and nursing note reviewed.   Constitutional:       General: He is not in acute distress.     Appearance: He is well-developed. He is not diaphoretic.   HENT:      Head: Normocephalic and  atraumatic.      Mouth/Throat:      Mouth: Mucous membranes are moist.   Eyes:      General: No scleral icterus.        Right eye: No discharge.         Left eye: No discharge.      Extraocular Movements: Extraocular movements intact.      Conjunctiva/sclera: Conjunctivae normal.      Pupils: Pupils are equal, round, and reactive to light.   Cardiovascular:      Rate and Rhythm: Normal rate and regular rhythm.      Heart sounds: Normal heart sounds. No murmur heard.    No friction rub. No gallop.   Pulmonary:      Effort: Pulmonary effort is normal. No respiratory distress.      Breath sounds: No wheezing or rhonchi.      Comments: BBS diminished  Abdominal:      General: Bowel sounds are normal. There is no distension.      Palpations: Abdomen is soft.      Tenderness: There is no abdominal tenderness. There is no guarding.   Genitourinary:     Comments: Zully urine draining in bag  Musculoskeletal:      Cervical back: Normal range of motion and neck supple. No rigidity or tenderness.      Right lower leg: No edema.      Left lower leg: No edema.      Comments: Stiff joints  General debility   Skin:     General: Skin is warm and dry.      Capillary Refill: Capillary refill takes 2 to 3 seconds.   Neurological:      Mental Status: He is alert.      Motor: Weakness present.      Gait: Gait abnormal.      Comments: Oriented person and place  Forgetful  Responds appropriately to simple commands   Psychiatric:         Cognition and Memory: Cognition is impaired. He exhibits impaired recent memory.      Comments: Calm and cooperative          Lab Results   Component Value Date    WBC 6.28 09/21/2022    HGB 9.5 (L) 09/21/2022    HCT 30.0 (L) 09/21/2022    MCV 77 (L) 09/21/2022     09/21/2022       BMP  Lab Results   Component Value Date     09/21/2022    K 4.0 09/21/2022     09/21/2022    CO2 21 (L) 09/21/2022    BUN 29 (H) 09/21/2022    CREATININE 1.0 09/21/2022    CALCIUM 9.3 09/21/2022    ANIONGAP 12  09/21/2022    ESTGFRAFRICA >60 07/26/2022    ESTGFRAFRICA >60 07/26/2022    EGFRNONAA 53 (A) 07/26/2022    EGFRNONAA 53 (A) 07/26/2022

## 2022-09-21 NOTE — PLAN OF CARE
Problem: Adult Inpatient Plan of Care  Goal: Plan of Care Review  Outcome: Ongoing, Progressing  Goal: Optimal Comfort and Wellbeing  Outcome: Ongoing, Progressing  Goal: Readiness for Transition of Care  Outcome: Ongoing, Progressing     Problem: Fluid and Electrolyte Imbalance (Acute Kidney Injury/Impairment)  Goal: Fluid and Electrolyte Balance  Outcome: Ongoing, Progressing     Problem: Oral Intake Inadequate (Acute Kidney Injury/Impairment)  Goal: Optimal Nutrition Intake  Outcome: Ongoing, Progressing

## 2022-09-21 NOTE — CONSULTS
[facility]  Psychiatry  Telepsychiatry Consult Note        Patient agreeable to consultation via telepsychiatry.    Tele-Consultation from Psychiatry started: 9/21/2022 at 1:35 PM  The chief complaint leading to psychiatric consultation is: Dementia with Behavioral Disturbances / Agitation   This consultation was requested by JESSENIA Covington, the primary team provider.  The location of the consulting psychiatrist is [place], [place].    The patient location is  [facility]*   Also present with the patient at the time of the consultation: nurse / tech    Patient Identification:   Joseph Toussaint is a 89 y.o. male.    Patient information was obtained from patient, past medical records, and primary team.    Inpatient consult to Telemedicine - Psyc  Consult performed by: Mick Saunders MD  Consult ordered by: JESSENIA Covington      Consult Start Time: 09/21/2022 13:35 CDT  Consult End Time: 09/21/2022 15:05 CDT    HISTORY    Per Initial History from Primary Team:   89 y.o. male patient with a PMHx of HTN, COPD, CHF, and DM who presents to the Emergency Department for evaluation of a fall which occurred x 2 days pta. Pt family called for transport to get pt checked out. Pt denies any complaints and is not in pain. No associated sxs reported. Patient denies any CP, SOB, fever, back pain, abd pain, and all other sxs at this time. No prior Tx reported. In the ED, UA consistent with UTI, dx with UTI on 8/27, unclear if patient completed antibiotic regimen. BUN/Cr: 39/2.1, Bili: 2.0, H/H: 10.4/32.3. Afebrile, vitals stable. Oriented to self only on exam. Intitiated on antibiotics in the ED, urine culture pending, treated with IV fluid bolus. Patient is a full code, surrogate decision maker is his son Kevin Toussaint. Placed in observation under the care of hospital medicine.   Overview/Hospital Course from Primary Team:  The patient is a 89 yo male with HTN, COPD, CHF, and DM who was  placed in observation for frequent falls, MELISSA, and presumed UTI on IVF and IV  Rocephin.   9/11/22: Pt AAOx3 at times but with notable cognitive deficits. Denies complaints. MELISSA improving 2.1>1.9. Hyperchloremic metabolic acidosis noted- will switch NS IVFs to Bicarb drip. Blood cultures show NGTD. Urine culture showed no growth but urine output is very cloudy -almost purulent. Will switch IV Cipro to oral dose. PT/OT evaluated pt and recommended SNF. Called pt's nephew Kraig (POA) and provided status update.  Pt lives with his nephew and his wife. Currently, Kraig is in the hospital recovering from surgery. He requested discharge to SNF.  9/12/22: Denies complaints. No acute events. MELISSA slowly improving 2.1>1.9>1.7. cont IVF. SNF placement pending   9/13/22: No acute events. Serum Cr remains 1.7. Will check BNP and Renal U/S. Hold IVFs  Awaiting SNF placement   9/14/22: Increased agitation - Seroquel increased to BID. Serum Cr 1.5. Renal U/s showed mild hydronephrosis on right and moderate on Left with distended bladder. Christopher placed-900 ml creamy white UOP immediately returned. Will d/c Cipro- add IV Rocephin. Urology consulted. Awaiting SNF placement   9/15/22: Seroquel in am made pt too drowsy- will decrease back to just nightly. Gross hematuria to UOP. Urology recommended continuous bladder irrigation. Serum Cr normalized after placing christopher. Hydronephrosis 2/2 urinary retention. Cont IV Rocephin. Repeat urine culture pending. Pt will be placed in SNF when hematuria resolves and repeat urine culture results.   9/16 Gross hematuria resolved. CBI clamped. Continue current treatment.   9/17 Patient remains stable and improved. No hematuria at this time.   9/18 Stable. SNF placement pending.   9/19 Stable. SNF placement pending.   9/20 Stable. SNF placement pending.   9/21 Stable.  Awaiting SNF placement.   Interval History from Primary Team on 09/21/2022:   Patient scheduled to be discharged to SNF and initially  "agreeable to go but per report when  came to pick him up to go to SNF, patient changed his mind and did not want to go to SNF and became combative with  as he was being placed in the van to leave. Discharge held.   Patient discussed with Nurse who reported patient has been confused but easily redirectable but no combative behavior prior to this.   Telepsych consulted.        Chief Complaint / Reason for Psychiatry Consult: Dementia with Behavioral Disturbances / Agitation       HPI:   Joseph Toussaint is a 89 y.o. male with a past medical history of HTN, COPD, CHF, and DM, and a past psychiatric history of unspecified dementia, currently being treated by his inpatient primary team for a principle problem of acute renal failure and debility.  Psychiatry was originally consulted as noted above.  The patient was seen and examined.  The chart was reviewed.  Of note, the patient had some benefit with low dose Seroquel earlier in this hospitalization (see hospital course info above).  On examination today, the patient was alert and only oriented to person.  He was disoriented to type of place, name of place, city, state, month, year, and situation.  He was irritable and only intermittently willing to participate in the psychiatric assessment.  He frequently answered, "I don't know" to questions.  Despite multiple attempts, he was unable / unwilling to participate in CAM-ICU delirium testing or formal cognitive testing (MOCA).  He denies any issues with sleep or appetite.  He denies any current or prior s/s consistent with alisha, psychosis, depression, anxiety, panic, OCD, PTSD, or eating disorders.  He denies any current or prior active or passive SI / HI.  He denies any AH, VH, TH, delusions, or paranoia (no RIS observed on exam).  He was unable to name or discuss any of his current diagnoses, medications, or other recommended treatments such as continued care in a skilled nursing setting.  " Regarding current medical/physical complaints, he endorses fatigue / generalized weakness.  He denies any other medical complaints at this time.  NAD was observed during the examination.  The comprehensive psychiatric assessment and the validity of the information that was obtained from the patient was notably limited due to his dementia / AMS.  Psychotherapy was implemented as noted below with a focus on improving orientation, confusion, irritability, and behavioral modification.  See detailed psych ROS below.  See A/P below.        Psychiatric Review Of Systems - Currently, the patient is endorsing and/or denying the following:  (patient's endorsements are BOLDED below; if not BOLDED, then patient denied)(limited validity due to patient's dementia / AMS):    Denies Symptoms of Depression: diminished mood, low motivation, loss of interest/anhedonia; irritability, diminished energy, change in sleep, change in appetite, diminished concentration or cognition or indecisiveness, PMA/R, excessive guilt or hopelessness or worthlessness, suicidal ideations    Denies issues with Sleep: initiation, maintenance, early morning awakening with inability to return to sleep    Denies Suicidal/Homicidal ideations: active/passive ideations, organized plans, future intentions    Denies Symptoms of psychosis: hallucinations, delusions, disorganized thinking, disorganized behavior or abnormal motor behavior, or negative symptoms (diminshed emotional expression, avolition, anhedonia, alogia, asociality     Denies Symptoms of alisha or hypomania: elevated, expansive, or irritable mood with increased energy or activity; with inflated self-esteem or grandiosity, decreased need for sleep, increased rate of speech, FOI or racing thoughts, distractibility, increased goal directed activity or PMA, risky/disinhibited behavior    Denies Symptoms of Anxiety: excessive anxiety/worry/fear, more days than not, about numerous issues, difficult to  control, with restlessness, fatigue, poor concentration, irritability, muscle tension, sleep disturbance; causes functionally impairing distress     Denies Symptoms of Panic Disorder: recurrent panic attacks, precipitated or un-precipitated, source of worry and/or behavioral changes secondary; with or without agoraphobia    Denies Symptoms of PTSD: h/o trauma; re-experiencing/intrusive symptoms, avoidant behavior, negative alterations in cognition or mood, or hyperarousal symptoms; with or without dissociative symptoms     Denies Symptoms of OCD: obsessions or compulsions     Denies Symptoms of Eating Disorders: anorexia, bulimia or binging    Denies Substance Use: intoxication, withdrawal, tolerance, used in larger amounts or duration than intended, unsuccessful attempts to limit or quit, increased time engaging in or seeking out, cravings or strong desire to use, failure to fulfill obligations, negative consequences in social/interpersonal/occupational,/recreational areas, use in dangerous situations, medical or psychological consequences       PSYCHOTHERAPY ADD-ON [id number]   30 (16-37*) minutes    Time: 18 minutes  Participants: Met with patient    Therapeutic Intervention Type: behavior modifying psychotherapy, supportive psychotherapy  Why chosen therapy is appropriate versus another modality: relevant to diagnosis, patient responds to this modality, evidence based practice    Target symptoms: disorientation, confusion, irritability, and behavioral disturbances   Primary focus: improving orientation, confusion, irritability, and behavioral modification  Psychotherapeutic techniques: supportive and psychodynamic techniques; psycho-education; deep breathing exercises; re-orientation; safe coping strategies; reality / insight orientation; problem solving techniques and managing life/medical/cognitive stressors    Outcome monitoring methods: self-report, observation    Patient's response to intervention:  The  patient's response to intervention is guarded / limited.     Progress toward goals:  The patient's progress toward goals is fair / limited.      ROS (limited validity due to patient's dementia / AMS):  General ROS: negative for - chills, fever or night sweats; positive for fatigue / generalized weakness   Ophthalmic ROS: negative for - blurry vision, double vision or eye pain  ENT ROS: negative for - sinus pain, headaches, sore throat or visual changes  Allergy and Immunology ROS: negative for - hives, itchy/watery eyes or nasal congestion  Hematological and Lymphatic ROS: negative for - bleeding problems, bruising, jaundice or pallor  Endocrine ROS: negative for - galactorrhea, hot flashes, mood swings, palpitations or temperature intolerance  Respiratory ROS: negative for - cough, hemoptysis, shortness of breath, tachypnea or wheezing  Cardiovascular ROS: negative for - chest pain, dyspnea on exertion, loss of consciousness, palpitations, rapid heart rate or shortness of breath  Gastrointestinal ROS: negative for - appetite loss, nausea, abdominal pain, blood in stools, change in bowel habits, constipation or diarrhea  Genito-Urinary ROS: negative for - incontinence, nocturia or pelvic pain  Musculoskeletal ROS: negative for - joint stiffness, joint swelling, joint pain or muscle pain   Neurological ROS: negative for - dizziness, numbness/tingling or seizures; positive for behavioral changes, confusion, & memory loss  Dermatological ROS: negative for dry skin, hair changes, pruritus or rash  Psychiatric ROS: see detailed psychiatric ROS above in history section       Past Psychiatric History:  Attempted but unable to assess due to patient's dementia / AMS    Social History:  Attempted but unable to assess due to patient's dementia / AMS    Family Psychiatric History: denies     Substance Abuse History:  Attempted but unable to assess due to patient's dementia / AMS    Legal History:  Attempted but unable to assess  due to patient's dementia / AMS    Psychosocial Stressors: health / neurocognitive impairment   Functioning Relationships: Attempted but unable to assess due to patient's dementia / AMS  Strengths AND Liabilities:  Strength: Patient has positive support network., Liability: Patient has poor health., Liability: Patient has neurocognitive impairment., Liability: Patient lacks coping skills.      PAST MEDICAL & SURGICAL HISTORY   Past Medical History:   Diagnosis Date    Acute coronary syndrome     1979    CHF (congestive heart failure)     COPD (chronic obstructive pulmonary disease)     Diabetes mellitus     HTN (hypertension)      No past surgical history on file.    NEUROLOGIC HISTORY  Seizures: none per chart review    Head trauma: none per chart review    CVA: yes, per most recent Head CT in chart     FAMILY HISTORY   No family history on file.    ALLERGIES   Review of patient's allergies indicates:   Allergen Reactions    Penicillins        CURRENT MEDICATION REGIMEN   Home Meds:   Prior to Admission medications    Medication Sig Start Date End Date Taking? Authorizing Provider   acetaminophen (TYLENOL) 325 MG tablet Take 2 tablets (650 mg total) by mouth every 4 (four) hours as needed for Pain. 9/21/22   JESSENIA Covington   albuterol (PROVENTIL/VENTOLIN HFA) 90 mcg/actuation inhaler Inhale 1-2 puffs into the lungs every 6 (six) hours as needed for Wheezing. Rescue 7/13/22   Carolina Vee MD   aluminum-magnesium hydroxide-simethicone (MAALOX) 200-200-20 mg/5 mL Susp Take 30 mLs by mouth every 6 (six) hours as needed (indigestion). 9/21/22   JESSENIA Covington   aspirin (ASPIR-81) 81 MG EC tablet Take 1 tablet (81 mg total) by mouth once daily. 7/26/22   Lan Escobar MD   bisacodyL (DULCOLAX) 10 mg Supp Place 1 suppository (10 mg total) rectally daily as needed (Until bowel movement if patient has no bowel movement for 2 days). 9/21/22   JESSENIA Covington   enoxaparin (LOVENOX) 40 mg/0.4 mL Syrg  Inject 0.4 mLs (40 mg total) into the skin once daily. As DVT prophylaxis 9/21/22   JESSENIA Covington   fluticasone furoate-vilanteroL (BREO ELLIPTA) 100-25 mcg/dose diskus inhaler Inhale 1 puff into the lungs once daily. Controller 7/26/22   Lan Escobar MD   furosemide (LASIX) 40 MG tablet Take 0.5 tablets (20 mg total) by mouth once daily. 9/21/22   JESSENIA Covington   multivitamin Tab Take 1 tablet by mouth once daily. 9/21/22   JESSENIA Covington   pantoprazole (PROTONIX) 40 MG tablet Take 1 tablet (40 mg total) by mouth once daily. 7/26/22   Lan Escobar MD   pravastatin (PRAVACHOL) 80 MG tablet Take 1 tablet (80 mg total) by mouth nightly. 7/26/22   Lan Escobar MD   sacubitriL-valsartan (ENTRESTO) 49-51 mg per tablet Take 1 tablet by mouth 2 (two) times daily. 8/15/22   Олег Gardner MD   tamsulosin (FLOMAX) 0.4 mg Cap Take 1 capsule (0.4 mg total) by mouth every evening. 7/26/22 7/26/23  Lan Escobar MD   timolol maleate 0.5% (TIMOPTIC) 0.5 % Drop Apply 1 drop to eye 2 (two) times daily. 6/13/22 6/13/23  Historical Provider   tiotropium (SPIRIVA) 18 mcg inhalation capsule Inhale 1 capsule (18 mcg total) into the lungs nightly. Controller 7/26/22   Lan Escobar MD   empagliflozin (JARDIANCE) 10 mg tablet Take 1 tablet (10 mg total) by mouth once daily. 7/26/22 9/21/22  Lan Escobar MD   empagliflozin (JARDIANCE) 10 mg tablet Take 10 mg by mouth. 6/14/22 9/21/22  Historical Provider   fluticasone furoate-vilanteroL (BREO) 100-25 mcg/dose diskus inhaler Inhale 1 puff into the lungs once daily. 6/14/22 9/21/22  Historical Provider   furosemide (LASIX) 40 MG tablet Take 1 tablet (40 mg total) by mouth once daily. And as needed for 5 lb weight gain or PND 8/15/22 9/21/22  Олег Gardner MD   metoprolol succinate (TOPROL-XL) 25 MG 24 hr tablet Take 1 tablet (25 mg total) by mouth once daily. 7/26/22 7/26/22  Lan Escobar MD   QUEtiapine (SEROQUEL) 25 MG Tab Take 1 tablet (25 mg total) by mouth once daily. 9/8/22 9/21/22   Abad Iqbal MD   spironolactone (ALDACTONE) 25 MG tablet Take 1 tablet (25 mg total) by mouth once daily. 7/26/22 9/21/22  Lan Escobar MD   spironolactone (ALDACTONE) 25 MG tablet Take 1 tablet by mouth once daily. 6/14/22 9/21/22  Historical Provider   tiotropium (SPIRIVA WITH HANDIHALER) 18 mcg inhalation capsule Inhale 18 mcg into the lungs. 6/13/22 9/21/22  Historical Provider       OTC Meds: denies     Scheduled Meds:    aspirin  81 mg Oral Daily    budesonide  0.5 mg Nebulization Q12H    dextrose 5 % and 0.45 % NaCl   Intravenous Once    enoxaparin  40 mg Subcutaneous Daily    furosemide  20 mg Oral Daily    ipratropium  0.5 mg Nebulization Q4H WAKE    multivitamin  1 tablet Oral Daily    tamsulosin  0.4 mg Oral QHS    timolol maleate 0.5%  1 drop Both Eyes BID      PRN Meds: acetaminophen, albuterol sulfate, aluminum-magnesium hydroxide-simethicone, bisacodyL, dextrose 10%, dextrose 10%, glucagon (human recombinant), glucose, glucose, hydrALAZINE, insulin aspart U-100, naloxone, ondansetron, pneumoc 20-leonor conj-dip cr(PF), sodium chloride 0.9%, sucralfate   Psychotherapeutics (From admission, onward)      None            LABORATORY DATA   Recent Results (from the past 72 hour(s))   POCT glucose    Collection Time: 09/18/22  5:08 PM   Result Value Ref Range    POCT Glucose 110 70 - 110 mg/dL   POCT glucose    Collection Time: 09/18/22  8:00 PM   Result Value Ref Range    POCT Glucose 146 (H) 70 - 110 mg/dL   POCT glucose    Collection Time: 09/19/22  5:17 AM   Result Value Ref Range    POCT Glucose 129 (H) 70 - 110 mg/dL   POCT glucose    Collection Time: 09/19/22  8:21 PM   Result Value Ref Range    POCT Glucose 132 (H) 70 - 110 mg/dL   POCT glucose    Collection Time: 09/20/22  5:13 AM   Result Value Ref Range    POCT Glucose 124 (H) 70 - 110 mg/dL   COVID-19 Rapid Screening    Collection Time: 09/20/22  9:48 AM   Result Value Ref Range    SARS-CoV-2 RNA, Amplification, Qual Negative Negative   POCT glucose     Collection Time: 09/20/22  8:35 PM   Result Value Ref Range    POCT Glucose 152 (H) 70 - 110 mg/dL   CBC Auto Differential    Collection Time: 09/21/22  5:03 AM   Result Value Ref Range    WBC 6.28 3.90 - 12.70 K/uL    RBC 3.91 (L) 4.60 - 6.20 M/uL    Hemoglobin 9.5 (L) 14.0 - 18.0 g/dL    Hematocrit 30.0 (L) 40.0 - 54.0 %    MCV 77 (L) 82 - 98 fL    MCH 24.3 (L) 27.0 - 31.0 pg    MCHC 31.7 (L) 32.0 - 36.0 g/dL    RDW 19.0 (H) 11.5 - 14.5 %    Platelets 181 150 - 450 K/uL    MPV 11.0 9.2 - 12.9 fL    Immature Granulocytes 0.5 0.0 - 0.5 %    Gran # (ANC) 4.6 1.8 - 7.7 K/uL    Immature Grans (Abs) 0.03 0.00 - 0.04 K/uL    Lymph # 1.0 1.0 - 4.8 K/uL    Mono # 0.6 0.3 - 1.0 K/uL    Eos # 0.1 0.0 - 0.5 K/uL    Baso # 0.02 0.00 - 0.20 K/uL    nRBC 0 0 /100 WBC    Gran % 73.2 (H) 38.0 - 73.0 %    Lymph % 15.3 (L) 18.0 - 48.0 %    Mono % 9.4 4.0 - 15.0 %    Eosinophil % 1.3 0.0 - 8.0 %    Basophil % 0.3 0.0 - 1.9 %    Differential Method Automated    Comprehensive Metabolic Panel    Collection Time: 09/21/22  5:03 AM   Result Value Ref Range    Sodium 140 136 - 145 mmol/L    Potassium 4.0 3.5 - 5.1 mmol/L    Chloride 107 95 - 110 mmol/L    CO2 21 (L) 23 - 29 mmol/L    Glucose 123 (H) 70 - 110 mg/dL    BUN 29 (H) 8 - 23 mg/dL    Creatinine 1.0 0.5 - 1.4 mg/dL    Calcium 9.3 8.7 - 10.5 mg/dL    Total Protein 6.1 6.0 - 8.4 g/dL    Albumin 2.5 (L) 3.5 - 5.2 g/dL    Total Bilirubin 0.9 0.1 - 1.0 mg/dL    Alkaline Phosphatase 153 (H) 55 - 135 U/L    AST 21 10 - 40 U/L    ALT 18 10 - 44 U/L    Anion Gap 12 8 - 16 mmol/L    eGFR >60 >60 mL/min/1.73 m^2   Magnesium    Collection Time: 09/21/22  5:03 AM   Result Value Ref Range    Magnesium 2.2 1.6 - 2.6 mg/dL   POCT glucose    Collection Time: 09/21/22 11:22 AM   Result Value Ref Range    POCT Glucose 157 (H) 70 - 110 mg/dL      No results found for: PHENYTOIN, PHENOBARB, VALPROATE, CBMZ      EXAMINATION    VITALS   Vitals:    09/21/22 0735 09/21/22 0750 09/21/22 0755 09/21/22  "1127   BP: 123/70   134/71   BP Location: Right arm   Right arm   Patient Position: Lying   Sitting   Pulse: 70 77 76 78   Resp: 16 16 16 16   Temp: 97.4 °F (36.3 °C)   97.9 °F (36.6 °C)   TempSrc: Oral   Oral   SpO2: 99% 99% 99% 100%   Weight:       Height:            CONSTITUTIONAL  General Appearance: NAD, unremarkable, age appropriate, normal weight, lying in bed, guarded, confused     MUSCULOSKELETAL  Muscle Strength and Tone: WNL ; generalized weakness / fatigue noted   Abnormal Involuntary Movements: none observed   Gait and Station: Attempted but unable to assess due to medical acuity     PSYCHIATRIC   Behavior/Cooperation:  cooperative at times but often reluctant to participate ; eye contact minimal ; guarded   Speech:  normal tone, normal rate, normal pitch, normal volume  Language: grossly intact with spontaneous speech  Mood:  "I'm fine"  Affect:  irritable / mildly constricted   Associations: intermittent SEN  Thought Process: Linear with intermittent confusion   Thought Content: denies SI, HI, AH, VH, TH, delusions, or paranoia (no RIS observed)  Sensorium:  Awake  Alert and Oriented: to person only.  He was disoriented to type of place, name of place, city, state, month, year, and situation.  Memory: 3/3 immediate, 0/3 at 5 minutes    Recent:  Impaired / Limited ; only able to report intermittent / minimal recent events   Remote:  Impaired ; Named 0/4 past presidents   Attention/concentration: Impaired / Limited ; patient unwilling to participate in formal testing   Similarities: Impaired / Limited ; patient unwilling to participate in formal testing   Abstract reasoning: Impaired / Limited ; patient unwilling to participate in formal testing   Fund of Knowledge: Named 0/4 past presidents   Insight: Impaired / Limited   Judgment: Impaired / Limited     CAM ICU Delirium Assessment - Despite multiple attempts, he was unable / unwilling to participate in CAM-ICU delirium testing due to AMS / dementia / " irritability     MOCA - Despite multiple attempts, he was unable / unwilling to participate in formal cognitive testing (MOCA) due to AMS / dementia / irritability     Is the patient aware of the biomedical complications associated with substance abuse and mental illness?   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      - Begin / Restart Seroquel at reduced dose of 12.5 mg PO QAM and 25 mg PO QHS (can also reduce to 12.5 mg if excess morning somnolence is observed) for behavioral disturbances / mood / sleep in dementia / possible resolving delirium (discussed risks/benefits/alt vs no treatment with patient).    - Can use Zyprexa 2.5 mg to 5 mg PO/IM q8 hours PRN for non-redirectable behavioral disturbances in dementia / possible resolving delirium (discussed risks/benefits/alt vs no treatment with patient).    Implement the below DELIRIUM BEHAVIOR MANAGEMENT:  - Minimize use of restraints; if physical restraints necessary, please utilize medical/chemical prns for agitation (as noted above).    - Keep shades open and room lit during day and room dim at night in order to promote healthy circadian rhythms.  - Encourage family at bedside.  - Keep whiteboard in patient's room current with the date and name of the members of patient's team for easy patient self re-orientation.  - Avoid benzodiazepines, antihistamines, anticholinergics, hypnotics, and minimize opiates while controlling for pain as these medications may exacerbate delirium.      -  "Psychotherapy was performed with patient as noted above with a focus on improving orientation, confusion, irritability, and behavioral modification.    - Patient's most recent labs, imaging, and EKG were reviewed today; CT Head from 08/27/2022 with "Age-related atrophy, severe chronic microvascular ischemic change and chronic infarcts." ; Please order and f/u repeat EKG to ensure that QTc remains below 500 while using neuroleptic medication ; Please order and f/u B1, B12, and Folate to assess for need for supplementation (possible contributor(s) to AMS).       - Please have CM/SW assist patient with outpatient neurology / dementia f/u for s/p discharge from this facility.      - Patient was instructed to call 911 and/or [phone] and return to the nearest ED if he begins feeling suicidal, homicidal, or gravely disabled (for s/p this hospitalization).      - Appropriate and most beneficial disposition for the patient at this time still appears to be SNF unless patient's behavioral disturbances are sustained and non-modifiable with above listed treatment recs (in this situation, then would shift disposition to inpt jose-psych placement).       - Thank you for this consult         Total time spent with patient and/or managing/coordinating patient's care today (excluding the time spent on psychotherapy): 72 minutes   Time spent on psychotherapy today (as noted above): 18 minutes   Total time for encounter today including psychotherapy: 90 minutes      More than 50% of the time was spent counseling/coordinating care.     Consulting clinician was informed of the encounter and consult note.     Consultation ended: 9/21/2022 at 3:05 PM       STAFF:  Mick Saunders MD  [facility]   9/21/2022         "

## 2022-09-21 NOTE — PROGRESS NOTES
[facility] - Med Surg 3  [facility]  Progress Note    Patient Name: Joseph Toussaint  MRN: [id number]  Patient Class: IP- Inpatient   Admission Date: 9/10/2022  Length of Stay: 6 days  Attending Physician: Matt Rossi MD  Primary Care Provider: Abad Iqbal MD        Subjective:     Principal Problem:Acute renal failure        HPI:  89 y.o. male patient with a PMHx of HTN, COPD, CHF, and DM who presents to the Emergency Department for evaluation of a fall which occurred x 2 days pta. Pt family called for transport to get pt checked out. Pt denies any complaints and is not in pain. No associated sxs reported. Patient denies any CP, SOB, fever, back pain, abd pain, and all other sxs at this time. No prior Tx reported. In the ED, UA consistent with UTI, dx with UTI on 8/27, unclear if patient completed antibiotic regimen. BUN/Cr: 39/2.1, Bili: 2.0, H/H: 10.4/32.3. Afebrile, vitals stable. Oriented to self only on exam. Intitiated on antibiotics in the ED, urine culture pending, treated with IV fluid bolus. Patient is a full code, surrogate decision maker is his son Kevin Toussaint. Placed in observation under the care of hospital medicine.       Overview/Hospital Course:  The patient is a 89 yo male with HTN, COPD, CHF, and DM who was placed in observation for frequent falls, MELISSA, and presumed UTI on IVF and IV  Rocephin.     9/11/22: Pt AAOx3 at times but with notable cognitive deficits. Denies complaints. MELISSA improving 2.1>1.9. Hyperchloremic metabolic acidosis noted- will switch NS IVFs to Bicarb drip. Blood cultures show NGTD. Urine culture showed no growth but urine output is very cloudy -almost purulent. Will switch IV Cipro to oral dose. PT/OT evaluated pt and recommended SNF. Called pt's nephew Kraig (POA) and provided status update.  Pt lives with his nephew and his wife. Currently, Kraig is in the hospital recovering from surgery. He requested discharge to SNF.    9/12/22: Denies complaints. No  acute events. MELISSA slowly improving 2.1>1.9>1.7. cont IVF. SNF placement pending     9/13/22: No acute events. Serum Cr remains 1.7. Will check BNP and Renal U/S. Hold IVFs  Awaiting SNF placement     9/14/22: Increased agitation - Seroquel increased to BID. Serum Cr 1.5. Renal U/s showed mild hydronephrosis on right and moderate on Left with distended bladder. Christopher placed-900 ml creamy white UOP immediately returned. Will d/c Cipro- add IV Rocephin. Urology consulted. Awaiting SNF placement     9/15/22: Seroquel in am made pt too drowsy- will decrease back to just nightly. Gross hematuria to UOP. Urology recommended continuous bladder irrigation. Serum Cr normalized after placing christopher. Hydronephrosis 2/2 urinary retention. Cont IV Rocephin. Repeat urine culture pending. Pt will be placed in SNF when hematuria resolves and repeat urine culture results.     9/16 Gross hematuria resolved. CBI clamped. Continue current treatment.   9/17 Patient remains stable and improved. No hematuria at this time.   9/18 Stable. SNF placement pending.   9/19 Stable. SNF placement pending.   9/20 Stable. SNF placement pending.   9/21 Stable.  Awaiting SNF placement.       Interval History:  Stable.  Awaiting SNF placement.     Review of Systems   Constitutional:  Positive for activity change and fatigue. Negative for chills, diaphoresis and fever.   HENT:  Positive for hearing loss. Negative for ear discharge, ear pain and facial swelling.    Eyes:  Negative for pain and redness.   Respiratory:  Negative for cough and shortness of breath.    Cardiovascular:  Negative for leg swelling.   Gastrointestinal:  Negative for abdominal pain, constipation, diarrhea, nausea and vomiting.   Endocrine: Negative for polydipsia and polyphagia.   Genitourinary:  Negative for flank pain and hematuria.               Musculoskeletal:  Positive for arthralgias and gait problem. Negative for back pain, neck pain and neck stiffness.   Skin:  Negative for  color change.   Allergic/Immunologic: Negative for food allergies.   Neurological:  Positive for weakness. Negative for facial asymmetry and speech difficulty.   Hematological:  Does not bruise/bleed easily.   Psychiatric/Behavioral:  Negative for agitation, behavioral problems, hallucinations and suicidal ideas. The patient is not nervous/anxious.    Objective:     Vital Signs (Most Recent):  Temp: 97.9 °F (36.6 °C) (09/21/22 1127)  Pulse: 78 (09/21/22 1127)  Resp: 16 (09/21/22 1127)  BP: 134/71 (09/21/22 1127)  SpO2: 100 % (09/21/22 1127) Vital Signs (24h Range):  Temp:  [97.4 °F (36.3 °C)-98.4 °F (36.9 °C)] 97.9 °F (36.6 °C)  Pulse:  [70-78] 78  Resp:  [16-20] 16  SpO2:  [98 %-100 %] 100 %  BP: (118-139)/(70-89) 134/71     Weight: 87 kg (191 lb 12.8 oz)  Body mass index is 26.38 kg/m².    Intake/Output Summary (Last 24 hours) at 9/21/2022 1244  Last data filed at 9/21/2022 0800  Gross per 24 hour   Intake --   Output 1475 ml   Net -1475 ml      Physical Exam  Vitals and nursing note reviewed.   Constitutional:       General: He is not in acute distress.     Appearance: He is well-developed. He is not diaphoretic.   HENT:      Head: Normocephalic and atraumatic.      Mouth/Throat:      Mouth: Mucous membranes are moist.   Eyes:      General: No scleral icterus.        Right eye: No discharge.         Left eye: No discharge.      Extraocular Movements: Extraocular movements intact.      Conjunctiva/sclera: Conjunctivae normal.      Pupils: Pupils are equal, round, and reactive to light.   Cardiovascular:      Rate and Rhythm: Normal rate and regular rhythm.      Heart sounds: Normal heart sounds. No murmur heard.    No friction rub. No gallop.   Pulmonary:      Effort: Pulmonary effort is normal. No respiratory distress.      Breath sounds: No wheezing or rhonchi.      Comments: BBS diminished  Abdominal:      General: Bowel sounds are normal. There is no distension.      Palpations: Abdomen is soft.       Tenderness: There is no abdominal tenderness. There is no guarding.   Genitourinary:     Comments: Zully urine draining in bag  Musculoskeletal:      Cervical back: Normal range of motion and neck supple. No rigidity or tenderness.      Right lower leg: No edema.      Left lower leg: No edema.      Comments: Stiff joints  General debility   Skin:     General: Skin is warm and dry.      Capillary Refill: Capillary refill takes 2 to 3 seconds.   Neurological:      Mental Status: He is alert.      Motor: Weakness present.      Gait: Gait abnormal.      Comments: Oriented person and place  Forgetful  Responds appropriately to simple commands   Psychiatric:         Cognition and Memory: Cognition is impaired. He exhibits impaired recent memory.      Comments: Calm and cooperative          Lab Results   Component Value Date    WBC 6.28 09/21/2022    HGB 9.5 (L) 09/21/2022    HCT 30.0 (L) 09/21/2022    MCV 77 (L) 09/21/2022     09/21/2022       BMP  Lab Results   Component Value Date     09/21/2022    K 4.0 09/21/2022     09/21/2022    CO2 21 (L) 09/21/2022    BUN 29 (H) 09/21/2022    CREATININE 1.0 09/21/2022    CALCIUM 9.3 09/21/2022    ANIONGAP 12 09/21/2022    ESTGFRAFRICA >60 07/26/2022    ESTGFRAFRICA >60 07/26/2022    EGFRNONAA 53 (A) 07/26/2022    EGFRNONAA 53 (A) 07/26/2022           Assessment/Plan:      Debility  9/16 Fall and skin precautions  Turn q 2 hours  Continue Physical therapy and Occupational therapy  9/21 Awaiting SNF placement        Malnutrition of moderate degree  9/16 Nutrition consulted. Most recent weight and BMI monitored  Add po supplement    Measurements:  Wt Readings from Last 1 Encounters:   09/19/22 87 kg (191 lb 12.8 oz)   Body mass index is 26.38 kg/m².    Recommendations:      Patient has been screened and assessed by RD. RD will follow patient.      Type 2 diabetes mellitus   9/16 Dm diet  Accuchecks with correctional SSI  Blood sugars running 129-135  Lab Results    Component Value Date    HGBA1C 6.6 (H) 07/20/2022 9/17 Blood sugars running 121-145  9/18 Blood sugars running 118-151  9/19 Blood sugars running 110-146  9/20 Blood sugars running 124-132  9/21 Blood sugars running 124-152    Hypokalemia  9/16 Add 40 meq po x 1 dose      Microcytic anemia  9/16 Add MVI      Hydronephrosis, bilateral  2/2 urinary retention   Serum Cr normalized     9/16 Patient to discharge with christopher and follow up with Urology as Outpatient      Urinary retention  9/16 Patient to discharge with christopher and follow up with Urology as Outpatient       Falls  Patient reported falling at home    --Avasys  --Fall Precautions    9/11/22: PT/OT  SNF placement - CM consulted     9/19 Awaiting SNF placement    Dementia without behavioral disturbance  Confused on admission\  Cont home emd Seroquel HS    --Avasys  --Fall precautions  --Neuro checks Q4H x24 hours    9/11/22: Pt AAOx3 at times but with notable cognitive deficits.    9/14/22: Increased agitation - Seroquel increased to BID.    9/15/22: Seroquel in am made pt too drowsy- will decrease back to just nightly.     9/17 Stable. Alert, appropriate    9/20 Sleepy but arousable. Calm and cooperative. Discontinue Seroquel  9/21 Stable. Pleasant. Watching tv.    Chronic systolic congestive heart failure  Patient is identified as having Systolic (HFrEF) heart failure that is Chronic. CHF is currently controlled. Latest ECHO performed and demonstrates- Results for orders placed during the hospital encounter of 07/19/22    Echo    Interpretation Summary  · Concentric hypertrophy and severely decreased systolic function.  · Mild left atrial enlargement.  · The estimated ejection fraction is 20%.  · Left ventricular diastolic dysfunction.  · There is left ventricular global hypokinesis.  · Moderate right ventricular enlargement with mildly to moderately reduced right ventricular systolic function.  · Moderate right atrial enlargement.  · There is mild aortic  valve stenosis.  · Aortic valve area is 1.90 cm2; peak velocity is 1.05 m/s; mean gradient is 2 mmHg.  · There is severe pulmonary hypertension.  · Moderate tricuspid regurgitation.  · Elevated central venous pressure (15 mmHg).  · The estimated PA systolic pressure is 103 mmHg.  . Continue ACE/ARB, Furosemide and Aldactone and monitor clinical status closely. Monitor on telemetry. Patient is on CHF pathway.  Monitor strict Is&Os and daily weights.  Place on fluid restriction of 1.5 L. Continue to stress to patient importance of self efficacy and  on diet for CHF. Last BNP reviewed- and noted below   Recent Labs   Lab 09/13/22  1449   BNP >4,900*   .  EF 20%  Monitor I/o  Daily weights  Spironolactone and Entresto, Lasix, on hold 2/2 normotensive and MELISSA    9/17 stable  9/20 Resume Home lasix at reduced dose    Essential hypertension  Normotensive   Home medications Spironolactone and Entresto on hold   --Hydralazine PRN  --Vitals Q4H  9/17 Stable      COPD suggested by initial evaluation  Negative for symptoms of exacerbation    --Continue home regimen    9/17 Stable            VTE Risk Mitigation (From admission, onward)         Ordered     Place NIKITA hose  Until discontinued         09/17/22 0846     enoxaparin injection 40 mg  Daily         09/14/22 1426     Reason for No Pharmacological VTE Prophylaxis  Once        Question:  Reasons:  Answer:  Already adequately anticoagulated on oral Anticoagulants    09/10/22 1158     IP VTE HIGH RISK PATIENT  Once         09/10/22 1158     Place sequential compression device  Until discontinued         09/10/22 1158                Discharge Planning   TATIANA: 9/21/2022     Code Status: Full Code   Is the patient medically ready for discharge?: Yes    Reason for patient still in hospital (select all that apply): Pending disposition  Discharge Plan A: Skilled Nursing Facility   Discharge Delays: None known at this time      Time spent seeing patient( greater than 1/2 spent  in direct contact) : 32 minutes    Kajal Grant, JESSENIA  Department of Hospital Medicine   [facility] - Med Surg 3

## 2022-09-21 NOTE — PLAN OF CARE
TX COMPLETED: facilitated bed mobility mod A, transfers with mod A, ambulated 3 ft with RW. Pt tolerated session well. Recommend SNF
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
## 2022-09-21 NOTE — PLAN OF CARE
[facility] - Med S[facility] 3  Discharge Final Note    Primary Care Provider: Abad Iqbal MD    Expected Discharge Date: 9/21/2022    Final Discharge Note (most recent)       Final Note - 09/21/22 1100          Final Note    Assessment Type Final Discharge Note     Anticipated Discharge Disposition Skilled Nursing Facility        Post-Acute Status    Post-Acute Authorization Placement     Post-Acute Placement Status Set-up Complete/Auth obtained     Discharge Delays None known at this time                     Important Message from Medicare  Important Message from Medicare regarding Discharge Appeal Rights: Explained to patient/caregiver, Given to patient/caregiver, Signed/date by patient/caregiver     Date IMM was signed: 09/16/22  Time IMM was signed: 1520    Contact Info       Yifan Sarmiento MD   Specialty: Urology    [address] [place] [address] [address] [address]   Phone: [phone]       Next Steps: Follow up in 1 week(s)    Instructions: Discharge with the christopher in place, and will follow-up with us in 1 week for a voiding trial.    Abad Iqbal MD   Specialty: Family Medicine   Relationship: PCP - General    [address] [place] [address] [address] [place] [address]   Phone: [phone]       Next Steps: Follow up in 1 week(s)          Pt to DC to [facility].  time ~12:00PM. Number for report [phone].    Tor Morocho LMSW 9/21/2022 11:02 AM

## 2022-09-21 NOTE — ASSESSMENT & PLAN NOTE
Confused on admission\  Cont home emd Seroquel HS    --Avasys  --Fall precautions  --Neuro checks Q4H x24 hours    9/11/22: Pt AAOx3 at times but with notable cognitive deficits.    9/14/22: Increased agitation - Seroquel increased to BID.    9/15/22: Seroquel in am made pt too drowsy- will decrease back to just nightly.     9/17 Stable. Alert, appropriate    9/20 Sleepy but arousable. Calm and cooperative. Discontinue Seroquel  9/21 Stable. Pleasant. Watching tv.    Telepsych consult done and recommended-  Begin / Restart Seroquel at reduced dose of 12.5 mg PO QAM and 25 mg PO QHS (can also reduce to 12.5 mg if excess morning somnolence is observed) for behavioral disturbances / mood / sleep in dementia / possible resolving delirium (discussed risks/benefits/alt vs no treatment with patient).     - Can use Zyprexa 2.5 mg to 5 mg PO/IM q8 hours PRN for non-redirectable behavioral disturbances in dementia / possible resolving delirium (discussed risks/benefits/alt vs no treatment with patient).    Avoid benzodiazepines, antihistamines, anticholinergics, hypnotics, and minimize opiates while controlling for pain as these medications may exacerbate delirium.      9/22 Patient refused SNF placement yesterday and some agitation noted at that time. Tele psych consulted and patient currently undergoing a medication adjustment for his Dementia. See Above.

## 2022-09-21 NOTE — ASSESSMENT & PLAN NOTE
9/16 Dm diet  Accuchecks with correctional SSI  Blood sugars running 129-135  Lab Results   Component Value Date    HGBA1C 6.6 (H) 07/20/2022 9/17 Blood sugars running 121-145  9/18 Blood sugars running 118-151  9/19 Blood sugars running 110-146  9/20 Blood sugars running 124-132  9/21 Blood sugars running 124-152

## 2022-09-21 NOTE — PT/OT/SLP PROGRESS
"Physical Therapy  Treatment    Joseph Toussaint   MRN: [id number]   Admitting Diagnosis: Acute renal failure       PT Start Time: 1535     PT Stop Time: 1600    PT Total Time (min): 25 min       Billable Minutes:  Gait Training 8 and Therapeutic Exercise 17    Treatment Type: Treatment  PT/PTA: PT     PTA Visit Number: 0       General Precautions: Standard, fall  Orthopedic Precautions: N/A   Braces: N/A  Respiratory Status: Room air    Spiritual, Cultural Beliefs, Jew Practices, Values that Affect Care: no    Subjective:  Communicated with nurse Hallie prior to session.  "I'm gonna walk home in a little bit" pt confused    Pain/Comfort  Pain Rating 1: 0/10    Objective:   Patient found with: peripheral IV, telemetry    Functional Mobility, Therapeutic Activities and Exercises:    Facilitated bed mobility with mod A and increased cues for sequencing and proper hand placement. Transfers performed x 2 reps with cues for forward weight shifting and upright standing posture. Ambulated 3ft min A with RW along EOB. Demonstrated overall unsteadiness on feet. Skilled intervention also included seated BLE exercises to promote joint mobility and strength. Exercises included AP, LAQ x 15 reps.     AM-PAC 6 CLICK MOBILITY  How much help from another person does this patient currently need?   1 = Unable, Total/Dependent Assistance  2 = A lot, Maximum/Moderate Assistance  3 = A little, Minimum/Contact Guard/Supervision  4 = None, Modified [place]/Independent    Turning over in bed (including adjusting bedclothes, sheets and blankets)?: 2  Sitting down on and standing up from a chair with arms (e.g., wheelchair, bedside commode, etc.): 2  Moving from lying on back to sitting on the side of the bed?: 2  Moving to and from a bed to a chair (including a wheelchair)?: 2  Need to walk in hospital room?: 2  Climbing 3-5 steps with a railing?: 1  Basic Mobility Total Score: 11    AM-PAC Raw Score CMS G-Code Modifier Level of " Impairment Assistance   6 % Total / Unable   7 - 9 CM 80 - 100% Maximal Assist   10 - 14 CL 60 - 80% Moderate Assist   15 - 19 CK 40 - 60% Moderate Assist   20 - 22 CJ 20 - 40% Minimal Assist   23 CI 1-20% SBA / CGA   24 CH 0% Independent/ Mod I     Patient left up in chair with all lines intact, call button in reach, nursing notified, and tray table placed in front on him. Pt re-educated on call button and call don't fall policy.    Assessment:  Joseph Toussaint is a 89 y.o. male with a medical diagnosis of Acute renal failure and presents with the deficits listed below. Pt will benefit from continued PT services to address deficits and increase functional mobility. Pt limited by impaired cognition/confusion.    Rehab identified problem list/impairments: Rehab identified problem list/impairments: weakness, impaired endurance, gait instability, impaired balance, impaired functional mobility, impaired cognition, decreased safety awareness    Rehab potential is good.    Activity tolerance: Good    Discharge recommendations: Discharge Facility/Level of Care Needs: nursing facility, skilled     Barriers to discharge:      Equipment recommendations:       GOALS:   Multidisciplinary Problems       Physical Therapy Goals          Problem: Physical Therapy    Goal Priority Disciplines Outcome Goal Variances Interventions   Physical Therapy Goal     PT, PT/OT Ongoing, Progressing     Description: Patient will be seen a minimal of 3 out of 7 days a week.    LTG to be met by 09/24:    Bed mobility: SPV  Transfers: CGA with RW   Gait: CGA with RW                       PLAN:    Patient to be seen 3 x/week  to address the above listed problems via gait training, therapeutic activities, therapeutic exercises, neuromuscular re-education  Plan of Care expires: 09/24/22  Plan of Care reviewed with: patient         09/21/2022

## 2022-09-21 NOTE — ASSESSMENT & PLAN NOTE
9/16 Fall and skin precautions  Turn q 2 hours  Continue Physical therapy and Occupational therapy  9/21 Awaiting SNF placement

## 2022-09-21 NOTE — DISCHARGE INSTRUCTIONS
[facility] - Med Surg 3  Facility Transfer Orders        Admit to: SNF- [facility]-     Diagnoses:   Active Hospital Problems    Diagnosis  POA    Microcytic anemia [D50.9]  Yes    Hypokalemia [E87.6]  Yes    Type 2 diabetes mellitus [E11.9]  Yes    Malnutrition of moderate degree [E44.0]  Yes    Debility [R53.81]  Yes    Urinary retention [R33.9]  Yes    Hydronephrosis, bilateral [N13.30]  Yes    Acute cystitis with hematuria [N30.01]  Yes    Falls [W19.XXXA]  Yes    Dementia without behavioral disturbance [F03.90]  Yes    Chronic systolic congestive heart failure [I50.22]  Yes    COPD suggested by initial evaluation [J44.9]  Yes    Essential hypertension [I10]  Yes      Resolved Hospital Problems    Diagnosis Date Resolved POA    *Acute renal failure [N17.9] 09/16/2022 Yes    Gross hematuria [R31.0] 09/17/2022 Yes    Hyperchloremic metabolic acidosis [E87.2] 09/12/2022 Yes     Allergies:   Review of patient's allergies indicates:   Allergen Reactions    Penicillins        Code Status: Full Code    Vitals: Routine       Diet: diabetic diet: 2000 calorie    Activity: Activity as tolerated  Fall skin and aspiration precautions    Nursing Precautions: Aspiration , Fall, and Pressure ulcer prevention    Bed/Surface: Low Air Loss    Consults: PT to evaluate and treat- 5 times a week and OT to evaluate and treat- 5 times a week    Oxygen: room air    Dialysis: Patient is not on dialysis.      Labs: [place] CBC, CMP, magnesium level    Imaging: None    Miscellaneous Care:   Routine Skin for Bedridden Patients:  Apply moisture barrier cream to all  Diabetes Care: Diabetes: Check blood sugar. Fingerstick blood sugar AC and HS  Sliding Scale/Hypoglycemia Protocol: For FSG<80, give 1 amp D50 or 1 glucose tablet. For FSG , do nothing. For -200, give 1 unit of novolog in addition to pre-meal insulin. For -250, give 2 units of novolog in addition to pre-meal insulin. For -300, give 3 units of novolog in  addition to pre-meal insulin. For 301-350, give 4 units of novolog in addition to pre-meal insulin. For 351-400, give 5 units of novolog in addition to pre-meal insulin. For FSG >400, give 5 units of novolog in addition to pre-meal insulin and please call MD  CHF Care: Daily Weight with notification of MD/NP of 2lb or > increase in 24 hours    v/s and O2 sat every shift    Oxygen as needed for sats <90%    Report abnormal breath sounds to MD/NP    Edema checks q shift- notify MD/NP of increased edema    Task segmentation by nursing for daily care to decrease exertion    CHF education to include diet ,medication, and CHF flags for MD notification    IV Access: None     Medications: Discontinue all previous medication orders, if any. See new list below.  Current Discharge Medication List        START taking these medications    Details   acetaminophen (TYLENOL) 325 MG tablet Take 2 tablets (650 mg total) by mouth every 4 (four) hours as needed for Pain.  Refills: 0      aluminum-magnesium hydroxide-simethicone (MAALOX) 200-200-20 mg/5 mL Susp Take 30 mLs by mouth every 6 (six) hours as needed (indigestion).      bisacodyL (DULCOLAX) 10 mg Supp Place 1 suppository (10 mg total) rectally daily as needed (Until bowel movement if patient has no bowel movement for 2 days).  Refills: 0      enoxaparin (LOVENOX) 40 mg/0.4 mL Syrg Inject 0.4 mLs (40 mg total) into the skin once daily. As DVT prophylaxis      multivitamin Tab Take 1 tablet by mouth once daily.           CONTINUE these medications which have CHANGED    Details   furosemide (LASIX) 40 MG tablet Take 0.5 tablets (20 mg total) by mouth once daily.           CONTINUE these medications which have NOT CHANGED    Details   albuterol (PROVENTIL/VENTOLIN HFA) 90 mcg/actuation inhaler Inhale 1-2 puffs into the lungs every 6 (six) hours as needed for Wheezing. Rescue  Qty: 6.7 g, Refills: 0      aspirin (ASPIR-81) 81 MG EC tablet Take 1 tablet (81 mg total) by mouth once  daily.  Qty: 90 tablet, Refills: 3      fluticasone furoate-vilanteroL (BREO ELLIPTA) 100-25 mcg/dose diskus inhaler Inhale 1 puff into the lungs once daily. Controller  Qty: 60 each, Refills: 0      pantoprazole (PROTONIX) 40 MG tablet Take 1 tablet (40 mg total) by mouth once daily.  Qty: 30 tablet, Refills: 1      pravastatin (PRAVACHOL) 80 MG tablet Take 1 tablet (80 mg total) by mouth nightly.  Qty: 30 tablet, Refills: 1      sacubitriL-valsartan (ENTRESTO) 49-51 mg per tablet Take 1 tablet by mouth 2 (two) times daily.  Qty: 60 tablet, Refills: 11      tamsulosin (FLOMAX) 0.4 mg Cap Take 1 capsule (0.4 mg total) by mouth every evening.  Qty: 30 capsule, Refills: 1      timolol maleate 0.5% (TIMOPTIC) 0.5 % Drop Apply 1 drop to eye 2 (two) times daily.      tiotropium (SPIRIVA) 18 mcg inhalation capsule Inhale 1 capsule (18 mcg total) into the lungs nightly. Controller  Qty: 30 capsule, Refills: 1           STOP taking these medications       empagliflozin (JARDIANCE) 10 mg tablet Comments:   Reason for Stopping:         metoprolol succinate (TOPROL-XL) 25 MG 24 hr tablet Comments:   Reason for Stopping:         QUEtiapine (SEROQUEL) 25 MG Tab Comments:   Reason for Stopping:         spironolactone (ALDACTONE) 25 MG tablet Comments:   Reason for Stopping:             Follow up:    Follow-up Information       Yifan Sarmiento MD Follow up in 1 week(s).    Specialty: Urology  Why: Discharge with the christopher in place, and will follow-up with us in 1 week for a voiding trial.  Contact information:  [address] [place] [address] [address] [place] [address] [phone]               Abad Iqbal MD Follow up in 1 week(s).    Specialty: Family Medicine  Contact information:  [address] [place] [address] [place] [address]  [phone]                               Immunizations Administered as of 9/21/2022       No immunizations on file.

## 2022-09-21 NOTE — ASSESSMENT & PLAN NOTE
Dx with UTI on 8/27 in ED, unclear if completed antibiotic regimen    --Follow urine culture  --Continue IV Cipro    9/11/22:Blood cultures show NGTD. Urine culture showed no growth but urine output is very cloudy -almost purulent. Will switch IV Cipro to oral dose.    9/14/22:  Serum Cr 1.5. Renal U/s showed mild hydronephrosis on right and moderate on Left. Sanchez palced-900 ml creamy white UOP immediately returned. Will d/c Cipro- add IV Rocephin. Urology consulted. Awaiting SNF placement     9/15/22:  Cont IV Rocephin. Repeat urine culture pending.   9/16 CBI clamped. Hematuria resolved at this time  9/17 Urine culture no growth  9/18  Very mild hematuria noted  9/19 Zully urine. No hematuria at this time.  Urine culture no growth  9/20 Zully urine. No hematuria at this time.  Urine culture no growth  9/21 Iv ABx dose completed

## 2022-09-21 NOTE — PLAN OF CARE
"  Problem: Adult Inpatient Plan of Care  Goal: Plan of Care Review  Outcome: Ongoing, Progressing  Goal: Optimal Comfort and Wellbeing  Outcome: Ongoing, Progressing  Goal: Readiness for Transition of Care  Outcome: Ongoing, Progressing     Problem: Fluid and Electrolyte Imbalance (Acute Kidney Injury/Impairment)  Goal: Fluid and Electrolyte Balance  Outcome: Ongoing, Progressing      Pt was planned for discharge, transport from [facility] arrived to  patient. Pt became agitated, stating " I changed my mind, I want to leave tomorrow" holding arms out when getting on the elevator. It was reported by the transporter that he was hitting as she got him to the van. The manager at the facility advised not to take him at this time. Family and  notified.         "

## 2022-09-22 ENCOUNTER — TELEPHONE (OUTPATIENT)
Dept: UROLOGY | Facility: CLINIC | Age: 87
End: 2022-09-22
Payer: MEDICARE

## 2022-09-22 LAB
FOLATE SERPL-MCNC: 10.1 NG/ML (ref 4–24)
POCT GLUCOSE: 114 MG/DL (ref 70–110)
POCT GLUCOSE: 140 MG/DL (ref 70–110)
VIT B12 SERPL-MCNC: 1414 PG/ML (ref 210–950)

## 2022-09-22 PROCEDURE — 25000003 PHARM REV CODE 250: Performed by: NURSE PRACTITIONER

## 2022-09-22 PROCEDURE — 36415 COLL VENOUS BLD VENIPUNCTURE: CPT | Performed by: NURSE PRACTITIONER

## 2022-09-22 PROCEDURE — 99900035 HC TECH TIME PER 15 MIN (STAT)

## 2022-09-22 PROCEDURE — 25000242 PHARM REV CODE 250 ALT 637 W/ HCPCS: Performed by: INTERNAL MEDICINE

## 2022-09-22 PROCEDURE — 82746 ASSAY OF FOLIC ACID SERUM: CPT | Performed by: NURSE PRACTITIONER

## 2022-09-22 PROCEDURE — 94640 AIRWAY INHALATION TREATMENT: CPT

## 2022-09-22 PROCEDURE — 94761 N-INVAS EAR/PLS OXIMETRY MLT: CPT

## 2022-09-22 PROCEDURE — 63600175 PHARM REV CODE 636 W HCPCS: Performed by: FAMILY MEDICINE

## 2022-09-22 PROCEDURE — 25000242 PHARM REV CODE 250 ALT 637 W/ HCPCS: Performed by: NURSE PRACTITIONER

## 2022-09-22 PROCEDURE — 84425 ASSAY OF VITAMIN B-1: CPT | Performed by: NURSE PRACTITIONER

## 2022-09-22 PROCEDURE — 82607 VITAMIN B-12: CPT | Performed by: NURSE PRACTITIONER

## 2022-09-22 PROCEDURE — 11000001 HC ACUTE MED/SURG PRIVATE ROOM

## 2022-09-22 RX ADMIN — TAMSULOSIN HYDROCHLORIDE 0.4 MG: 0.4 CAPSULE ORAL at 09:09

## 2022-09-22 RX ADMIN — IPRATROPIUM BROMIDE 0.5 MG: 0.5 SOLUTION RESPIRATORY (INHALATION) at 07:09

## 2022-09-22 RX ADMIN — ASPIRIN 81 MG: 81 TABLET, COATED ORAL at 08:09

## 2022-09-22 RX ADMIN — ENOXAPARIN SODIUM 40 MG: 100 INJECTION SUBCUTANEOUS at 05:09

## 2022-09-22 RX ADMIN — BUDESONIDE 0.5 MG: 0.5 INHALANT ORAL at 07:09

## 2022-09-22 RX ADMIN — QUETIAPINE FUMARATE 12.5 MG: 25 TABLET, FILM COATED ORAL at 08:09

## 2022-09-22 RX ADMIN — FUROSEMIDE 20 MG: 20 TABLET ORAL at 08:09

## 2022-09-22 RX ADMIN — TIMOLOL MALEATE 1 DROP: 5 SOLUTION/ DROPS OPHTHALMIC at 09:09

## 2022-09-22 RX ADMIN — IPRATROPIUM BROMIDE 0.5 MG: 0.5 SOLUTION RESPIRATORY (INHALATION) at 08:09

## 2022-09-22 RX ADMIN — IPRATROPIUM BROMIDE 0.5 MG: 0.5 SOLUTION RESPIRATORY (INHALATION) at 01:09

## 2022-09-22 RX ADMIN — QUETIAPINE FUMARATE 25 MG: 25 TABLET ORAL at 09:09

## 2022-09-22 RX ADMIN — THERA TABS 1 TABLET: TAB at 08:09

## 2022-09-22 RX ADMIN — BUDESONIDE 0.5 MG: 0.5 INHALANT ORAL at 08:09

## 2022-09-22 RX ADMIN — TIMOLOL MALEATE 1 DROP: 5 SOLUTION/ DROPS OPHTHALMIC at 08:09

## 2022-09-22 RX ADMIN — IPRATROPIUM BROMIDE 0.5 MG: 0.5 SOLUTION RESPIRATORY (INHALATION) at 04:09

## 2022-09-22 NOTE — NURSING
Pt alert and pleasant this am. Took all medications, ate a little breakfast. No behavioral issues noted at this point in time. Pt calmly lying in bed, will continue to monitor pt throughout shift.

## 2022-09-22 NOTE — PLAN OF CARE
Pt had a good day. Not combative, not trying to climb out of bed. Was able to eat his meals and followed instructions throughout shift.     Problem: Adult Inpatient Plan of Care  Goal: Plan of Care Review  Outcome: Ongoing, Progressing  Goal: Optimal Comfort and Wellbeing  Outcome: Ongoing, Progressing     Problem: Diabetes Comorbidity  Goal: Blood Glucose Level Within Targeted Range  Outcome: Ongoing, Progressing     Problem: Fluid and Electrolyte Imbalance (Acute Kidney Injury/Impairment)  Goal: Fluid and Electrolyte Balance  Outcome: Ongoing, Progressing

## 2022-09-22 NOTE — ASSESSMENT & PLAN NOTE
Patient is identified as having Systolic (HFrEF) heart failure that is Chronic. CHF is currently controlled. Latest ECHO performed and demonstrates- Results for orders placed during the hospital encounter of 07/19/22    Echo    Interpretation Summary  · Concentric hypertrophy and severely decreased systolic function.  · Mild left atrial enlargement.  · The estimated ejection fraction is 20%.  · Left ventricular diastolic dysfunction.  · There is left ventricular global hypokinesis.  · Moderate right ventricular enlargement with mildly to moderately reduced right ventricular systolic function.  · Moderate right atrial enlargement.  · There is mild aortic valve stenosis.  · Aortic valve area is 1.90 cm2; peak velocity is 1.05 m/s; mean gradient is 2 mmHg.  · There is severe pulmonary hypertension.  · Moderate tricuspid regurgitation.  · Elevated central venous pressure (15 mmHg).  · The estimated PA systolic pressure is 103 mmHg.  . Continue ACE/ARB, Furosemide and Aldactone and monitor clinical status closely. Monitor on telemetry. Patient is on CHF pathway.  Monitor strict Is&Os and daily weights.  Place on fluid restriction of 1.5 L. Continue to stress to patient importance of self efficacy and  on diet for CHF. Last BNP reviewed- and noted below   No results for input(s): BNP, BNPTRIAGEBLO in the last 168 hours..  EF 20%  Monitor I/o  Daily weights  Spironolactone and Entresto, Lasix, on hold 2/2 normotensive and MELISSA    9/17 stable  9/20 Resume Home lasix at reduced dose  9/22 Stable

## 2022-09-22 NOTE — ASSESSMENT & PLAN NOTE
Negative for symptoms of exacerbation    --Continue home regimen    9/17 Stable  9/22 Stable

## 2022-09-22 NOTE — SUBJECTIVE & OBJECTIVE
Interval History: Stable. Pleasant and cooperative at this time.     Review of Systems   Constitutional:  Positive for activity change and fatigue. Negative for chills, diaphoresis and fever.   HENT:  Positive for hearing loss. Negative for ear discharge, ear pain and facial swelling.    Eyes:  Negative for pain and redness.   Respiratory:  Negative for cough and shortness of breath.    Cardiovascular:  Negative for leg swelling.   Gastrointestinal:  Negative for abdominal pain, constipation, diarrhea, nausea and vomiting.   Endocrine: Negative for polydipsia and polyphagia.   Genitourinary:  Negative for flank pain and hematuria.               Musculoskeletal:  Positive for arthralgias and gait problem. Negative for back pain, neck pain and neck stiffness.   Skin:  Negative for color change.   Allergic/Immunologic: Negative for food allergies.   Neurological:  Positive for weakness. Negative for facial asymmetry and speech difficulty.   Hematological:  Does not bruise/bleed easily.   Psychiatric/Behavioral:  Negative for agitation, behavioral problems, hallucinations and suicidal ideas. The patient is not nervous/anxious.    Objective:     Vital Signs (Most Recent):  Temp: 97.7 °F (36.5 °C) (09/22/22 0710)  Pulse: 84 (09/22/22 0718)  Resp: 14 (09/22/22 0718)  BP: 127/81 (09/22/22 0710)  SpO2: 98 % (09/22/22 0718) Vital Signs (24h Range):  Temp:  [97.5 °F (36.4 °C)-98.4 °F (36.9 °C)] 97.7 °F (36.5 °C)  Pulse:  [74-93] 84  Resp:  [14-20] 14  SpO2:  [91 %-100 %] 98 %  BP: (127-144)/(71-85) 127/81     Weight: 87 kg (191 lb 12.8 oz)  Body mass index is 26.38 kg/m².    Intake/Output Summary (Last 24 hours) at 9/22/2022 1118  Last data filed at 9/22/2022 0500  Gross per 24 hour   Intake 480 ml   Output 1250 ml   Net -770 ml      Physical Exam  Vitals and nursing note reviewed.   Constitutional:       General: He is not in acute distress.     Appearance: He is well-developed. He is not diaphoretic.   HENT:      Head:  Normocephalic and atraumatic.      Mouth/Throat:      Mouth: Mucous membranes are moist.   Eyes:      General: No scleral icterus.        Right eye: No discharge.         Left eye: No discharge.      Extraocular Movements: Extraocular movements intact.      Conjunctiva/sclera: Conjunctivae normal.      Pupils: Pupils are equal, round, and reactive to light.   Cardiovascular:      Rate and Rhythm: Normal rate and regular rhythm.      Heart sounds: Normal heart sounds. No murmur heard.    No friction rub. No gallop.   Pulmonary:      Effort: Pulmonary effort is normal. No respiratory distress.      Breath sounds: No wheezing or rhonchi.      Comments: BBS diminished  Abdominal:      General: Bowel sounds are normal. There is no distension.      Palpations: Abdomen is soft.      Tenderness: There is no abdominal tenderness. There is no guarding.   Genitourinary:     Comments: Zully urine draining in bag  Musculoskeletal:      Cervical back: Normal range of motion and neck supple. No rigidity or tenderness.      Right lower leg: No edema.      Left lower leg: No edema.      Comments: Stiff joints  General debility   Skin:     General: Skin is warm and dry.      Capillary Refill: Capillary refill takes 2 to 3 seconds.   Neurological:      Mental Status: He is alert.      Motor: Weakness present.      Gait: Gait abnormal.      Comments: Oriented person and place  Forgetful  Responds appropriately to simple commands   Psychiatric:         Cognition and Memory: Cognition is impaired. He exhibits impaired recent memory.      Comments: Calm and cooperative       Lab Results   Component Value Date    WBC 6.28 09/21/2022    HGB 9.5 (L) 09/21/2022    HCT 30.0 (L) 09/21/2022    MCV 77 (L) 09/21/2022     09/21/2022       BMP  Lab Results   Component Value Date     09/21/2022    K 4.0 09/21/2022     09/21/2022    CO2 21 (L) 09/21/2022    BUN 29 (H) 09/21/2022    CREATININE 1.0 09/21/2022    CALCIUM 9.3 09/21/2022     ANIONGAP 12 09/21/2022    ESTGFRAFRICA >60 07/26/2022    ESTGFRAFRICA >60 07/26/2022    EGFRNONAA 53 (A) 07/26/2022    EGFRNONAA 53 (A) 07/26/2022

## 2022-09-22 NOTE — PLAN OF CARE
Pt remains fall free this shift.  Pt AAOx4, verbal, clear speech.  Skin warm and dry. No new skin issues.  Room air  Sanchez in place   Requires assistance with transfers.  CBG AC/ HS with PRN SS insulin.  Bed low, side rails up x 2, wheels locked, call light in reach.  Bed alarm maintained for safety.  Patient instructed to call for assistance.  Hourly rounding completed.  24 hour chart check completed  POC updated and reviewed with pt. Will continue POC.

## 2022-09-22 NOTE — ASSESSMENT & PLAN NOTE
9/16 Fall and skin precautions  Turn q 2 hours  Continue Physical therapy and Occupational therapy  9/21 Awaiting SNF placement  9/22 Patient refused SNF placement yesterday and some agitation noted at that time. Tele psych consulted and patient currently undergoing a medication adjustment for his Dementia.

## 2022-09-22 NOTE — TELEPHONE ENCOUNTER
Spoke to pts relative and informed him that Dr. Mariano wanted him to followup with Dr. Sarmiento in one week; relative stated pt may be going to a SNF unit but will keep appt in the event he does not go.

## 2022-09-22 NOTE — PROGRESS NOTES
[facility] - Med Surg 3  [facility] Medicine  Progress Note    Patient Name: Joseph Toussaint  MRN: [id number]  Patient Class: IP- Inpatient   Admission Date: 9/10/2022  Length of Stay: 7 days  Attending Physician: Matt Rossi MD  Primary Care Provider: Abad Iqbal MD      Subjective:     Principal Problem:Acute renal failure      HPI:  89 y.o. male patient with a PMHx of HTN, COPD, CHF, and DM who presents to the Emergency Department for evaluation of a fall which occurred x 2 days pta. Pt family called for transport to get pt checked out. Pt denies any complaints and is not in pain. No associated sxs reported. Patient denies any CP, SOB, fever, back pain, abd pain, and all other sxs at this time. No prior Tx reported. In the ED, UA consistent with UTI, dx with UTI on 8/27, unclear if patient completed antibiotic regimen. BUN/Cr: 39/2.1, Bili: 2.0, H/H: 10.4/32.3. Afebrile, vitals stable. Oriented to self only on exam. Intitiated on antibiotics in the ED, urine culture pending, treated with IV fluid bolus. Patient is a full code, surrogate decision maker is his son Kevin Toussaint. Placed in observation under the care of hospital medicine.       Overview/Hospital Course:  The patient is a 89 yo male with HTN, COPD, CHF, and DM who was placed in observation for frequent falls, MELISSA, and presumed UTI on IVF and IV  Rocephin.     9/11/22: Pt AAOx3 at times but with notable cognitive deficits. Denies complaints. MELISSA improving 2.1>1.9. Hyperchloremic metabolic acidosis noted- will switch NS IVFs to Bicarb drip. Blood cultures show NGTD. Urine culture showed no growth but urine output is very cloudy -almost purulent. Will switch IV Cipro to oral dose. PT/OT evaluated pt and recommended SNF. Called pt's nephew Kraig (POA) and provided status update.  Pt lives with his nephew and his wife. Currently, Kraig is in the hospital recovering from surgery. He requested discharge to SNF.    9/12/22: Denies complaints. No acute  events. MELISSA slowly improving 2.1>1.9>1.7. cont IVF. SNF placement pending     9/13/22: No acute events. Serum Cr remains 1.7. Will check BNP and Renal U/S. Hold IVFs  Awaiting SNF placement     9/14/22: Increased agitation - Seroquel increased to BID. Serum Cr 1.5. Renal U/s showed mild hydronephrosis on right and moderate on Left with distended bladder. Christopher placed-900 ml creamy white UOP immediately returned. Will d/c Cipro- add IV Rocephin. Urology consulted. Awaiting SNF placement     9/15/22: Seroquel in am made pt too drowsy- will decrease back to just nightly. Gross hematuria to UOP. Urology recommended continuous bladder irrigation. Serum Cr normalized after placing christopher. Hydronephrosis 2/2 urinary retention. Cont IV Rocephin. Repeat urine culture pending. Pt will be placed in SNF when hematuria resolves and repeat urine culture results.     9/16 Gross hematuria resolved. CBI clamped. Continue current treatment.   9/17 Patient remains stable and improved. No hematuria at this time.   9/18 Stable. SNF placement pending.   9/19 Stable. SNF placement pending.   9/20 Stable. SNF placement pending.   9/21 Stable.  Awaiting SNF placement.   9/22 Stable. Pleasant and cooperative at this time.       Interval History: Stable. Pleasant and cooperative at this time.     Review of Systems   Constitutional:  Positive for activity change and fatigue. Negative for chills, diaphoresis and fever.   HENT:  Positive for hearing loss. Negative for ear discharge, ear pain and facial swelling.    Eyes:  Negative for pain and redness.   Respiratory:  Negative for cough and shortness of breath.    Cardiovascular:  Negative for leg swelling.   Gastrointestinal:  Negative for abdominal pain, constipation, diarrhea, nausea and vomiting.   Endocrine: Negative for polydipsia and polyphagia.   Genitourinary:  Negative for flank pain and hematuria.               Musculoskeletal:  Positive for arthralgias and gait problem. Negative for  back pain, neck pain and neck stiffness.   Skin:  Negative for color change.   Allergic/Immunologic: Negative for food allergies.   Neurological:  Positive for weakness. Negative for facial asymmetry and speech difficulty.   Hematological:  Does not bruise/bleed easily.   Psychiatric/Behavioral:  Negative for agitation, behavioral problems, hallucinations and suicidal ideas. The patient is not nervous/anxious.    Objective:     Vital Signs (Most Recent):  Temp: 97.7 °F (36.5 °C) (09/22/22 0710)  Pulse: 84 (09/22/22 0718)  Resp: 14 (09/22/22 0718)  BP: 127/81 (09/22/22 0710)  SpO2: 98 % (09/22/22 0718) Vital Signs (24h Range):  Temp:  [97.5 °F (36.4 °C)-98.4 °F (36.9 °C)] 97.7 °F (36.5 °C)  Pulse:  [74-93] 84  Resp:  [14-20] 14  SpO2:  [91 %-100 %] 98 %  BP: (127-144)/(71-85) 127/81     Weight: 87 kg (191 lb 12.8 oz)  Body mass index is 26.38 kg/m².    Intake/Output Summary (Last 24 hours) at 9/22/2022 1118  Last data filed at 9/22/2022 0500  Gross per 24 hour   Intake 480 ml   Output 1250 ml   Net -770 ml      Physical Exam  Vitals and nursing note reviewed.   Constitutional:       General: He is not in acute distress.     Appearance: He is well-developed. He is not diaphoretic.   HENT:      Head: Normocephalic and atraumatic.      Mouth/Throat:      Mouth: Mucous membranes are moist.   Eyes:      General: No scleral icterus.        Right eye: No discharge.         Left eye: No discharge.      Extraocular Movements: Extraocular movements intact.      Conjunctiva/sclera: Conjunctivae normal.      Pupils: Pupils are equal, round, and reactive to light.   Cardiovascular:      Rate and Rhythm: Normal rate and regular rhythm.      Heart sounds: Normal heart sounds. No murmur heard.    No friction rub. No gallop.   Pulmonary:      Effort: Pulmonary effort is normal. No respiratory distress.      Breath sounds: No wheezing or rhonchi.      Comments: BBS diminished  Abdominal:      General: Bowel sounds are normal. There is  no distension.      Palpations: Abdomen is soft.      Tenderness: There is no abdominal tenderness. There is no guarding.   Genitourinary:     Comments: Zully urine draining in bag  Musculoskeletal:      Cervical back: Normal range of motion and neck supple. No rigidity or tenderness.      Right lower leg: No edema.      Left lower leg: No edema.      Comments: Stiff joints  General debility   Skin:     General: Skin is warm and dry.      Capillary Refill: Capillary refill takes 2 to 3 seconds.   Neurological:      Mental Status: He is alert.      Motor: Weakness present.      Gait: Gait abnormal.      Comments: Oriented person and place  Forgetful  Responds appropriately to simple commands   Psychiatric:         Cognition and Memory: Cognition is impaired. He exhibits impaired recent memory.      Comments: Calm and cooperative       Lab Results   Component Value Date    WBC 6.28 09/21/2022    HGB 9.5 (L) 09/21/2022    HCT 30.0 (L) 09/21/2022    MCV 77 (L) 09/21/2022     09/21/2022       BMP  Lab Results   Component Value Date     09/21/2022    K 4.0 09/21/2022     09/21/2022    CO2 21 (L) 09/21/2022    BUN 29 (H) 09/21/2022    CREATININE 1.0 09/21/2022    CALCIUM 9.3 09/21/2022    ANIONGAP 12 09/21/2022    ESTGFRAFRICA >60 07/26/2022    ESTGFRAFRICA >60 07/26/2022    EGFRNONAA 53 (A) 07/26/2022    EGFRNONAA 53 (A) 07/26/2022           Assessment/Plan:      Debility  9/16 Fall and skin precautions  Turn q 2 hours  Continue Physical therapy and Occupational therapy  9/21 Awaiting SNF placement  9/22 Patient refused SNF placement yesterday and some agitation noted at that time. Tele psych consulted and patient currently undergoing a medication adjustment for his Dementia.         Malnutrition of moderate degree  9/16 Nutrition consulted. Most recent weight and BMI monitored  Add po supplement    Measurements:  Wt Readings from Last 1 Encounters:   09/19/22 87 kg (191 lb 12.8 oz)   Body mass index is  26.38 kg/m².    Recommendations:      Patient has been screened and assessed by RD. RD will follow patient.      Type 2 diabetes mellitus   9/16 Dm diet  Accuchecks with correctional SSI  Blood sugars running 129-135  Lab Results   Component Value Date    HGBA1C 6.6 (H) 07/20/2022 9/17 Blood sugars running 121-145  9/18 Blood sugars running 118-151  9/19 Blood sugars running 110-146  9/20 Blood sugars running 124-132  9/21 Blood sugars running 124-152  9/22 Blood sugars running 140-159     Hypokalemia  9/16 Add 40 meq po x 1 dose      Microcytic anemia  9/16 Add MVI      Hydronephrosis, bilateral  2/2 urinary retention   Serum Cr normalized     9/16 Patient to discharge with christopher and follow up with Urology as Outpatient      Urinary retention  9/16 Patient to discharge with christopher and follow up with Urology as Outpatient       Falls  Patient reported falling at home    --Avasys  --Fall Precautions    9/11/22: PT/OT  SNF placement - CM consulted     9/19 Awaiting SNF placement    Dementia with behavioral disturbance  Confused on admission\  Cont home emd Seroquel HS    --Avasys  --Fall precautions  --Neuro checks Q4H x24 hours    9/11/22: Pt AAOx3 at times but with notable cognitive deficits.    9/14/22: Increased agitation - Seroquel increased to BID.    9/15/22: Seroquel in am made pt too drowsy- will decrease back to just nightly.     9/17 Stable. Alert, appropriate    9/20 Sleepy but arousable. Calm and cooperative. Discontinue Seroquel  9/21 Stable. Pleasant. Watching tv.    Telepsych consult done and recommended-  Begin / Restart Seroquel at reduced dose of 12.5 mg PO QAM and 25 mg PO QHS (can also reduce to 12.5 mg if excess morning somnolence is observed) for behavioral disturbances / mood / sleep in dementia / possible resolving delirium (discussed risks/benefits/alt vs no treatment with patient).     - Can use Zyprexa 2.5 mg to 5 mg PO/IM q8 hours PRN for non-redirectable behavioral disturbances in  dementia / possible resolving delirium (discussed risks/benefits/alt vs no treatment with patient).    Avoid benzodiazepines, antihistamines, anticholinergics, hypnotics, and minimize opiates while controlling for pain as these medications may exacerbate delirium.      9/22 Patient refused SNF placement yesterday and some agitation noted at that time. Tele psych consulted and patient currently undergoing a medication adjustment for his Dementia. See Above.      Chronic systolic congestive heart failure  Patient is identified as having Systolic (HFrEF) heart failure that is Chronic. CHF is currently controlled. Latest ECHO performed and demonstrates- Results for orders placed during the hospital encounter of 07/19/22    Echo    Interpretation Summary  · Concentric hypertrophy and severely decreased systolic function.  · Mild left atrial enlargement.  · The estimated ejection fraction is 20%.  · Left ventricular diastolic dysfunction.  · There is left ventricular global hypokinesis.  · Moderate right ventricular enlargement with mildly to moderately reduced right ventricular systolic function.  · Moderate right atrial enlargement.  · There is mild aortic valve stenosis.  · Aortic valve area is 1.90 cm2; peak velocity is 1.05 m/s; mean gradient is 2 mmHg.  · There is severe pulmonary hypertension.  · Moderate tricuspid regurgitation.  · Elevated central venous pressure (15 mmHg).  · The estimated PA systolic pressure is 103 mmHg.  . Continue ACE/ARB, Furosemide and Aldactone and monitor clinical status closely. Monitor on telemetry. Patient is on CHF pathway.  Monitor strict Is&Os and daily weights.  Place on fluid restriction of 1.5 L. Continue to stress to patient importance of self efficacy and  on diet for CHF. Last BNP reviewed- and noted below   No results for input(s): BNP, BNPTRIAGEBLO in the last 168 hours..  EF 20%  Monitor I/o  Daily weights  Spironolactone and Entresto, Lasix, on hold 2/2  normotensive and MELISSA    9/17 stable  9/20 Resume Home lasix at reduced dose  9/22 Stable    Essential hypertension  Normotensive   Home medications Spironolactone and Entresto on hold   --Hydralazine PRN  --Vitals Q4H  9/17 Stable      COPD suggested by initial evaluation  Negative for symptoms of exacerbation    --Continue home regimen    9/17 Stable  9/22 Stable            VTE Risk Mitigation (From admission, onward)         Ordered     Place NIKITA hose  Until discontinued         09/17/22 0846     enoxaparin injection 40 mg  Daily         09/14/22 1426     Reason for No Pharmacological VTE Prophylaxis  Once        Question:  Reasons:  Answer:  Already adequately anticoagulated on oral Anticoagulants    09/10/22 1158     IP VTE HIGH RISK PATIENT  Once         09/10/22 1158     Place sequential compression device  Until discontinued         09/10/22 1158                Discharge Planning   TATIANA: 9/21/2022     Code Status: Full Code   Is the patient medically ready for discharge?: Yes    Reason for patient still in hospital (select all that apply): Treatment  Discharge Plan A: Skilled Nursing Facility   Discharge Delays: None known at this time      Time spent seeing patient( greater than 1/2 spent in direct contact) :  34 minutes    JESSENIA Bah  Department of Hospital Medicine   [facility] - Med Surg 3

## 2022-09-22 NOTE — ASSESSMENT & PLAN NOTE
9/16 Dm diet  Accuchecks with correctional SSI  Blood sugars running 129-135  Lab Results   Component Value Date    HGBA1C 6.6 (H) 07/20/2022 9/17 Blood sugars running 121-145  9/18 Blood sugars running 118-151  9/19 Blood sugars running 110-146  9/20 Blood sugars running 124-132  9/21 Blood sugars running 124-152  9/22 Blood sugars running 140-159

## 2022-09-23 LAB
POCT GLUCOSE: 121 MG/DL (ref 70–110)
POCT GLUCOSE: 123 MG/DL (ref 70–110)
POCT GLUCOSE: 142 MG/DL (ref 70–110)

## 2022-09-23 PROCEDURE — 97530 THERAPEUTIC ACTIVITIES: CPT

## 2022-09-23 PROCEDURE — 63600175 PHARM REV CODE 636 W HCPCS: Performed by: FAMILY MEDICINE

## 2022-09-23 PROCEDURE — 25000242 PHARM REV CODE 250 ALT 637 W/ HCPCS: Performed by: INTERNAL MEDICINE

## 2022-09-23 PROCEDURE — 25000003 PHARM REV CODE 250: Performed by: NURSE PRACTITIONER

## 2022-09-23 PROCEDURE — 25000242 PHARM REV CODE 250 ALT 637 W/ HCPCS: Performed by: NURSE PRACTITIONER

## 2022-09-23 PROCEDURE — 11000001 HC ACUTE MED/SURG PRIVATE ROOM

## 2022-09-23 PROCEDURE — 97530 THERAPEUTIC ACTIVITIES: CPT | Mod: CQ

## 2022-09-23 PROCEDURE — 94761 N-INVAS EAR/PLS OXIMETRY MLT: CPT

## 2022-09-23 PROCEDURE — G0009 ADMIN PNEUMOCOCCAL VACCINE: HCPCS | Performed by: INTERNAL MEDICINE

## 2022-09-23 PROCEDURE — 90677 PCV20 VACCINE IM: CPT | Performed by: INTERNAL MEDICINE

## 2022-09-23 PROCEDURE — 90471 IMMUNIZATION ADMIN: CPT | Performed by: INTERNAL MEDICINE

## 2022-09-23 PROCEDURE — 93010 EKG 12-LEAD: ICD-10-PCS | Mod: ,,, | Performed by: STUDENT IN AN ORGANIZED HEALTH CARE EDUCATION/TRAINING PROGRAM

## 2022-09-23 PROCEDURE — 93010 ELECTROCARDIOGRAM REPORT: CPT | Mod: ,,, | Performed by: STUDENT IN AN ORGANIZED HEALTH CARE EDUCATION/TRAINING PROGRAM

## 2022-09-23 PROCEDURE — 63600175 PHARM REV CODE 636 W HCPCS: Performed by: INTERNAL MEDICINE

## 2022-09-23 PROCEDURE — 94640 AIRWAY INHALATION TREATMENT: CPT

## 2022-09-23 PROCEDURE — 93005 ELECTROCARDIOGRAM TRACING: CPT

## 2022-09-23 RX ADMIN — QUETIAPINE FUMARATE 25 MG: 25 TABLET ORAL at 10:09

## 2022-09-23 RX ADMIN — IPRATROPIUM BROMIDE 0.5 MG: 0.5 SOLUTION RESPIRATORY (INHALATION) at 07:09

## 2022-09-23 RX ADMIN — BUDESONIDE 0.5 MG: 0.5 INHALANT ORAL at 07:09

## 2022-09-23 RX ADMIN — QUETIAPINE FUMARATE 12.5 MG: 25 TABLET, FILM COATED ORAL at 09:09

## 2022-09-23 RX ADMIN — THERA TABS 1 TABLET: TAB at 09:09

## 2022-09-23 RX ADMIN — TIMOLOL MALEATE 1 DROP: 5 SOLUTION/ DROPS OPHTHALMIC at 10:09

## 2022-09-23 RX ADMIN — IPRATROPIUM BROMIDE 0.5 MG: 0.5 SOLUTION RESPIRATORY (INHALATION) at 04:09

## 2022-09-23 RX ADMIN — IPRATROPIUM BROMIDE 0.5 MG: 0.5 SOLUTION RESPIRATORY (INHALATION) at 01:09

## 2022-09-23 RX ADMIN — TAMSULOSIN HYDROCHLORIDE 0.4 MG: 0.4 CAPSULE ORAL at 10:09

## 2022-09-23 RX ADMIN — ASPIRIN 81 MG: 81 TABLET, COATED ORAL at 09:09

## 2022-09-23 RX ADMIN — PNEUMOCOCCAL 20-VALENT CONJUGATE VACCINE 0.5 ML
2.2; 2.2; 2.2; 2.2; 2.2; 2.2; 2.2; 2.2; 2.2; 2.2; 2.2; 2.2; 2.2; 2.2; 2.2; 2.2; 4.4; 2.2; 2.2; 2.2 INJECTION, SUSPENSION INTRAMUSCULAR at 12:09

## 2022-09-23 RX ADMIN — ENOXAPARIN SODIUM 40 MG: 100 INJECTION SUBCUTANEOUS at 05:09

## 2022-09-23 RX ADMIN — TIMOLOL MALEATE 1 DROP: 5 SOLUTION/ DROPS OPHTHALMIC at 09:09

## 2022-09-23 RX ADMIN — FUROSEMIDE 20 MG: 20 TABLET ORAL at 09:09

## 2022-09-23 NOTE — PLAN OF CARE
[facility] - Med [facility] 3  Discharge Reassessment    Primary Care Provider: Abad Iqbal MD    Expected Discharge Date: 9/21/2022    Reassessment (most recent)       Discharge Reassessment - 09/23/22 0923          Discharge Reassessment    Assessment Type Discharge Planning Reassessment     Did the patient's condition or plan change since previous assessment? Yes     Discharge Plan discussed with: Adult children     Communicated TATIANA with patient/caregiver Date not available/Unable to determine     Discharge Plan A Skilled Nursing Facility     Discharge Plan B Home Health     Discharge Barriers Identified Nursing Home rejection;No family/friends to help     Why the patient remains in the hospital Placement issues        Post-Acute Status    Post-Acute Authorization Placement     Post-Acute Placement Status Referrals Sent     Discharge Delays Post-Acute Set-up                   Pt remains in hospital after failed DC attempt to [facility] because he became combative with [employer] . Spoke with Jose Alfredo from [facility] who said he would discuss with his team a possibility of trying to admit pt again.    Tor Morocho LMSW 9/23/2022 9:30 AM

## 2022-09-23 NOTE — ACP (ADVANCE CARE PLANNING)
Advance Care Planning   [facility] - Med S[facility] 3  Palliative Care RN      Patient Name: Joseph Toussaint  MRN: [id number]  Admission Date: 9/10/2022  Hospital Length of Stay: 8 days  Code Status: Full Code   Attending Provider: Matt Rossi MD  Primary Care Physician: Abad Iqbal MD  Principal Problem:Acute renal failure    HCPOA on file naming son Kevin Toussaint as agent: mobile [phone] home [phone].      Shamika Cobb RN-CHPN, Palliative Medicine   [phone]

## 2022-09-23 NOTE — PT/OT/SLP PROGRESS
Physical Therapy  Treatment    Joseph Toussaint   MRN: [id number]   Admitting Diagnosis: Acute renal failure    PT Received On: 09/23/22  PT Start Time: 1310     PT Stop Time: 1335    PT Total Time (min): 25 min       Billable Minutes:  Therapeutic Activity 25    Treatment Type: Treatment  PT/PTA: PTA     PTA Visit Number: 1       General Precautions: Standard, fall  Orthopedic Precautions: N/A   Braces: N/A  Respiratory Status: Room air    Spiritual, Cultural Beliefs, Gnosticism Practices, Values that Affect Care: no    Subjective:  Communicated with patient's nurse, Sania, and completed Epic chart review prior to session.  Patient highly confused but not resistant to participate.    Pain/Comfort  Pain Rating 1: 0/10    Objective:   Patient found with: peripheral IV, telemetry, bed alarm, christopher catheter, Other (comments) (AVASYS)    Functional Mobility:  Supine > sit EOB: Mod A (increased time to complete with cues for sequencing of task)    Forward scoot towards EOB: Min A    Seated EOB x5 min total focusing on increased tolerance to upright position, core stability, trunk control and CV endurance.  Required CGA-Min A to maintain sitting balance. Increased level of assist as fatigue increased and if patient was not in self supported sitting.     STS from EOB > RW: Mod A     Side stepping along EOB R/L (~6 steps each direction) Mod A w/ RW    X4 forward/back steps w/ RW Mod A (consistent VC to remain within SEMAJ of RW)    Sit > supine: Mod A    Supine scoot towards HOB: Total A of 2    Educated patient on importance of increased tolerance to upright position and direct impact on CV endurance and strength. Patient encouraged to utilize elevated HOB to simulate chair position until able to safely complete chair T/F. Patient given a minimum goal of majority of the day to be spent in upright, especially with all meals. Encouraged patient to perform AROM TE to BLE throughout the day within all available planes of motion.  Re enforced importance of utilizing call light to meet needs in room and not attempt to get up without staff assistance.    AM-PAC 6 CLICK MOBILITY  How much help from another person does this patient currently need?   1 = Unable, Total/Dependent Assistance  2 = A lot, Maximum/Moderate Assistance  3 = A little, Minimum/Contact Guard/Supervision  4 = None, Modified [place]/Independent    Turning over in bed (including adjusting bedclothes, sheets and blankets)?: 2  Sitting down on and standing up from a chair with arms (e.g., wheelchair, bedside commode, etc.): 2  Moving from lying on back to sitting on the side of the bed?: 2  Moving to and from a bed to a chair (including a wheelchair)?: 2  Need to walk in hospital room?: 2  Climbing 3-5 steps with a railing?: 1  Basic Mobility Total Score: 11    AM-PAC Raw Score CMS G-Code Modifier Level of Impairment Assistance   6 % Total / Unable   7 - 9 CM 80 - 100% Maximal Assist   10 - 14 CL 60 - 80% Moderate Assist   15 - 19 CK 40 - 60% Moderate Assist   20 - 22 CJ 20 - 40% Minimal Assist   23 CI 1-20% SBA / CGA   24 CH 0% Independent/ Mod I     Patient left with bed in chair position with call button in reach, bed alarm on, and AVASYS present.    Assessment:  Joseph Toussaint is a 89 y.o. male with a medical diagnosis of Acute renal failure and presents with overall decline in functional mobility. Patient would continue to benefit from skilled PT to address functional limitations listed below in order to return to PLOF/decrease caregiver burden.     Rehab identified problem list/impairments: Rehab identified problem list/impairments: weakness, impaired endurance, impaired sensation, impaired self care skills, impaired functional mobility, gait instability, impaired balance, impaired cognition, decreased coordination, decreased upper extremity function, decreased lower extremity function, decreased safety awareness, pain, abnormal tone, decreased ROM, impaired  coordination, impaired cardiopulmonary response to activity    Rehab potential is fair.    Activity tolerance: Poor    Discharge recommendations: Discharge Facility/Level of Care Needs: nursing facility, skilled     Barriers to discharge:      Equipment recommendations: Equipment Needed After Discharge: other (see comments) (TBD BY NEXT LEVEL OF CARE)     GOALS:   Multidisciplinary Problems       Physical Therapy Goals          Problem: Physical Therapy    Goal Priority Disciplines Outcome Goal Variances Interventions   Physical Therapy Goal     PT, PT/OT Ongoing, Progressing     Description: Patient will be seen a minimal of 3 out of 7 days a week.    LTG to be met by 09/24:    Bed mobility: SPV  Transfers: CGA with RW   Gait: CGA with RW                       PLAN:    Patient to be seen 3 x/week  to address the above listed problems via gait training, therapeutic activities, therapeutic exercises, neuromuscular re-education  Plan of Care expires: 09/24/22  Plan of Care reviewed with: patient         09/23/2022

## 2022-09-23 NOTE — PROGRESS NOTES
[facility] - Med Surg 3  [facility] Medicine  Progress Note    Patient Name: Joseph Toussaint  MRN: [id number]  Patient Class: IP- Inpatient   Admission Date: 9/10/2022  Length of Stay: 8 days  Attending Physician: Matt Rossi MD  Primary Care Provider: Abad Iqbal MD        Subjective:     Principal Problem:Acute renal failure        HPI:  89 y.o. male patient with a PMHx of HTN, COPD, CHF, and DM who presents to the Emergency Department for evaluation of a fall which occurred x 2 days pta. Pt family called for transport to get pt checked out. Pt denies any complaints and is not in pain. No associated sxs reported. Patient denies any CP, SOB, fever, back pain, abd pain, and all other sxs at this time. No prior Tx reported. In the ED, UA consistent with UTI, dx with UTI on 8/27, unclear if patient completed antibiotic regimen. BUN/Cr: 39/2.1, Bili: 2.0, H/H: 10.4/32.3. Afebrile, vitals stable. Oriented to self only on exam. Intitiated on antibiotics in the ED, urine culture pending, treated with IV fluid bolus. Patient is a full code, surrogate decision maker is his son Kevin Toussaint. Placed in observation under the care of hospital medicine.       Overview/Hospital Course:  The patient is a 89 yo male with HTN, COPD, CHF, and DM who was placed in observation for frequent falls, MELISSA, and presumed UTI on IVF and IV  Rocephin.     9/11/22: Pt AAOx3 at times but with notable cognitive deficits. Denies complaints. MELISSA improving 2.1>1.9. Hyperchloremic metabolic acidosis noted- will switch NS IVFs to Bicarb drip. Blood cultures show NGTD. Urine culture showed no growth but urine output is very cloudy -almost purulent. Will switch IV Cipro to oral dose. PT/OT evaluated pt and recommended SNF. Called pt's nephew Kraig (POA) and provided status update.  Pt lives with his nephew and his wife. Currently, Kraig is in the hospital recovering from surgery. He requested discharge to SNF.    9/12/22: Denies complaints. No  acute events. MELISSA slowly improving 2.1>1.9>1.7. cont IVF. SNF placement pending     9/13/22: No acute events. Serum Cr remains 1.7. Will check BNP and Renal U/S. Hold IVFs  Awaiting SNF placement     9/14/22: Increased agitation - Seroquel increased to BID. Serum Cr 1.5. Renal U/s showed mild hydronephrosis on right and moderate on Left with distended bladder. Christopher placed-900 ml creamy white UOP immediately returned. Will d/c Cipro- add IV Rocephin. Urology consulted. Awaiting SNF placement     9/15/22: Seroquel in am made pt too drowsy- will decrease back to just nightly. Gross hematuria to UOP. Urology recommended continuous bladder irrigation. Serum Cr normalized after placing christopher. Hydronephrosis 2/2 urinary retention. Cont IV Rocephin. Repeat urine culture pending. Pt will be placed in SNF when hematuria resolves and repeat urine culture results.     9/16 Gross hematuria resolved. CBI clamped. Continue current treatment.   9/17 Patient remains stable and improved. No hematuria at this time.   9/18 Stable. SNF placement pending.   9/19 Stable. SNF placement pending.   9/20 Stable. SNF placement pending.   9/21 Stable.  Awaiting SNF placement.   9/22 Stable. Pleasant and cooperative at this time.     As of 9/23/2022, pt seen and examined. He is more clam today, however not accepted by [facility] on Monday. Will follow.       Interval History: Awaiting placement     Review of Systems   Constitutional:  Positive for activity change and fatigue. Negative for chills, diaphoresis and fever.   HENT:  Positive for hearing loss. Negative for ear discharge, ear pain and facial swelling.    Eyes:  Negative for pain and redness.   Respiratory:  Negative for cough and shortness of breath.    Cardiovascular:  Negative for leg swelling.   Gastrointestinal:  Negative for abdominal pain, constipation, diarrhea, nausea and vomiting.   Endocrine: Negative for polydipsia and polyphagia.    Genitourinary:  Negative for flank pain and hematuria.               Musculoskeletal:  Positive for arthralgias and gait problem. Negative for back pain, neck pain and neck stiffness.   Skin:  Negative for color change.   Allergic/Immunologic: Negative for food allergies.   Neurological:  Positive for weakness. Negative for facial asymmetry and speech difficulty.   Hematological:  Does not bruise/bleed easily.   Psychiatric/Behavioral:  Negative for agitation, behavioral problems, hallucinations and suicidal ideas. The patient is not nervous/anxious.    Objective:     Vital Signs (Most Recent):  Temp: 98 °F (36.7 °C) (09/23/22 1538)  Pulse: 87 (09/23/22 1538)  Resp: 18 (09/23/22 1538)  BP: 136/84 (09/23/22 1538)  SpO2: 100 % (09/23/22 1538) Vital Signs (24h Range):  Temp:  [97.6 °F (36.4 °C)-98.6 °F (37 °C)] 98 °F (36.7 °C)  Pulse:  [68-87] 87  Resp:  [18] 18  SpO2:  [95 %-100 %] 100 %  BP: (114-136)/(64-84) 136/84     Weight: 87 kg (191 lb 12.8 oz)  Body mass index is 26.38 kg/m².    Intake/Output Summary (Last 24 hours) at 9/23/2022 1647  Last data filed at 9/23/2022 1100  Gross per 24 hour   Intake 240 ml   Output 1850 ml   Net -1610 ml      Physical Exam  Vitals and nursing note reviewed.   Constitutional:       General: He is not in acute distress.     Appearance: He is well-developed. He is not ill-appearing, toxic-appearing or diaphoretic.   HENT:      Head: Normocephalic and atraumatic.      Mouth/Throat:      Mouth: Mucous membranes are moist.   Eyes:      General: No scleral icterus.        Right eye: No discharge.         Left eye: No discharge.      Extraocular Movements: Extraocular movements intact.      Conjunctiva/sclera: Conjunctivae normal.      Pupils: Pupils are equal, round, and reactive to light.   Cardiovascular:      Rate and Rhythm: Normal rate and regular rhythm.      Heart sounds: Normal heart sounds. No murmur heard.    No friction rub. No gallop.   Pulmonary:      Effort: Pulmonary  effort is normal. No respiratory distress.      Breath sounds: No wheezing or rhonchi.      Comments: BBS diminished  Abdominal:      General: Bowel sounds are normal. There is no distension.      Palpations: Abdomen is soft.      Tenderness: There is no abdominal tenderness. There is no guarding.   Genitourinary:     Comments: Zully urine draining in bag  Musculoskeletal:      Cervical back: Normal range of motion and neck supple. No rigidity or tenderness.      Right lower leg: No edema.      Left lower leg: No edema.      Comments: Stiff joints  General debility   Skin:     General: Skin is warm and dry.      Capillary Refill: Capillary refill takes 2 to 3 seconds.   Neurological:      Mental Status: He is alert.      Motor: Weakness present.      Gait: Gait abnormal.      Comments: Oriented person and place  Responds appropriately to simple commands   Psychiatric:         Cognition and Memory: Cognition is impaired. He exhibits impaired recent memory.      Comments: Calm and cooperative       Significant Labs: All pertinent labs within the past 24 hours have been reviewed.          Assessment/Plan:      Debility  9/16 Fall and skin precautions  Turn q 2 hours  Continue Physical therapy and Occupational therapy  9/21 Awaiting SNF placement  9/22 Patient refused SNF placement yesterday and some agitation noted at that time. Tele psych consulted and patient currently undergoing a medication adjustment for his Dementia.         Malnutrition of moderate degree  9/16 Nutrition consulted. Most recent weight and BMI monitored  Add po supplement    Measurements:  Wt Readings from Last 1 Encounters:   09/19/22 87 kg (191 lb 12.8 oz)   Body mass index is 26.38 kg/m².    Recommendations:      Patient has been screened and assessed by RD. RD will follow patient.      Type 2 diabetes mellitus   9/16 Dm diet  Accuchecks with correctional SSI  Blood sugars running 129-135  Lab Results   Component Value Date    HGBA1C 6.6 (H)  07/20/2022 9/17 Blood sugars running 121-145  9/18 Blood sugars running 118-151  9/19 Blood sugars running 110-146  9/20 Blood sugars running 124-132  9/21 Blood sugars running 124-152  9/22 Blood sugars running 140-159     Hypokalemia  9/16 Add 40 meq po x 1 dose      Microcytic anemia  9/16 Add MVI      Hydronephrosis, bilateral  2/2 urinary retention   Serum Cr normalized     9/16 Patient to discharge with christopher and follow up with Urology as Outpatient      Urinary retention  9/16 Patient to discharge with christopher and follow up with Urology as Outpatient       Falls  Patient reported falling at home    --Avasys  --Fall Precautions    9/11/22: PT/OT  SNF placement - CM consulted     9/19 Awaiting SNF placement    Dementia with behavioral disturbance  Confused on admission\  Cont home emd Seroquel HS    --Avasys  --Fall precautions  --Neuro checks Q4H x24 hours    9/11/22: Pt AAOx3 at times but with notable cognitive deficits.    9/14/22: Increased agitation - Seroquel increased to BID.    9/15/22: Seroquel in am made pt too drowsy- will decrease back to just nightly.     9/17 Stable. Alert, appropriate    9/20 Sleepy but arousable. Calm and cooperative. Discontinue Seroquel  9/21 Stable. Pleasant. Watching tv.    Telepsych consult done and recommended-  Begin / Restart Seroquel at reduced dose of 12.5 mg PO QAM and 25 mg PO QHS (can also reduce to 12.5 mg if excess morning somnolence is observed) for behavioral disturbances / mood / sleep in dementia / possible resolving delirium (discussed risks/benefits/alt vs no treatment with patient).     - Can use Zyprexa 2.5 mg to 5 mg PO/IM q8 hours PRN for non-redirectable behavioral disturbances in dementia / possible resolving delirium (discussed risks/benefits/alt vs no treatment with patient).    Avoid benzodiazepines, antihistamines, anticholinergics, hypnotics, and minimize opiates while controlling for pain as these medications may exacerbate delirium.      9/22  Patient refused SNF placement yesterday and some agitation noted at that time. Tele psych consulted and patient currently undergoing a medication adjustment for his Dementia. See Above.      Chronic systolic congestive heart failure  Patient is identified as having Systolic (HFrEF) heart failure that is Chronic. CHF is currently controlled. Latest ECHO performed and demonstrates- Results for orders placed during the hospital encounter of 07/19/22    Echo    Interpretation Summary  · Concentric hypertrophy and severely decreased systolic function.  · Mild left atrial enlargement.  · The estimated ejection fraction is 20%.  · Left ventricular diastolic dysfunction.  · There is left ventricular global hypokinesis.  · Moderate right ventricular enlargement with mildly to moderately reduced right ventricular systolic function.  · Moderate right atrial enlargement.  · There is mild aortic valve stenosis.  · Aortic valve area is 1.90 cm2; peak velocity is 1.05 m/s; mean gradient is 2 mmHg.  · There is severe pulmonary hypertension.  · Moderate tricuspid regurgitation.  · Elevated central venous pressure (15 mmHg).  · The estimated PA systolic pressure is 103 mmHg.  . Continue ACE/ARB, Furosemide and Aldactone and monitor clinical status closely. Monitor on telemetry. Patient is on CHF pathway.  Monitor strict Is&Os and daily weights.  Place on fluid restriction of 1.5 L. Continue to stress to patient importance of self efficacy and  on diet for CHF. Last BNP reviewed- and noted below   No results for input(s): BNP, BNPTRIAGEBLO in the last 168 hours..  EF 20%  Monitor I/o  Daily weights  Spironolactone and Entresto, Lasix, on hold 2/2 normotensive and MELISSA    9/17 stable  9/20 Resume Home lasix at reduced dose  9/22 Stable    Essential hypertension  Normotensive   Home medications Spironolactone and Entresto on hold   --Hydralazine PRN  --Vitals Q4H  9/17 Stable      COPD suggested by initial evaluation  Negative for  symptoms of exacerbation    --Continue home regimen    9/17 Stable  9/22 Stable            VTE Risk Mitigation (From admission, onward)         Ordered     Place NIKITA hose  Until discontinued         09/17/22 0846     enoxaparin injection 40 mg  Daily         09/14/22 1426     Reason for No Pharmacological VTE Prophylaxis  Once        Question:  Reasons:  Answer:  Already adequately anticoagulated on oral Anticoagulants    09/10/22 1158     IP VTE HIGH RISK PATIENT  Once         09/10/22 1158     Place sequential compression device  Until discontinued         09/10/22 1158                Discharge Planning   TATIANA: 9/23/2022     Code Status: Full Code   Is the patient medically ready for discharge?: Yes    Reason for patient still in hospital (select all that apply): Patient trending condition and Treatment  Discharge Plan A: Skilled Nursing Facility   Discharge Delays: (!) Post-Acute Set-up              Joseph Polanco NP  Department of Hospital Medicine   [facility] - Med Surg 3

## 2022-09-23 NOTE — PLAN OF CARE
Problem: Adult Inpatient Plan of Care  Goal: Plan of Care Review  Outcome: Ongoing, Progressing  Goal: Patient-Specific Goal (Individualized)  Outcome: Ongoing, Progressing  Goal: Absence of Hospital-Acquired Illness or Injury  Outcome: Ongoing, Progressing  Goal: Optimal Comfort and Wellbeing  Outcome: Ongoing, Progressing     Problem: Diabetes Comorbidity  Goal: Blood Glucose Level Within Targeted Range  Outcome: Ongoing, Progressing     Problem: Fluid and Electrolyte Imbalance (Acute Kidney Injury/Impairment)  Goal: Fluid and Electrolyte Balance  Outcome: Ongoing, Progressing

## 2022-09-23 NOTE — PLAN OF CARE
Received call from Jose Alfred[facility]. They have denied pt due to behaviors.    Tor Morocho LMSW 9/23/2022 12:11 PM

## 2022-09-23 NOTE — NURSING
Pt alert this am. Didn't sleep much last night but not combative. Pt also very pleasant this am, sitting up in bed eating breakfast. Will continue to monitor pt throughout shift.

## 2022-09-23 NOTE — SUBJECTIVE & OBJECTIVE
Interval History: Awaiting placement     Review of Systems   Constitutional:  Positive for activity change and fatigue. Negative for chills, diaphoresis and fever.   HENT:  Positive for hearing loss. Negative for ear discharge, ear pain and facial swelling.    Eyes:  Negative for pain and redness.   Respiratory:  Negative for cough and shortness of breath.    Cardiovascular:  Negative for leg swelling.   Gastrointestinal:  Negative for abdominal pain, constipation, diarrhea, nausea and vomiting.   Endocrine: Negative for polydipsia and polyphagia.   Genitourinary:  Negative for flank pain and hematuria.               Musculoskeletal:  Positive for arthralgias and gait problem. Negative for back pain, neck pain and neck stiffness.   Skin:  Negative for color change.   Allergic/Immunologic: Negative for food allergies.   Neurological:  Positive for weakness. Negative for facial asymmetry and speech difficulty.   Hematological:  Does not bruise/bleed easily.   Psychiatric/Behavioral:  Negative for agitation, behavioral problems, hallucinations and suicidal ideas. The patient is not nervous/anxious.    Objective:     Vital Signs (Most Recent):  Temp: 98 °F (36.7 °C) (09/23/22 1538)  Pulse: 87 (09/23/22 1538)  Resp: 18 (09/23/22 1538)  BP: 136/84 (09/23/22 1538)  SpO2: 100 % (09/23/22 1538) Vital Signs (24h Range):  Temp:  [97.6 °F (36.4 °C)-98.6 °F (37 °C)] 98 °F (36.7 °C)  Pulse:  [68-87] 87  Resp:  [18] 18  SpO2:  [95 %-100 %] 100 %  BP: (114-136)/(64-84) 136/84     Weight: 87 kg (191 lb 12.8 oz)  Body mass index is 26.38 kg/m².    Intake/Output Summary (Last 24 hours) at 9/23/2022 1647  Last data filed at 9/23/2022 1100  Gross per 24 hour   Intake 240 ml   Output 1850 ml   Net -1610 ml      Physical Exam  Vitals and nursing note reviewed.   Constitutional:       General: He is not in acute distress.     Appearance: He is well-developed. He is not ill-appearing, toxic-appearing or diaphoretic.   HENT:      Head:  Normocephalic and atraumatic.      Mouth/Throat:      Mouth: Mucous membranes are moist.   Eyes:      General: No scleral icterus.        Right eye: No discharge.         Left eye: No discharge.      Extraocular Movements: Extraocular movements intact.      Conjunctiva/sclera: Conjunctivae normal.      Pupils: Pupils are equal, round, and reactive to light.   Cardiovascular:      Rate and Rhythm: Normal rate and regular rhythm.      Heart sounds: Normal heart sounds. No murmur heard.    No friction rub. No gallop.   Pulmonary:      Effort: Pulmonary effort is normal. No respiratory distress.      Breath sounds: No wheezing or rhonchi.      Comments: BBS diminished  Abdominal:      General: Bowel sounds are normal. There is no distension.      Palpations: Abdomen is soft.      Tenderness: There is no abdominal tenderness. There is no guarding.   Genitourinary:     Comments: Zully urine draining in bag  Musculoskeletal:      Cervical back: Normal range of motion and neck supple. No rigidity or tenderness.      Right lower leg: No edema.      Left lower leg: No edema.      Comments: Stiff joints  General debility   Skin:     General: Skin is warm and dry.      Capillary Refill: Capillary refill takes 2 to 3 seconds.   Neurological:      Mental Status: He is alert.      Motor: Weakness present.      Gait: Gait abnormal.      Comments: Oriented person and place  Responds appropriately to simple commands   Psychiatric:         Cognition and Memory: Cognition is impaired. He exhibits impaired recent memory.      Comments: Calm and cooperative       Significant Labs: All pertinent labs within the past 24 hours have been reviewed.

## 2022-09-23 NOTE — PLAN OF CARE
Pt remains fall free this shift.  Pt AAOx4, verbal, clear speech.  Skin warm and dry. No new skin issues.  Telesitter in place  Room air  Sanchez catheter in place  Requires maximum assistance with transfers.  CBG AC/ HS with PRN SS insulin.  Bed low, side rails up x 4 wheels locked, call light in reach.  Bed alarm maintained for safety.  Patient instructed to call for assistance.  Hourly rounding completed.  24 hour chart check completed  POC updated and reviewed with pt. Will continue POC.

## 2022-09-23 NOTE — PLAN OF CARE
Spoke to pt's family who agreed to [facility] referral. Per Francisco Polo, pt can be admitted to one of their facilities on Monday.    Tor Morocho LMSW 9/23/2022 3:47 PM

## 2022-09-24 LAB
POCT GLUCOSE: 136 MG/DL (ref 70–110)
POCT GLUCOSE: 143 MG/DL (ref 70–110)
POCT GLUCOSE: 151 MG/DL (ref 70–110)
POCT GLUCOSE: 154 MG/DL (ref 70–110)

## 2022-09-24 PROCEDURE — 25000003 PHARM REV CODE 250: Performed by: NURSE PRACTITIONER

## 2022-09-24 PROCEDURE — 94640 AIRWAY INHALATION TREATMENT: CPT

## 2022-09-24 PROCEDURE — 25000242 PHARM REV CODE 250 ALT 637 W/ HCPCS: Performed by: INTERNAL MEDICINE

## 2022-09-24 PROCEDURE — 25000242 PHARM REV CODE 250 ALT 637 W/ HCPCS: Performed by: NURSE PRACTITIONER

## 2022-09-24 PROCEDURE — 11000001 HC ACUTE MED/SURG PRIVATE ROOM

## 2022-09-24 PROCEDURE — 94761 N-INVAS EAR/PLS OXIMETRY MLT: CPT

## 2022-09-24 PROCEDURE — 63600175 PHARM REV CODE 636 W HCPCS: Performed by: FAMILY MEDICINE

## 2022-09-24 RX ADMIN — FUROSEMIDE 20 MG: 20 TABLET ORAL at 08:09

## 2022-09-24 RX ADMIN — ENOXAPARIN SODIUM 40 MG: 100 INJECTION SUBCUTANEOUS at 04:09

## 2022-09-24 RX ADMIN — TAMSULOSIN HYDROCHLORIDE 0.4 MG: 0.4 CAPSULE ORAL at 08:09

## 2022-09-24 RX ADMIN — IPRATROPIUM BROMIDE 0.5 MG: 0.5 SOLUTION RESPIRATORY (INHALATION) at 04:09

## 2022-09-24 RX ADMIN — IPRATROPIUM BROMIDE 0.5 MG: 0.5 SOLUTION RESPIRATORY (INHALATION) at 12:09

## 2022-09-24 RX ADMIN — TIMOLOL MALEATE 1 DROP: 5 SOLUTION/ DROPS OPHTHALMIC at 09:09

## 2022-09-24 RX ADMIN — IPRATROPIUM BROMIDE 0.5 MG: 0.5 SOLUTION RESPIRATORY (INHALATION) at 07:09

## 2022-09-24 RX ADMIN — TIMOLOL MALEATE 1 DROP: 5 SOLUTION/ DROPS OPHTHALMIC at 08:09

## 2022-09-24 RX ADMIN — BUDESONIDE 0.5 MG: 0.5 INHALANT ORAL at 07:09

## 2022-09-24 RX ADMIN — QUETIAPINE FUMARATE 25 MG: 25 TABLET ORAL at 08:09

## 2022-09-24 RX ADMIN — QUETIAPINE FUMARATE 12.5 MG: 25 TABLET, FILM COATED ORAL at 08:09

## 2022-09-24 RX ADMIN — ASPIRIN 81 MG: 81 TABLET, COATED ORAL at 08:09

## 2022-09-24 RX ADMIN — THERA TABS 1 TABLET: TAB at 08:09

## 2022-09-24 NOTE — ASSESSMENT & PLAN NOTE
9/16 Fall and skin precautions  Turn q 2 hours  Continue Physical therapy and Occupational therapy  9/21 Awaiting SNF placement  9/22 Patient refused SNF placement yesterday and some agitation noted at that time. Tele psych consulted and patient currently undergoing a medication adjustment for his Dementia.   9/24/2022, Patient more awake and alert today. Follow simple commands. Awaiting possible placement with [facility] on Monday.

## 2022-09-24 NOTE — PT/OT/SLP PROGRESS
Occupational Therapy  Treatment    Joseph Toussaint   MRN: [id number]   Admitting Diagnosis: Acute renal failure    OT Date of Treatment: 09/23/22   OT Start Time: 1310  OT Stop Time: 1335  OT Total Time (min): 25 min    Billable Minutes:  Therapeutic Activity 25 minutes    OT/TRICIA: OT          General Precautions: Standard, fall  Orthopedic Precautions: N/A  Braces:    Respiratory Status: Room air         Subjective:  Communicated with nurse and epic chart review prior to session.    Pain/Comfort  Pain Rating 1: 0/10    Objective:  Patient found with: telemetry     Functional Mobility:  Bed Mobility:  Mod A WITH ROLLING L<R  MOD A WITH SUPINE<>SIT WITH LEG LIFT  Transfers:   Mod a x 2 with sit<> vc for hand ne self to a seated position lowwerig,    Functional Ambulation: SIDE STEP WITH MOD/ MAX L<> R  WITH FORWARD /BACKWARD 10 STEPS WITH MAX ASSIST WITH NAVIGATION OF ROLLING WALKER    Balance:   Static Sit: POOR+: Needs MINIMAL assist to maintain  Dynamic Sit: POOR: N/A  Static Stand: 0: Needs MAXIMAL assist to maintain   Dynamic stand: 0: N/A    Therapeutic Activities and Exercises:  Patient educated on role of OT in acute setting and benefits of participation. Educated on techniques to use to increase independence and decrease fall risk with functional transfers.  PT encouraged completion of B UE AROM therex throughout the day to tolerance to increase functional strength and activity tolerance. Patient stated understanding and in agreement with POC.      AM-PAC 6 CLICK ADL   How much help from another person does this patient currently need?   1 = Unable, Total/Dependent Assistance  2 = A lot, Maximum/Moderate Assistance  3 = A little, Minimum/Contact Guard/Supervision  4 = None, Modified [place]/Independent    Putting on and taking off regular lower body clothing? : 2  Bathing (including washing, rinsing, drying)?: 2  Toileting, which includes using toilet, bedpan, or urinal? : 2  Putting on and taking off  "regular upper body clothing?: 2  Taking care of personal grooming such as brushing teeth?: 3  Eating meals?: 4  Daily Activity Total Score: 15     AM-PAC Raw Score CMS "G-Code Modifier Level of Impairment Assistance   6 % Total / Unable   7 - 8 CM 80 - 100% Maximal Assist   9-13 CL 60 - 80% Moderate Assist   14 - 19 CK 40 - 60% Moderate Assist   20 - 22 CJ 20 - 40% Minimal Assist   23 CI 1-20% SBA / CGA   24 CH 0% Independent/ Mod I       Patient left HOB elevated with all lines intact, call button in reach, nurse notified, and .debility and generalized  present.    ASSESSMENT:  Joseph Toussaint is a 89 y.o. male with a medical diagnosis of Acute renal failure and presents with .  Rehab identified problem list/impairments: Rehab identified problem list/impairments: weakness, impaired self care skills, impaired balance, decreased coordination, decreased safety awareness, impaired endurance, impaired functional mobility, decreased upper extremity function, pain, gait instability, decreased lower extremity function, orthopedic precautions    Rehab potential is good.    Activity tolerance: Excellent and Good      Discharge recommendations:       Barriers to discharge:      Equipment recommendations:       GOALS:   Multidisciplinary Problems       Occupational Therapy Goals          Problem: Occupational Therapy    Goal Priority Disciplines Outcome Interventions   Occupational Therapy Goal     OT, PT/OT Ongoing, Progressing    Description: Goals to be met by: 9/25/22     Patient will increase functional independence with ADLs by performing:    Toileting from bedside commode with Contact Guard Assistance for hygiene and clothing management.   Toilet transfer to bedside commode with Contact Guard Assistance.  Increased functional strength in B UE grossly by 1/2 MM grade.                         Plan:  Patient to be seen 2 x/week to address the above listed problems via self-care/home management, therapeutic " exercises, therapeutic activities  Plan of Care expires: 09/25/22  Plan of Care reviewed with: patient         09/23/2022

## 2022-09-24 NOTE — ASSESSMENT & PLAN NOTE
Confused on admission\  Cont home emd Seroquel HS    --Avasys  --Fall precautions  --Neuro checks Q4H x24 hours    9/11/22: Pt AAOx3 at times but with notable cognitive deficits.    9/14/22: Increased agitation - Seroquel increased to BID.    9/15/22: Seroquel in am made pt too drowsy- will decrease back to just nightly.     9/17 Stable. Alert, appropriate    9/20 Sleepy but arousable. Calm and cooperative. Discontinue Seroquel  9/21 Stable. Pleasant. Watching tv.    Telepsych consult done and recommended-  Begin / Restart Seroquel at reduced dose of 12.5 mg PO QAM and 25 mg PO QHS (can also reduce to 12.5 mg if excess morning somnolence is observed) for behavioral disturbances / mood / sleep in dementia / possible resolving delirium (discussed risks/benefits/alt vs no treatment with patient).     - Can use Zyprexa 2.5 mg to 5 mg PO/IM q8 hours PRN for non-redirectable behavioral disturbances in dementia / possible resolving delirium (discussed risks/benefits/alt vs no treatment with patient).    Avoid benzodiazepines, antihistamines, anticholinergics, hypnotics, and minimize opiates while controlling for pain as these medications may exacerbate delirium.      9/22 Patient refused SNF placement yesterday and some agitation noted at that time. Tele psych consulted and patient currently undergoing a medication adjustment for his Dementia. See Above.    9/24/2022, Patient more awake and alert today. Follow simple commands. Awaiting possible placement with [facility] on Monday.

## 2022-09-24 NOTE — PLAN OF CARE
Problem: Diabetes Comorbidity  Goal: Blood Glucose Level Within Targeted Range  Intervention: Monitor and Manage Glycemia  Flowsheets (Taken 9/24/2022 0151)  Glycemic Management: blood glucose monitored     Problem: Fluid and Electrolyte Imbalance (Acute Kidney Injury/Impairment)  Goal: Fluid and Electrolyte Balance  Intervention: Monitor and Manage Fluid and Electrolyte Balance  Flowsheets (Taken 9/24/2022 0151)  Fluid/Electrolyte Management: fluids provided     Problem: Oral Intake Inadequate (Acute Kidney Injury/Impairment)  Goal: Optimal Nutrition Intake  Intervention: Promote and Optimize Nutrition  Flowsheets (Taken 9/24/2022 0151)  Oral Nutrition Promotion: rest periods promoted     Problem: Fall Injury Risk  Goal: Absence of Fall and Fall-Related Injury  Intervention: Identify and Manage Contributors  Flowsheets (Taken 9/24/2022 0151)  Medication Review/Management: medications reviewed     Problem: Skin Injury Risk Increased  Goal: Skin Health and Integrity  Intervention: Optimize Skin Protection  Flowsheets (Taken 9/24/2022 0152)  Pressure Reduction Techniques:   frequent weight shift encouraged   weight shift assistance provided  Pressure Reduction Devices: foam padding utilized  Head of Bed (HOB) Positioning: HOB elevated     Problem: Infection  Goal: Absence of Infection Signs and Symptoms  Intervention: Prevent or Manage Infection  Flowsheets (Taken 9/24/2022 0152)  Infection Management: aseptic technique maintained     Problem: Confusion Chronic  Goal: Optimal Cognitive Function  Intervention: Minimize Injury Risk and Provide Safety  Flowsheets (Taken 9/24/2022 0152)  Enhanced Safety Measures:   bed alarm set   monitored by video  Intervention: Minimize and Manage Confusion  Flowsheets (Taken 9/24/2022 0152)  Environment Familiarity/Consistency: familiar objects from home provided  Reorientation Measures:   clock in view   reorientation provided  Sensory Stimulation Regulation:   care clustered    quiet environment promoted   lighting decreased  Family/Support System Care: support provided  Environmental Support:   calm environment promoted   distractions minimized

## 2022-09-24 NOTE — PLAN OF CARE
POC reviewed with pt. Pt verbalizes understanding of POC. No questions at this time.  AAOx1. NADN.  Pt remains free of falls   No complaints at this time.  Safety measures in place. Will continue to monitor.  Informed pt to call for assistance before getting up. Avasys in place and bed alarm on

## 2022-09-24 NOTE — PROGRESS NOTES
[facility] - Med Surg 3  [facility] Medicine  Progress Note    Patient Name: Joseph Toussaint  MRN: [id number]  Patient Class: IP- Inpatient   Admission Date: 9/10/2022  Length of Stay: 9 days  Attending Physician: Matt Rossi MD  Primary Care Provider: Abad Iqbal MD        Subjective:     Principal Problem:Acute renal failure        HPI:  89 y.o. male patient with a PMHx of HTN, COPD, CHF, and DM who presents to the Emergency Department for evaluation of a fall which occurred x 2 days pta. Pt family called for transport to get pt checked out. Pt denies any complaints and is not in pain. No associated sxs reported. Patient denies any CP, SOB, fever, back pain, abd pain, and all other sxs at this time. No prior Tx reported. In the ED, UA consistent with UTI, dx with UTI on 8/27, unclear if patient completed antibiotic regimen. BUN/Cr: 39/2.1, Bili: 2.0, H/H: 10.4/32.3. Afebrile, vitals stable. Oriented to self only on exam. Intitiated on antibiotics in the ED, urine culture pending, treated with IV fluid bolus. Patient is a full code, surrogate decision maker is his son Kevin Toussaint. Placed in observation under the care of hospital medicine.       Overview/Hospital Course:  The patient is a 89 yo male with HTN, COPD, CHF, and DM who was placed in observation for frequent falls, MELISSA, and presumed UTI on IVF and IV  Rocephin.     9/11/22: Pt AAOx3 at times but with notable cognitive deficits. Denies complaints. MELISSA improving 2.1>1.9. Hyperchloremic metabolic acidosis noted- will switch NS IVFs to Bicarb drip. Blood cultures show NGTD. Urine culture showed no growth but urine output is very cloudy -almost purulent. Will switch IV Cipro to oral dose. PT/OT evaluated pt and recommended SNF. Called pt's nephew Kraig (POA) and provided status update.  Pt lives with his nephew and his wife. Currently, Kraig is in the hospital recovering from surgery. He requested discharge to SNF.    9/12/22: Denies complaints. No  acute events. MELISSA slowly improving 2.1>1.9>1.7. cont IVF. SNF placement pending     9/13/22: No acute events. Serum Cr remains 1.7. Will check BNP and Renal U/S. Hold IVFs  Awaiting SNF placement     9/14/22: Increased agitation - Seroquel increased to BID. Serum Cr 1.5. Renal U/s showed mild hydronephrosis on right and moderate on Left with distended bladder. Christopher placed-900 ml creamy white UOP immediately returned. Will d/c Cipro- add IV Rocephin. Urology consulted. Awaiting SNF placement     9/15/22: Seroquel in am made pt too drowsy- will decrease back to just nightly. Gross hematuria to UOP. Urology recommended continuous bladder irrigation. Serum Cr normalized after placing christopher. Hydronephrosis 2/2 urinary retention. Cont IV Rocephin. Repeat urine culture pending. Pt will be placed in SNF when hematuria resolves and repeat urine culture results.     9/16 Gross hematuria resolved. CBI clamped. Continue current treatment.   9/17 Patient remains stable and improved. No hematuria at this time.   9/18 Stable. SNF placement pending.   9/19 Stable. SNF placement pending.   9/20 Stable. SNF placement pending.   9/21 Stable.  Awaiting SNF placement.   9/22 Stable. Pleasant and cooperative at this time.     As of 9/23/2022, pt seen and examined. He is more clam today, however not accepted by [facility] on Monday. Will follow.   As of 9/24/2022, vital signs stable. Patient more awake and alert today. Follow simple commands. Awaiting placement. Will monitor.       Interval History: Pt seen and examined, no distress. Awaiting placement.     Review of Systems   Constitutional:  Positive for activity change and fatigue. Negative for chills, diaphoresis and fever.   HENT:  Positive for hearing loss. Negative for ear discharge, ear pain and facial swelling.    Eyes:  Negative for pain and redness.   Respiratory:  Negative for cough and shortness of breath.    Cardiovascular:  Negative for  leg swelling.   Gastrointestinal:  Negative for abdominal pain, constipation, diarrhea, nausea and vomiting.   Endocrine: Negative for polydipsia and polyphagia.   Genitourinary:  Negative for flank pain and hematuria.               Musculoskeletal:  Positive for arthralgias and gait problem. Negative for back pain, neck pain and neck stiffness.        Left upper extremely weakness (chronic)    Skin:  Negative for color change.   Allergic/Immunologic: Negative for food allergies.   Neurological:  Positive for weakness. Negative for facial asymmetry and speech difficulty.   Hematological:  Does not bruise/bleed easily.   Psychiatric/Behavioral:  Negative for agitation, behavioral problems, hallucinations and suicidal ideas. The patient is not nervous/anxious.    Objective:     Vital Signs (Most Recent):  Temp: 98.7 °F (37.1 °C) (09/24/22 0804)  Pulse: 72 (09/24/22 0804)  Resp: 16 (09/24/22 0804)  BP: 128/79 (09/24/22 0804)  SpO2: 96 % (09/24/22 0804) Vital Signs (24h Range):  Temp:  [96.8 °F (36 °C)-98.7 °F (37.1 °C)] 98.7 °F (37.1 °C)  Pulse:  [61-87] 72  Resp:  [16-20] 16  SpO2:  [93 %-100 %] 96 %  BP: (111-136)/(72-84) 128/79     Weight: 88.9 kg (195 lb 15.8 oz)  Body mass index is 26.95 kg/m².    Intake/Output Summary (Last 24 hours) at 9/24/2022 1211  Last data filed at 9/24/2022 0500  Gross per 24 hour   Intake 720 ml   Output 900 ml   Net -180 ml      Physical Exam  Vitals and nursing note reviewed.   Constitutional:       General: He is not in acute distress.     Appearance: He is well-developed. He is not ill-appearing, toxic-appearing or diaphoretic.   HENT:      Head: Normocephalic and atraumatic.      Mouth/Throat:      Mouth: Mucous membranes are moist.   Eyes:      General: No scleral icterus.        Right eye: No discharge.         Left eye: No discharge.      Extraocular Movements: Extraocular movements intact.      Conjunctiva/sclera: Conjunctivae normal.      Pupils: Pupils are equal, round, and  reactive to light.   Cardiovascular:      Rate and Rhythm: Normal rate and regular rhythm.      Heart sounds: Normal heart sounds. No murmur heard.    No friction rub. No gallop.   Pulmonary:      Effort: Pulmonary effort is normal. No respiratory distress.      Breath sounds: No wheezing or rhonchi.      Comments: BBS diminished  Abdominal:      General: Bowel sounds are normal. There is no distension.      Palpations: Abdomen is soft.      Tenderness: There is no abdominal tenderness. There is no guarding.   Genitourinary:     Comments: Zully urine draining in bag  Musculoskeletal:      Cervical back: Normal range of motion and neck supple. No rigidity or tenderness.      Right lower leg: No edema.      Left lower leg: No edema.      Comments: Stiff joints  General debility  Left upper extremely weakness   Skin:     General: Skin is warm and dry.      Capillary Refill: Capillary refill takes 2 to 3 seconds.   Neurological:      Mental Status: He is alert.      Motor: Weakness present.      Gait: Gait abnormal.      Comments: Oriented person and place  Responds appropriately to simple commands   Psychiatric:         Cognition and Memory: Cognition is impaired. He exhibits impaired recent memory.      Comments: Calm and cooperative       Significant Labs:     Significant Imaging:     Imaging Results    None            Assessment/Plan:      Debility  9/16 Fall and skin precautions  Turn q 2 hours  Continue Physical therapy and Occupational therapy  9/21 Awaiting SNF placement  9/22 Patient refused SNF placement yesterday and some agitation noted at that time. Tele psych consulted and patient currently undergoing a medication adjustment for his Dementia.   9/24/2022, Patient more awake and alert today. Follow simple commands. [facility]       Malnutrition of moderate degree  9/16 Nutrition consulted. Most recent weight and BMI monitored  Add po  supplement    Measurements:  Wt Readings from Last 1 Encounters:   09/19/22 87 kg (191 lb 12.8 oz)   Body mass index is 26.38 kg/m².    Recommendations:      Patient has been screened and assessed by RD. RD will follow patient.      Type 2 diabetes mellitus   9/16 Dm diet  Accuchecks with correctional SSI  Blood sugars running 129-135  Lab Results   Component Value Date    HGBA1C 6.6 (H) 07/20/2022 9/17 Blood sugars running 121-145  9/18 Blood sugars running 118-151  9/19 Blood sugars running 110-146  9/20 Blood sugars running 124-132  9/21 Blood sugars running 124-152  9/22 Blood sugars running 140-159     Hypokalemia  9/16 Add 40 meq po x 1 dose      Microcytic anemia  9/16 Add MVI      Hydronephrosis, bilateral  2/2 urinary retention   Serum Cr normalized     9/16 Patient to discharge with christopher and follow up with Urology as Outpatient      Urinary retention  9/16 Patient to discharge with christopher and follow up with Urology as Outpatient       Falls  Patient reported falling at home    --Avasys  --Fall Precautions    9/11/22: PT/OT  SNF placement - CM consulted     9/19 Awaiting SNF placement    Dementia with behavioral disturbance  Confused on admission\  Cont home emd Seroquel HS    --Avasys  --Fall precautions  --Neuro checks Q4H x24 hours    9/11/22: Pt AAOx3 at times but with notable cognitive deficits.    9/14/22: Increased agitation - Seroquel increased to BID.    9/15/22: Seroquel in am made pt too drowsy- will decrease back to just nightly.     9/17 Stable. Alert, appropriate    9/20 Sleepy but arousable. Calm and cooperative. Discontinue Seroquel  9/21 Stable. Pleasant. Watching tv.    Telepsych consult done and recommended-  Begin / Restart Seroquel at reduced dose of 12.5 mg PO QAM and 25 mg PO QHS (can also reduce to 12.5 mg if excess morning somnolence is observed) for behavioral disturbances / mood / sleep in dementia / possible resolving delirium (discussed risks/benefits/alt vs no treatment  with patient).     - Can use Zyprexa 2.5 mg to 5 mg PO/IM q8 hours PRN for non-redirectable behavioral disturbances in dementia / possible resolving delirium (discussed risks/benefits/alt vs no treatment with patient).    Avoid benzodiazepines, antihistamines, anticholinergics, hypnotics, and minimize opiates while controlling for pain as these medications may exacerbate delirium.      9/22 Patient refused SNF placement yesterday and some agitation noted at that time. Tele psych consulted and patient currently undergoing a medication adjustment for his Dementia. See Above.    9/24/2022, Patient more awake and alert today. Follow simple commands. Awaiting possible placement with [facility] on Monday.     Chronic systolic congestive heart failure  Patient is identified as having Systolic (HFrEF) heart failure that is Chronic. CHF is currently controlled. Latest ECHO performed and demonstrates- Results for orders placed during the hospital encounter of 07/19/22    Echo    Interpretation Summary  · Concentric hypertrophy and severely decreased systolic function.  · Mild left atrial enlargement.  · The estimated ejection fraction is 20%.  · Left ventricular diastolic dysfunction.  · There is left ventricular global hypokinesis.  · Moderate right ventricular enlargement with mildly to moderately reduced right ventricular systolic function.  · Moderate right atrial enlargement.  · There is mild aortic valve stenosis.  · Aortic valve area is 1.90 cm2; peak velocity is 1.05 m/s; mean gradient is 2 mmHg.  · There is severe pulmonary hypertension.  · Moderate tricuspid regurgitation.  · Elevated central venous pressure (15 mmHg).  · The estimated PA systolic pressure is 103 mmHg.  . Continue ACE/ARB, Furosemide and Aldactone and monitor clinical status closely. Monitor on telemetry. Patient is on CHF pathway.  Monitor strict Is&Os and daily weights.  Place on fluid restriction of 1.5 L. Continue to stress to patient  importance of self efficacy and  on diet for CHF. Last BNP reviewed- and noted below   No results for input(s): BNP, BNPTRIAGEBLO in the last 168 hours..  EF 20%  Monitor I/o  Daily weights  Spironolactone and Entresto, Lasix, on hold 2/2 normotensive and MELISSA    9/17 stable  9/20 Resume Home lasix at reduced dose  9/22 Stable    Essential hypertension  Normotensive   Home medications Spironolactone and Entresto on hold   --Hydralazine PRN  --Vitals Q4H  9/17 Stable      COPD suggested by initial evaluation  Negative for symptoms of exacerbation    --Continue home regimen    9/17 Stable  9/22 Stable            VTE Risk Mitigation (From admission, onward)         Ordered     Place NIKITA hose  Until discontinued         09/17/22 0846     enoxaparin injection 40 mg  Daily         09/14/22 1426     Reason for No Pharmacological VTE Prophylaxis  Once        Question:  Reasons:  Answer:  Already adequately anticoagulated on oral Anticoagulants    09/10/22 1158     IP VTE HIGH RISK PATIENT  Once         09/10/22 1158     Place sequential compression device  Until discontinued         09/10/22 1158                Discharge Planning   TATIANA: 9/23/2022     Code Status: Full Code   Is the patient medically ready for discharge?: Yes    Reason for patient still in hospital (select all that apply): Patient trending condition and Treatment  Discharge Plan A: Skilled Nursing Facility   Discharge Delays: (!) Post-Acute Set-up              Joseph Polanco NP  Department of Hospital Medicine   [facility] - Med Surg 3

## 2022-09-24 NOTE — SUBJECTIVE & OBJECTIVE
Interval History: Pt seen and examined, no distress. Awaiting placement.     Review of Systems   Constitutional:  Positive for activity change and fatigue. Negative for chills, diaphoresis and fever.   HENT:  Positive for hearing loss. Negative for ear discharge, ear pain and facial swelling.    Eyes:  Negative for pain and redness.   Respiratory:  Negative for cough and shortness of breath.    Cardiovascular:  Negative for leg swelling.   Gastrointestinal:  Negative for abdominal pain, constipation, diarrhea, nausea and vomiting.   Endocrine: Negative for polydipsia and polyphagia.   Genitourinary:  Negative for flank pain and hematuria.               Musculoskeletal:  Positive for arthralgias and gait problem. Negative for back pain, neck pain and neck stiffness.        Left upper extremely weakness (chronic)    Skin:  Negative for color change.   Allergic/Immunologic: Negative for food allergies.   Neurological:  Positive for weakness. Negative for facial asymmetry and speech difficulty.   Hematological:  Does not bruise/bleed easily.   Psychiatric/Behavioral:  Negative for agitation, behavioral problems, hallucinations and suicidal ideas. The patient is not nervous/anxious.    Objective:     Vital Signs (Most Recent):  Temp: 98.7 °F (37.1 °C) (09/24/22 0804)  Pulse: 72 (09/24/22 0804)  Resp: 16 (09/24/22 0804)  BP: 128/79 (09/24/22 0804)  SpO2: 96 % (09/24/22 0804) Vital Signs (24h Range):  Temp:  [96.8 °F (36 °C)-98.7 °F (37.1 °C)] 98.7 °F (37.1 °C)  Pulse:  [61-87] 72  Resp:  [16-20] 16  SpO2:  [93 %-100 %] 96 %  BP: (111-136)/(72-84) 128/79     Weight: 88.9 kg (195 lb 15.8 oz)  Body mass index is 26.95 kg/m².    Intake/Output Summary (Last 24 hours) at 9/24/2022 1211  Last data filed at 9/24/2022 0500  Gross per 24 hour   Intake 720 ml   Output 900 ml   Net -180 ml      Physical Exam  Vitals and nursing note reviewed.   Constitutional:       General: He is not in acute distress.     Appearance: He is  well-developed. He is not ill-appearing, toxic-appearing or diaphoretic.   HENT:      Head: Normocephalic and atraumatic.      Mouth/Throat:      Mouth: Mucous membranes are moist.   Eyes:      General: No scleral icterus.        Right eye: No discharge.         Left eye: No discharge.      Extraocular Movements: Extraocular movements intact.      Conjunctiva/sclera: Conjunctivae normal.      Pupils: Pupils are equal, round, and reactive to light.   Cardiovascular:      Rate and Rhythm: Normal rate and regular rhythm.      Heart sounds: Normal heart sounds. No murmur heard.    No friction rub. No gallop.   Pulmonary:      Effort: Pulmonary effort is normal. No respiratory distress.      Breath sounds: No wheezing or rhonchi.      Comments: BBS diminished  Abdominal:      General: Bowel sounds are normal. There is no distension.      Palpations: Abdomen is soft.      Tenderness: There is no abdominal tenderness. There is no guarding.   Genitourinary:     Comments: Zully urine draining in bag  Musculoskeletal:      Cervical back: Normal range of motion and neck supple. No rigidity or tenderness.      Right lower leg: No edema.      Left lower leg: No edema.      Comments: Stiff joints  General debility  Left upper extremely weakness   Skin:     General: Skin is warm and dry.      Capillary Refill: Capillary refill takes 2 to 3 seconds.   Neurological:      Mental Status: He is alert.      Motor: Weakness present.      Gait: Gait abnormal.      Comments: Oriented person and place  Responds appropriately to simple commands   Psychiatric:         Cognition and Memory: Cognition is impaired. He exhibits impaired recent memory.      Comments: Calm and cooperative       Significant Labs:     Significant Imaging:     Imaging Results    None

## 2022-09-25 LAB
POCT GLUCOSE: 125 MG/DL (ref 70–110)
POCT GLUCOSE: 160 MG/DL (ref 70–110)
POCT GLUCOSE: 162 MG/DL (ref 70–110)
POCT GLUCOSE: 175 MG/DL (ref 70–110)

## 2022-09-25 PROCEDURE — 94640 AIRWAY INHALATION TREATMENT: CPT

## 2022-09-25 PROCEDURE — 25000242 PHARM REV CODE 250 ALT 637 W/ HCPCS: Performed by: INTERNAL MEDICINE

## 2022-09-25 PROCEDURE — 25000003 PHARM REV CODE 250: Performed by: NURSE PRACTITIONER

## 2022-09-25 PROCEDURE — 25000242 PHARM REV CODE 250 ALT 637 W/ HCPCS: Performed by: NURSE PRACTITIONER

## 2022-09-25 PROCEDURE — 94761 N-INVAS EAR/PLS OXIMETRY MLT: CPT

## 2022-09-25 PROCEDURE — 11000001 HC ACUTE MED/SURG PRIVATE ROOM

## 2022-09-25 PROCEDURE — 63600175 PHARM REV CODE 636 W HCPCS: Performed by: FAMILY MEDICINE

## 2022-09-25 PROCEDURE — 25000242 PHARM REV CODE 250 ALT 637 W/ HCPCS: Performed by: FAMILY MEDICINE

## 2022-09-25 RX ADMIN — TIMOLOL MALEATE 1 DROP: 5 SOLUTION/ DROPS OPHTHALMIC at 09:09

## 2022-09-25 RX ADMIN — QUETIAPINE FUMARATE 12.5 MG: 25 TABLET, FILM COATED ORAL at 09:09

## 2022-09-25 RX ADMIN — IPRATROPIUM BROMIDE 0.5 MG: 0.5 SOLUTION RESPIRATORY (INHALATION) at 07:09

## 2022-09-25 RX ADMIN — TIMOLOL MALEATE 1 DROP: 5 SOLUTION/ DROPS OPHTHALMIC at 08:09

## 2022-09-25 RX ADMIN — TAMSULOSIN HYDROCHLORIDE 0.4 MG: 0.4 CAPSULE ORAL at 08:09

## 2022-09-25 RX ADMIN — IPRATROPIUM BROMIDE 0.5 MG: 0.5 SOLUTION RESPIRATORY (INHALATION) at 03:09

## 2022-09-25 RX ADMIN — IPRATROPIUM BROMIDE 0.5 MG: 0.5 SOLUTION RESPIRATORY (INHALATION) at 12:09

## 2022-09-25 RX ADMIN — QUETIAPINE FUMARATE 25 MG: 25 TABLET ORAL at 08:09

## 2022-09-25 RX ADMIN — BUDESONIDE 0.5 MG: 0.5 INHALANT ORAL at 07:09

## 2022-09-25 RX ADMIN — THERA TABS 1 TABLET: TAB at 09:09

## 2022-09-25 RX ADMIN — ALBUTEROL SULFATE 2.5 MG: 2.5 SOLUTION RESPIRATORY (INHALATION) at 12:09

## 2022-09-25 RX ADMIN — ENOXAPARIN SODIUM 40 MG: 100 INJECTION SUBCUTANEOUS at 04:09

## 2022-09-25 RX ADMIN — ASPIRIN 81 MG: 81 TABLET, COATED ORAL at 09:09

## 2022-09-25 RX ADMIN — FUROSEMIDE 20 MG: 20 TABLET ORAL at 09:09

## 2022-09-25 NOTE — PLAN OF CARE
POC reviewed with pt. Pt verbalizes understanding of POC. No questions at this time.  AAOx1. NADN.  Pt remains free of falls   No complaints at this time.  Safety measures in place. Will continue to monitor.  Informed pt to call for assistance before getting up. Pt verbalizes understanding.

## 2022-09-25 NOTE — ASSESSMENT & PLAN NOTE
Confused on admission\  Cont home emd Seroquel HS    --Avasys  --Fall precautions  --Neuro checks Q4H x24 hours    9/11/22: Pt AAOx3 at times but with notable cognitive deficits.    9/14/22: Increased agitation - Seroquel increased to BID.    9/15/22: Seroquel in am made pt too drowsy- will decrease back to just nightly.     9/17 Stable. Alert, appropriate    9/20 Sleepy but arousable. Calm and cooperative. Discontinue Seroquel  9/21 Stable. Pleasant. Watching tv.    Telepsych consult done and recommended-  Begin / Restart Seroquel at reduced dose of 12.5 mg PO QAM and 25 mg PO QHS (can also reduce to 12.5 mg if excess morning somnolence is observed) for behavioral disturbances / mood / sleep in dementia / possible resolving delirium (discussed risks/benefits/alt vs no treatment with patient).     - Can use Zyprexa 2.5 mg to 5 mg PO/IM q8 hours PRN for non-redirectable behavioral disturbances in dementia / possible resolving delirium (discussed risks/benefits/alt vs no treatment with patient).    Avoid benzodiazepines, antihistamines, anticholinergics, hypnotics, and minimize opiates while controlling for pain as these medications may exacerbate delirium.      9/22 Patient refused SNF placement yesterday and some agitation noted at that time. Tele psych consulted and patient currently undergoing a medication adjustment for his Dementia. See Above.    9/24/2022, Patient more awake and alert today. Follow simple commands. Awaiting possible placement with [facility] on Monday.     9/25/2022, Patient continues to be more awake and alert. Following simple commands. Awaiting possible placement with [facility] on Monday.

## 2022-09-25 NOTE — PROGRESS NOTES
[facility] - Med Surg 3  [facility] Medicine  Progress Note    Patient Name: Joseph Toussaint  MRN: [id number]  Patient Class: IP- Inpatient   Admission Date: 9/10/2022  Length of Stay: 10 days  Attending Physician: Lan Escobar MD  Primary Care Provider: Abad Iqbal MD        Subjective:     Principal Problem:Debility        HPI:  89 y.o. male patient with a PMHx of HTN, COPD, CHF, and DM who presents to the Emergency Department for evaluation of a fall which occurred x 2 days pta. Pt family called for transport to get pt checked out. Pt denies any complaints and is not in pain. No associated sxs reported. Patient denies any CP, SOB, fever, back pain, abd pain, and all other sxs at this time. No prior Tx reported. In the ED, UA consistent with UTI, dx with UTI on 8/27, unclear if patient completed antibiotic regimen. BUN/Cr: 39/2.1, Bili: 2.0, H/H: 10.4/32.3. Afebrile, vitals stable. Oriented to self only on exam. Intitiated on antibiotics in the ED, urine culture pending, treated with IV fluid bolus. Patient is a full code, surrogate decision maker is his son Kevin Toussaint. Placed in observation under the care of hospital medicine.       Overview/Hospital Course:  The patient is a 89 yo male with HTN, COPD, CHF, and DM who was placed in observation for frequent falls, MELISSA, and presumed UTI on IVF and IV  Rocephin.     9/11/22: Pt AAOx3 at times but with notable cognitive deficits. Denies complaints. MELISSA improving 2.1>1.9. Hyperchloremic metabolic acidosis noted- will switch NS IVFs to Bicarb drip. Blood cultures show NGTD. Urine culture showed no growth but urine output is very cloudy -almost purulent. Will switch IV Cipro to oral dose. PT/OT evaluated pt and recommended SNF. Called pt's nephew Kraig (POA) and provided status update.  Pt lives with his nephew and his wife. Currently, Kraig is in the hospital recovering from surgery. He requested discharge to SNF.    9/12/22: Denies complaints. No acute events. MELISSA slowly  improving 2.1>1.9>1.7. cont IVF. SNF placement pending     9/13/22: No acute events. Serum Cr remains 1.7. Will check BNP and Renal U/S. Hold IVFs  Awaiting SNF placement     9/14/22: Increased agitation - Seroquel increased to BID. Serum Cr 1.5. Renal U/s showed mild hydronephrosis on right and moderate on Left with distended bladder. Christophre placed-900 ml creamy white UOP immediately returned. Will d/c Cipro- add IV Rocephin. Urology consulted. Awaiting SNF placement     9/15/22: Seroquel in am made pt too drowsy- will decrease back to just nightly. Gross hematuria to UOP. Urology recommended continuous bladder irrigation. Serum Cr normalized after placing christopher. Hydronephrosis 2/2 urinary retention. Cont IV Rocephin. Repeat urine culture pending. Pt will be placed in SNF when hematuria resolves and repeat urine culture results.     9/16 Gross hematuria resolved. CBI clamped. Continue current treatment.   9/17 Patient remains stable and improved. No hematuria at this time.   9/18 Stable. SNF placement pending.   9/19 Stable. SNF placement pending.   9/20 Stable. SNF placement pending.   9/21 Stable.  Awaiting SNF placement.   9/22 Stable. Pleasant and cooperative at this time.     As of 9/23/2022, pt seen and examined. He is more clam today, however not accepted by [facility], possible [facility] on Monday. Will follow.   As of 9/24/2022, vital signs stable. Patient more awake and alert today. Follow simple commands. Awaiting placement. Will monitor.     As of 9/25/2022, patient awake and cooperative. Vital signs stable. Awaiting placement, possible [facility] on Monday.        Interval History: Awaiting placement     Review of Systems   Constitutional:  Positive for activity change and fatigue. Negative for chills, diaphoresis and fever.   HENT:  Positive for hearing loss. Negative for ear discharge, ear pain and facial swelling.    Eyes:  Negative for pain and redness.   Respiratory:  Negative  for cough and shortness of breath.    Cardiovascular:  Negative for leg swelling.   Gastrointestinal:  Negative for abdominal pain, constipation, diarrhea, nausea and vomiting.   Endocrine: Negative for polydipsia and polyphagia.   Genitourinary:  Negative for flank pain and hematuria.               Musculoskeletal:  Positive for arthralgias and gait problem. Negative for back pain, neck pain and neck stiffness.        Left upper extremely weakness (chronic)    Skin:  Negative for color change.   Allergic/Immunologic: Negative for food allergies.   Neurological:  Positive for weakness. Negative for facial asymmetry and speech difficulty.   Hematological:  Does not bruise/bleed easily.   Psychiatric/Behavioral:  Negative for agitation, behavioral problems, hallucinations and suicidal ideas. The patient is not nervous/anxious.    Objective:     Vital Signs (Most Recent):  Temp: 98 °F (36.7 °C) (09/25/22 1120)  Pulse: 79 (09/25/22 1258)  Resp: 18 (09/25/22 1258)  BP: (!) 137/95 (09/25/22 1120)  SpO2: 96 % (09/25/22 1258)   Vital Signs (24h Range):  Temp:  [97.5 °F (36.4 °C)-98.8 °F (37.1 °C)] 98 °F (36.7 °C)  Pulse:  [72-86] 79  Resp:  [17-18] 18  SpO2:  [95 %-100 %] 96 %  BP: (129-147)/(78-95) 137/95     Weight: 88.9 kg (195 lb 15.8 oz)  Body mass index is 26.95 kg/m².    Intake/Output Summary (Last 24 hours) at 9/25/2022 1520  Last data filed at 9/25/2022 1200  Gross per 24 hour   Intake 1270 ml   Output 1600 ml   Net -330 ml      Physical Exam  Vitals and nursing note reviewed.   Constitutional:       General: He is not in acute distress.     Appearance: He is well-developed. He is not ill-appearing, toxic-appearing or diaphoretic.   HENT:      Head: Normocephalic and atraumatic.      Mouth/Throat:      Mouth: Mucous membranes are moist.   Eyes:      General: No scleral icterus.        Right eye: No discharge.         Left eye: No discharge.      Extraocular Movements: Extraocular movements intact.       Conjunctiva/sclera: Conjunctivae normal.      Pupils: Pupils are equal, round, and reactive to light.   Cardiovascular:      Rate and Rhythm: Normal rate and regular rhythm.      Heart sounds: Normal heart sounds. No murmur heard.    No friction rub. No gallop.   Pulmonary:      Effort: Pulmonary effort is normal. No respiratory distress.      Breath sounds: No wheezing or rhonchi.      Comments: BBS diminished  Abdominal:      General: Bowel sounds are normal. There is no distension.      Palpations: Abdomen is soft.      Tenderness: There is no abdominal tenderness. There is no guarding.   Genitourinary:     Comments: Zully urine draining in bag  Musculoskeletal:      Cervical back: Normal range of motion and neck supple. No rigidity or tenderness.      Right lower leg: No edema.      Left lower leg: No edema.      Comments: Stiff joints  General debility  Left upper extremely weakness   Skin:     General: Skin is warm and dry.      Capillary Refill: Capillary refill takes 2 to 3 seconds.   Neurological:      Mental Status: He is alert.      Motor: Weakness present.      Gait: Gait abnormal.      Comments: Oriented person and place  Responds appropriately to simple commands   Psychiatric:         Cognition and Memory: Cognition is impaired. He exhibits impaired recent memory.      Comments: Calm and cooperative           Assessment/Plan:      * Debility  9/16 Fall and skin precautions  Turn q 2 hours  Continue Physical therapy and Occupational therapy  9/21 Awaiting SNF placement  9/22 Patient refused SNF placement yesterday and some agitation noted at that time. Tele psych consulted and patient currently undergoing a medication adjustment for his Dementia.   9/24/2022, Patient more awake and alert today. Follow simple commands. Awaiting possible placement with [facility] on Monday.     9/25/2022, Patient continues to be more awake and alert. Following simple commands. Awaiting possible placement with  [facility] on Monday.         Dementia with behavioral disturbance  Confused on admission\  Cont home emd Seroquel HS    --Avasys  --Fall precautions  --Neuro checks Q4H x24 hours    9/11/22: Pt AAOx3 at times but with notable cognitive deficits.    9/14/22: Increased agitation - Seroquel increased to BID.    9/15/22: Seroquel in am made pt too drowsy- will decrease back to just nightly.     9/17 Stable. Alert, appropriate    9/20 Sleepy but arousable. Calm and cooperative. Discontinue Seroquel  9/21 Stable. Pleasant. Watching tv.    Telepsych consult done and recommended-  Begin / Restart Seroquel at reduced dose of 12.5 mg PO QAM and 25 mg PO QHS (can also reduce to 12.5 mg if excess morning somnolence is observed) for behavioral disturbances / mood / sleep in dementia / possible resolving delirium (discussed risks/benefits/alt vs no treatment with patient).     - Can use Zyprexa 2.5 mg to 5 mg PO/IM q8 hours PRN for non-redirectable behavioral disturbances in dementia / possible resolving delirium (discussed risks/benefits/alt vs no treatment with patient).    Avoid benzodiazepines, antihistamines, anticholinergics, hypnotics, and minimize opiates while controlling for pain as these medications may exacerbate delirium.      9/22 Patient refused SNF placement yesterday and some agitation noted at that time. Tele psych consulted and patient currently undergoing a medication adjustment for his Dementia. See Above.    9/24/2022, Patient more awake and alert today. Follow simple commands. Awaiting possible placement with [facility] on Monday.     9/25/2022, Patient continues to be more awake and alert. Following simple commands. Awaiting possible placement with [facility] on Monday.         Malnutrition of moderate degree  9/16 Nutrition consulted. Most recent weight and BMI monitored  Add po supplement    Measurements:  Wt Readings from Last 1 Encounters:   09/19/22 87 kg (191 lb 12.8 oz)   Body  mass index is 26.38 kg/m².    Recommendations:      Patient has been screened and assessed by RD. RD will follow patient.      Type 2 diabetes mellitus   9/16 Dm diet  Accuchecks with correctional SSI  Blood sugars running 129-135  Lab Results   Component Value Date    HGBA1C 6.6 (H) 07/20/2022 9/17 Blood sugars running 121-145  9/18 Blood sugars running 118-151  9/19 Blood sugars running 110-146  9/20 Blood sugars running 124-132  9/21 Blood sugars running 124-152  9/22 Blood sugars running 140-159     Hypokalemia  9/16 Add 40 meq po x 1 dose      Microcytic anemia  9/16 Add MVI      Hydronephrosis, bilateral  2/2 urinary retention   Serum Cr normalized     9/16 Patient to discharge with christopher and follow up with Urology as Outpatient      Urinary retention  9/16 Patient to discharge with christopher and follow up with Urology as Outpatient       Falls  Patient reported falling at home    --Avasys  --Fall Precautions    9/11/22: PT/OT  SNF placement - CM consulted     9/19 Awaiting SNF placement    Chronic systolic congestive heart failure  Patient is identified as having Systolic (HFrEF) heart failure that is Chronic. CHF is currently controlled. Latest ECHO performed and demonstrates- Results for orders placed during the hospital encounter of 07/19/22    Echo    Interpretation Summary  · Concentric hypertrophy and severely decreased systolic function.  · Mild left atrial enlargement.  · The estimated ejection fraction is 20%.  · Left ventricular diastolic dysfunction.  · There is left ventricular global hypokinesis.  · Moderate right ventricular enlargement with mildly to moderately reduced right ventricular systolic function.  · Moderate right atrial enlargement.  · There is mild aortic valve stenosis.  · Aortic valve area is 1.90 cm2; peak velocity is 1.05 m/s; mean gradient is 2 mmHg.  · There is severe pulmonary hypertension.  · Moderate tricuspid regurgitation.  · Elevated central venous pressure (15 mmHg).  ·  The estimated PA systolic pressure is 103 mmHg.  . Continue ACE/ARB, Furosemide and Aldactone and monitor clinical status closely. Monitor on telemetry. Patient is on CHF pathway.  Monitor strict Is&Os and daily weights.  Place on fluid restriction of 1.5 L. Continue to stress to patient importance of self efficacy and  on diet for CHF. Last BNP reviewed- and noted below   No results for input(s): BNP, BNPTRIAGEBLO in the last 168 hours..  EF 20%  Monitor I/o  Daily weights  Spironolactone and Entresto, Lasix, on hold 2/2 normotensive and MELISSA    9/17 stable  9/20 Resume Home lasix at reduced dose  9/22 Stable    Essential hypertension  Normotensive   Home medications Spironolactone and Entresto on hold   --Hydralazine PRN  --Vitals Q4H  9/17 Stable      COPD suggested by initial evaluation  Negative for symptoms of exacerbation    --Continue home regimen    9/17 Stable  9/22 Stable            VTE Risk Mitigation (From admission, onward)         Ordered     Place NIKITA hose  Until discontinued         09/17/22 0846     enoxaparin injection 40 mg  Daily         09/14/22 1426     Reason for No Pharmacological VTE Prophylaxis  Once        Question:  Reasons:  Answer:  Already adequately anticoagulated on oral Anticoagulants    09/10/22 1158     IP VTE HIGH RISK PATIENT  Once         09/10/22 1158     Place sequential compression device  Until discontinued         09/10/22 1158                Discharge Planning   TATIANA: 9/23/2022     Code Status: Full Code   Is the patient medically ready for discharge?: Yes    Reason for patient still in hospital (select all that apply): Patient trending condition and Treatment  Discharge Plan A: Skilled Nursing Facility   Discharge Delays: (!) Post-Acute Set-up              Joseph Polanco NP  Department of Hospital Medicine   [facility] - Med Surg 3

## 2022-09-25 NOTE — ASSESSMENT & PLAN NOTE
9/16 Fall and skin precautions  Turn q 2 hours  Continue Physical therapy and Occupational therapy  9/21 Awaiting SNF placement  9/22 Patient refused SNF placement yesterday and some agitation noted at that time. Tele psych consulted and patient currently undergoing a medication adjustment for his Dementia.   9/24/2022, Patient more awake and alert today. Follow simple commands. Awaiting possible placement with [facility] on Monday.     9/25/2022, Patient continues to be more awake and alert. Following simple commands. Awaiting possible placement with [facility] on Monday.

## 2022-09-25 NOTE — PLAN OF CARE
Problem: Diabetes Comorbidity  Goal: Blood Glucose Level Within Targeted Range  Intervention: Monitor and Manage Glycemia  Flowsheets (Taken 9/25/2022 0106)  Glycemic Management: blood glucose monitored     Problem: Fall Injury Risk  Goal: Absence of Fall and Fall-Related Injury  Intervention: Identify and Manage Contributors  Flowsheets (Taken 9/25/2022 0106)  Medication Review/Management: medications reviewed  Intervention: Promote Injury-Free Environment  Flowsheets (Taken 9/25/2022 0106)  Safety Promotion/Fall Prevention:   assistive device/personal item within reach   bed alarm set   Fall Risk reviewed with patient/family   medications reviewed   nonskid shoes/socks when out of bed   instructed to call staff for mobility     Problem: Skin Injury Risk Increased  Goal: Skin Health and Integrity  Intervention: Optimize Skin Protection  Flowsheets (Taken 9/25/2022 0106)  Pressure Reduction Techniques:   frequent weight shift encouraged   sit time limited to 1 hour   weight shift assistance provided  Head of Bed (HOB) Positioning: HOB elevated     Problem: Infection  Goal: Absence of Infection Signs and Symptoms  Intervention: Prevent or Manage Infection  Flowsheets (Taken 9/25/2022 0106)  Infection Management: aseptic technique maintained     Problem: Confusion Chronic  Goal: Optimal Cognitive Function  Intervention: Minimize Injury Risk and Provide Safety  Flowsheets (Taken 9/25/2022 0106)  Enhanced Safety Measures: bed alarm set

## 2022-09-25 NOTE — SUBJECTIVE & OBJECTIVE
Interval History: Awaiting placement     Review of Systems   Constitutional:  Positive for activity change and fatigue. Negative for chills, diaphoresis and fever.   HENT:  Positive for hearing loss. Negative for ear discharge, ear pain and facial swelling.    Eyes:  Negative for pain and redness.   Respiratory:  Negative for cough and shortness of breath.    Cardiovascular:  Negative for leg swelling.   Gastrointestinal:  Negative for abdominal pain, constipation, diarrhea, nausea and vomiting.   Endocrine: Negative for polydipsia and polyphagia.   Genitourinary:  Negative for flank pain and hematuria.               Musculoskeletal:  Positive for arthralgias and gait problem. Negative for back pain, neck pain and neck stiffness.        Left upper extremely weakness (chronic)    Skin:  Negative for color change.   Allergic/Immunologic: Negative for food allergies.   Neurological:  Positive for weakness. Negative for facial asymmetry and speech difficulty.   Hematological:  Does not bruise/bleed easily.   Psychiatric/Behavioral:  Negative for agitation, behavioral problems, hallucinations and suicidal ideas. The patient is not nervous/anxious.    Objective:     Vital Signs (Most Recent):  Temp: 98 °F (36.7 °C) (09/25/22 1120)  Pulse: 79 (09/25/22 1258)  Resp: 18 (09/25/22 1258)  BP: (!) 137/95 (09/25/22 1120)  SpO2: 96 % (09/25/22 1258)   Vital Signs (24h Range):  Temp:  [97.5 °F (36.4 °C)-98.8 °F (37.1 °C)] 98 °F (36.7 °C)  Pulse:  [72-86] 79  Resp:  [17-18] 18  SpO2:  [95 %-100 %] 96 %  BP: (129-147)/(78-95) 137/95     Weight: 88.9 kg (195 lb 15.8 oz)  Body mass index is 26.95 kg/m².    Intake/Output Summary (Last 24 hours) at 9/25/2022 1520  Last data filed at 9/25/2022 1200  Gross per 24 hour   Intake 1270 ml   Output 1600 ml   Net -330 ml      Physical Exam  Vitals and nursing note reviewed.   Constitutional:       General: He is not in acute distress.     Appearance: He is well-developed. He is not  ill-appearing, toxic-appearing or diaphoretic.   HENT:      Head: Normocephalic and atraumatic.      Mouth/Throat:      Mouth: Mucous membranes are moist.   Eyes:      General: No scleral icterus.        Right eye: No discharge.         Left eye: No discharge.      Extraocular Movements: Extraocular movements intact.      Conjunctiva/sclera: Conjunctivae normal.      Pupils: Pupils are equal, round, and reactive to light.   Cardiovascular:      Rate and Rhythm: Normal rate and regular rhythm.      Heart sounds: Normal heart sounds. No murmur heard.    No friction rub. No gallop.   Pulmonary:      Effort: Pulmonary effort is normal. No respiratory distress.      Breath sounds: No wheezing or rhonchi.      Comments: BBS diminished  Abdominal:      General: Bowel sounds are normal. There is no distension.      Palpations: Abdomen is soft.      Tenderness: There is no abdominal tenderness. There is no guarding.   Genitourinary:     Comments: Zully urine draining in bag  Musculoskeletal:      Cervical back: Normal range of motion and neck supple. No rigidity or tenderness.      Right lower leg: No edema.      Left lower leg: No edema.      Comments: Stiff joints  General debility  Left upper extremely weakness   Skin:     General: Skin is warm and dry.      Capillary Refill: Capillary refill takes 2 to 3 seconds.   Neurological:      Mental Status: He is alert.      Motor: Weakness present.      Gait: Gait abnormal.      Comments: Oriented person and place  Responds appropriately to simple commands   Psychiatric:         Cognition and Memory: Cognition is impaired. He exhibits impaired recent memory.      Comments: Calm and cooperative

## 2022-09-26 LAB
ANION GAP SERPL CALC-SCNC: 8 MMOL/L (ref 8–16)
BUN SERPL-MCNC: 37 MG/DL (ref 8–23)
CALCIUM SERPL-MCNC: 9.1 MG/DL (ref 8.7–10.5)
CHLORIDE SERPL-SCNC: 111 MMOL/L (ref 95–110)
CO2 SERPL-SCNC: 23 MMOL/L (ref 23–29)
CREAT SERPL-MCNC: 1.1 MG/DL (ref 0.5–1.4)
EST. GFR  (NO RACE VARIABLE): >60 ML/MIN/1.73 M^2
GLUCOSE SERPL-MCNC: 123 MG/DL (ref 70–110)
POCT GLUCOSE: 123 MG/DL (ref 70–110)
POCT GLUCOSE: 142 MG/DL (ref 70–110)
POTASSIUM SERPL-SCNC: 3.5 MMOL/L (ref 3.5–5.1)
SODIUM SERPL-SCNC: 142 MMOL/L (ref 136–145)

## 2022-09-26 PROCEDURE — 80048 BASIC METABOLIC PNL TOTAL CA: CPT | Performed by: FAMILY MEDICINE

## 2022-09-26 PROCEDURE — 25000003 PHARM REV CODE 250: Performed by: NURSE PRACTITIONER

## 2022-09-26 PROCEDURE — 94640 AIRWAY INHALATION TREATMENT: CPT

## 2022-09-26 PROCEDURE — 25000242 PHARM REV CODE 250 ALT 637 W/ HCPCS: Performed by: INTERNAL MEDICINE

## 2022-09-26 PROCEDURE — 11000001 HC ACUTE MED/SURG PRIVATE ROOM

## 2022-09-26 PROCEDURE — 94761 N-INVAS EAR/PLS OXIMETRY MLT: CPT

## 2022-09-26 PROCEDURE — 99900035 HC TECH TIME PER 15 MIN (STAT)

## 2022-09-26 PROCEDURE — 25000242 PHARM REV CODE 250 ALT 637 W/ HCPCS: Performed by: FAMILY MEDICINE

## 2022-09-26 PROCEDURE — 97530 THERAPEUTIC ACTIVITIES: CPT | Mod: CQ

## 2022-09-26 PROCEDURE — 25000242 PHARM REV CODE 250 ALT 637 W/ HCPCS: Performed by: NURSE PRACTITIONER

## 2022-09-26 PROCEDURE — 36415 COLL VENOUS BLD VENIPUNCTURE: CPT | Performed by: FAMILY MEDICINE

## 2022-09-26 PROCEDURE — 63600175 PHARM REV CODE 636 W HCPCS: Performed by: FAMILY MEDICINE

## 2022-09-26 RX ADMIN — IPRATROPIUM BROMIDE 0.5 MG: 0.5 SOLUTION RESPIRATORY (INHALATION) at 01:09

## 2022-09-26 RX ADMIN — THERA TABS 1 TABLET: TAB at 08:09

## 2022-09-26 RX ADMIN — TIMOLOL MALEATE 1 DROP: 5 SOLUTION/ DROPS OPHTHALMIC at 09:09

## 2022-09-26 RX ADMIN — ASPIRIN 81 MG: 81 TABLET, COATED ORAL at 08:09

## 2022-09-26 RX ADMIN — ENOXAPARIN SODIUM 40 MG: 100 INJECTION SUBCUTANEOUS at 04:09

## 2022-09-26 RX ADMIN — ALBUTEROL SULFATE 2.5 MG: 2.5 SOLUTION RESPIRATORY (INHALATION) at 07:09

## 2022-09-26 RX ADMIN — IPRATROPIUM BROMIDE 0.5 MG: 0.5 SOLUTION RESPIRATORY (INHALATION) at 07:09

## 2022-09-26 RX ADMIN — IPRATROPIUM BROMIDE 0.5 MG: 0.5 SOLUTION RESPIRATORY (INHALATION) at 08:09

## 2022-09-26 RX ADMIN — FUROSEMIDE 20 MG: 20 TABLET ORAL at 08:09

## 2022-09-26 RX ADMIN — TAMSULOSIN HYDROCHLORIDE 0.4 MG: 0.4 CAPSULE ORAL at 09:09

## 2022-09-26 RX ADMIN — BUDESONIDE 0.5 MG: 0.5 INHALANT ORAL at 07:09

## 2022-09-26 RX ADMIN — QUETIAPINE FUMARATE 25 MG: 25 TABLET ORAL at 09:09

## 2022-09-26 RX ADMIN — TIMOLOL MALEATE 1 DROP: 5 SOLUTION/ DROPS OPHTHALMIC at 08:09

## 2022-09-26 RX ADMIN — QUETIAPINE FUMARATE 12.5 MG: 25 TABLET, FILM COATED ORAL at 08:09

## 2022-09-26 RX ADMIN — BUDESONIDE 0.5 MG: 0.5 INHALANT ORAL at 08:09

## 2022-09-26 RX ADMIN — IPRATROPIUM BROMIDE 0.5 MG: 0.5 SOLUTION RESPIRATORY (INHALATION) at 04:09

## 2022-09-26 NOTE — SUBJECTIVE & OBJECTIVE
Interval History: Awaiting placement     Review of Systems   Constitutional:  Positive for activity change and fatigue. Negative for chills, diaphoresis and fever.   HENT:  Positive for hearing loss. Negative for ear discharge, ear pain and facial swelling.    Eyes:  Negative for pain and redness.   Respiratory:  Negative for cough and shortness of breath.    Cardiovascular:  Negative for leg swelling.   Gastrointestinal:  Negative for abdominal pain, constipation, diarrhea, nausea and vomiting.   Endocrine: Negative for polydipsia and polyphagia.   Genitourinary:  Negative for flank pain and hematuria.               Musculoskeletal:  Positive for arthralgias and gait problem. Negative for back pain, neck pain and neck stiffness.        Left upper extremely weakness (chronic)    Skin:  Negative for color change.   Allergic/Immunologic: Negative for food allergies.   Neurological:  Positive for weakness. Negative for facial asymmetry and speech difficulty.   Hematological:  Does not bruise/bleed easily.   Psychiatric/Behavioral:  Negative for agitation, behavioral problems, hallucinations and suicidal ideas. The patient is not nervous/anxious.    Objective:     Vital Signs (Most Recent):  Temp: 98 °F (36.7 °C) (09/26/22 1522)  Pulse: 74 (09/26/22 1653)  Resp: 18 (09/26/22 1653)  BP: (!) 128/90 (09/26/22 1522)  SpO2: 98 % (09/26/22 1653)   Vital Signs (24h Range):  Temp:  [97.9 °F (36.6 °C)-98.2 °F (36.8 °C)] 98 °F (36.7 °C)  Pulse:  [72-83] 74  Resp:  [18-20] 18  SpO2:  [94 %-100 %] 98 %  BP: (128-142)/(55-90) 128/90     Weight: 90 kg (198 lb 6.6 oz)  Body mass index is 27.29 kg/m².    Intake/Output Summary (Last 24 hours) at 9/26/2022 1751  Last data filed at 9/26/2022 1300  Gross per 24 hour   Intake 1240 ml   Output 900 ml   Net 340 ml      Physical Exam  Vitals and nursing note reviewed.   Constitutional:       General: He is not in acute distress.     Appearance: He is well-developed. He is not ill-appearing,  toxic-appearing or diaphoretic.   HENT:      Head: Normocephalic and atraumatic.      Mouth/Throat:      Mouth: Mucous membranes are moist.   Eyes:      General: No scleral icterus.        Right eye: No discharge.         Left eye: No discharge.      Extraocular Movements: Extraocular movements intact.      Conjunctiva/sclera: Conjunctivae normal.      Pupils: Pupils are equal, round, and reactive to light.   Cardiovascular:      Rate and Rhythm: Normal rate and regular rhythm.      Heart sounds: Normal heart sounds. No murmur heard.    No friction rub. No gallop.   Pulmonary:      Effort: Pulmonary effort is normal. No respiratory distress.      Breath sounds: No wheezing or rhonchi.      Comments: BBS diminished  Abdominal:      General: Bowel sounds are normal. There is no distension.      Palpations: Abdomen is soft.      Tenderness: There is no abdominal tenderness. There is no guarding.   Genitourinary:     Comments: Zully urine draining in bag  Musculoskeletal:      Cervical back: Normal range of motion and neck supple. No rigidity or tenderness.      Right lower leg: No edema.      Left lower leg: No edema.      Comments: Stiff joints  General debility  Left upper extremely weakness   Skin:     General: Skin is warm and dry.      Capillary Refill: Capillary refill takes 2 to 3 seconds.   Neurological:      Mental Status: He is alert.      Motor: Weakness present.      Gait: Gait abnormal.      Comments: Oriented person and place  Responds appropriately to simple commands   Psychiatric:         Cognition and Memory: Cognition is impaired. He exhibits impaired recent memory.      Comments: Calm and cooperative

## 2022-09-26 NOTE — PT/OT/SLP PROGRESS
Physical Therapy  Treatment    Joseph Toussaint   MRN: [id number]   Admitting Diagnosis: Debility    PT Received On: 09/26/22  PT Start Time: 1425     PT Stop Time: 1455    PT Total Time (min): 30 min       Billable Minutes:  Therapeutic Activity 30    Treatment Type: Treatment  PT/PTA: PTA     PTA Visit Number: 2       General Precautions: Standard, fall  Orthopedic Precautions: N/A   Braces: N/A  Respiratory Status: Nasal cannula, flow 2 L/min    Spiritual, Cultural Beliefs, Moravian Practices, Values that Affect Care: no    Subjective:  Communicated with patient's nurse, Kajal, and completed Epic chart review prior to session.  Patient agreed to PT session with encouragement.    Pain/Comfort  Pain Rating 1: 0/10    Objective:   Patient found with: telemetry, peripheral IV, christopher catheter, Other (comments) (AVASYS)    Functional Mobility:  Supine > sit EOB: Max A     Forward scoot towards EOB: Max A     STS from EOB > RW x3 trials: Max A of 2   (Severe posterior lean which patient was unable to correct with verbal or tactile cues; unstable SEMAJ)    Lateral scoot towards HOB: Min-Mod A     Sit > supine: Max A     Supine scoot towards HOB: Total A of 2    Roll R/L for brief change: Max A    Educated patient on importance of increased tolerance to upright position and direct impact on CV endurance and strength. Patient encouraged to utilize elevated HOB to simulate chair position until able to safely complete chair T/F. Patient given a minimum goal of majority of the day to be spent in upright, especially with all meals. Encouraged patient to perform AROM TE to BLE throughout the day within all available planes of motion. Re enforced importance of utilizing call light to meet needs in room and not attempt to get up without staff assistance.      AM-PAC 6 CLICK MOBILITY  How much help from another person does this patient currently need?   1 = Unable, Total/Dependent Assistance  2 = A lot, Maximum/Moderate Assistance  3  = A little, Minimum/Contact Guard/Supervision  4 = None, Modified [place]/Independent    Turning over in bed (including adjusting bedclothes, sheets and blankets)?: 2  Sitting down on and standing up from a chair with arms (e.g., wheelchair, bedside commode, etc.): 2  Moving from lying on back to sitting on the side of the bed?: 2  Moving to and from a bed to a chair (including a wheelchair)?: 1  Need to walk in hospital room?: 1  Climbing 3-5 steps with a railing?: 1  Basic Mobility Total Score: 9    AM-PAC Raw Score CMS G-Code Modifier Level of Impairment Assistance   6 % Total / Unable   7 - 9 CM 80 - 100% Maximal Assist   10 - 14 CL 60 - 80% Moderate Assist   15 - 19 CK 40 - 60% Moderate Assist   20 - 22 CJ 20 - 40% Minimal Assist   23 CI 1-20% SBA / CGA   24 CH 0% Independent/ Mod I     Patient left with bed in chair position with call button in reach, bed alarm on, and AVASYS & nurse present.    Assessment:  Joseph Toussaint is a 89 y.o. male with a medical diagnosis of Debility and presents with overall decline in functional mobility. Patient would continue to benefit from skilled PT to address functional limitations listed below in order to return to PLOF/decrease caregiver burden.     Rehab identified problem list/impairments: Rehab identified problem list/impairments: weakness, impaired endurance, impaired sensation, impaired self care skills, impaired functional mobility, impaired balance, impaired cognition, decreased coordination, decreased upper extremity function, decreased lower extremity function, decreased safety awareness, pain, decreased ROM, impaired coordination, impaired cardiopulmonary response to activity    Rehab potential is fair.    Activity tolerance: Poor    Discharge recommendations: Discharge Facility/Level of Care Needs: nursing facility, skilled     Barriers to discharge:      Equipment recommendations: Equipment Needed After Discharge: other (see comments) (TBD BY NEXT  LEVEL OF CARE)     GOALS:   Multidisciplinary Problems       Physical Therapy Goals          Problem: Physical Therapy    Goal Priority Disciplines Outcome Goal Variances Interventions   Physical Therapy Goal     PT, PT/OT Ongoing, Progressing     Description: LTG to be met by 10-10-22:    Bed mobility: SPV  Transfers: CGA with RW   Gait: CGA with RW                       PLAN:    Patient to be seen 3 x/week  to address the above listed problems via gait training, therapeutic activities, therapeutic exercises, neuromuscular re-education  Plan of Care expires: 10/10/22  Plan of Care reviewed with: patient         09/26/2022

## 2022-09-26 NOTE — ASSESSMENT & PLAN NOTE
9/16 Nutrition consulted. Most recent weight and BMI monitored  Add po supplement    Measurements:  Wt Readings from Last 1 Encounters:   09/25/22 90 kg (198 lb 6.6 oz)   Body mass index is 27.29 kg/m².    Recommendations:      Patient has been screened and assessed by RD. RD will follow patient.

## 2022-09-26 NOTE — PLAN OF CARE
Alternate contact number given to [facility] . They are trying to get financial documents from family for admission, possibly today.

## 2022-09-26 NOTE — PLAN OF CARE
Called Malaika from [facility]. No answer. Left message.    Tor Morocho LM 9/26/2022 2:01 PM

## 2022-09-26 NOTE — PROGRESS NOTES
[facility] - Med Surg 3  [facility] Medicine  Progress Note    Patient Name: Joseph Toussaint  MRN: [id number]  Patient Class: IP- Inpatient   Admission Date: 9/10/2022  Length of Stay: 11 days  Attending Physician: Lan Escobar MD  Primary Care Provider: Abad Iqbal MD        Subjective:     Principal Problem:Debility        HPI:  89 y.o. male patient with a PMHx of HTN, COPD, CHF, and DM who presents to the Emergency Department for evaluation of a fall which occurred x 2 days pta. Pt family called for transport to get pt checked out. Pt denies any complaints and is not in pain. No associated sxs reported. Patient denies any CP, SOB, fever, back pain, abd pain, and all other sxs at this time. No prior Tx reported. In the ED, UA consistent with UTI, dx with UTI on 8/27, unclear if patient completed antibiotic regimen. BUN/Cr: 39/2.1, Bili: 2.0, H/H: 10.4/32.3. Afebrile, vitals stable. Oriented to self only on exam. Intitiated on antibiotics in the ED, urine culture pending, treated with IV fluid bolus. Patient is a full code, surrogate decision maker is his son Kevin Toussaint. Placed in observation under the care of hospital medicine.       Overview/Hospital Course:  The patient is a 89 yo male with HTN, COPD, CHF, and DM who was placed in observation for frequent falls, MELISSA, and presumed UTI on IVF and IV  Rocephin.     9/11/22: Pt AAOx3 at times but with notable cognitive deficits. Denies complaints. MELISSA improving 2.1>1.9. Hyperchloremic metabolic acidosis noted- will switch NS IVFs to Bicarb drip. Blood cultures show NGTD. Urine culture showed no growth but urine output is very cloudy -almost purulent. Will switch IV Cipro to oral dose. PT/OT evaluated pt and recommended SNF. Called pt's nephew Kraig (POA) and provided status update.  Pt lives with his nephew and his wife. Currently, Kraig is in the hospital recovering from surgery. He requested discharge to SNF.    9/12/22: Denies complaints. No acute events. MELISSA slowly  improving 2.1>1.9>1.7. cont IVF. SNF placement pending     9/13/22: No acute events. Serum Cr remains 1.7. Will check BNP and Renal U/S. Hold IVFs  Awaiting SNF placement     9/14/22: Increased agitation - Seroquel increased to BID. Serum Cr 1.5. Renal U/s showed mild hydronephrosis on right and moderate on Left with distended bladder. Christopher placed-900 ml creamy white UOP immediately returned. Will d/c Cipro- add IV Rocephin. Urology consulted. Awaiting SNF placement     9/15/22: Seroquel in am made pt too drowsy- will decrease back to just nightly. Gross hematuria to UOP. Urology recommended continuous bladder irrigation. Serum Cr normalized after placing christopher. Hydronephrosis 2/2 urinary retention. Cont IV Rocephin. Repeat urine culture pending. Pt will be placed in SNF when hematuria resolves and repeat urine culture results.     9/16 Gross hematuria resolved. CBI clamped. Continue current treatment.   9/17 Patient remains stable and improved. No hematuria at this time.   9/18 Stable. SNF placement pending.   9/19 Stable. SNF placement pending.   9/20 Stable. SNF placement pending.   9/21 Stable.  Awaiting SNF placement.   9/22 Stable. Pleasant and cooperative at this time.     As of 9/23/2022, pt seen and examined. He is more clam today, however not accepted by [facility], possible [facility] on Monday. Will follow.   As of 9/24/2022, vital signs stable. Patient more awake and alert today. Follow simple commands. Awaiting placement. Will monitor.     As of 9/25/2022, patient awake and cooperative. Vital signs stable. Awaiting placement, possible [facility] on Monday.      As of 9/26/2022, patient awake, following simple commends. Vital signs stable. Awaiting placement, [facility]. Case management following.       Interval History: Awaiting placement     Review of Systems   Constitutional:  Positive for activity change and fatigue. Negative for chills, diaphoresis and fever.   HENT:   Positive for hearing loss. Negative for ear discharge, ear pain and facial swelling.    Eyes:  Negative for pain and redness.   Respiratory:  Negative for cough and shortness of breath.    Cardiovascular:  Negative for leg swelling.   Gastrointestinal:  Negative for abdominal pain, constipation, diarrhea, nausea and vomiting.   Endocrine: Negative for polydipsia and polyphagia.   Genitourinary:  Negative for flank pain and hematuria.               Musculoskeletal:  Positive for arthralgias and gait problem. Negative for back pain, neck pain and neck stiffness.        Left upper extremely weakness (chronic)    Skin:  Negative for color change.   Allergic/Immunologic: Negative for food allergies.   Neurological:  Positive for weakness. Negative for facial asymmetry and speech difficulty.   Hematological:  Does not bruise/bleed easily.   Psychiatric/Behavioral:  Negative for agitation, behavioral problems, hallucinations and suicidal ideas. The patient is not nervous/anxious.    Objective:     Vital Signs (Most Recent):  Temp: 98 °F (36.7 °C) (09/26/22 1522)  Pulse: 74 (09/26/22 1653)  Resp: 18 (09/26/22 1653)  BP: (!) 128/90 (09/26/22 1522)  SpO2: 98 % (09/26/22 1653)   Vital Signs (24h Range):  Temp:  [97.9 °F (36.6 °C)-98.2 °F (36.8 °C)] 98 °F (36.7 °C)  Pulse:  [72-83] 74  Resp:  [18-20] 18  SpO2:  [94 %-100 %] 98 %  BP: (128-142)/(55-90) 128/90     Weight: 90 kg (198 lb 6.6 oz)  Body mass index is 27.29 kg/m².    Intake/Output Summary (Last 24 hours) at 9/26/2022 1751  Last data filed at 9/26/2022 1300  Gross per 24 hour   Intake 1240 ml   Output 900 ml   Net 340 ml      Physical Exam  Vitals and nursing note reviewed.   Constitutional:       General: He is not in acute distress.     Appearance: He is well-developed. He is not ill-appearing, toxic-appearing or diaphoretic.   HENT:      Head: Normocephalic and atraumatic.      Mouth/Throat:      Mouth: Mucous membranes are moist.   Eyes:      General: No scleral  icterus.        Right eye: No discharge.         Left eye: No discharge.      Extraocular Movements: Extraocular movements intact.      Conjunctiva/sclera: Conjunctivae normal.      Pupils: Pupils are equal, round, and reactive to light.   Cardiovascular:      Rate and Rhythm: Normal rate and regular rhythm.      Heart sounds: Normal heart sounds. No murmur heard.    No friction rub. No gallop.   Pulmonary:      Effort: Pulmonary effort is normal. No respiratory distress.      Breath sounds: No wheezing or rhonchi.      Comments: BBS diminished  Abdominal:      General: Bowel sounds are normal. There is no distension.      Palpations: Abdomen is soft.      Tenderness: There is no abdominal tenderness. There is no guarding.   Genitourinary:     Comments: Zully urine draining in bag  Musculoskeletal:      Cervical back: Normal range of motion and neck supple. No rigidity or tenderness.      Right lower leg: No edema.      Left lower leg: No edema.      Comments: Stiff joints  General debility  Left upper extremely weakness   Skin:     General: Skin is warm and dry.      Capillary Refill: Capillary refill takes 2 to 3 seconds.   Neurological:      Mental Status: He is alert.      Motor: Weakness present.      Gait: Gait abnormal.      Comments: Oriented person and place  Responds appropriately to simple commands   Psychiatric:         Cognition and Memory: Cognition is impaired. He exhibits impaired recent memory.      Comments: Calm and cooperative           Assessment/Plan:      * Debility  9/16 Fall and skin precautions  Turn q 2 hours  Continue Physical therapy and Occupational therapy  9/21 Awaiting SNF placement  9/22 Patient refused SNF placement yesterday and some agitation noted at that time. Tele psych consulted and patient currently undergoing a medication adjustment for his Dementia.   9/24/2022, Patient more awake and alert today. Follow simple commands. Awaiting possible placement with [facility]  on Monday.     9/25/2022, Patient continues to be more awake and alert. Following simple commands. Awaiting possible placement with [facility] on Monday.         Dementia with behavioral disturbance  Confused on admission\  Cont home emd Seroquel HS    --Avasys  --Fall precautions  --Neuro checks Q4H x24 hours    9/11/22: Pt AAOx3 at times but with notable cognitive deficits.    9/14/22: Increased agitation - Seroquel increased to BID.    9/15/22: Seroquel in am made pt too drowsy- will decrease back to just nightly.     9/17 Stable. Alert, appropriate    9/20 Sleepy but arousable. Calm and cooperative. Discontinue Seroquel  9/21 Stable. Pleasant. Watching tv.    Telepsych consult done and recommended-  Begin / Restart Seroquel at reduced dose of 12.5 mg PO QAM and 25 mg PO QHS (can also reduce to 12.5 mg if excess morning somnolence is observed) for behavioral disturbances / mood / sleep in dementia / possible resolving delirium (discussed risks/benefits/alt vs no treatment with patient).     - Can use Zyprexa 2.5 mg to 5 mg PO/IM q8 hours PRN for non-redirectable behavioral disturbances in dementia / possible resolving delirium (discussed risks/benefits/alt vs no treatment with patient).    Avoid benzodiazepines, antihistamines, anticholinergics, hypnotics, and minimize opiates while controlling for pain as these medications may exacerbate delirium.      9/22 Patient refused SNF placement yesterday and some agitation noted at that time. Tele psych consulted and patient currently undergoing a medication adjustment for his Dementia. See Above.    9/24/2022, Patient more awake and alert today. Follow simple commands. Awaiting possible placement with [facility] on Monday.     9/25/2022, Patient continues to be more awake and alert. Following simple commands. Awaiting possible placement with [facility] on Monday.         Malnutrition of moderate degree  9/16 Nutrition consulted. Most recent weight  and BMI monitored  Add po supplement    Measurements:  Wt Readings from Last 1 Encounters:   09/25/22 90 kg (198 lb 6.6 oz)   Body mass index is 27.29 kg/m².    Recommendations:      Patient has been screened and assessed by RD. RD will follow patient.      Type 2 diabetes mellitus   9/16 Dm diet  Accuchecks with correctional SSI  Blood sugars running 129-135  Lab Results   Component Value Date    HGBA1C 6.6 (H) 07/20/2022 9/17 Blood sugars running 121-145  9/18 Blood sugars running 118-151  9/19 Blood sugars running 110-146  9/20 Blood sugars running 124-132  9/21 Blood sugars running 124-152  9/22 Blood sugars running 140-159     Hypokalemia  9/16 Add 40 meq po x 1 dose      Microcytic anemia  9/16 Add MVI      Hydronephrosis, bilateral  2/2 urinary retention   Serum Cr normalized     9/16 Patient to discharge with christopher and follow up with Urology as Outpatient      Urinary retention  9/16 Patient to discharge with christopher and follow up with Urology as Outpatient       Falls  Patient reported falling at home    --Avasys  --Fall Precautions    9/11/22: PT/OT  SNF placement - CM consulted     9/19 Awaiting SNF placement    Chronic systolic congestive heart failure  Patient is identified as having Systolic (HFrEF) heart failure that is Chronic. CHF is currently controlled. Latest ECHO performed and demonstrates- Results for orders placed during the hospital encounter of 07/19/22    Echo    Interpretation Summary  · Concentric hypertrophy and severely decreased systolic function.  · Mild left atrial enlargement.  · The estimated ejection fraction is 20%.  · Left ventricular diastolic dysfunction.  · There is left ventricular global hypokinesis.  · Moderate right ventricular enlargement with mildly to moderately reduced right ventricular systolic function.  · Moderate right atrial enlargement.  · There is mild aortic valve stenosis.  · Aortic valve area is 1.90 cm2; peak velocity is 1.05 m/s; mean gradient is 2  mmHg.  · There is severe pulmonary hypertension.  · Moderate tricuspid regurgitation.  · Elevated central venous pressure (15 mmHg).  · The estimated PA systolic pressure is 103 mmHg.  . Continue ACE/ARB, Furosemide and Aldactone and monitor clinical status closely. Monitor on telemetry. Patient is on CHF pathway.  Monitor strict Is&Os and daily weights.  Place on fluid restriction of 1.5 L. Continue to stress to patient importance of self efficacy and  on diet for CHF. Last BNP reviewed- and noted below   No results for input(s): BNP, BNPTRIAGEBLO in the last 168 hours..  EF 20%  Monitor I/o  Daily weights  Spironolactone and Entresto, Lasix, on hold 2/2 normotensive and MELISSA    9/17 stable  9/20 Resume Home lasix at reduced dose  9/22 Stable    Essential hypertension  Normotensive   Home medications Spironolactone and Entresto on hold   --Hydralazine PRN  --Vitals Q4H  9/17 Stable      COPD suggested by initial evaluation  Negative for symptoms of exacerbation    --Continue home regimen    9/17 Stable  9/22 Stable            VTE Risk Mitigation (From admission, onward)         Ordered     Place NIKITA hose  Until discontinued         09/17/22 0846     enoxaparin injection 40 mg  Daily         09/14/22 1426     Reason for No Pharmacological VTE Prophylaxis  Once        Question:  Reasons:  Answer:  Already adequately anticoagulated on oral Anticoagulants    09/10/22 1158     IP VTE HIGH RISK PATIENT  Once         09/10/22 1158     Place sequential compression device  Until discontinued         09/10/22 1158                Discharge Planning   TATIANA: 9/23/2022     Code Status: Full Code   Is the patient medically ready for discharge?: Yes    Reason for patient still in hospital (select all that apply): Patient trending condition and Treatment  Discharge Plan A: Skilled Nursing Facility   Discharge Delays: (!) Post-Acute Set-up              Joseph Polanco NP  Department of Hospital Medicine   [facility] - Med Surg  3

## 2022-09-27 LAB
POCT GLUCOSE: 116 MG/DL (ref 70–110)
POCT GLUCOSE: 128 MG/DL (ref 70–110)
POCT GLUCOSE: 128 MG/DL (ref 70–110)
VIT B1 BLD-MCNC: 49 UG/L (ref 38–122)

## 2022-09-27 PROCEDURE — 11000001 HC ACUTE MED/SURG PRIVATE ROOM

## 2022-09-27 PROCEDURE — 99900035 HC TECH TIME PER 15 MIN (STAT)

## 2022-09-27 PROCEDURE — 25000242 PHARM REV CODE 250 ALT 637 W/ HCPCS: Performed by: NURSE PRACTITIONER

## 2022-09-27 PROCEDURE — 63600175 PHARM REV CODE 636 W HCPCS: Performed by: FAMILY MEDICINE

## 2022-09-27 PROCEDURE — 25000003 PHARM REV CODE 250: Performed by: NURSE PRACTITIONER

## 2022-09-27 PROCEDURE — 94640 AIRWAY INHALATION TREATMENT: CPT

## 2022-09-27 PROCEDURE — 94761 N-INVAS EAR/PLS OXIMETRY MLT: CPT

## 2022-09-27 PROCEDURE — 97535 SELF CARE MNGMENT TRAINING: CPT

## 2022-09-27 PROCEDURE — 25000242 PHARM REV CODE 250 ALT 637 W/ HCPCS: Performed by: INTERNAL MEDICINE

## 2022-09-27 RX ADMIN — QUETIAPINE FUMARATE 25 MG: 25 TABLET ORAL at 08:09

## 2022-09-27 RX ADMIN — BUDESONIDE 0.5 MG: 0.5 INHALANT ORAL at 07:09

## 2022-09-27 RX ADMIN — QUETIAPINE FUMARATE 12.5 MG: 25 TABLET, FILM COATED ORAL at 08:09

## 2022-09-27 RX ADMIN — IPRATROPIUM BROMIDE 0.5 MG: 0.5 SOLUTION RESPIRATORY (INHALATION) at 07:09

## 2022-09-27 RX ADMIN — TIMOLOL MALEATE 1 DROP: 5 SOLUTION/ DROPS OPHTHALMIC at 08:09

## 2022-09-27 RX ADMIN — ENOXAPARIN SODIUM 40 MG: 100 INJECTION SUBCUTANEOUS at 05:09

## 2022-09-27 RX ADMIN — TAMSULOSIN HYDROCHLORIDE 0.4 MG: 0.4 CAPSULE ORAL at 08:09

## 2022-09-27 RX ADMIN — THERA TABS 1 TABLET: TAB at 08:09

## 2022-09-27 RX ADMIN — IPRATROPIUM BROMIDE 0.5 MG: 0.5 SOLUTION RESPIRATORY (INHALATION) at 01:09

## 2022-09-27 RX ADMIN — ASPIRIN 81 MG: 81 TABLET, COATED ORAL at 08:09

## 2022-09-27 RX ADMIN — FUROSEMIDE 20 MG: 20 TABLET ORAL at 08:09

## 2022-09-27 NOTE — PROGRESS NOTES
[facility]  [facility]  Progress Note    Patient Name: Joseph Toussaint  MRN: [id number]  Patient Class: IP- Inpatient   Admission Date: 9/10/2022  Length of Stay: 12 days  Attending Physician: Lan Escobar MD  Primary Care Provider: Abad Iqbal MD        Subjective:     Principal Problem:Debility        HPI:  89 y.o. male patient with a PMHx of HTN, COPD, CHF, and DM who presents to the Emergency Department for evaluation of a fall which occurred x 2 days pta. Pt family called for transport to get pt checked out. Pt denies any complaints and is not in pain. No associated sxs reported. Patient denies any CP, SOB, fever, back pain, abd pain, and all other sxs at this time. No prior Tx reported. In the ED, UA consistent with UTI, dx with UTI on 8/27, unclear if patient completed antibiotic regimen. BUN/Cr: 39/2.1, Bili: 2.0, H/H: 10.4/32.3. Afebrile, vitals stable. Oriented to self only on exam. Intitiated on antibiotics in the ED, urine culture pending, treated with IV fluid bolus. Patient is a full code, surrogate decision maker is his son Kevin Toussaint. Placed in observation under the care of hospital medicine.       Overview/Hospital Course:  The patient is a 89 yo male with HTN, COPD, CHF, and DM who was placed in observation for frequent falls, MELISSA, and presumed UTI on IVF and IV  Rocephin.     9/11/22: Pt AAOx3 at times but with notable cognitive deficits. Denies complaints. MELISSA improving 2.1>1.9. Hyperchloremic metabolic acidosis noted- will switch NS IVFs to Bicarb drip. Blood cultures show NGTD. Urine culture showed no growth but urine output is very cloudy -almost purulent. Will switch IV Cipro to oral dose. PT/OT evaluated pt and recommended SNF. Called pt's nephew Kraig (POA) and provided status update.  Pt lives with his nephew and his wife. Currently, Kraig is in the hospital recovering from surgery. He requested discharge to SNF.    9/12/22: Denies complaints. No acute events. MELISSA slowly  improving 2.1>1.9>1.7. cont IVF. SNF placement pending     9/13/22: No acute events. Serum Cr remains 1.7. Will check BNP and Renal U/S. Hold IVFs  Awaiting SNF placement     9/14/22: Increased agitation - Seroquel increased to BID. Serum Cr 1.5. Renal U/s showed mild hydronephrosis on right and moderate on Left with distended bladder. Christopher placed-900 ml creamy white UOP immediately returned. Will d/c Cipro- add IV Rocephin. Urology consulted. Awaiting SNF placement     9/15/22: Seroquel in am made pt too drowsy- will decrease back to just nightly. Gross hematuria to UOP. Urology recommended continuous bladder irrigation. Serum Cr normalized after placing christopher. Hydronephrosis 2/2 urinary retention. Cont IV Rocephin. Repeat urine culture pending. Pt will be placed in SNF when hematuria resolves and repeat urine culture results.     9/16 Gross hematuria resolved. CBI clamped. Continue current treatment.   9/17 Patient remains stable and improved. No hematuria at this time.   9/18 Stable. SNF placement pending.   9/19 Stable. SNF placement pending.   9/20 Stable. SNF placement pending.   9/21 Stable.  Awaiting SNF placement.   9/22 Stable. Pleasant and cooperative at this time.     As of 9/23/2022, pt seen and examined. He is more clam today, however not accepted by [facility], possible [facility] on Monday. Will follow.   As of 9/24/2022, vital signs stable. Patient more awake and alert today. Follow simple commands. Awaiting placement. Will monitor.     As of 9/25/2022, patient awake and cooperative. Vital signs stable. Awaiting placement, possible [facility] on Monday.      As of 9/26/2022, patient awake, following simple commends. Vital signs stable. Awaiting placement, [facility]. Case management following.     As of 9/27/2022, patient seen and examined. No distress. Vital signs stable. Awaiting SNF placement.       Interval History: Awaiting SNF placement     Review of Systems    Constitutional:  Positive for activity change and fatigue. Negative for chills, diaphoresis and fever.   HENT:  Positive for hearing loss. Negative for ear discharge, ear pain and facial swelling.    Eyes:  Negative for pain and redness.   Respiratory:  Negative for cough and shortness of breath.    Cardiovascular:  Negative for leg swelling.   Gastrointestinal:  Negative for abdominal pain, constipation, diarrhea, nausea and vomiting.   Endocrine: Negative for polydipsia and polyphagia.   Genitourinary:  Negative for flank pain and hematuria.               Musculoskeletal:  Positive for arthralgias and gait problem. Negative for back pain, neck pain and neck stiffness.        Left upper extremely weakness (chronic)    Skin:  Negative for color change.   Allergic/Immunologic: Negative for food allergies.   Neurological:  Positive for weakness. Negative for facial asymmetry and speech difficulty.   Hematological:  Does not bruise/bleed easily.   Psychiatric/Behavioral:  Negative for agitation, behavioral problems, hallucinations and suicidal ideas. The patient is not nervous/anxious.    Objective:     Vital Signs (Most Recent):  Temp: 97.5 °F (36.4 °C) (09/27/22 1124)  Pulse: 79 (09/27/22 1300)  Resp: 18 (09/27/22 1300)  BP: 128/83 (09/27/22 1124)  SpO2: 98 % (09/27/22 1300) Vital Signs (24h Range):  Temp:  [97.5 °F (36.4 °C)-98 °F (36.7 °C)] 97.5 °F (36.4 °C)  Pulse:  [70-88] 79  Resp:  [16-26] 18  SpO2:  [95 %-100 %] 98 %  BP: (119-130)/(72-90) 128/83     Weight: 90 kg (198 lb 6.6 oz)  Body mass index is 27.29 kg/m².    Intake/Output Summary (Last 24 hours) at 9/27/2022 1423  Last data filed at 9/27/2022 1200  Gross per 24 hour   Intake 1300 ml   Output 1900 ml   Net -600 ml      Physical Exam  Vitals and nursing note reviewed.   Constitutional:       General: He is not in acute distress.     Appearance: He is well-developed. He is not ill-appearing, toxic-appearing or diaphoretic.   HENT:      Head:  Normocephalic and atraumatic.      Mouth/Throat:      Mouth: Mucous membranes are moist.   Eyes:      General: No scleral icterus.        Right eye: No discharge.         Left eye: No discharge.      Extraocular Movements: Extraocular movements intact.      Conjunctiva/sclera: Conjunctivae normal.      Pupils: Pupils are equal, round, and reactive to light.   Cardiovascular:      Rate and Rhythm: Normal rate and regular rhythm.      Heart sounds: Normal heart sounds. No murmur heard.    No friction rub. No gallop.   Pulmonary:      Effort: Pulmonary effort is normal. No respiratory distress.      Breath sounds: No wheezing or rhonchi.      Comments: BBS diminished  Abdominal:      General: Bowel sounds are normal. There is no distension.      Palpations: Abdomen is soft.      Tenderness: There is no abdominal tenderness. There is no guarding.   Genitourinary:     Comments: Zully urine draining in bag  Musculoskeletal:      Cervical back: Normal range of motion and neck supple. No rigidity or tenderness.      Right lower leg: No edema.      Left lower leg: No edema.      Comments: Stiff joints  General debility  Left upper extremely weakness   Skin:     General: Skin is warm and dry.      Capillary Refill: Capillary refill takes 2 to 3 seconds.   Neurological:      Mental Status: He is alert.      Motor: Weakness present.      Gait: Gait abnormal.      Comments: Oriented person and place  Responds appropriately to simple commands   Psychiatric:         Cognition and Memory: Cognition is impaired. He exhibits impaired recent memory.      Comments: Calm and cooperative       Significant Labs: All pertinent labs within the past 24 hours have been reviewed.  CBC:   CMP:   Recent Labs   Lab 09/26/22  0459      K 3.5   *   CO2 23   *   BUN 37*   CREATININE 1.1   CALCIUM 9.1   ANIONGAP 8       Significant Imaging:     Imaging Results    None            Assessment/Plan:      * Debility  9/16 Fall and skin  precautions  Turn q 2 hours  Continue Physical therapy and Occupational therapy  9/21 Awaiting SNF placement  9/22 Patient refused SNF placement yesterday and some agitation noted at that time. Tele psych consulted and patient currently undergoing a medication adjustment for his Dementia.   9/24/2022, Patient more awake and alert today. Follow simple commands. Awaiting possible placement with [facility] on Monday.     9/25/2022, Patient continues to be more awake and alert. Following simple commands. Awaiting possible placement with [facility] on Monday.         Dementia with behavioral disturbance  Confused on admission\  Cont home emd Seroquel HS    --Avasys  --Fall precautions  --Neuro checks Q4H x24 hours    9/11/22: Pt AAOx3 at times but with notable cognitive deficits.    9/14/22: Increased agitation - Seroquel increased to BID.    9/15/22: Seroquel in am made pt too drowsy- will decrease back to just nightly.     9/17 Stable. Alert, appropriate    9/20 Sleepy but arousable. Calm and cooperative. Discontinue Seroquel  9/21 Stable. Pleasant. Watching tv.    Telepsych consult done and recommended-  Begin / Restart Seroquel at reduced dose of 12.5 mg PO QAM and 25 mg PO QHS (can also reduce to 12.5 mg if excess morning somnolence is observed) for behavioral disturbances / mood / sleep in dementia / possible resolving delirium (discussed risks/benefits/alt vs no treatment with patient).     - Can use Zyprexa 2.5 mg to 5 mg PO/IM q8 hours PRN for non-redirectable behavioral disturbances in dementia / possible resolving delirium (discussed risks/benefits/alt vs no treatment with patient).    Avoid benzodiazepines, antihistamines, anticholinergics, hypnotics, and minimize opiates while controlling for pain as these medications may exacerbate delirium.      9/22 Patient refused SNF placement yesterday and some agitation noted at that time. Tele psych consulted and patient currently undergoing a  medication adjustment for his Dementia. See Above.    9/24/2022, Patient more awake and alert today. Follow simple commands. Awaiting possible placement with [facility] on Monday.     9/25/2022, Patient continues to be more awake and alert. Following simple commands. Awaiting possible placement with [facility] on Monday.         Malnutrition of moderate degree  9/16 Nutrition consulted. Most recent weight and BMI monitored  Add po supplement    Measurements:  Wt Readings from Last 1 Encounters:   09/25/22 90 kg (198 lb 6.6 oz)   Body mass index is 27.29 kg/m².    Recommendations:      Patient has been screened and assessed by RD. RD will follow patient.      Type 2 diabetes mellitus   9/16 Dm diet  Accuchecks with correctional SSI  Blood sugars running 129-135  Lab Results   Component Value Date    HGBA1C 6.6 (H) 07/20/2022 9/17 Blood sugars running 121-145  9/18 Blood sugars running 118-151  9/19 Blood sugars running 110-146  9/20 Blood sugars running 124-132  9/21 Blood sugars running 124-152  9/22 Blood sugars running 140-159     Hypokalemia  9/16 Add 40 meq po x 1 dose      Microcytic anemia  9/16 Add MVI      Hydronephrosis, bilateral  2/2 urinary retention   Serum Cr normalized     9/16 Patient to discharge with christopher and follow up with Urology as Outpatient      Urinary retention  9/16 Patient to discharge with christopher and follow up with Urology as Outpatient       Falls  Patient reported falling at home    --Avasys  --Fall Precautions    9/11/22: PT/OT  SNF placement - CM consulted     9/19 Awaiting SNF placement    Chronic systolic congestive heart failure  Patient is identified as having Systolic (HFrEF) heart failure that is Chronic. CHF is currently controlled. Latest ECHO performed and demonstrates- Results for orders placed during the hospital encounter of 07/19/22    Echo    Interpretation Summary  · Concentric hypertrophy and severely decreased systolic function.  · Mild left atrial  enlargement.  · The estimated ejection fraction is 20%.  · Left ventricular diastolic dysfunction.  · There is left ventricular global hypokinesis.  · Moderate right ventricular enlargement with mildly to moderately reduced right ventricular systolic function.  · Moderate right atrial enlargement.  · There is mild aortic valve stenosis.  · Aortic valve area is 1.90 cm2; peak velocity is 1.05 m/s; mean gradient is 2 mmHg.  · There is severe pulmonary hypertension.  · Moderate tricuspid regurgitation.  · Elevated central venous pressure (15 mmHg).  · The estimated PA systolic pressure is 103 mmHg.  . Continue ACE/ARB, Furosemide and Aldactone and monitor clinical status closely. Monitor on telemetry. Patient is on CHF pathway.  Monitor strict Is&Os and daily weights.  Place on fluid restriction of 1.5 L. Continue to stress to patient importance of self efficacy and  on diet for CHF. Last BNP reviewed- and noted below   No results for input(s): BNP, BNPTRIAGEBLO in the last 168 hours..  EF 20%  Monitor I/o  Daily weights  Spironolactone and Entresto, Lasix, on hold 2/2 normotensive and MELISSA    9/17 stable  9/20 Resume Home lasix at reduced dose  9/22 Stable    Essential hypertension  Normotensive   Home medications Spironolactone and Entresto on hold   --Hydralazine PRN  --Vitals Q4H  9/17 Stable      COPD suggested by initial evaluation  Negative for symptoms of exacerbation    --Continue home regimen    9/17 Stable  9/22 Stable            VTE Risk Mitigation (From admission, onward)         Ordered     Place NIKITA hose  Until discontinued         09/17/22 0846     enoxaparin injection 40 mg  Daily         09/14/22 1426     Reason for No Pharmacological VTE Prophylaxis  Once        Question:  Reasons:  Answer:  Already adequately anticoagulated on oral Anticoagulants    09/10/22 1158     IP VTE HIGH RISK PATIENT  Once         09/10/22 1158     Place sequential compression device  Until discontinued          09/10/22 1158                Discharge Planning   TATIANA: 9/23/2022     Code Status: Full Code   Is the patient medically ready for discharge?: Yes    Reason for patient still in hospital (select all that apply): Patient trending condition and Treatment  Discharge Plan A: Skilled Nursing Facility   Discharge Delays: (!) Post-Acute Set-up              Joseph Polanco NP  Department of Hospital Medicine   [facility] - Med S[facility]

## 2022-09-27 NOTE — PLAN OF CARE
Spoke with Krishna Cornell who agreed to pay out of pocket once pt runs out of SNF days. Relayed this information to Malaika. Malaika stated that she are Francisco from [facility] will work on admitting pt.    Tor Morocho LMSW 9/27/2022 3:20 PM

## 2022-09-27 NOTE — SUBJECTIVE & OBJECTIVE
Interval History: Awaiting SNF placement     Review of Systems   Constitutional:  Positive for activity change and fatigue. Negative for chills, diaphoresis and fever.   HENT:  Positive for hearing loss. Negative for ear discharge, ear pain and facial swelling.    Eyes:  Negative for pain and redness.   Respiratory:  Negative for cough and shortness of breath.    Cardiovascular:  Negative for leg swelling.   Gastrointestinal:  Negative for abdominal pain, constipation, diarrhea, nausea and vomiting.   Endocrine: Negative for polydipsia and polyphagia.   Genitourinary:  Negative for flank pain and hematuria.               Musculoskeletal:  Positive for arthralgias and gait problem. Negative for back pain, neck pain and neck stiffness.        Left upper extremely weakness (chronic)    Skin:  Negative for color change.   Allergic/Immunologic: Negative for food allergies.   Neurological:  Positive for weakness. Negative for facial asymmetry and speech difficulty.   Hematological:  Does not bruise/bleed easily.   Psychiatric/Behavioral:  Negative for agitation, behavioral problems, hallucinations and suicidal ideas. The patient is not nervous/anxious.    Objective:     Vital Signs (Most Recent):  Temp: 97.5 °F (36.4 °C) (09/27/22 1124)  Pulse: 79 (09/27/22 1300)  Resp: 18 (09/27/22 1300)  BP: 128/83 (09/27/22 1124)  SpO2: 98 % (09/27/22 1300) Vital Signs (24h Range):  Temp:  [97.5 °F (36.4 °C)-98 °F (36.7 °C)] 97.5 °F (36.4 °C)  Pulse:  [70-88] 79  Resp:  [16-26] 18  SpO2:  [95 %-100 %] 98 %  BP: (119-130)/(72-90) 128/83     Weight: 90 kg (198 lb 6.6 oz)  Body mass index is 27.29 kg/m².    Intake/Output Summary (Last 24 hours) at 9/27/2022 1423  Last data filed at 9/27/2022 1200  Gross per 24 hour   Intake 1300 ml   Output 1900 ml   Net -600 ml      Physical Exam  Vitals and nursing note reviewed.   Constitutional:       General: He is not in acute distress.     Appearance: He is well-developed. He is not ill-appearing,  toxic-appearing or diaphoretic.   HENT:      Head: Normocephalic and atraumatic.      Mouth/Throat:      Mouth: Mucous membranes are moist.   Eyes:      General: No scleral icterus.        Right eye: No discharge.         Left eye: No discharge.      Extraocular Movements: Extraocular movements intact.      Conjunctiva/sclera: Conjunctivae normal.      Pupils: Pupils are equal, round, and reactive to light.   Cardiovascular:      Rate and Rhythm: Normal rate and regular rhythm.      Heart sounds: Normal heart sounds. No murmur heard.    No friction rub. No gallop.   Pulmonary:      Effort: Pulmonary effort is normal. No respiratory distress.      Breath sounds: No wheezing or rhonchi.      Comments: BBS diminished  Abdominal:      General: Bowel sounds are normal. There is no distension.      Palpations: Abdomen is soft.      Tenderness: There is no abdominal tenderness. There is no guarding.   Genitourinary:     Comments: Zully urine draining in bag  Musculoskeletal:      Cervical back: Normal range of motion and neck supple. No rigidity or tenderness.      Right lower leg: No edema.      Left lower leg: No edema.      Comments: Stiff joints  General debility  Left upper extremely weakness   Skin:     General: Skin is warm and dry.      Capillary Refill: Capillary refill takes 2 to 3 seconds.   Neurological:      Mental Status: He is alert.      Motor: Weakness present.      Gait: Gait abnormal.      Comments: Oriented person and place  Responds appropriately to simple commands   Psychiatric:         Cognition and Memory: Cognition is impaired. He exhibits impaired recent memory.      Comments: Calm and cooperative       Significant Labs: All pertinent labs within the past 24 hours have been reviewed.  CBC:   CMP:   Recent Labs   Lab 09/26/22  0459      K 3.5   *   CO2 23   *   BUN 37*   CREATININE 1.1   CALCIUM 9.1   ANIONGAP 8       Significant Imaging:     Imaging Results    None

## 2022-09-27 NOTE — PLAN OF CARE
Received call from Malaika from the [facility]. After receiving financials from pt's nephew, it appears as though pt/pt's nephew have ~$40K. This contradicts family reports that they could not afford a private sitter to look after him while they sought nursing. Malaika is going to call [facility] to inquire about spending that money down for correction care.    Tor Morocho LMSW 9/27/2022 1:21 PM

## 2022-09-27 NOTE — PLAN OF CARE
Problem: Fluid and Electrolyte Imbalance (Acute Kidney Injury/Impairment)  Goal: Fluid and Electrolyte Balance  Intervention: Monitor and Manage Fluid and Electrolyte Balance  Flowsheets (Taken 9/27/2022 0521)  Fluid/Electrolyte Management: oral rehydration therapy initiated     Problem: Fall Injury Risk  Goal: Absence of Fall and Fall-Related Injury  Intervention: Identify and Manage Contributors  Flowsheets (Taken 9/27/2022 0521)  Self-Care Promotion: meal set-up provided  Intervention: Promote Injury-Free Environment  Flowsheets (Taken 9/27/2022 0521)  Safety Promotion/Fall Prevention:   nonskid shoes/socks when out of bed   Fall Risk reviewed with patient/family   diversional activities provided   bed alarm set   assistive device/personal item within reach   instructed to call staff for mobility     Problem: Skin Injury Risk Increased  Goal: Skin Health and Integrity  Intervention: Optimize Skin Protection  Flowsheets (Taken 9/27/2022 0521)  Pressure Reduction Techniques:   frequent weight shift encouraged   weight shift assistance provided  Pressure Reduction Devices: positioning supports utilized     Problem: Infection  Goal: Absence of Infection Signs and Symptoms  Intervention: Prevent or Manage Infection  Flowsheets (Taken 9/27/2022 0521)  Infection Management: aseptic technique maintained     Problem: Confusion Chronic  Goal: Optimal Cognitive Function  Intervention: Minimize Injury Risk and Provide Safety  Flowsheets (Taken 9/27/2022 0521)  Enhanced Safety Measures:   bed alarm set    at bedside

## 2022-09-27 NOTE — PLAN OF CARE
Spoke with Malaika who stated that [facility] refused financial agreement and they are now denying pt. Spoke with [facility] who said they did not have a sitter and therefore can not take pt home and hung up on Sw[facility].    Tro Morocho LMSW 9/27/2022 4:06 PM

## 2022-09-27 NOTE — PLAN OF CARE
Spoke with pt's nephew. He is sending the requested financial records to the [facility] presently.    Tor Morocho LMSW 9/27/2022 12:44 PM

## 2022-09-28 LAB
POCT GLUCOSE: 120 MG/DL (ref 70–110)
POCT GLUCOSE: 127 MG/DL (ref 70–110)
POCT GLUCOSE: 129 MG/DL (ref 70–110)
POCT GLUCOSE: 149 MG/DL (ref 70–110)

## 2022-09-28 PROCEDURE — 94640 AIRWAY INHALATION TREATMENT: CPT

## 2022-09-28 PROCEDURE — 27000221 HC OXYGEN, UP TO 24 HOURS

## 2022-09-28 PROCEDURE — 25000242 PHARM REV CODE 250 ALT 637 W/ HCPCS: Performed by: INTERNAL MEDICINE

## 2022-09-28 PROCEDURE — 97110 THERAPEUTIC EXERCISES: CPT

## 2022-09-28 PROCEDURE — 25000242 PHARM REV CODE 250 ALT 637 W/ HCPCS: Performed by: NURSE PRACTITIONER

## 2022-09-28 PROCEDURE — 63600175 PHARM REV CODE 636 W HCPCS: Performed by: FAMILY MEDICINE

## 2022-09-28 PROCEDURE — 99900035 HC TECH TIME PER 15 MIN (STAT)

## 2022-09-28 PROCEDURE — 94761 N-INVAS EAR/PLS OXIMETRY MLT: CPT

## 2022-09-28 PROCEDURE — 25000003 PHARM REV CODE 250: Performed by: NURSE PRACTITIONER

## 2022-09-28 PROCEDURE — 11000001 HC ACUTE MED/SURG PRIVATE ROOM

## 2022-09-28 PROCEDURE — 97530 THERAPEUTIC ACTIVITIES: CPT

## 2022-09-28 RX ADMIN — TAMSULOSIN HYDROCHLORIDE 0.4 MG: 0.4 CAPSULE ORAL at 08:09

## 2022-09-28 RX ADMIN — ASPIRIN 81 MG: 81 TABLET, COATED ORAL at 08:09

## 2022-09-28 RX ADMIN — TIMOLOL MALEATE 1 DROP: 5 SOLUTION/ DROPS OPHTHALMIC at 08:09

## 2022-09-28 RX ADMIN — IPRATROPIUM BROMIDE 0.5 MG: 0.5 SOLUTION RESPIRATORY (INHALATION) at 11:09

## 2022-09-28 RX ADMIN — ENOXAPARIN SODIUM 40 MG: 100 INJECTION SUBCUTANEOUS at 07:09

## 2022-09-28 RX ADMIN — FUROSEMIDE 20 MG: 20 TABLET ORAL at 08:09

## 2022-09-28 RX ADMIN — QUETIAPINE FUMARATE 25 MG: 25 TABLET ORAL at 08:09

## 2022-09-28 RX ADMIN — IPRATROPIUM BROMIDE 0.5 MG: 0.5 SOLUTION RESPIRATORY (INHALATION) at 04:09

## 2022-09-28 RX ADMIN — BUDESONIDE 0.5 MG: 0.5 INHALANT ORAL at 07:09

## 2022-09-28 RX ADMIN — BISACODYL 10 MG: 10 SUPPOSITORY RECTAL at 01:09

## 2022-09-28 RX ADMIN — THERA TABS 1 TABLET: TAB at 08:09

## 2022-09-28 RX ADMIN — QUETIAPINE FUMARATE 12.5 MG: 25 TABLET, FILM COATED ORAL at 08:09

## 2022-09-28 RX ADMIN — IPRATROPIUM BROMIDE 0.5 MG: 0.5 SOLUTION RESPIRATORY (INHALATION) at 07:09

## 2022-09-28 NOTE — PLAN OF CARE
TX COMPLETED: facilitated bed mobility with mod A, transfers mod A, Limited ambulation today secondary to increased abdominal discomfort. Recommend SNF

## 2022-09-28 NOTE — PLAN OF CARE
Problem: Adult Inpatient Plan of Care  Goal: Plan of Care Review  Outcome: Ongoing, Progressing  Goal: Patient-Specific Goal (Individualized)  Outcome: Ongoing, Progressing  Goal: Absence of Hospital-Acquired Illness or Injury  Outcome: Ongoing, Progressing  Goal: Optimal Comfort and Wellbeing  Outcome: Ongoing, Progressing  Goal: Readiness for Transition of Care  Outcome: Ongoing, Progressing     Problem: Diabetes Comorbidity  Goal: Blood Glucose Level Within Targeted Range  Outcome: Ongoing, Progressing     Problem: Fluid and Electrolyte Imbalance (Acute Kidney Injury/Impairment)  Goal: Fluid and Electrolyte Balance  Outcome: Ongoing, Progressing     Problem: Oral Intake Inadequate (Acute Kidney Injury/Impairment)  Goal: Optimal Nutrition Intake  Outcome: Ongoing, Progressing     Problem: Renal Function Impairment (Acute Kidney Injury/Impairment)  Goal: Effective Renal Function  Outcome: Ongoing, Progressing     Problem: Fall Injury Risk  Goal: Absence of Fall and Fall-Related Injury  Outcome: Ongoing, Progressing     Problem: Skin Injury Risk Increased  Goal: Skin Health and Integrity  Outcome: Ongoing, Progressing     Problem: Infection  Goal: Absence of Infection Signs and Symptoms  Outcome: Ongoing, Progressing     Problem: Confusion Chronic  Goal: Optimal Cognitive Function  Outcome: Ongoing, Progressing

## 2022-09-28 NOTE — ASSESSMENT & PLAN NOTE
Confused on admission\  Cont home emd Seroquel HS    --Avasys  --Fall precautions  --Neuro checks Q4H x24 hours    9/11/22: Pt AAOx3 at times but with notable cognitive deficits.    9/14/22: Increased agitation - Seroquel increased to BID.    9/15/22: Seroquel in am made pt too drowsy- will decrease back to just nightly.     9/17 Stable. Alert, appropriate    9/20 Sleepy but arousable. Calm and cooperative. Discontinue Seroquel  9/21 Stable. Pleasant. Watching tv.    Telepsych consult done and recommended-  Begin / Restart Seroquel at reduced dose of 12.5 mg PO QAM and 25 mg PO QHS (can also reduce to 12.5 mg if excess morning somnolence is observed) for behavioral disturbances / mood / sleep in dementia / possible resolving delirium (discussed risks/benefits/alt vs no treatment with patient).     - Can use Zyprexa 2.5 mg to 5 mg PO/IM q8 hours PRN for non-redirectable behavioral disturbances in dementia / possible resolving delirium (discussed risks/benefits/alt vs no treatment with patient).    Avoid benzodiazepines, antihistamines, anticholinergics, hypnotics, and minimize opiates while controlling for pain as these medications may exacerbate delirium.      9/22 Patient refused SNF placement yesterday and some agitation noted at that time. Tele psych consulted and patient currently undergoing a medication adjustment for his Dementia. See Above.    9/24/2022, Patient more awake and alert today. Follow simple commands. Awaiting possible placement with [facility] on Monday.     9/25/2022, Patient continues to be more awake and alert. Following simple commands. Awaiting possible placement with [facility] on Monday.

## 2022-09-28 NOTE — PROGRESS NOTES
[facility] - Med Surg 3  [facility] Medicine  Progress Note    Patient Name: Joseph Toussaint  MRN: [id number]  Patient Class: IP- Inpatient   Admission Date: 9/10/2022  Length of Stay: 13 days  Attending Physician: Lan Escobar MD  Primary Care Provider: Abad Iqbal MD        Subjective:     Principal Problem:Debility        HPI:  89 y.o. male patient with a PMHx of HTN, COPD, CHF, and DM who presents to the Emergency Department for evaluation of a fall which occurred x 2 days pta. Pt family called for transport to get pt checked out. Pt denies any complaints and is not in pain. No associated sxs reported. Patient denies any CP, SOB, fever, back pain, abd pain, and all other sxs at this time. No prior Tx reported. In the ED, UA consistent with UTI, dx with UTI on 8/27, unclear if patient completed antibiotic regimen. BUN/Cr: 39/2.1, Bili: 2.0, H/H: 10.4/32.3. Afebrile, vitals stable. Oriented to self only on exam. Intitiated on antibiotics in the ED, urine culture pending, treated with IV fluid bolus. Patient is a full code, surrogate decision maker is his son Kevin Toussaint. Placed in observation under the care of hospital medicine.       Overview/Hospital Course:  The patient is a 89 yo male with HTN, COPD, CHF, and DM who was placed in observation for frequent falls, MELSISA, and presumed UTI on IVF and IV  Rocephin.     9/11/22: Pt AAOx3 at times but with notable cognitive deficits. Denies complaints. MELISSA improving 2.1>1.9. Hyperchloremic metabolic acidosis noted- will switch NS IVFs to Bicarb drip. Blood cultures show NGTD. Urine culture showed no growth but urine output is very cloudy -almost purulent. Will switch IV Cipro to oral dose. PT/OT evaluated pt and recommended SNF. Called pt's nephew Kraig (POA) and provided status update.  Pt lives with his nephew and his wife. Currently, Kraig is in the hospital recovering from surgery. He requested discharge to SNF.    9/12/22: Denies complaints. No acute events. MELISSA slowly  improving 2.1>1.9>1.7. cont IVF. SNF placement pending     9/13/22: No acute events. Serum Cr remains 1.7. Will check BNP and Renal U/S. Hold IVFs  Awaiting SNF placement     9/14/22: Increased agitation - Seroquel increased to BID. Serum Cr 1.5. Renal U/s showed mild hydronephrosis on right and moderate on Left with distended bladder. Christopher placed-900 ml creamy white UOP immediately returned. Will d/c Cipro- add IV Rocephin. Urology consulted. Awaiting SNF placement     9/15/22: Seroquel in am made pt too drowsy- will decrease back to just nightly. Gross hematuria to UOP. Urology recommended continuous bladder irrigation. Serum Cr normalized after placing christopher. Hydronephrosis 2/2 urinary retention. Cont IV Rocephin. Repeat urine culture pending. Pt will be placed in SNF when hematuria resolves and repeat urine culture results.     9/16 Gross hematuria resolved. CBI clamped. Continue current treatment.   9/17 Patient remains stable and improved. No hematuria at this time.   9/18 Stable. SNF placement pending.   9/19 Stable. SNF placement pending.   9/20 Stable. SNF placement pending.   9/21 Stable.  Awaiting SNF placement.   9/22 Stable. Pleasant and cooperative at this time.     As of 9/23/2022, pt seen and examined. He is more clam today, however not accepted by [facility], possible [facility] on Monday. Will follow.   As of 9/24/2022, vital signs stable. Patient more awake and alert today. Follow simple commands. Awaiting placement. Will monitor.     As of 9/25/2022, patient awake and cooperative. Vital signs stable. Awaiting placement, possible [facility] on Monday.      As of 9/26/2022, patient awake, following simple commends. Vital signs stable. Awaiting placement, [facility]. Case management following.     As of 9/27/2022, patient seen and examined. No distress. Vital signs stable. Awaiting SNF placement.     As of 9/28/2022, vital signs stable. No distress. Awaiting placement,  plan discharge tomorrow [facility].       Interval History: Plan discharge tomorrow to [facility]     Review of Systems   Constitutional:  Positive for activity change and fatigue. Negative for chills, diaphoresis and fever.   HENT:  Positive for hearing loss. Negative for ear discharge, ear pain and facial swelling.    Eyes:  Negative for pain and redness.   Respiratory:  Negative for cough and shortness of breath.    Cardiovascular:  Negative for leg swelling.   Gastrointestinal:  Negative for abdominal pain, constipation, diarrhea, nausea and vomiting.   Endocrine: Negative for polydipsia and polyphagia.   Genitourinary:  Negative for flank pain and hematuria.               Musculoskeletal:  Positive for arthralgias and gait problem. Negative for back pain, neck pain and neck stiffness.        Left upper extremely weakness (chronic)    Skin:  Negative for color change.   Allergic/Immunologic: Negative for food allergies.   Neurological:  Positive for weakness. Negative for facial asymmetry and speech difficulty.   Hematological:  Does not bruise/bleed easily.   Psychiatric/Behavioral:  Negative for agitation, behavioral problems, hallucinations and suicidal ideas. The patient is not nervous/anxious.    Objective:     Vital Signs (Most Recent):  Temp: 97.3 °F (36.3 °C) (09/28/22 1200)  Pulse: 74 (09/28/22 1200)  Resp: 16 (09/28/22 1200)  BP: 132/77 (09/28/22 1200)  SpO2: 99 % (09/28/22 1101) Vital Signs (24h Range):  Temp:  [97.3 °F (36.3 °C)-97.8 °F (36.6 °C)] 97.3 °F (36.3 °C)  Pulse:  [64-80] 74  Resp:  [16-20] 16  SpO2:  [96 %-100 %] 99 %  BP: (130-160)/(72-93) 132/77     Weight: 90 kg (198 lb 6.6 oz)  Body mass index is 27.29 kg/m².    Intake/Output Summary (Last 24 hours) at 9/28/2022 1442  Last data filed at 9/28/2022 0432  Gross per 24 hour   Intake 480 ml   Output 600 ml   Net -120 ml      Physical Exam  Vitals and nursing note reviewed.   Constitutional:       General: He is not in acute  distress.     Appearance: He is well-developed. He is not ill-appearing, toxic-appearing or diaphoretic.   HENT:      Head: Normocephalic and atraumatic.      Mouth/Throat:      Mouth: Mucous membranes are moist.   Eyes:      General: No scleral icterus.        Right eye: No discharge.         Left eye: No discharge.      Extraocular Movements: Extraocular movements intact.      Conjunctiva/sclera: Conjunctivae normal.      Pupils: Pupils are equal, round, and reactive to light.   Cardiovascular:      Rate and Rhythm: Normal rate and regular rhythm.      Heart sounds: Normal heart sounds. No murmur heard.    No friction rub. No gallop.   Pulmonary:      Effort: Pulmonary effort is normal. No respiratory distress.      Breath sounds: No wheezing or rhonchi.      Comments: BBS diminished  Abdominal:      General: Bowel sounds are normal. There is no distension.      Palpations: Abdomen is soft.      Tenderness: There is no abdominal tenderness. There is no guarding.   Genitourinary:     Comments: Zully urine draining in bag  Musculoskeletal:      Cervical back: Normal range of motion and neck supple. No rigidity or tenderness.      Right lower leg: No edema.      Left lower leg: No edema.      Comments: Stiff joints  General debility  Left upper extremely weakness   Skin:     General: Skin is warm and dry.      Capillary Refill: Capillary refill takes 2 to 3 seconds.   Neurological:      Mental Status: He is alert.      Motor: Weakness present.      Gait: Gait abnormal.      Comments: Oriented person and place  Responds appropriately to simple commands   Psychiatric:         Cognition and Memory: Cognition is impaired. He exhibits impaired recent memory.      Comments: Calm and cooperative             Assessment/Plan:      * Debility  9/16 Fall and skin precautions  Turn q 2 hours  Continue Physical therapy and Occupational therapy  9/21 Awaiting SNF placement  9/22 Patient refused SNF placement yesterday and some  agitation noted at that time. Tele psych consulted and patient currently undergoing a medication adjustment for his Dementia.   9/24/2022, Patient more awake and alert today. Follow simple commands. Awaiting possible placement with [facility] on Monday.     9/25/2022, Patient continues to be more awake and alert. Following simple commands. Awaiting possible placement with [facility] on Monday.         Dementia with behavioral disturbance  Confused on admission\  Cont home emd Seroquel HS    --Avasys  --Fall precautions  --Neuro checks Q4H x24 hours    9/11/22: Pt AAOx3 at times but with notable cognitive deficits.    9/14/22: Increased agitation - Seroquel increased to BID.    9/15/22: Seroquel in am made pt too drowsy- will decrease back to just nightly.     9/17 Stable. Alert, appropriate    9/20 Sleepy but arousable. Calm and cooperative. Discontinue Seroquel  9/21 Stable. Pleasant. Watching tv.    Telepsych consult done and recommended-  Begin / Restart Seroquel at reduced dose of 12.5 mg PO QAM and 25 mg PO QHS (can also reduce to 12.5 mg if excess morning somnolence is observed) for behavioral disturbances / mood / sleep in dementia / possible resolving delirium (discussed risks/benefits/alt vs no treatment with patient).     - Can use Zyprexa 2.5 mg to 5 mg PO/IM q8 hours PRN for non-redirectable behavioral disturbances in dementia / possible resolving delirium (discussed risks/benefits/alt vs no treatment with patient).    Avoid benzodiazepines, antihistamines, anticholinergics, hypnotics, and minimize opiates while controlling for pain as these medications may exacerbate delirium.      9/22 Patient refused SNF placement yesterday and some agitation noted at that time. Tele psych consulted and patient currently undergoing a medication adjustment for his Dementia. See Above.    9/24/2022, Patient more awake and alert today. Follow simple commands. Awaiting possible placement with [facility] on Monday.     9/25/2022, Patient continues to be more awake and alert. Following simple commands. Awaiting possible placement with [facility] on Monday.         Malnutrition of moderate degree  9/16 Nutrition consulted. Most recent weight and BMI monitored  Add po supplement    Measurements:  Wt Readings from Last 1 Encounters:   09/25/22 90 kg (198 lb 6.6 oz)   Body mass index is 27.29 kg/m².    Recommendations:      Patient has been screened and assessed by RD. RD will follow patient.      Type 2 diabetes mellitus   9/16 Dm diet  Accuchecks with correctional SSI  Blood sugars running 129-135  Lab Results   Component Value Date    HGBA1C 6.6 (H) 07/20/2022 9/17 Blood sugars running 121-145  9/18 Blood sugars running 118-151  9/19 Blood sugars running 110-146  9/20 Blood sugars running 124-132  9/21 Blood sugars running 124-152  9/22 Blood sugars running 140-159     Hypokalemia  9/16 Add 40 meq po x 1 dose      Microcytic anemia  9/16 Add MVI      Hydronephrosis, bilateral  2/2 urinary retention   Serum Cr normalized     9/16 Patient to discharge with christopher and follow up with Urology as Outpatient      Urinary retention  9/16 Patient to discharge with christopher and follow up with Urology as Outpatient       Falls  Patient reported falling at home    --Avasys  --Fall Precautions    9/11/22: PT/OT  SNF placement - CM consulted     9/19 Awaiting SNF placement    Chronic systolic congestive heart failure  Patient is identified as having Systolic (HFrEF) heart failure that is Chronic. CHF is currently controlled. Latest ECHO performed and demonstrates- Results for orders placed during the hospital encounter of 07/19/22    Echo    Interpretation Summary  · Concentric hypertrophy and severely decreased systolic function.  · Mild left atrial enlargement.  · The estimated ejection fraction is 20%.  · Left ventricular diastolic dysfunction.  · There is left ventricular global hypokinesis.  · Moderate right  ventricular enlargement with mildly to moderately reduced right ventricular systolic function.  · Moderate right atrial enlargement.  · There is mild aortic valve stenosis.  · Aortic valve area is 1.90 cm2; peak velocity is 1.05 m/s; mean gradient is 2 mmHg.  · There is severe pulmonary hypertension.  · Moderate tricuspid regurgitation.  · Elevated central venous pressure (15 mmHg).  · The estimated PA systolic pressure is 103 mmHg.  . Continue ACE/ARB, Furosemide and Aldactone and monitor clinical status closely. Monitor on telemetry. Patient is on CHF pathway.  Monitor strict Is&Os and daily weights.  Place on fluid restriction of 1.5 L. Continue to stress to patient importance of self efficacy and  on diet for CHF. Last BNP reviewed- and noted below   No results for input(s): BNP, BNPTRIAGEBLO in the last 168 hours..  EF 20%  Monitor I/o  Daily weights  Spironolactone and Entresto, Lasix, on hold 2/2 normotensive and MELISSA    9/17 stable  9/20 Resume Home lasix at reduced dose  9/22 Stable    Essential hypertension  Normotensive   Home medications Spironolactone and Entresto on hold   --Hydralazine PRN  --Vitals Q4H  9/17 Stable      COPD suggested by initial evaluation  Negative for symptoms of exacerbation    --Continue home regimen    9/17 Stable  9/22 Stable            VTE Risk Mitigation (From admission, onward)         Ordered     Place NIKITA hose  Until discontinued         09/17/22 0846     enoxaparin injection 40 mg  Daily         09/14/22 1426     Reason for No Pharmacological VTE Prophylaxis  Once        Question:  Reasons:  Answer:  Already adequately anticoagulated on oral Anticoagulants    09/10/22 1158     IP VTE HIGH RISK PATIENT  Once         09/10/22 1158     Place sequential compression device  Until discontinued         09/10/22 1158                Discharge Planning   TATIANA: 9/23/2022     Code Status: Full Code   Is the patient medically ready for discharge?: Yes    Reason for patient still  in hospital (select all that apply): Patient trending condition and Treatment  Discharge Plan A: Skilled Nursing Facility   Discharge Delays: (!) Post-Acute Set-up              Joseph Polanco NP  Department of Hospital Medicine   [facility] - Med Surg 3

## 2022-09-28 NOTE — PLAN OF CARE
Per Kraig Dai has now agreed to pay 30 days in advance for senior care care.    Tor Morocho LMSW 9/28/2022 9:29 AM

## 2022-09-28 NOTE — PT/OT/SLP PROGRESS
"Physical Therapy  Treatment    Joseph Toussaint   MRN: [id number]   Admitting Diagnosis: Debility       PT Start Time: 1317     PT Stop Time: 1340    PT Total Time (min): 23 min       Billable Minutes:  Therapeutic Activity 10 and Therapeutic Exercise 13    Treatment Type: Evaluation  PT/PTA: PT     PTA Visit Number: 0       General Precautions: Standard, fall  Orthopedic Precautions: N/A   Braces: N/A  Respiratory Status: Nasal cannula, flow     Spiritual, Cultural Beliefs, Mandaen Practices, Values that Affect Care: no    Subjective:  Communicated with nurse Miller prior to session.  Pt presents supine in bed, agreeable to PT with increased encouragement. Refused gait activity "I don't want to have to pick myself off the floor"    Pain/Comfort  Pain Rating 1:  (unable to rate, increased complaints of discomfort related to constipation)  Pain Addressed 1: Distraction, Reposition, Nurse notified (nursing aware and provided meds following session (suppository))    Objective:   Patient found with: peripheral IV, christopher catheter, oxygen    Functional Mobility:  Bed Mobility: mod A with increased cuing for sequencing and safety, Facilitated transfer training with RW and mod A. Manual assist to facilitate appropriate lower trunk anterior weight shifting needed to achieve standing position. While standing pt able to participate in marching 2x15 reps with intermittent rest breaks and mod/min A to maintain balance.          AM-PAC 6 CLICK MOBILITY  How much help from another person does this patient currently need?   1 = Unable, Total/Dependent Assistance  2 = A lot, Maximum/Moderate Assistance  3 = A little, Minimum/Contact Guard/Supervision  4 = None, Modified [place]/Independent    Turning over in bed (including adjusting bedclothes, sheets and blankets)?: 2  Sitting down on and standing up from a chair with arms (e.g., wheelchair, bedside commode, etc.): 2  Moving from lying on back to sitting on the side of the " bed?: 2  Moving to and from a bed to a chair (including a wheelchair)?: 2  Need to walk in hospital room?: 1  Climbing 3-5 steps with a railing?: 1  Basic Mobility Total Score: 10    AM-PAC Raw Score CMS G-Code Modifier Level of Impairment Assistance   6 % Total / Unable   7 - 9 CM 80 - 100% Maximal Assist   10 - 14 CL 60 - 80% Moderate Assist   15 - 19 CK 40 - 60% Moderate Assist   20 - 22 CJ 20 - 40% Minimal Assist   23 CI 1-20% SBA / CGA   24 CH 0% Independent/ Mod I     Patient left right sidelying at nursing request with all lines intact, call button in reach, bed alarm on. Nursing at bedside to provide suppository.    Assessment:  Joseph Toussaint is a 89 y.o. male with a medical diagnosis of Debility and presents with deficits in strength, balance and other deficits listed below. Pt will benefit from continued PT services at SNF level to promote safe mobility.    Rehab identified problem list/impairments: Rehab identified problem list/impairments: weakness, impaired endurance, impaired functional mobility, gait instability, impaired cognition, decreased safety awareness    Rehab potential is good.    Activity tolerance: Good    Discharge recommendations: Discharge Facility/Level of Care Needs: nursing facility, skilled     Barriers to discharge:      Equipment recommendations:       GOALS:   Multidisciplinary Problems       Physical Therapy Goals          Problem: Physical Therapy    Goal Priority Disciplines Outcome Goal Variances Interventions   Physical Therapy Goal     PT, PT/OT Ongoing, Progressing     Description: LTG to be met by 10-10-22:    Bed mobility: SPV  Transfers: CGA with RW   Gait: CGA with RW                       PLAN:    Patient to be seen 3 x/week  to address the above listed problems via gait training, therapeutic activities, therapeutic exercises, neuromuscular re-education  Plan of Care expires: 10/10/22  Plan of Care reviewed with: patient         09/28/2022

## 2022-09-28 NOTE — PLAN OF CARE
Kraig went to [facility] and refused to pay in advance.     Received call from Kimberlyn at [facility]. They have accepted. Pt can DC there tomorrow.    Tor Morocho LMSW 9/28/2022 2:20 PM

## 2022-09-28 NOTE — SUBJECTIVE & OBJECTIVE
Interval History: Plan discharge tomorrow to [facility]     Review of Systems   Constitutional:  Positive for activity change and fatigue. Negative for chills, diaphoresis and fever.   HENT:  Positive for hearing loss. Negative for ear discharge, ear pain and facial swelling.    Eyes:  Negative for pain and redness.   Respiratory:  Negative for cough and shortness of breath.    Cardiovascular:  Negative for leg swelling.   Gastrointestinal:  Negative for abdominal pain, constipation, diarrhea, nausea and vomiting.   Endocrine: Negative for polydipsia and polyphagia.   Genitourinary:  Negative for flank pain and hematuria.               Musculoskeletal:  Positive for arthralgias and gait problem. Negative for back pain, neck pain and neck stiffness.        Left upper extremely weakness (chronic)    Skin:  Negative for color change.   Allergic/Immunologic: Negative for food allergies.   Neurological:  Positive for weakness. Negative for facial asymmetry and speech difficulty.   Hematological:  Does not bruise/bleed easily.   Psychiatric/Behavioral:  Negative for agitation, behavioral problems, hallucinations and suicidal ideas. The patient is not nervous/anxious.    Objective:     Vital Signs (Most Recent):  Temp: 97.3 °F (36.3 °C) (09/28/22 1200)  Pulse: 74 (09/28/22 1200)  Resp: 16 (09/28/22 1200)  BP: 132/77 (09/28/22 1200)  SpO2: 99 % (09/28/22 1101) Vital Signs (24h Range):  Temp:  [97.3 °F (36.3 °C)-97.8 °F (36.6 °C)] 97.3 °F (36.3 °C)  Pulse:  [64-80] 74  Resp:  [16-20] 16  SpO2:  [96 %-100 %] 99 %  BP: (130-160)/(72-93) 132/77     Weight: 90 kg (198 lb 6.6 oz)  Body mass index is 27.29 kg/m².    Intake/Output Summary (Last 24 hours) at 9/28/2022 1442  Last data filed at 9/28/2022 0432  Gross per 24 hour   Intake 480 ml   Output 600 ml   Net -120 ml      Physical Exam  Vitals and nursing note reviewed.   Constitutional:       General: He is not in acute distress.     Appearance: He is well-developed. He is  not ill-appearing, toxic-appearing or diaphoretic.   HENT:      Head: Normocephalic and atraumatic.      Mouth/Throat:      Mouth: Mucous membranes are moist.   Eyes:      General: No scleral icterus.        Right eye: No discharge.         Left eye: No discharge.      Extraocular Movements: Extraocular movements intact.      Conjunctiva/sclera: Conjunctivae normal.      Pupils: Pupils are equal, round, and reactive to light.   Cardiovascular:      Rate and Rhythm: Normal rate and regular rhythm.      Heart sounds: Normal heart sounds. No murmur heard.    No friction rub. No gallop.   Pulmonary:      Effort: Pulmonary effort is normal. No respiratory distress.      Breath sounds: No wheezing or rhonchi.      Comments: BBS diminished  Abdominal:      General: Bowel sounds are normal. There is no distension.      Palpations: Abdomen is soft.      Tenderness: There is no abdominal tenderness. There is no guarding.   Genitourinary:     Comments: Zully urine draining in bag  Musculoskeletal:      Cervical back: Normal range of motion and neck supple. No rigidity or tenderness.      Right lower leg: No edema.      Left lower leg: No edema.      Comments: Stiff joints  General debility  Left upper extremely weakness   Skin:     General: Skin is warm and dry.      Capillary Refill: Capillary refill takes 2 to 3 seconds.   Neurological:      Mental Status: He is alert.      Motor: Weakness present.      Gait: Gait abnormal.      Comments: Oriented person and place  Responds appropriately to simple commands   Psychiatric:         Cognition and Memory: Cognition is impaired. He exhibits impaired recent memory.      Comments: Calm and cooperative

## 2022-09-29 VITALS
HEIGHT: 72 IN | RESPIRATION RATE: 18 BRPM | BODY MASS INDEX: 26.88 KG/M2 | WEIGHT: 198.44 LBS | SYSTOLIC BLOOD PRESSURE: 129 MMHG | DIASTOLIC BLOOD PRESSURE: 71 MMHG | TEMPERATURE: 97 F | HEART RATE: 85 BPM | OXYGEN SATURATION: 97 %

## 2022-09-29 LAB
POCT GLUCOSE: 115 MG/DL (ref 70–110)
POCT GLUCOSE: 225 MG/DL (ref 70–110)

## 2022-09-29 PROCEDURE — 25000003 PHARM REV CODE 250: Performed by: NURSE PRACTITIONER

## 2022-09-29 PROCEDURE — 63600175 PHARM REV CODE 636 W HCPCS: Performed by: NURSE PRACTITIONER

## 2022-09-29 PROCEDURE — 27000221 HC OXYGEN, UP TO 24 HOURS

## 2022-09-29 PROCEDURE — 94761 N-INVAS EAR/PLS OXIMETRY MLT: CPT

## 2022-09-29 PROCEDURE — 25000242 PHARM REV CODE 250 ALT 637 W/ HCPCS: Performed by: INTERNAL MEDICINE

## 2022-09-29 PROCEDURE — 94640 AIRWAY INHALATION TREATMENT: CPT

## 2022-09-29 PROCEDURE — 99900035 HC TECH TIME PER 15 MIN (STAT)

## 2022-09-29 RX ORDER — BISACODYL 5 MG
10 TABLET, DELAYED RELEASE (ENTERIC COATED) ORAL DAILY PRN
Status: DISCONTINUED | OUTPATIENT
Start: 2022-09-29 | End: 2022-09-29

## 2022-09-29 RX ORDER — ADHESIVE BANDAGE
30 BANDAGE TOPICAL ONCE
Status: COMPLETED | OUTPATIENT
Start: 2022-09-29 | End: 2022-09-29

## 2022-09-29 RX ORDER — AMOXICILLIN 250 MG
1 CAPSULE ORAL 2 TIMES DAILY
Start: 2022-09-29

## 2022-09-29 RX ORDER — AMOXICILLIN 250 MG
1 CAPSULE ORAL DAILY PRN
Status: DISCONTINUED | OUTPATIENT
Start: 2022-09-29 | End: 2022-09-29 | Stop reason: HOSPADM

## 2022-09-29 RX ADMIN — ASPIRIN 81 MG: 81 TABLET, COATED ORAL at 08:09

## 2022-09-29 RX ADMIN — TIMOLOL MALEATE 1 DROP: 5 SOLUTION/ DROPS OPHTHALMIC at 08:09

## 2022-09-29 RX ADMIN — INSULIN ASPART 2 UNITS: 100 INJECTION, SOLUTION INTRAVENOUS; SUBCUTANEOUS at 01:09

## 2022-09-29 RX ADMIN — IPRATROPIUM BROMIDE 0.5 MG: 0.5 SOLUTION RESPIRATORY (INHALATION) at 01:09

## 2022-09-29 RX ADMIN — SODIUM PHOSPHATE, DIBASIC AND SODIUM PHOSPHATE, MONOBASIC 1 ENEMA: 7; 19 ENEMA RECTAL at 11:09

## 2022-09-29 RX ADMIN — QUETIAPINE FUMARATE 12.5 MG: 25 TABLET, FILM COATED ORAL at 08:09

## 2022-09-29 RX ADMIN — THERA TABS 1 TABLET: TAB at 08:09

## 2022-09-29 RX ADMIN — MAGNESIUM HYDROXIDE 2400 MG: 400 SUSPENSION ORAL at 12:09

## 2022-09-29 RX ADMIN — FUROSEMIDE 20 MG: 20 TABLET ORAL at 08:09

## 2022-09-29 NOTE — PLAN OF CARE
[facility] - Med Surg 3  Discharge Final Note    Primary Care Provider: Abad Iqbal MD    Expected Discharge Date: 9/29/2022    Final Discharge Note (most recent)       Final Note - 09/29/22 1114          Final Note    Assessment Type Final Discharge Note     Anticipated Discharge Disposition Skilled Nursing Facility        Post-Acute Status    Post-Acute Authorization Placement     Post-Acute Placement Status Set-up Complete/Auth obtained     Discharge Delays None known at this time                     Important Message from Medicare  Important Message from Medicare regarding Discharge Appeal Rights: Explained to patient/caregiver, Signed/date by patient/caregiver, Given to patient/caregiver     Date IMM was signed: 09/29/22  Time IMM was signed: 0946    Contact Info       Yifan Sarmiento MD   Specialty: Urology    [address] [place] [address] [address] [address]   Phone: [phone]       Next Steps: Follow up in 1 week(s)    Instructions: Discharge with the christopher in place, and will follow-up with us in 1 week for a voiding trial.    Abad Iqbal MD   Specialty: Family Medicine   Relationship: PCP - General    [address] [place] [address] [address] [place] [address]   Phone: [phone]       Next Steps: Follow up in 1 week(s)          Pt to DC to [facility].  time 2:30. Number to call report [phone].    Tor Morocho LMSW 9/29/2022 11:15 AM

## 2022-09-29 NOTE — NURSING
Report given to [facility] nurse Estephania. Facility notified about placed christopher and requests to keep christopher intact. Awaiting transport from facility.

## 2022-09-29 NOTE — NURSING
Pt discharged to [facility] transporter. Pt accompanied by nephew and niece. IV removed and intact. Discharge summary given to facility transporter. Pt transported via facility personal wheelchair to pt transport area. Sanchez catheter in place due to facility request.

## 2022-09-29 NOTE — PLAN OF CARE
Problem: Adult Inpatient Plan of Care  Goal: Plan of Care Review  Outcome: Met  Goal: Patient-Specific Goal (Individualized)  Outcome: Met  Goal: Absence of Hospital-Acquired Illness or Injury  Outcome: Met  Goal: Optimal Comfort and Wellbeing  Outcome: Met  Goal: Readiness for Transition of Care  Outcome: Met     Problem: Diabetes Comorbidity  Goal: Blood Glucose Level Within Targeted Range  Outcome: Met     Problem: Fluid and Electrolyte Imbalance (Acute Kidney Injury/Impairment)  Goal: Fluid and Electrolyte Balance  Outcome: Met     Problem: Oral Intake Inadequate (Acute Kidney Injury/Impairment)  Goal: Optimal Nutrition Intake  Outcome: Met     Problem: Renal Function Impairment (Acute Kidney Injury/Impairment)  Goal: Effective Renal Function  Outcome: Met     Problem: Fall Injury Risk  Goal: Absence of Fall and Fall-Related Injury  Outcome: Met     Problem: Skin Injury Risk Increased  Goal: Skin Health and Integrity  Outcome: Met     Problem: Infection  Goal: Absence of Infection Signs and Symptoms  Outcome: Met     Problem: Confusion Chronic  Goal: Optimal Cognitive Function  Outcome: Met

## 2022-09-29 NOTE — DISCHARGE SUMMARY
[facility] - Med Surg 3  Hospital Medicine  Discharge Summary      Patient Name: Joseph Toussaint  MRN: [id number]  Patient Class: IP- Inpatient  Admission Date: 9/10/2022  Hospital Length of Stay: 14 days  Discharge Date and Time: 9/29/2022  2:36 PM  Attending Physician: Lan Escobar MD   Discharging Provider: Joseph Polanco NP  Primary Care Provider: Abad Iqbal MD      HPI:   89 y.o. male patient with a PMHx of HTN, COPD, CHF, and DM who presents to the Emergency Department for evaluation of a fall which occurred x 2 days pta. Pt family called for transport to get pt checked out. Pt denies any complaints and is not in pain. No associated sxs reported. Patient denies any CP, SOB, fever, back pain, abd pain, and all other sxs at this time. No prior Tx reported. In the ED, UA consistent with UTI, dx with UTI on 8/27, unclear if patient completed antibiotic regimen. BUN/Cr: 39/2.1, Bili: 2.0, H/H: 10.4/32.3. Afebrile, vitals stable. Oriented to self only on exam. Intitiated on antibiotics in the ED, urine culture pending, treated with IV fluid bolus. Patient is a full code, surrogate decision maker is his son Kevin Toussaint. Placed in observation under the care of hospital medicine.       * No surgery found *      Hospital Course:   The patient is a 89 yo male with HTN, COPD, CHF, and DM who was placed in observation for frequent falls, MELISSA, and presumed UTI on IVF and IV  Rocephin.     9/11/22: Pt AAOx3 at times but with notable cognitive deficits. Denies complaints. MELISSA improving 2.1>1.9. Hyperchloremic metabolic acidosis noted- will switch NS IVFs to Bicarb drip. Blood cultures show NGTD. Urine culture showed no growth but urine output is very cloudy -almost purulent. Will switch IV Cipro to oral dose. PT/OT evaluated pt and recommended SNF. Called pt's nephew Kraig (POA) and provided status update.  Pt lives with his nephew and his wife. Currently, Kraig is in the hospital recovering from surgery. He requested  discharge to SNF.    9/12/22: Denies complaints. No acute events. MELISSA slowly improving 2.1>1.9>1.7. cont IVF. SNF placement pending     9/13/22: No acute events. Serum Cr remains 1.7. Will check BNP and Renal U/S. Hold IVFs  Awaiting SNF placement     9/14/22: Increased agitation - Seroquel increased to BID. Serum Cr 1.5. Renal U/s showed mild hydronephrosis on right and moderate on Left with distended bladder. Christopher placed-900 ml creamy white UOP immediately returned. Will d/c Cipro- add IV Rocephin. Urology consulted. Awaiting SNF placement     9/15/22: Seroquel in am made pt too drowsy- will decrease back to just nightly. Gross hematuria to UOP. Urology recommended continuous bladder irrigation. Serum Cr normalized after placing christopher. Hydronephrosis 2/2 urinary retention. Cont IV Rocephin. Repeat urine culture pending. Pt will be placed in SNF when hematuria resolves and repeat urine culture results.     9/16 Gross hematuria resolved. CBI clamped. Continue current treatment.   9/17 Patient remains stable and improved. No hematuria at this time.   9/18 Stable. SNF placement pending.   9/19 Stable. SNF placement pending.   9/20 Stable. SNF placement pending.   9/21 Stable.  Awaiting SNF placement.   9/22 Stable. Pleasant and cooperative at this time.     As of 9/23/2022, pt seen and examined. He is more clam today, however not accepted by [facility], possible [facility] on Monday. Will follow.   As of 9/24/2022, vital signs stable. Patient more awake and alert today. Follow simple commands. Awaiting placement. Will monitor.     As of 9/25/2022, patient awake and cooperative. Vital signs stable. Awaiting placement, possible [facility] on Monday.      As of 9/26/2022, patient awake, following simple commends. Vital signs stable. Awaiting placement, [facility]. Case management following.     As of 9/27/2022, patient seen and examined. No distress. Vital signs stable. Awaiting SNF  placement.     As of 9/28/2022, vital signs stable. No distress. Awaiting placement, plan discharge tomorrow [facility].     As of 9/29/2022, vital signs stable, no distress, patient experiencing some constipation. Laxative and enema given. Will discharge with stool softener, as well. Patient was seen, examined and deemed stable for discharge.        Goals of Care Treatment Preferences:  Code Status: Full Code      Consults:   Consults (From admission, onward)        Status Ordering Provider     Inpatient consult to [facility]  Once        Provider:  Brooks Esparza MD    Completed ALIZA BEE     Inpatient consult to Social Work  Once        Provider:  (Not yet assigned)    Completed ALIZA BEE     Inpatient consult to Urology  Once        Provider:  Yifan Sarmiento MD    Completed MARIMAR LESLIE     Inpatient consult to Social Work  Once        Provider:  (Not yet assigned)    Completed MARIMAR LESLIE          No new Assessment & Plan notes have been filed under this hospital service since the last note was generated.  Service: Hospital Medicine    Final Active Diagnoses:    Diagnosis Date Noted POA    PRINCIPAL PROBLEM:  Debility [R53.81] 09/16/2022 Yes    Dementia with behavioral disturbance [F03.91] 08/01/2022 No    Microcytic anemia [D50.9] 09/16/2022 Yes    Hypokalemia [E87.6] 09/16/2022 Yes    Type 2 diabetes mellitus [E11.9] 09/16/2022 Yes    Malnutrition of moderate degree [E44.0] 09/16/2022 Yes    Urinary retention [R33.9] 09/15/2022 Yes    Hydronephrosis, bilateral [N13.30] 09/15/2022 Yes    Falls [W19.XXXA] 09/10/2022 Yes    Chronic systolic congestive heart failure [I50.22] 07/20/2022 Yes    COPD suggested by initial evaluation [J44.9] 06/13/2022 Yes    Essential hypertension [I10] 10/26/2018 Yes      Problems Resolved During this Admission:    Diagnosis Date Noted Date Resolved POA    Gross hematuria [R31.0] 09/15/2022 09/17/2022 Yes     Hyperchloremic metabolic acidosis [E87.2] 09/11/2022 09/12/2022 Yes    Acute renal failure [N17.9] 09/10/2022 09/16/2022 Yes    Acute cystitis with hematuria [N30.01] 09/10/2022 09/21/2022 Yes       Discharged Condition: stable    Disposition: Skilled Nursing Facility    Follow Up:   Follow-up Information     Yifan Sarmiento MD Follow up in 1 week(s).    Specialty: Urology  Why: Discharge with the christopher in place, and will follow-up with us in 1 week for a voiding trial.  Contact information:  [address] [place] [address] [address] [place] [address]  [phone]             Abad Iqbal MD Follow up in 1 week(s).    Specialty: Family Medicine  Contact information:  [address] [place] [address] [address] [address]  [phone]                       Patient Instructions:   No discharge procedures on file.    Significant Diagnostic Studies:     Pending Diagnostic Studies:     None         Medications:  Reconciled Home Medications:      Medication List      START taking these medications    acetaminophen 325 MG tablet  Commonly known as: TYLENOL  Take 2 tablets (650 mg total) by mouth every 4 (four) hours as needed for Pain.     aluminum-magnesium hydroxide-simethicone 200-200-20 mg/5 mL Susp  Commonly known as: MAALOX  Take 30 mLs by mouth every 6 (six) hours as needed (indigestion).     bisacodyL 10 mg Supp  Commonly known as: DULCOLAX  Place 1 suppository (10 mg total) rectally daily as needed (Until bowel movement if patient has no bowel movement for 2 days).     enoxaparin 40 mg/0.4 mL Syrg  Commonly known as: LOVENOX  Inject 0.4 mLs (40 mg total) into the skin once daily. As DVT prophylaxis     multivitamin Tab  Take 1 tablet by mouth once daily.     senna-docusate 8.6-50 mg 8.6-50 mg per tablet  Commonly known as: SENNA WITH DOCUSATE SODIUM  Take 1 tablet by mouth 2 (two) times a day.        CHANGE how you take these medications    furosemide 40 MG tablet  Commonly known as: LASIX  Take 0.5 tablets (20 mg total)  by mouth once daily.  What changed: how much to take        CONTINUE taking these medications    albuterol 90 mcg/actuation inhaler  Commonly known as: PROVENTIL/VENTOLIN HFA  Inhale 1-2 puffs into the lungs every 6 (six) hours as needed for Wheezing. Rescue     aspirin 81 MG EC tablet  Commonly known as: ASPIR-81  Take 1 tablet (81 mg total) by mouth once daily.     BREO ELLIPTA 100-25 mcg/dose diskus inhaler  Generic drug: fluticasone furoate-vilanteroL  Inhale 1 puff into the lungs once daily. Controller     ENTRESTO 49-51 mg per tablet  Generic drug: sacubitriL-valsartan  Take 1 tablet by mouth 2 (two) times daily.     pantoprazole 40 MG tablet  Commonly known as: PROTONIX  Take 1 tablet (40 mg total) by mouth once daily.     pravastatin 80 MG tablet  Commonly known as: PRAVACHOL  Take 1 tablet (80 mg total) by mouth nightly.     SPIRIVA WITH HANDIHALER 18 mcg inhalation capsule  Generic drug: tiotropium  Inhale 1 capsule (18 mcg total) into the lungs nightly. Controller     tamsulosin 0.4 mg Cap  Commonly known as: FLOMAX  Take 1 capsule (0.4 mg total) by mouth every evening.     timolol maleate 0.5% 0.5 % Drop  Commonly known as: TIMOPTIC  Apply 1 drop to eye 2 (two) times daily.        STOP taking these medications    JARDIANCE 10 mg tablet  Generic drug: empagliflozin     QUEtiapine 25 MG Tab  Commonly known as: SEROQUEL     spironolactone 25 MG tablet  Commonly known as: ALDACTONE            Indwelling Lines/Drains at time of discharge:   Lines/Drains/Airways     Drain  Duration                Urethral Catheter 09/15/22 1045 Triple-lumen 22 Fr. 14 days                Time spent on the discharge of patient: 45 minutes         Joseph Polanco NP  Department of Hospital Medicine  [facility] - Med Surg 3

## 2023-02-17 ENCOUNTER — PATIENT OUTREACH (OUTPATIENT)
Dept: ADMINISTRATIVE | Facility: OTHER | Age: 88
End: 2023-02-17
Payer: MEDICARE

## 2023-02-17 NOTE — PROGRESS NOTES
CHW - Case Closure    This Community Health Worker spoke to caregiver via telephone today.   Pt/Caregiver reported: patient is

## 2023-06-10 NOTE — ASSESSMENT & PLAN NOTE
9/16 Patient to discharge with christopher and follow up with Urology as Outpatient      I have personally provided the amount of critical care time documented below excluding time spent on separate procedures.

## 2024-02-08 NOTE — H&P
[facility] Medicine  History & Physical    Patient Name: Joseph Toussaint  MRN: [id number]  Patient Class: IP- Inpatient  Admission Date: 7/19/2022  Attending Physician: Ren Moura MD   Primary Care Provider: Primary Doctor No         Patient information was obtained from patient, past medical records and ER records.     Subjective:     Principal Problem:Acute on chronic systolic congestive heart failure    Chief Complaint:   Chief Complaint   Patient presents with    Shortness of Breath     x1 week, audible wheezing in triage. Hx COPD, CHF.        HPI: History was taken from the patient and electronic chart .No family at bedside at this time.  89-year-old  male with history of COPD and CHF ,chronic active smoker ,DM,BPH  presenting to the emergency department complaining of worsening shortness of breath over the past week.  There is associated history of cough ,denies history of leg swelling ,dizziness or syncope.  He was recently discharged from [facility]-07/12/2022 and from that documentation, he was sent to the SNF after recent diagnosis of CHF with EF -25-30% . He was continued on Aldactone/Entresto.   He was also seen in the ED -07/13/2022 with worsening dyspnoea and was discharged home.  He was [facility]06/01 for acute CHF . ECho done at that time -06/2022 showed    1. Normal left ventricular cavity size. Severely depressed left ventricular systolic   function. LVEF 25 - 30%.   2. Mildly dilated right ventricle. Moderate right ventricular hypokinesis.   3. Moderate to severe mitral valve regurgitation.   4. Moderate to severe tricuspid valve regurgitation  Labs and imaging test reviewed.  Cardiomegaly with perihilar edema.  Mild pleural effusions suspected  BNP- more than 4900  CBC -hemoglobin 10.8  He will be admitted for CHF exacerbation /COPD.    His  primary care physician is Dr. Leonid Noguera.       Past Medical History:  February 8, 2024    Hello, may I speak with Charlene Bey?    My name is Carla      I am  with [facility]  [address]  [address]  [facility] [address] [phone].    Before we get started may I verify your date of birth? 1955    I am calling to  ask about your recent visit. Is this a good time to talk? yes    Tell me about your visit with us. What things went well?  everything, happy to be here       We're always looking for ways to make our patients' experiences even better. Do you have recommendations on ways we may improve?  no    Overall were you satisfied with your follow up visit to our practice? yes       I appreciate you taking the time to speak with me today. Is there anything else I can do for you? no      Thank you, and have a great day.         Diagnosis Date    CHF (congestive heart failure)     COPD (chronic obstructive pulmonary disease)     HTN (hypertension)        History reviewed. No pertinent surgical history.    Review of patient's allergies indicates:   Allergen Reactions    Penicillins        No current facility-administered medications on file prior to encounter.     Current Outpatient Medications on File Prior to Encounter   Medication Sig    albuterol (PROVENTIL/VENTOLIN HFA) 90 mcg/actuation inhaler Inhale 1-2 puffs into the lungs every 6 (six) hours as needed for Wheezing. Rescue     Family History    None       Tobacco Use    Smoking status: Current Every Day Smoker     Packs/day: 1.00     Types: Cigarettes    Smokeless tobacco: Never Used   Substance and Sexual Activity    Alcohol use: Yes     Comment: occasionally    Drug use: Not Currently    Sexual activity: Not Currently     Review of Systems   Constitutional:  Positive for activity change and appetite change. Negative for chills, diaphoresis, fatigue, fever and unexpected weight change.   HENT:  Negative for congestion, dental problem, drooling and ear discharge.    Respiratory:  Positive for cough and shortness of breath.    Cardiovascular:  Negative for palpitations and leg swelling.   Gastrointestinal:  Negative for abdominal distention and abdominal pain.   Endocrine: Negative for cold intolerance.   Musculoskeletal:  Negative for arthralgias and back pain.   Allergic/Immunologic: Negative for environmental allergies.   Psychiatric/Behavioral:  Negative for agitation and behavioral problems.    Objective:     Vital Signs (Most Recent):  Temp: 98.8 °F (37.1 °C) (07/20/22 0417)  Pulse: 83 (07/20/22 0417)  Resp: 18 (07/20/22 0417)  BP: (!) 163/89 (07/20/22 0417)  SpO2: 99 % (07/20/22 0417)   Vital Signs (24h Range):  Temp:  [97.6 °F (36.4 °C)-98.8 °F (37.1 °C)] 98.8 °F (37.1 °C)  Pulse:  [80-97] 83  Resp:  [18-30] 18  SpO2:  [92 %-100 %] 99 %  BP: (134-165)/()  163/89     Weight: 101.5 kg (223 lb 12.3 oz)  Body mass index is 31.21 kg/m².    Physical Exam  Vitals and nursing note reviewed.   Constitutional:       Appearance: He is well-developed.   HENT:      Head: Normocephalic and atraumatic.      Nose: Nose normal.   Eyes:      Pupils: Pupils are equal, round, and reactive to light.   Cardiovascular:      Rate and Rhythm: Normal rate and regular rhythm.      Heart sounds: Normal heart sounds.   Pulmonary:      Effort: Pulmonary effort is normal. No respiratory distress.      Breath sounds: Rales present. No wheezing.      Comments: Barrel chest   Abdominal:      General: Abdomen is flat. There is no distension.      Tenderness: There is no abdominal tenderness.   Musculoskeletal:         General: Normal range of motion.      Cervical back: Normal range of motion and neck supple.   Skin:     General: Skin is dry.   Neurological:      Mental Status: He is alert. Mental status is at baseline.   Psychiatric:         Mood and Affect: Mood normal.         CRANIAL NERVES     CN III, IV, VI   Pupils are equal, round, and reactive to light.     Significant Labs: All pertinent labs within the past 24 hours have been reviewed.  BMP:   Recent Labs   Lab 07/19/22  1800   *      K 4.0   *   CO2 17*   BUN 26*   CREATININE 1.4   CALCIUM 9.4     CBC:   Recent Labs   Lab 07/19/22  1800   WBC 4.80   HGB 10.8*   HCT 35.6*        CMP:   Recent Labs   Lab 07/19/22  1800      K 4.0   *   CO2 17*   *   BUN 26*   CREATININE 1.4   CALCIUM 9.4   PROT 7.4   ALBUMIN 3.6   BILITOT 1.7*   ALKPHOS 178*   AST 12   ALT 14   ANIONGAP 11   EGFRNONAA 44*     Cardiac Markers:   Recent Labs   Lab 07/19/22  1800   BNP >4,900*       Significant Imaging: I have reviewed all pertinent imaging results/findings within the past 24 hours.    Assessment/Plan:     * Acute on chronic systolic congestive heart failure  Patient is identified as having Systolic (HFrEF) heart  failure that is Acute on chronic. CHF is currently uncontrolled due to Dyspnea not returned to baseline after 1 doses of IV diuretic. Latest ECHO performed and demonstrates- 06/2022  CONCLUSIONS:   1. Normal left ventricular cavity size. Severely depressed left ventricular systolic   function. LVEF 25 - 30%.   2. Mildly dilated right ventricle. Moderate right ventricular hypokinesis.   3. Moderate to severe mitral valve regurgitation.   4. Moderate to severe tricuspid valve regurgitation    . Continue Nitrate/Vasodilator and monitor clinical status closely. Monitor on telemetry. Patient is on CHF pathway.  Monitor strict Is&Os and daily weights.  Place on fluid restriction of 1 L. Continue to stress to patient importance of self efficacy and  on diet for CHF. Last BNP reviewed- and noted below   Recent Labs   Lab 07/19/22  1800   BNP >4,900*   .  Will start entresto, lasix,aldactone  cardiology consult       Mixed hyperlipidemia  Will monitor closely , out patient follow up       Type 2 diabetes mellitus with diabetic polyneuropathy, without long-term current use of insulin    Insulin sliding scale, diabetic diet      Essential hypertension    On Entresto , monitor BP closely ,low salt diet     COPD suggested by initial evaluation    duonebs as tolerated, encourage nicotine cessation  ,has about 70 pack years., start zithromax     Benign prostatic hyperplasia with nocturia  Start flomax. Monitor clinical response        VTE Risk Mitigation (From admission, onward)         Ordered     enoxaparin injection 40 mg  Daily         07/19/22 2131     IP VTE HIGH RISK PATIENT  Once         07/19/22 2131     Place sequential compression device  Until discontinued         07/19/22 2131                   Yifan Malhotra MD  Department of Hospital Medicine   [facility] - Telemetry ([facility])

## 2025-01-29 NOTE — ASSESSMENT & PLAN NOTE
Left message with patient to return call.     Patient is identified as having Systolic (HFrEF) heart failure that is Chronic. CHF is currently controlled. Latest ECHO performed and demonstrates- Results for orders placed during the hospital encounter of 07/19/22    Echo    Interpretation Summary  · Concentric hypertrophy and severely decreased systolic function.  · Mild left atrial enlargement.  · The estimated ejection fraction is 20%.  · Left ventricular diastolic dysfunction.  · There is left ventricular global hypokinesis.  · Moderate right ventricular enlargement with mildly to moderately reduced right ventricular systolic function.  · Moderate right atrial enlargement.  · There is mild aortic valve stenosis.  · Aortic valve area is 1.90 cm2; peak velocity is 1.05 m/s; mean gradient is 2 mmHg.  · There is severe pulmonary hypertension.  · Moderate tricuspid regurgitation.  · Elevated central venous pressure (15 mmHg).  · The estimated PA systolic pressure is 103 mmHg.  . Continue ACE/ARB, Furosemide and Aldactone and monitor clinical status closely. Monitor on telemetry. Patient is on CHF pathway.  Monitor strict Is&Os and daily weights.  Place on fluid restriction of 1.5 L. Continue to stress to patient importance of self efficacy and  on diet for CHF. Last BNP reviewed- and noted below   Recent Labs   Lab 09/13/22  1449   BNP >4,900*   .  EF 20%  Monitor I/o  Daily weights  Spironolactone and Entresto, Lasix, on hold 2/2 normotensive and MELISSA    9/16 stable